# Patient Record
Sex: MALE | Race: BLACK OR AFRICAN AMERICAN | Employment: PART TIME | URBAN - METROPOLITAN AREA
[De-identification: names, ages, dates, MRNs, and addresses within clinical notes are randomized per-mention and may not be internally consistent; named-entity substitution may affect disease eponyms.]

---

## 2017-01-13 RX ORDER — TRAMADOL HYDROCHLORIDE 50 MG/1
50 TABLET ORAL EVERY 6 HOURS PRN
Qty: 40 TABLET | Refills: 0 | Status: SHIPPED | OUTPATIENT
Start: 2017-01-13 | End: 2017-01-25 | Stop reason: SDUPTHER

## 2017-01-25 ENCOUNTER — OFFICE VISIT (OUTPATIENT)
Dept: ORTHOPEDIC SURGERY | Age: 72
End: 2017-01-25

## 2017-01-25 VITALS
DIASTOLIC BLOOD PRESSURE: 86 MMHG | BODY MASS INDEX: 30.16 KG/M2 | SYSTOLIC BLOOD PRESSURE: 126 MMHG | HEART RATE: 74 BPM | HEIGHT: 74 IN | WEIGHT: 235 LBS

## 2017-01-25 DIAGNOSIS — M19.012 ARTHRITIS OF LEFT ACROMIOCLAVICULAR JOINT: ICD-10-CM

## 2017-01-25 DIAGNOSIS — Z98.890 S/P ROTATOR CUFF REPAIR: Primary | ICD-10-CM

## 2017-01-25 PROCEDURE — 99024 POSTOP FOLLOW-UP VISIT: CPT | Performed by: ORTHOPAEDIC SURGERY

## 2017-01-25 RX ORDER — TRAMADOL HYDROCHLORIDE 50 MG/1
50 TABLET ORAL EVERY 6 HOURS PRN
Qty: 40 TABLET | Refills: 0 | Status: SHIPPED | OUTPATIENT
Start: 2017-01-25 | End: 2017-02-04

## 2017-02-13 ENCOUNTER — OFFICE VISIT (OUTPATIENT)
Dept: INTERNAL MEDICINE CLINIC | Age: 72
End: 2017-02-13

## 2017-02-13 VITALS
BODY MASS INDEX: 29.92 KG/M2 | TEMPERATURE: 98.4 F | WEIGHT: 233 LBS | RESPIRATION RATE: 20 BRPM | OXYGEN SATURATION: 98 % | SYSTOLIC BLOOD PRESSURE: 132 MMHG | HEART RATE: 85 BPM | DIASTOLIC BLOOD PRESSURE: 62 MMHG

## 2017-02-13 DIAGNOSIS — J06.9 ACUTE URI: Primary | ICD-10-CM

## 2017-02-13 PROCEDURE — 99214 OFFICE O/P EST MOD 30 MIN: CPT | Performed by: INTERNAL MEDICINE

## 2017-02-13 RX ORDER — GUAIFENESIN 600 MG/1
1200 TABLET, EXTENDED RELEASE ORAL 2 TIMES DAILY
Qty: 28 TABLET | Refills: 0 | Status: SHIPPED | OUTPATIENT
Start: 2017-02-13 | End: 2019-10-22 | Stop reason: SDUPTHER

## 2017-02-13 ASSESSMENT — ENCOUNTER SYMPTOMS
SHORTNESS OF BREATH: 0
RHINORRHEA: 1
HEMOPTYSIS: 0
COUGH: 1
HEARTBURN: 0
SORE THROAT: 1
WHEEZING: 0

## 2017-02-15 DIAGNOSIS — R05.9 COUGH: Primary | ICD-10-CM

## 2017-02-15 RX ORDER — DEXTROMETHORPHAN HYDROBROMIDE AND PROMETHAZINE HYDROCHLORIDE 15; 6.25 MG/5ML; MG/5ML
5 SYRUP ORAL 4 TIMES DAILY PRN
Qty: 240 ML | Refills: 0 | Status: SHIPPED | OUTPATIENT
Start: 2017-02-15 | End: 2017-02-15 | Stop reason: SDUPTHER

## 2017-02-15 RX ORDER — AZITHROMYCIN 250 MG/1
TABLET, FILM COATED ORAL
Qty: 1 PACKET | Refills: 0 | Status: SHIPPED | OUTPATIENT
Start: 2017-02-15 | End: 2017-02-15 | Stop reason: SDUPTHER

## 2017-02-15 RX ORDER — DEXTROMETHORPHAN HYDROBROMIDE AND PROMETHAZINE HYDROCHLORIDE 15; 6.25 MG/5ML; MG/5ML
5 SYRUP ORAL 4 TIMES DAILY PRN
Qty: 240 ML | Refills: 0 | Status: SHIPPED | OUTPATIENT
Start: 2017-02-15 | End: 2018-01-08 | Stop reason: SDUPTHER

## 2017-02-15 RX ORDER — AZITHROMYCIN 250 MG/1
TABLET, FILM COATED ORAL
Qty: 1 PACKET | Refills: 0 | Status: SHIPPED | OUTPATIENT
Start: 2017-02-15 | End: 2017-03-20 | Stop reason: ALTCHOICE

## 2017-02-16 ENCOUNTER — TELEPHONE (OUTPATIENT)
Dept: INTERNAL MEDICINE CLINIC | Age: 72
End: 2017-02-16

## 2017-03-08 ENCOUNTER — OFFICE VISIT (OUTPATIENT)
Dept: RHEUMATOLOGY | Age: 72
End: 2017-03-08

## 2017-03-08 VITALS
BODY MASS INDEX: 30.03 KG/M2 | HEART RATE: 88 BPM | HEIGHT: 74 IN | DIASTOLIC BLOOD PRESSURE: 82 MMHG | WEIGHT: 234 LBS | SYSTOLIC BLOOD PRESSURE: 132 MMHG

## 2017-03-08 DIAGNOSIS — M25.511 CHRONIC PAIN OF BOTH SHOULDERS: Primary | ICD-10-CM

## 2017-03-08 DIAGNOSIS — Z79.1 ENCOUNTER FOR LONG-TERM (CURRENT) USE OF NON-STEROIDAL ANTI-INFLAMMATORIES: ICD-10-CM

## 2017-03-08 DIAGNOSIS — M65.9 FLEXOR TENOSYNOVITIS OF FINGER: ICD-10-CM

## 2017-03-08 DIAGNOSIS — G89.29 CHRONIC PAIN OF BOTH SHOULDERS: Primary | ICD-10-CM

## 2017-03-08 DIAGNOSIS — M25.512 CHRONIC PAIN OF BOTH SHOULDERS: Primary | ICD-10-CM

## 2017-03-08 LAB
ALBUMIN SERPL-MCNC: 4.4 G/DL (ref 3.4–5)
ALP BLD-CCNC: 110 U/L (ref 40–129)
ALT SERPL-CCNC: 21 U/L (ref 10–40)
AST SERPL-CCNC: 22 U/L (ref 15–37)
BASOPHILS ABSOLUTE: 0 K/UL (ref 0–0.2)
BASOPHILS RELATIVE PERCENT: 0.3 %
BILIRUB SERPL-MCNC: 0.4 MG/DL (ref 0–1)
BILIRUBIN DIRECT: <0.2 MG/DL (ref 0–0.3)
BILIRUBIN, INDIRECT: NORMAL MG/DL (ref 0–1)
CREAT SERPL-MCNC: 1.7 MG/DL (ref 0.8–1.3)
EOSINOPHILS ABSOLUTE: 0.1 K/UL (ref 0–0.6)
EOSINOPHILS RELATIVE PERCENT: 3.2 %
GFR AFRICAN AMERICAN: 48
GFR NON-AFRICAN AMERICAN: 40
HCT VFR BLD CALC: 43.2 % (ref 40.5–52.5)
HEMOGLOBIN: 13.5 G/DL (ref 13.5–17.5)
LYMPHOCYTES ABSOLUTE: 1.7 K/UL (ref 1–5.1)
LYMPHOCYTES RELATIVE PERCENT: 38.7 %
MCH RBC QN AUTO: 26.3 PG (ref 26–34)
MCHC RBC AUTO-ENTMCNC: 31.3 G/DL (ref 31–36)
MCV RBC AUTO: 83.8 FL (ref 80–100)
MONOCYTES ABSOLUTE: 0.4 K/UL (ref 0–1.3)
MONOCYTES RELATIVE PERCENT: 9.2 %
NEUTROPHILS ABSOLUTE: 2.1 K/UL (ref 1.7–7.7)
NEUTROPHILS RELATIVE PERCENT: 48.6 %
PDW BLD-RTO: 15.8 % (ref 12.4–15.4)
PLATELET # BLD: 166 K/UL (ref 135–450)
PMV BLD AUTO: 8.9 FL (ref 5–10.5)
RBC # BLD: 5.15 M/UL (ref 4.2–5.9)
TOTAL PROTEIN: 7.2 G/DL (ref 6.4–8.2)
WBC # BLD: 4.3 K/UL (ref 4–11)

## 2017-03-08 PROCEDURE — 99213 OFFICE O/P EST LOW 20 MIN: CPT | Performed by: INTERNAL MEDICINE

## 2017-03-08 RX ORDER — MELOXICAM 7.5 MG/1
7.5 TABLET ORAL DAILY
Qty: 90 TABLET | Refills: 1 | Status: SHIPPED | OUTPATIENT
Start: 2017-03-08 | End: 2017-06-19 | Stop reason: SDUPTHER

## 2017-03-08 RX ORDER — MELOXICAM 7.5 MG/1
7.5 TABLET ORAL DAILY
COMMUNITY
End: 2017-03-08 | Stop reason: SDUPTHER

## 2017-03-09 ENCOUNTER — TELEPHONE (OUTPATIENT)
Dept: INTERNAL MEDICINE CLINIC | Age: 72
End: 2017-03-09

## 2017-03-20 ENCOUNTER — OFFICE VISIT (OUTPATIENT)
Dept: INTERNAL MEDICINE CLINIC | Age: 72
End: 2017-03-20

## 2017-03-20 VITALS
WEIGHT: 234 LBS | HEART RATE: 72 BPM | TEMPERATURE: 98.2 F | SYSTOLIC BLOOD PRESSURE: 118 MMHG | OXYGEN SATURATION: 97 % | DIASTOLIC BLOOD PRESSURE: 80 MMHG | RESPIRATION RATE: 18 BRPM | BODY MASS INDEX: 30.04 KG/M2

## 2017-03-20 DIAGNOSIS — M25.511 CHRONIC PAIN OF BOTH SHOULDERS: Primary | ICD-10-CM

## 2017-03-20 DIAGNOSIS — G89.29 CHRONIC PAIN OF BOTH SHOULDERS: Primary | ICD-10-CM

## 2017-03-20 DIAGNOSIS — N17.9 ACUTE RENAL FAILURE, UNSPECIFIED ACUTE RENAL FAILURE TYPE (HCC): ICD-10-CM

## 2017-03-20 DIAGNOSIS — M25.512 CHRONIC PAIN OF BOTH SHOULDERS: Primary | ICD-10-CM

## 2017-03-20 PROCEDURE — 99214 OFFICE O/P EST MOD 30 MIN: CPT | Performed by: INTERNAL MEDICINE

## 2017-03-20 RX ORDER — TRAMADOL HYDROCHLORIDE 50 MG/1
50 TABLET ORAL EVERY 8 HOURS PRN
Qty: 90 TABLET | Refills: 1 | Status: SHIPPED | OUTPATIENT
Start: 2017-03-20 | End: 2017-03-30

## 2017-03-20 ASSESSMENT — PATIENT HEALTH QUESTIONNAIRE - PHQ9
1. LITTLE INTEREST OR PLEASURE IN DOING THINGS: 0
2. FEELING DOWN, DEPRESSED OR HOPELESS: 0
SUM OF ALL RESPONSES TO PHQ9 QUESTIONS 1 & 2: 0
SUM OF ALL RESPONSES TO PHQ QUESTIONS 1-9: 0

## 2017-03-27 ENCOUNTER — TELEPHONE (OUTPATIENT)
Dept: INTERNAL MEDICINE CLINIC | Age: 72
End: 2017-03-27

## 2017-03-27 RX ORDER — TRAMADOL HYDROCHLORIDE 50 MG/1
50 TABLET ORAL EVERY 8 HOURS PRN
Qty: 90 TABLET | Refills: 1 | OUTPATIENT
Start: 2017-03-27 | End: 2018-06-25 | Stop reason: SDUPTHER

## 2017-03-31 ASSESSMENT — ENCOUNTER SYMPTOMS: BACK PAIN: 0

## 2017-04-24 DIAGNOSIS — I10 ESSENTIAL HYPERTENSION: Primary | ICD-10-CM

## 2017-04-24 DIAGNOSIS — R79.89 ELEVATED SERUM CREATININE: ICD-10-CM

## 2017-04-24 DIAGNOSIS — I10 ESSENTIAL HYPERTENSION: ICD-10-CM

## 2017-04-24 LAB
ALBUMIN SERPL-MCNC: 4.2 G/DL (ref 3.4–5)
ANION GAP SERPL CALCULATED.3IONS-SCNC: 16 MMOL/L (ref 3–16)
BUN BLDV-MCNC: 17 MG/DL (ref 7–20)
CALCIUM SERPL-MCNC: 9 MG/DL (ref 8.3–10.6)
CHLORIDE BLD-SCNC: 102 MMOL/L (ref 99–110)
CO2: 27 MMOL/L (ref 21–32)
CREAT SERPL-MCNC: 1.3 MG/DL (ref 0.8–1.3)
GFR AFRICAN AMERICAN: >60
GFR NON-AFRICAN AMERICAN: 55
GLUCOSE BLD-MCNC: 108 MG/DL (ref 70–99)
PHOSPHORUS: 3.1 MG/DL (ref 2.5–4.9)
POTASSIUM SERPL-SCNC: 3.8 MMOL/L (ref 3.5–5.1)
SODIUM BLD-SCNC: 145 MMOL/L (ref 136–145)

## 2017-04-24 PROCEDURE — 36415 COLL VENOUS BLD VENIPUNCTURE: CPT | Performed by: INTERNAL MEDICINE

## 2017-04-28 ENCOUNTER — OFFICE VISIT (OUTPATIENT)
Dept: INTERNAL MEDICINE CLINIC | Age: 72
End: 2017-04-28

## 2017-04-28 VITALS
TEMPERATURE: 98.8 F | HEART RATE: 87 BPM | WEIGHT: 231 LBS | BODY MASS INDEX: 29.66 KG/M2 | DIASTOLIC BLOOD PRESSURE: 72 MMHG | SYSTOLIC BLOOD PRESSURE: 110 MMHG | OXYGEN SATURATION: 96 %

## 2017-04-28 DIAGNOSIS — E78.5 DYSLIPIDEMIA: ICD-10-CM

## 2017-04-28 DIAGNOSIS — I10 ESSENTIAL HYPERTENSION: Primary | ICD-10-CM

## 2017-04-28 DIAGNOSIS — N17.9 ACUTE RENAL FAILURE, UNSPECIFIED ACUTE RENAL FAILURE TYPE (HCC): ICD-10-CM

## 2017-04-28 PROCEDURE — 99213 OFFICE O/P EST LOW 20 MIN: CPT | Performed by: INTERNAL MEDICINE

## 2017-04-28 RX ORDER — ROSUVASTATIN CALCIUM 40 MG/1
40 TABLET, COATED ORAL EVERY EVENING
Qty: 90 TABLET | Refills: 2 | Status: SHIPPED | OUTPATIENT
Start: 2017-04-28 | End: 2018-09-18 | Stop reason: SDUPTHER

## 2017-04-30 ASSESSMENT — ENCOUNTER SYMPTOMS
RESPIRATORY NEGATIVE: 1
EYES NEGATIVE: 1

## 2017-05-02 ENCOUNTER — TELEPHONE (OUTPATIENT)
Dept: INTERNAL MEDICINE CLINIC | Age: 72
End: 2017-05-02

## 2017-06-01 DIAGNOSIS — I10 ESSENTIAL HYPERTENSION: ICD-10-CM

## 2017-06-01 LAB
ALBUMIN SERPL-MCNC: 4.4 G/DL (ref 3.4–5)
ANION GAP SERPL CALCULATED.3IONS-SCNC: 14 MMOL/L (ref 3–16)
BUN BLDV-MCNC: 19 MG/DL (ref 7–20)
CALCIUM SERPL-MCNC: 9.2 MG/DL (ref 8.3–10.6)
CHLORIDE BLD-SCNC: 98 MMOL/L (ref 99–110)
CO2: 27 MMOL/L (ref 21–32)
CREAT SERPL-MCNC: 1.3 MG/DL (ref 0.8–1.3)
GFR AFRICAN AMERICAN: >60
GFR NON-AFRICAN AMERICAN: 55
GLUCOSE BLD-MCNC: 79 MG/DL (ref 70–99)
PHOSPHORUS: 3.7 MG/DL (ref 2.5–4.9)
POTASSIUM SERPL-SCNC: 4 MMOL/L (ref 3.5–5.1)
SODIUM BLD-SCNC: 139 MMOL/L (ref 136–145)

## 2017-06-19 ENCOUNTER — OFFICE VISIT (OUTPATIENT)
Dept: INTERNAL MEDICINE CLINIC | Age: 72
End: 2017-06-19

## 2017-06-19 VITALS
HEIGHT: 73 IN | OXYGEN SATURATION: 98 % | RESPIRATION RATE: 18 BRPM | HEART RATE: 68 BPM | SYSTOLIC BLOOD PRESSURE: 124 MMHG | BODY MASS INDEX: 30.88 KG/M2 | TEMPERATURE: 98.3 F | WEIGHT: 233 LBS | DIASTOLIC BLOOD PRESSURE: 74 MMHG

## 2017-06-19 DIAGNOSIS — I10 ESSENTIAL HYPERTENSION: ICD-10-CM

## 2017-06-19 DIAGNOSIS — Z00.00 ROUTINE GENERAL MEDICAL EXAMINATION AT A HEALTH CARE FACILITY: ICD-10-CM

## 2017-06-19 DIAGNOSIS — Z72.89 OTHER PROBLEMS RELATED TO LIFESTYLE: ICD-10-CM

## 2017-06-19 DIAGNOSIS — R35.1 NOCTURIA: ICD-10-CM

## 2017-06-19 DIAGNOSIS — Z12.5 SCREENING FOR PROSTATE CANCER: ICD-10-CM

## 2017-06-19 DIAGNOSIS — Z23 NEED FOR PROPHYLACTIC VACCINATION WITH COMBINED DIPHTHERIA-TETANUS-PERTUSSIS (DTP) VACCINE: ICD-10-CM

## 2017-06-19 DIAGNOSIS — Z23 NEED FOR SHINGLES VACCINE: ICD-10-CM

## 2017-06-19 PROCEDURE — G0438 PPPS, INITIAL VISIT: HCPCS | Performed by: INTERNAL MEDICINE

## 2017-06-19 RX ORDER — TRIAMTERENE AND HYDROCHLOROTHIAZIDE 37.5; 25 MG/1; MG/1
1 TABLET ORAL DAILY
Qty: 90 TABLET | Refills: 3 | Status: SHIPPED | OUTPATIENT
Start: 2017-06-19 | End: 2017-10-16 | Stop reason: SDUPTHER

## 2017-06-19 RX ORDER — MELOXICAM 7.5 MG/1
7.5 TABLET ORAL DAILY
Qty: 90 TABLET | Refills: 1 | Status: SHIPPED | OUTPATIENT
Start: 2017-06-19 | End: 2017-10-16 | Stop reason: SDUPTHER

## 2017-06-19 RX ORDER — LOSARTAN POTASSIUM 100 MG/1
100 TABLET ORAL DAILY
Qty: 90 TABLET | Refills: 3 | Status: SHIPPED | OUTPATIENT
Start: 2017-06-19 | End: 2017-10-16 | Stop reason: SDUPTHER

## 2017-06-19 ASSESSMENT — ANXIETY QUESTIONNAIRES: GAD7 TOTAL SCORE: 0

## 2017-06-19 ASSESSMENT — LIFESTYLE VARIABLES
HAS A RELATIVE, FRIEND, DOCTOR, OR ANOTHER HEALTH PROFESSIONAL EXPRESSED CONCERN ABOUT YOUR DRINKING OR SUGGESTED YOU CUT DOWN: 0
HOW OFTEN DURING THE LAST YEAR HAVE YOU FAILED TO DO WHAT WAS NORMALLY EXPECTED FROM YOU BECAUSE OF DRINKING: 0
HOW OFTEN DURING THE LAST YEAR HAVE YOU BEEN UNABLE TO REMEMBER WHAT HAPPENED THE NIGHT BEFORE BECAUSE YOU HAD BEEN DRINKING: 0
HOW OFTEN DO YOU HAVE SIX OR MORE DRINKS ON ONE OCCASION: 0
AUDIT-C TOTAL SCORE: 1
HOW OFTEN DO YOU HAVE A DRINK CONTAINING ALCOHOL: 1
HOW OFTEN DURING THE LAST YEAR HAVE YOU NEEDED AN ALCOHOLIC DRINK FIRST THING IN THE MORNING TO GET YOURSELF GOING AFTER A NIGHT OF HEAVY DRINKING: 0
HOW OFTEN DURING THE LAST YEAR HAVE YOU HAD A FEELING OF GUILT OR REMORSE AFTER DRINKING: 0
HOW MANY STANDARD DRINKS CONTAINING ALCOHOL DO YOU HAVE ON A TYPICAL DAY: 0
HOW OFTEN DURING THE LAST YEAR HAVE YOU FOUND THAT YOU WERE NOT ABLE TO STOP DRINKING ONCE YOU HAD STARTED: 0

## 2017-06-19 ASSESSMENT — PATIENT HEALTH QUESTIONNAIRE - PHQ9: SUM OF ALL RESPONSES TO PHQ QUESTIONS 1-9: 0

## 2017-06-21 ENCOUNTER — TELEPHONE (OUTPATIENT)
Dept: INTERNAL MEDICINE CLINIC | Age: 72
End: 2017-06-21

## 2017-08-08 ENCOUNTER — OFFICE VISIT (OUTPATIENT)
Dept: RHEUMATOLOGY | Age: 72
End: 2017-08-08

## 2017-08-08 VITALS
BODY MASS INDEX: 31 KG/M2 | HEART RATE: 76 BPM | WEIGHT: 235 LBS | DIASTOLIC BLOOD PRESSURE: 82 MMHG | SYSTOLIC BLOOD PRESSURE: 118 MMHG

## 2017-08-08 DIAGNOSIS — G89.29 CHRONIC PAIN OF BOTH SHOULDERS: Primary | ICD-10-CM

## 2017-08-08 DIAGNOSIS — M25.511 CHRONIC PAIN OF BOTH SHOULDERS: Primary | ICD-10-CM

## 2017-08-08 DIAGNOSIS — M25.512 CHRONIC PAIN OF BOTH SHOULDERS: Primary | ICD-10-CM

## 2017-08-08 PROCEDURE — 99213 OFFICE O/P EST LOW 20 MIN: CPT | Performed by: INTERNAL MEDICINE

## 2017-10-06 DIAGNOSIS — Z12.5 SCREENING FOR PROSTATE CANCER: ICD-10-CM

## 2017-10-06 DIAGNOSIS — I10 ESSENTIAL HYPERTENSION: ICD-10-CM

## 2017-10-06 DIAGNOSIS — R35.1 NOCTURIA: ICD-10-CM

## 2017-10-06 DIAGNOSIS — Z72.89 OTHER PROBLEMS RELATED TO LIFESTYLE: ICD-10-CM

## 2017-10-06 LAB
ALBUMIN SERPL-MCNC: 4.2 G/DL (ref 3.4–5)
ANION GAP SERPL CALCULATED.3IONS-SCNC: 12 MMOL/L (ref 3–16)
BUN BLDV-MCNC: 17 MG/DL (ref 7–20)
CALCIUM SERPL-MCNC: 9.1 MG/DL (ref 8.3–10.6)
CHLORIDE BLD-SCNC: 103 MMOL/L (ref 99–110)
CO2: 28 MMOL/L (ref 21–32)
CREAT SERPL-MCNC: 1.4 MG/DL (ref 0.8–1.3)
GFR AFRICAN AMERICAN: >60
GFR NON-AFRICAN AMERICAN: 50
GLUCOSE BLD-MCNC: 97 MG/DL (ref 70–99)
HEPATITIS C ANTIBODY INTERPRETATION: NORMAL
PHOSPHORUS: 3.3 MG/DL (ref 2.5–4.9)
POTASSIUM SERPL-SCNC: 3.9 MMOL/L (ref 3.5–5.1)
PROSTATE SPECIFIC ANTIGEN: 2.22 NG/ML (ref 0–4)
SODIUM BLD-SCNC: 143 MMOL/L (ref 136–145)

## 2017-10-16 ENCOUNTER — OFFICE VISIT (OUTPATIENT)
Dept: INTERNAL MEDICINE CLINIC | Age: 72
End: 2017-10-16

## 2017-10-16 VITALS
OXYGEN SATURATION: 98 % | RESPIRATION RATE: 16 BRPM | BODY MASS INDEX: 31.27 KG/M2 | HEART RATE: 68 BPM | DIASTOLIC BLOOD PRESSURE: 76 MMHG | WEIGHT: 237 LBS | SYSTOLIC BLOOD PRESSURE: 128 MMHG | TEMPERATURE: 98.2 F

## 2017-10-16 DIAGNOSIS — N18.2 CRI (CHRONIC RENAL INSUFFICIENCY), STAGE 2 (MILD): ICD-10-CM

## 2017-10-16 DIAGNOSIS — I10 ESSENTIAL HYPERTENSION: ICD-10-CM

## 2017-10-16 DIAGNOSIS — N52.9 VASCULOGENIC ERECTILE DYSFUNCTION, UNSPECIFIED VASCULOGENIC ERECTILE DYSFUNCTION TYPE: Primary | ICD-10-CM

## 2017-10-16 PROCEDURE — 99214 OFFICE O/P EST MOD 30 MIN: CPT | Performed by: INTERNAL MEDICINE

## 2017-10-16 RX ORDER — SILDENAFIL 100 MG/1
100 TABLET, FILM COATED ORAL PRN
Qty: 4 TABLET | Refills: 0 | COMMUNITY
Start: 2017-10-16 | End: 2020-08-06 | Stop reason: SDUPTHER

## 2017-10-16 RX ORDER — TRIAMTERENE AND HYDROCHLOROTHIAZIDE 37.5; 25 MG/1; MG/1
1 TABLET ORAL DAILY
Qty: 90 TABLET | Refills: 3 | Status: SHIPPED | OUTPATIENT
Start: 2017-10-16 | End: 2018-09-28 | Stop reason: SDUPTHER

## 2017-10-16 RX ORDER — MELOXICAM 7.5 MG/1
7.5 TABLET ORAL DAILY
Qty: 90 TABLET | Refills: 1 | Status: SHIPPED | OUTPATIENT
Start: 2017-10-16 | End: 2018-09-18 | Stop reason: SDUPTHER

## 2017-10-16 RX ORDER — LOSARTAN POTASSIUM 100 MG/1
100 TABLET ORAL DAILY
Qty: 90 TABLET | Refills: 3 | Status: SHIPPED | OUTPATIENT
Start: 2017-10-16 | End: 2018-11-29 | Stop reason: SDUPTHER

## 2017-10-16 RX ORDER — INFLUENZA A VIRUS A/MICHIGAN/45/2015 X-275 (H1N1) ANTIGEN (FORMALDEHYDE INACTIVATED), INFLUENZA A VIRUS A/SINGAPORE/INFIMH-16-0019/2016 IVR-186 (H3N2) ANTIGEN (FORMALDEHYDE INACTIVATED), AND INFLUENZA B VIRUS B/MARYLAND/15/2016 BX-69A (A B/COLORADO/6/2017-LIKE VIRUS) ANTIGEN (FORMALDEHYDE INACTIVATED) 60; 60; 60 UG/.5ML; UG/.5ML; UG/.5ML
INJECTION, SUSPENSION INTRAMUSCULAR
Refills: 0 | COMMUNITY
Start: 2017-09-28 | End: 2018-01-08 | Stop reason: ALTCHOICE

## 2017-10-16 ASSESSMENT — ENCOUNTER SYMPTOMS: RESPIRATORY NEGATIVE: 1

## 2017-10-16 NOTE — PROGRESS NOTES
Subjective:      Patient ID: Ken Hurley is a 81 y.o. male.     HPI    Review of Systems    Objective:   Physical Exam    Assessment:      ***      Plan:      ***

## 2017-10-16 NOTE — PROGRESS NOTES
Subjective:      Patient ID: Ban Malik is a 81 y.o. male. Chief Complaint   Patient presents with    Hypertension     follow up  HTN    Chronic Kidney Disease     stable    Shoulder Pain     improved       HPI   Subjective:       Ban Malik is a 81 y.o. male here for evaluation and treatment of erectile dysfunction. Onset of dysfunction was several weeks ago and onset was gradual. Patient states he has difficulty both attaining and maintaining erection. Full erections occur with intercourse. Partial erections occur with intercourse and spontaneously. Libido is not affected. Risk factors for ED include antihypertensive medications and diabetes mellitus. Patient denies history of hypogonadism, neurologic disease, and pelvic radiation. Patient's expectations of sexual function  To maintain an erection. Patient describes relationship with partner as good. Previous treatment of ED includes cialis. Hypertension:  Home blood pressure monitoring: No.  He is adherent to a low sodium diet. Patient denies headache, lightheadedness, blurred vision, peripheral edema and palpitations. Antihypertensive medication side effects: no medication side effects noted. Use of agents associated with hypertension: none. Sodium (mmol/L)   Date Value   10/06/2017 143    BUN (mg/dL)   Date Value   10/06/2017 17    Glucose (mg/dL)   Date Value   10/06/2017 97      Potassium (mmol/L)   Date Value   10/06/2017 3.9    CREATININE (mg/dL)   Date Value   10/06/2017 1.4 (H)         Shoulder pain is somewhat improved. He feels much better with and has been using meloxicam intermittently. Review of Systems   Constitutional: Negative. HENT: Negative. Respiratory: Negative. Cardiovascular: Negative. Negative for chest pain and leg swelling. Gastrointestinal: Negative. Negative for abdominal distention, abdominal pain and anal bleeding. Musculoskeletal: Negative.   Negative for Head: Normocephalic and atraumatic. Eyes: Conjunctivae and EOM are normal. Pupils are equal, round, and reactive to light. Neck: Normal range of motion. Neck supple. Cardiovascular: Normal rate, regular rhythm and intact distal pulses. Exam reveals gallop. No murmur heard. Pulmonary/Chest: Effort normal and breath sounds normal. No respiratory distress. He has no wheezes. Abdominal: Soft. Bowel sounds are normal. He exhibits no distension. Musculoskeletal: Normal range of motion. Neurological: He is alert and oriented to person, place, and time. Skin: Skin is warm. Psychiatric: He has a normal mood and affect. His behavior is normal. Judgment and thought content normal.   Nursing note and vitals reviewed. Assessment/Plan:  Kita Moreno was seen today for hypertension. Diagnoses and all orders for this visit:    Vasculogenic erectile dysfunction, unspecified vasculogenic erectile dysfunction type  -     sildenafil (VIAGRA) 100 MG tablet; Take 1 tablet by mouth as needed for Erectile Dysfunction    Essential hypertension  -     losartan (COZAAR) 100 MG tablet; Take 1 tablet by mouth daily  -     triamterene-hydrochlorothiazide (MAXZIDE-25) 37.5-25 MG per tablet; Take 1 tablet by mouth daily    Other orders. CRI/shoulder pain  -     meloxicam (MOBIC) 7.5 MG tablet; Take 1 tablet by mouth daily  -     Continue follow renal function, patient will continue to use medicine sparingly      No Follow-up on file.

## 2017-11-02 ASSESSMENT — ENCOUNTER SYMPTOMS
ANAL BLEEDING: 0
BACK PAIN: 0
ABDOMINAL PAIN: 0
GASTROINTESTINAL NEGATIVE: 1
ABDOMINAL DISTENTION: 0

## 2018-01-08 ENCOUNTER — OFFICE VISIT (OUTPATIENT)
Dept: INTERNAL MEDICINE CLINIC | Age: 73
End: 2018-01-08

## 2018-01-08 VITALS
WEIGHT: 239 LBS | DIASTOLIC BLOOD PRESSURE: 68 MMHG | OXYGEN SATURATION: 98 % | BODY MASS INDEX: 30.67 KG/M2 | HEART RATE: 85 BPM | TEMPERATURE: 98.7 F | SYSTOLIC BLOOD PRESSURE: 102 MMHG | HEIGHT: 74 IN

## 2018-01-08 DIAGNOSIS — R68.89 FLU-LIKE SYMPTOMS: ICD-10-CM

## 2018-01-08 DIAGNOSIS — J20.9 ACUTE BRONCHITIS, UNSPECIFIED ORGANISM: Primary | ICD-10-CM

## 2018-01-08 LAB
INFLUENZA A ANTIBODY: NEGATIVE
INFLUENZA B ANTIBODY: NEGATIVE

## 2018-01-08 PROCEDURE — 87804 INFLUENZA ASSAY W/OPTIC: CPT | Performed by: INTERNAL MEDICINE

## 2018-01-08 PROCEDURE — 99213 OFFICE O/P EST LOW 20 MIN: CPT | Performed by: INTERNAL MEDICINE

## 2018-01-08 RX ORDER — AZITHROMYCIN 250 MG/1
TABLET, FILM COATED ORAL
Qty: 6 TABLET | Refills: 0 | Status: SHIPPED | OUTPATIENT
Start: 2018-01-08 | End: 2018-03-12 | Stop reason: ALTCHOICE

## 2018-01-08 RX ORDER — DEXTROMETHORPHAN HYDROBROMIDE AND PROMETHAZINE HYDROCHLORIDE 15; 6.25 MG/5ML; MG/5ML
5 SYRUP ORAL 4 TIMES DAILY PRN
Qty: 240 ML | Refills: 0 | Status: SHIPPED | OUTPATIENT
Start: 2018-01-08 | End: 2018-01-15

## 2018-01-08 NOTE — PROGRESS NOTES
Chief Complaint   Patient presents with    Cough    Headache    Generalized Body Aches          Subjective:  4 days of productive cough  +shortness of breath  No chest pain  No runny nose or sore throat  No ear pain    History:     Past Medical History:   Diagnosis Date    Arthritis     Hyperlipemia     Hypertension     Rheumatoid arthritis ([address]        No past surgical history on file. Social History     Social History    Marital status:      Spouse name: N/A    Number of children: 4    Years of education: N/A     Occupational History    part-time to stay busy      [employer] History Main Topics    Smoking status: Former Smoker     Years: 10.00     Quit date: 2/11/1984    Smokeless tobacco: Never Used      Comment: quit smoking 30 years     Alcohol use Yes      Comment: rare    Drug use: No    Sexual activity: Not on file     Other Topics Concern    Not on file     Social History Narrative    Lives home with wife and has 4 children. Family History   Problem Relation Age of Onset    High Blood Pressure Sister        No Known Allergies     Review of Systems:    Review of Systems   Constitutional: Negative for unexpected weight change. HENT: Negative. Eyes: Negative. Respiratory: Positive for cough and chest tightness. Negative for wheezing. Cardiovascular: Negative for chest pain. Gastrointestinal: Negative. Objective:    Vitals:    01/08/18 1414   BP: 102/68   Pulse: 85   Temp: 98.7 °F (37.1 °C)   TempSrc: Oral   SpO2: 98%   Weight: 239 lb (108.4 kg)   Height: 6' 2\" (1.88 m)     Wt Readings from Last 3 Encounters:   01/08/18 239 lb (108.4 kg)   10/16/17 237 lb (107.5 kg)   08/08/17 235 lb (106.6 kg)       Body mass index is 30.69 kg/m². Physical Exam   Constitutional: He appears well-developed and well-nourished. No distress. HENT:   Head: Normocephalic. Mouth/Throat: Oropharynx is clear and moist. No oropharyngeal exudate.

## 2018-01-15 ASSESSMENT — ENCOUNTER SYMPTOMS
WHEEZING: 0
EYES NEGATIVE: 1
COUGH: 1
CHEST TIGHTNESS: 1
GASTROINTESTINAL NEGATIVE: 1

## 2018-01-24 ENCOUNTER — TELEPHONE (OUTPATIENT)
Dept: INTERNAL MEDICINE CLINIC | Age: 73
End: 2018-01-24

## 2018-03-12 ENCOUNTER — OFFICE VISIT (OUTPATIENT)
Dept: INTERNAL MEDICINE CLINIC | Age: 73
End: 2018-03-12

## 2018-03-12 VITALS
RESPIRATION RATE: 18 BRPM | DIASTOLIC BLOOD PRESSURE: 76 MMHG | BODY MASS INDEX: 30.93 KG/M2 | WEIGHT: 241 LBS | SYSTOLIC BLOOD PRESSURE: 126 MMHG | HEART RATE: 68 BPM | OXYGEN SATURATION: 96 % | TEMPERATURE: 98.4 F | HEIGHT: 74 IN

## 2018-03-12 DIAGNOSIS — E78.2 MIXED HYPERLIPIDEMIA: ICD-10-CM

## 2018-03-12 DIAGNOSIS — I10 ESSENTIAL HYPERTENSION: Primary | ICD-10-CM

## 2018-03-12 PROCEDURE — 99214 OFFICE O/P EST MOD 30 MIN: CPT | Performed by: INTERNAL MEDICINE

## 2018-03-18 ASSESSMENT — ENCOUNTER SYMPTOMS
RESPIRATORY NEGATIVE: 1
GASTROINTESTINAL NEGATIVE: 1

## 2018-03-28 DIAGNOSIS — E78.2 MIXED HYPERLIPIDEMIA: ICD-10-CM

## 2018-03-28 DIAGNOSIS — I10 ESSENTIAL HYPERTENSION: ICD-10-CM

## 2018-03-28 LAB
A/G RATIO: 1.7 (ref 1.1–2.2)
ALBUMIN SERPL-MCNC: 4.5 G/DL (ref 3.4–5)
ALP BLD-CCNC: 89 U/L (ref 40–129)
ALT SERPL-CCNC: 28 U/L (ref 10–40)
ANION GAP SERPL CALCULATED.3IONS-SCNC: 16 MMOL/L (ref 3–16)
AST SERPL-CCNC: 29 U/L (ref 15–37)
BILIRUB SERPL-MCNC: 0.3 MG/DL (ref 0–1)
BUN BLDV-MCNC: 18 MG/DL (ref 7–20)
CALCIUM SERPL-MCNC: 9.3 MG/DL (ref 8.3–10.6)
CHLORIDE BLD-SCNC: 102 MMOL/L (ref 99–110)
CHOLESTEROL, TOTAL: 178 MG/DL (ref 0–199)
CO2: 26 MMOL/L (ref 21–32)
CREAT SERPL-MCNC: 1.3 MG/DL (ref 0.8–1.3)
GFR AFRICAN AMERICAN: >60
GFR NON-AFRICAN AMERICAN: 54
GLOBULIN: 2.6 G/DL
GLUCOSE BLD-MCNC: 98 MG/DL (ref 70–99)
HDLC SERPL-MCNC: 55 MG/DL (ref 40–60)
LDL CHOLESTEROL CALCULATED: 95 MG/DL
POTASSIUM SERPL-SCNC: 3.8 MMOL/L (ref 3.5–5.1)
SODIUM BLD-SCNC: 144 MMOL/L (ref 136–145)
TOTAL PROTEIN: 7.1 G/DL (ref 6.4–8.2)
TRIGL SERPL-MCNC: 139 MG/DL (ref 0–150)
VLDLC SERPL CALC-MCNC: 28 MG/DL

## 2018-06-25 ENCOUNTER — TELEPHONE (OUTPATIENT)
Dept: INTERNAL MEDICINE CLINIC | Age: 73
End: 2018-06-25

## 2018-06-25 DIAGNOSIS — M25.511 CHRONIC PAIN OF BOTH SHOULDERS: Primary | ICD-10-CM

## 2018-06-25 DIAGNOSIS — G89.29 CHRONIC PAIN OF BOTH SHOULDERS: Primary | ICD-10-CM

## 2018-06-25 DIAGNOSIS — M25.512 CHRONIC PAIN OF BOTH SHOULDERS: Primary | ICD-10-CM

## 2018-06-25 RX ORDER — TRAMADOL HYDROCHLORIDE 50 MG/1
50 TABLET ORAL EVERY 8 HOURS PRN
Qty: 90 TABLET | Refills: 1 | Status: SHIPPED | OUTPATIENT
Start: 2018-06-25 | End: 2019-10-22 | Stop reason: SDUPTHER

## 2018-09-18 DIAGNOSIS — I10 ESSENTIAL HYPERTENSION: ICD-10-CM

## 2018-09-18 DIAGNOSIS — E78.5 DYSLIPIDEMIA: ICD-10-CM

## 2018-09-18 RX ORDER — MELOXICAM 7.5 MG/1
TABLET ORAL
Qty: 90 TABLET | Refills: 1 | Status: SHIPPED | OUTPATIENT
Start: 2018-09-18 | End: 2019-03-02 | Stop reason: SDUPTHER

## 2018-09-19 RX ORDER — ROSUVASTATIN CALCIUM 40 MG/1
TABLET, COATED ORAL
Qty: 90 TABLET | Refills: 2 | Status: SHIPPED | OUTPATIENT
Start: 2018-09-19 | End: 2020-02-14

## 2018-09-28 ENCOUNTER — OFFICE VISIT (OUTPATIENT)
Dept: INTERNAL MEDICINE CLINIC | Age: 73
End: 2018-09-28
Payer: COMMERCIAL

## 2018-09-28 VITALS
BODY MASS INDEX: 31.57 KG/M2 | WEIGHT: 246 LBS | TEMPERATURE: 98.4 F | HEART RATE: 68 BPM | RESPIRATION RATE: 16 BRPM | SYSTOLIC BLOOD PRESSURE: 126 MMHG | DIASTOLIC BLOOD PRESSURE: 74 MMHG | OXYGEN SATURATION: 97 % | HEIGHT: 74 IN

## 2018-09-28 DIAGNOSIS — I10 ESSENTIAL HYPERTENSION: Primary | ICD-10-CM

## 2018-09-28 DIAGNOSIS — M25.831 MASS OF JOINT OF RIGHT WRIST: ICD-10-CM

## 2018-09-28 PROCEDURE — 99213 OFFICE O/P EST LOW 20 MIN: CPT | Performed by: INTERNAL MEDICINE

## 2018-09-28 PROCEDURE — 90662 IIV NO PRSV INCREASED AG IM: CPT | Performed by: INTERNAL MEDICINE

## 2018-09-28 PROCEDURE — G0008 ADMIN INFLUENZA VIRUS VAC: HCPCS | Performed by: INTERNAL MEDICINE

## 2018-09-28 RX ORDER — TRIAMTERENE AND HYDROCHLOROTHIAZIDE 37.5; 25 MG/1; MG/1
1 TABLET ORAL DAILY
Qty: 90 TABLET | Refills: 3 | Status: SHIPPED | OUTPATIENT
Start: 2018-09-28 | End: 2019-09-01 | Stop reason: SDUPTHER

## 2018-09-28 ASSESSMENT — PATIENT HEALTH QUESTIONNAIRE - PHQ9
1. LITTLE INTEREST OR PLEASURE IN DOING THINGS: 1
SUM OF ALL RESPONSES TO PHQ9 QUESTIONS 1 & 2: 1
SUM OF ALL RESPONSES TO PHQ QUESTIONS 1-9: 1
2. FEELING DOWN, DEPRESSED OR HOPELESS: 0
SUM OF ALL RESPONSES TO PHQ QUESTIONS 1-9: 1

## 2018-09-29 ASSESSMENT — ENCOUNTER SYMPTOMS
EYES NEGATIVE: 1
ALLERGIC/IMMUNOLOGIC NEGATIVE: 1
RESPIRATORY NEGATIVE: 1

## 2018-10-06 ENCOUNTER — HOSPITAL ENCOUNTER (OUTPATIENT)
Age: 73
Discharge: HOME | End: 2018-10-06
Payer: COMMERCIAL

## 2018-10-06 ENCOUNTER — HOSPITAL ENCOUNTER (OUTPATIENT)
Dept: GENERAL RADIOLOGY | Age: 73
Discharge: HOME | End: 2018-10-06
Payer: COMMERCIAL

## 2018-10-06 DIAGNOSIS — M25.831 MASS OF JOINT OF RIGHT WRIST: ICD-10-CM

## 2018-10-06 PROCEDURE — 73100 X-RAY EXAM OF WRIST: CPT

## 2018-10-09 DIAGNOSIS — M25.831 MASS OF JOINT OF RIGHT WRIST: ICD-10-CM

## 2018-10-09 DIAGNOSIS — I10 ESSENTIAL HYPERTENSION: ICD-10-CM

## 2018-10-09 LAB
ALBUMIN SERPL-MCNC: 4.2 G/DL (ref 3.4–5)
ANION GAP SERPL CALCULATED.3IONS-SCNC: 14 MMOL/L (ref 3–16)
BUN BLDV-MCNC: 20 MG/DL (ref 7–20)
CALCIUM SERPL-MCNC: 9.6 MG/DL (ref 8.3–10.6)
CHLORIDE BLD-SCNC: 104 MMOL/L (ref 99–110)
CO2: 26 MMOL/L (ref 21–32)
CREAT SERPL-MCNC: 1.6 MG/DL (ref 0.8–1.3)
GFR AFRICAN AMERICAN: 52
GFR NON-AFRICAN AMERICAN: 43
GLUCOSE BLD-MCNC: 101 MG/DL (ref 70–99)
PHOSPHORUS: 3 MG/DL (ref 2.5–4.9)
POTASSIUM SERPL-SCNC: 4.1 MMOL/L (ref 3.5–5.1)
SODIUM BLD-SCNC: 144 MMOL/L (ref 136–145)
URIC ACID, SERUM: 7.5 MG/DL (ref 3.5–7.2)

## 2019-03-05 RX ORDER — MELOXICAM 7.5 MG/1
TABLET ORAL
Qty: 90 TABLET | Refills: 1 | Status: SHIPPED | OUTPATIENT
Start: 2019-03-05 | End: 2019-09-01 | Stop reason: SDUPTHER

## 2019-03-12 ENCOUNTER — OFFICE VISIT (OUTPATIENT)
Dept: INTERNAL MEDICINE CLINIC | Age: 74
End: 2019-03-12
Payer: COMMERCIAL

## 2019-03-12 VITALS
OXYGEN SATURATION: 98 % | HEART RATE: 68 BPM | TEMPERATURE: 97.8 F | DIASTOLIC BLOOD PRESSURE: 60 MMHG | WEIGHT: 244.2 LBS | SYSTOLIC BLOOD PRESSURE: 124 MMHG | HEIGHT: 74 IN | RESPIRATION RATE: 16 BRPM | BODY MASS INDEX: 31.34 KG/M2

## 2019-03-12 DIAGNOSIS — L30.9 ECZEMA, UNSPECIFIED TYPE: ICD-10-CM

## 2019-03-12 DIAGNOSIS — N28.9 RENAL DYSFUNCTION: Primary | ICD-10-CM

## 2019-03-12 DIAGNOSIS — Z23 NEED FOR PROPHYLACTIC VACCINATION AND INOCULATION AGAINST VARICELLA: ICD-10-CM

## 2019-03-12 DIAGNOSIS — Z23 NEED FOR PROPHYLACTIC VACCINATION AGAINST DIPHTHERIA-TETANUS-PERTUSSIS (DTP): ICD-10-CM

## 2019-03-12 DIAGNOSIS — Z00.00 ROUTINE GENERAL MEDICAL EXAMINATION AT A HEALTH CARE FACILITY: ICD-10-CM

## 2019-03-12 PROCEDURE — G0439 PPPS, SUBSEQ VISIT: HCPCS | Performed by: INTERNAL MEDICINE

## 2019-03-12 RX ORDER — CLOBETASOL PROPIONATE 0.5 MG/G
OINTMENT TOPICAL
Qty: 1 TUBE | Refills: 5 | Status: SHIPPED | OUTPATIENT
Start: 2019-03-12 | End: 2019-04-17 | Stop reason: SDUPTHER

## 2019-03-12 RX ORDER — SILDENAFIL 100 MG/1
100 TABLET, FILM COATED ORAL DAILY PRN
Qty: 30 TABLET | Refills: 3 | Status: SHIPPED | OUTPATIENT
Start: 2019-03-12 | End: 2021-06-28 | Stop reason: SDUPTHER

## 2019-03-12 ASSESSMENT — LIFESTYLE VARIABLES: HOW OFTEN DO YOU HAVE A DRINK CONTAINING ALCOHOL: 0

## 2019-03-12 ASSESSMENT — ANXIETY QUESTIONNAIRES: GAD7 TOTAL SCORE: 0

## 2019-03-12 ASSESSMENT — PATIENT HEALTH QUESTIONNAIRE - PHQ9
SUM OF ALL RESPONSES TO PHQ QUESTIONS 1-9: 0
SUM OF ALL RESPONSES TO PHQ QUESTIONS 1-9: 0

## 2019-03-13 DIAGNOSIS — N28.9 RENAL DYSFUNCTION: ICD-10-CM

## 2019-03-13 DIAGNOSIS — Z00.00 ROUTINE GENERAL MEDICAL EXAMINATION AT A HEALTH CARE FACILITY: ICD-10-CM

## 2019-03-13 LAB
A/G RATIO: 2 (ref 1.1–2.2)
ALBUMIN SERPL-MCNC: 4.2 G/DL (ref 3.4–5)
ALBUMIN SERPL-MCNC: 4.4 G/DL (ref 3.4–5)
ALP BLD-CCNC: 70 U/L (ref 40–129)
ALT SERPL-CCNC: 22 U/L (ref 10–40)
ANION GAP SERPL CALCULATED.3IONS-SCNC: 13 MMOL/L (ref 3–16)
ANION GAP SERPL CALCULATED.3IONS-SCNC: 14 MMOL/L (ref 3–16)
AST SERPL-CCNC: 29 U/L (ref 15–37)
BILIRUB SERPL-MCNC: 0.3 MG/DL (ref 0–1)
BUN BLDV-MCNC: 19 MG/DL (ref 7–20)
BUN BLDV-MCNC: 19 MG/DL (ref 7–20)
CALCIUM SERPL-MCNC: 9.1 MG/DL (ref 8.3–10.6)
CALCIUM SERPL-MCNC: 9.2 MG/DL (ref 8.3–10.6)
CHLORIDE BLD-SCNC: 102 MMOL/L (ref 99–110)
CHLORIDE BLD-SCNC: 103 MMOL/L (ref 99–110)
CHOLESTEROL, TOTAL: 168 MG/DL (ref 0–199)
CO2: 27 MMOL/L (ref 21–32)
CO2: 27 MMOL/L (ref 21–32)
CREAT SERPL-MCNC: 1.4 MG/DL (ref 0.8–1.3)
CREAT SERPL-MCNC: 1.4 MG/DL (ref 0.8–1.3)
GFR AFRICAN AMERICAN: >60
GFR AFRICAN AMERICAN: >60
GFR NON-AFRICAN AMERICAN: 50
GFR NON-AFRICAN AMERICAN: 50
GLOBULIN: 2.2 G/DL
GLUCOSE BLD-MCNC: 103 MG/DL (ref 70–99)
GLUCOSE BLD-MCNC: 104 MG/DL (ref 70–99)
HDLC SERPL-MCNC: 51 MG/DL (ref 40–60)
LDL CHOLESTEROL CALCULATED: 91 MG/DL
PHOSPHORUS: 3.8 MG/DL (ref 2.5–4.9)
POTASSIUM SERPL-SCNC: 4.1 MMOL/L (ref 3.5–5.1)
POTASSIUM SERPL-SCNC: 4.2 MMOL/L (ref 3.5–5.1)
SODIUM BLD-SCNC: 142 MMOL/L (ref 136–145)
SODIUM BLD-SCNC: 144 MMOL/L (ref 136–145)
TOTAL PROTEIN: 6.6 G/DL (ref 6.4–8.2)
TRIGL SERPL-MCNC: 132 MG/DL (ref 0–150)
VLDLC SERPL CALC-MCNC: 26 MG/DL

## 2019-03-14 LAB
ESTIMATED AVERAGE GLUCOSE: 122.6 MG/DL
HBA1C MFR BLD: 5.9 %

## 2019-03-16 LAB — VITAMIN D 1,25-DIHYDROXY: 51.4 PG/ML (ref 19.9–79.3)

## 2019-03-18 ENCOUNTER — TELEPHONE (OUTPATIENT)
Dept: INTERNAL MEDICINE CLINIC | Age: 74
End: 2019-03-18

## 2019-03-18 DIAGNOSIS — L30.9 ECZEMA, UNSPECIFIED TYPE: ICD-10-CM

## 2019-03-18 NOTE — TELEPHONE ENCOUNTER
Patient states his insurance will not cover the Temovate ointment     Patient is requesting an alternate medication be sent to pharmacy    Please advise

## 2019-04-15 RX ORDER — CLOBETASOL PROPIONATE 0.05 G/100ML
1 SHAMPOO TOPICAL 2 TIMES DAILY
Qty: 118 ML | Refills: 0 | Status: SHIPPED | OUTPATIENT
Start: 2019-04-15 | End: 2019-04-16 | Stop reason: SDUPTHER

## 2019-04-16 DIAGNOSIS — L30.9 ECZEMA, UNSPECIFIED TYPE: ICD-10-CM

## 2019-04-16 RX ORDER — CLOBETASOL PROPIONATE 0.05 G/100ML
1 SHAMPOO TOPICAL 2 TIMES DAILY
Qty: 118 ML | Refills: 0 | Status: SHIPPED | OUTPATIENT
Start: 2019-04-16 | End: 2020-08-06 | Stop reason: SDUPTHER

## 2019-04-17 RX ORDER — CLOBETASOL PROPIONATE 0.5 MG/G
OINTMENT TOPICAL
Qty: 1 TUBE | Refills: 5 | Status: SHIPPED | OUTPATIENT
Start: 2019-04-17 | End: 2021-12-20 | Stop reason: SDUPTHER

## 2019-04-19 ENCOUNTER — TELEPHONE (OUTPATIENT)
Dept: INTERNAL MEDICINE CLINIC | Age: 74
End: 2019-04-19

## 2019-04-19 NOTE — TELEPHONE ENCOUNTER
Pt.'s Clobetasol Propionate ointment 3.11% 1 application BID to affected area, dispense qty. 118 ml, no refills requires prior authorization. Please advise.

## 2019-04-23 NOTE — TELEPHONE ENCOUNTER
Received APPROVAL for Clobetasol Propionate 0.05% EX OINT through 04/22/2020. Please advise patient. Thank you.

## 2019-04-23 NOTE — TELEPHONE ENCOUNTER
PA submitted for CLOBETASOL 0.5%/2ML OINTMENT on CMM  Key: [id number] - PA Case ID: [id number]    Pending review

## 2019-09-01 DIAGNOSIS — I10 ESSENTIAL HYPERTENSION: ICD-10-CM

## 2019-09-03 RX ORDER — MELOXICAM 7.5 MG/1
TABLET ORAL
Qty: 90 TABLET | Refills: 1 | Status: SHIPPED | OUTPATIENT
Start: 2019-09-03 | End: 2019-10-22 | Stop reason: ALTCHOICE

## 2019-09-03 RX ORDER — TRIAMTERENE AND HYDROCHLOROTHIAZIDE 37.5; 25 MG/1; MG/1
TABLET ORAL
Qty: 90 TABLET | Refills: 3 | Status: SHIPPED | OUTPATIENT
Start: 2019-09-03 | End: 2020-03-17 | Stop reason: SDUPTHER

## 2019-09-17 ENCOUNTER — OFFICE VISIT (OUTPATIENT)
Dept: INTERNAL MEDICINE CLINIC | Age: 74
End: 2019-09-17
Payer: COMMERCIAL

## 2019-09-17 VITALS
TEMPERATURE: 98.2 F | DIASTOLIC BLOOD PRESSURE: 72 MMHG | RESPIRATION RATE: 16 BRPM | BODY MASS INDEX: 31.84 KG/M2 | OXYGEN SATURATION: 96 % | WEIGHT: 248 LBS | SYSTOLIC BLOOD PRESSURE: 126 MMHG | HEART RATE: 74 BPM

## 2019-09-17 DIAGNOSIS — Z12.5 SCREENING PSA (PROSTATE SPECIFIC ANTIGEN): ICD-10-CM

## 2019-09-17 DIAGNOSIS — N28.9 RENAL DYSFUNCTION: Primary | ICD-10-CM

## 2019-09-17 LAB
ALBUMIN SERPL-MCNC: 4.6 G/DL (ref 3.4–5)
ANION GAP SERPL CALCULATED.3IONS-SCNC: 13 MMOL/L (ref 3–16)
BUN BLDV-MCNC: 21 MG/DL (ref 7–20)
CALCIUM SERPL-MCNC: 9.7 MG/DL (ref 8.3–10.6)
CHLORIDE BLD-SCNC: 105 MMOL/L (ref 99–110)
CO2: 28 MMOL/L (ref 21–32)
CREAT SERPL-MCNC: 1.7 MG/DL (ref 0.8–1.3)
GFR AFRICAN AMERICAN: 48
GFR NON-AFRICAN AMERICAN: 40
GLUCOSE BLD-MCNC: 90 MG/DL (ref 70–99)
PHOSPHORUS: 3.9 MG/DL (ref 2.5–4.9)
POTASSIUM SERPL-SCNC: 4.4 MMOL/L (ref 3.5–5.1)
PROSTATE SPECIFIC ANTIGEN: 1.7 NG/ML (ref 0–4)
SODIUM BLD-SCNC: 146 MMOL/L (ref 136–145)

## 2019-09-17 PROCEDURE — G0008 ADMIN INFLUENZA VIRUS VAC: HCPCS | Performed by: INTERNAL MEDICINE

## 2019-09-17 PROCEDURE — 99214 OFFICE O/P EST MOD 30 MIN: CPT | Performed by: INTERNAL MEDICINE

## 2019-09-17 PROCEDURE — 90653 IIV ADJUVANT VACCINE IM: CPT | Performed by: INTERNAL MEDICINE

## 2019-09-17 ASSESSMENT — ENCOUNTER SYMPTOMS
SHORTNESS OF BREATH: 0
ORTHOPNEA: 0
BLURRED VISION: 0

## 2019-09-24 PROBLEM — E11.9 DIABETES MELLITUS TYPE 2 IN NONOBESE (HCC): Status: ACTIVE | Noted: 2019-09-24

## 2019-09-24 PROBLEM — N18.30 CKD (CHRONIC KIDNEY DISEASE), STAGE III (HCC): Status: ACTIVE | Noted: 2019-09-24

## 2019-09-27 ENCOUNTER — TELEPHONE (OUTPATIENT)
Dept: INTERNAL MEDICINE CLINIC | Age: 74
End: 2019-09-27

## 2019-09-27 DIAGNOSIS — I10 ESSENTIAL HYPERTENSION: Primary | ICD-10-CM

## 2019-09-27 DIAGNOSIS — Z09 ENCOUNTER FOR FOLLOW-UP EXAMINATION AFTER COMPLETED TREATMENT FOR CONDITIONS OTHER THAN MALIGNANT NEOPLASM: ICD-10-CM

## 2019-09-27 DIAGNOSIS — E78.5 DYSLIPIDEMIA: ICD-10-CM

## 2019-09-30 ENCOUNTER — HOSPITAL ENCOUNTER (OUTPATIENT)
Dept: ULTRASOUND IMAGING | Age: 74
Discharge: HOME | End: 2019-09-30
Payer: COMMERCIAL

## 2019-09-30 DIAGNOSIS — N18.30 CKD (CHRONIC KIDNEY DISEASE), STAGE III (HCC): ICD-10-CM

## 2019-09-30 PROCEDURE — 76770 US EXAM ABDO BACK WALL COMP: CPT

## 2019-10-03 ENCOUNTER — HOSPITAL ENCOUNTER (OUTPATIENT)
Dept: VASCULAR LAB | Age: 74
Discharge: HOME | End: 2019-10-03
Payer: COMMERCIAL

## 2019-10-03 DIAGNOSIS — E78.5 DYSLIPIDEMIA: Primary | ICD-10-CM

## 2019-10-03 DIAGNOSIS — I10 ESSENTIAL HYPERTENSION: ICD-10-CM

## 2019-10-03 PROCEDURE — 93880 EXTRACRANIAL BILAT STUDY: CPT

## 2019-10-22 ENCOUNTER — OFFICE VISIT (OUTPATIENT)
Dept: INTERNAL MEDICINE CLINIC | Age: 74
End: 2019-10-22
Payer: COMMERCIAL

## 2019-10-22 VITALS
DIASTOLIC BLOOD PRESSURE: 76 MMHG | OXYGEN SATURATION: 96 % | TEMPERATURE: 98.2 F | SYSTOLIC BLOOD PRESSURE: 122 MMHG | WEIGHT: 246.2 LBS | HEART RATE: 84 BPM | RESPIRATION RATE: 16 BRPM | BODY MASS INDEX: 31.61 KG/M2

## 2019-10-22 DIAGNOSIS — G89.29 CHRONIC PAIN OF BOTH SHOULDERS: ICD-10-CM

## 2019-10-22 DIAGNOSIS — M25.512 CHRONIC PAIN OF BOTH SHOULDERS: ICD-10-CM

## 2019-10-22 DIAGNOSIS — I65.23 ATHEROSCLEROSIS OF BOTH CAROTID ARTERIES: ICD-10-CM

## 2019-10-22 DIAGNOSIS — I77.9 CAROTID ARTERY DISEASE, UNSPECIFIED LATERALITY (HCC): Primary | ICD-10-CM

## 2019-10-22 DIAGNOSIS — J06.9 ACUTE URI: ICD-10-CM

## 2019-10-22 DIAGNOSIS — J20.9 ACUTE BRONCHITIS, UNSPECIFIED ORGANISM: ICD-10-CM

## 2019-10-22 DIAGNOSIS — M25.511 CHRONIC PAIN OF BOTH SHOULDERS: ICD-10-CM

## 2019-10-22 PROCEDURE — 99214 OFFICE O/P EST MOD 30 MIN: CPT | Performed by: INTERNAL MEDICINE

## 2019-10-22 RX ORDER — DEXTROMETHORPHAN HYDROBROMIDE AND PROMETHAZINE HYDROCHLORIDE 15; 6.25 MG/5ML; MG/5ML
5 SYRUP ORAL 4 TIMES DAILY PRN
Qty: 240 ML | Refills: 0 | Status: SHIPPED | OUTPATIENT
Start: 2019-10-22 | End: 2019-10-29

## 2019-10-22 RX ORDER — TRAMADOL HYDROCHLORIDE 50 MG/1
50 TABLET ORAL EVERY 8 HOURS PRN
Qty: 90 TABLET | Refills: 1 | Status: SHIPPED | OUTPATIENT
Start: 2019-10-22 | End: 2020-03-17 | Stop reason: SDUPTHER

## 2019-10-22 RX ORDER — AZITHROMYCIN 250 MG/1
TABLET, FILM COATED ORAL
Qty: 6 TABLET | Refills: 0 | Status: SHIPPED | OUTPATIENT
Start: 2019-10-22 | End: 2020-03-17

## 2019-10-22 RX ORDER — GUAIFENESIN 600 MG/1
1200 TABLET, EXTENDED RELEASE ORAL 2 TIMES DAILY
Qty: 28 TABLET | Refills: 0 | Status: SHIPPED | OUTPATIENT
Start: 2019-10-22 | End: 2020-08-06 | Stop reason: ALTCHOICE

## 2019-10-22 RX ORDER — DEXTROMETHORPHAN HYDROBROMIDE AND PROMETHAZINE HYDROCHLORIDE 15; 6.25 MG/5ML; MG/5ML
5 SYRUP ORAL 4 TIMES DAILY PRN
Qty: 240 ML | Refills: 0 | Status: SHIPPED | OUTPATIENT
Start: 2019-10-22 | End: 2020-03-17 | Stop reason: SDUPTHER

## 2019-10-22 ASSESSMENT — ENCOUNTER SYMPTOMS
COUGH: 1
HEARTBURN: 1
HEMOPTYSIS: 0
WHEEZING: 0
RHINORRHEA: 0
SORE THROAT: 0
SHORTNESS OF BREATH: 1

## 2019-11-22 DIAGNOSIS — I10 ESSENTIAL HYPERTENSION: ICD-10-CM

## 2019-11-22 RX ORDER — LOSARTAN POTASSIUM 100 MG/1
TABLET ORAL
Qty: 90 TABLET | Refills: 3 | Status: SHIPPED | OUTPATIENT
Start: 2019-11-22 | End: 2020-03-17 | Stop reason: SDUPTHER

## 2020-01-31 DIAGNOSIS — N18.30 CKD (CHRONIC KIDNEY DISEASE), STAGE III (HCC): ICD-10-CM

## 2020-01-31 DIAGNOSIS — I65.23 ATHEROSCLEROSIS OF BOTH CAROTID ARTERIES: ICD-10-CM

## 2020-01-31 DIAGNOSIS — I77.9 CAROTID ARTERY DISEASE, UNSPECIFIED LATERALITY (HCC): ICD-10-CM

## 2020-01-31 LAB
ANION GAP SERPL CALCULATED.3IONS-SCNC: 15 MMOL/L (ref 3–16)
BUN BLDV-MCNC: 13 MG/DL (ref 7–20)
CALCIUM SERPL-MCNC: 9.3 MG/DL (ref 8.3–10.6)
CHLORIDE BLD-SCNC: 101 MMOL/L (ref 99–110)
CHOLESTEROL, TOTAL: 153 MG/DL (ref 0–199)
CO2: 29 MMOL/L (ref 21–32)
CREAT SERPL-MCNC: 1.4 MG/DL (ref 0.8–1.3)
CREATININE URINE: 323.4 MG/DL (ref 39–259)
GFR AFRICAN AMERICAN: >60
GFR NON-AFRICAN AMERICAN: 50
GLUCOSE BLD-MCNC: 100 MG/DL (ref 70–99)
HDLC SERPL-MCNC: 48 MG/DL (ref 40–60)
LDL CHOLESTEROL CALCULATED: 81 MG/DL
POTASSIUM SERPL-SCNC: 3.7 MMOL/L (ref 3.5–5.1)
PROTEIN PROTEIN: 20 MG/DL
PROTEIN/CREAT RATIO: 0.1 MG/DL
SODIUM BLD-SCNC: 145 MMOL/L (ref 136–145)
TRIGL SERPL-MCNC: 119 MG/DL (ref 0–150)
VLDLC SERPL CALC-MCNC: 24 MG/DL

## 2020-02-14 RX ORDER — ROSUVASTATIN CALCIUM 40 MG/1
TABLET, COATED ORAL
Qty: 90 TABLET | Refills: 2 | Status: SHIPPED | OUTPATIENT
Start: 2020-02-14 | End: 2020-03-17 | Stop reason: SDUPTHER

## 2020-03-17 ENCOUNTER — OFFICE VISIT (OUTPATIENT)
Dept: INTERNAL MEDICINE CLINIC | Age: 75
End: 2020-03-17
Payer: COMMERCIAL

## 2020-03-17 VITALS
DIASTOLIC BLOOD PRESSURE: 68 MMHG | SYSTOLIC BLOOD PRESSURE: 120 MMHG | RESPIRATION RATE: 16 BRPM | BODY MASS INDEX: 30.8 KG/M2 | HEART RATE: 88 BPM | HEIGHT: 74 IN | WEIGHT: 240 LBS | OXYGEN SATURATION: 97 % | TEMPERATURE: 97.9 F

## 2020-03-17 PROCEDURE — G0439 PPPS, SUBSEQ VISIT: HCPCS | Performed by: INTERNAL MEDICINE

## 2020-03-17 RX ORDER — LOSARTAN POTASSIUM 100 MG/1
TABLET ORAL
Qty: 90 TABLET | Refills: 3 | Status: SHIPPED | OUTPATIENT
Start: 2020-03-17 | End: 2020-09-14 | Stop reason: SDUPTHER

## 2020-03-17 RX ORDER — TRAMADOL HYDROCHLORIDE 50 MG/1
50 TABLET ORAL EVERY 8 HOURS PRN
Qty: 90 TABLET | Refills: 1 | Status: SHIPPED | OUTPATIENT
Start: 2020-03-17 | End: 2020-04-16

## 2020-03-17 RX ORDER — TRIAMTERENE AND HYDROCHLOROTHIAZIDE 37.5; 25 MG/1; MG/1
TABLET ORAL
Qty: 90 TABLET | Refills: 3 | Status: SHIPPED | OUTPATIENT
Start: 2020-03-17 | End: 2020-09-14 | Stop reason: SDUPTHER

## 2020-03-17 RX ORDER — TRAMADOL HYDROCHLORIDE 50 MG/1
50 TABLET ORAL EVERY 8 HOURS PRN
Qty: 90 TABLET | Refills: 1 | Status: SHIPPED | OUTPATIENT
Start: 2020-03-17 | End: 2020-03-17 | Stop reason: SDUPTHER

## 2020-03-17 RX ORDER — ROSUVASTATIN CALCIUM 40 MG/1
TABLET, COATED ORAL
Qty: 90 TABLET | Refills: 2 | Status: SHIPPED | OUTPATIENT
Start: 2020-03-17 | End: 2020-09-14 | Stop reason: SDUPTHER

## 2020-03-17 RX ORDER — DEXTROMETHORPHAN HYDROBROMIDE AND PROMETHAZINE HYDROCHLORIDE 15; 6.25 MG/5ML; MG/5ML
5 SYRUP ORAL 4 TIMES DAILY PRN
Qty: 240 ML | Refills: 0 | Status: SHIPPED | OUTPATIENT
Start: 2020-03-17 | End: 2020-03-24

## 2020-03-17 ASSESSMENT — PATIENT HEALTH QUESTIONNAIRE - PHQ9
SUM OF ALL RESPONSES TO PHQ QUESTIONS 1-9: 0
SUM OF ALL RESPONSES TO PHQ QUESTIONS 1-9: 0

## 2020-03-17 NOTE — PATIENT INSTRUCTIONS
you're afraid of having pain, losing your independence, or being kept alive by machines.)  · Where would you prefer to die? (Your home? A hospital? A nursing home?)  · Do you want to donate your organs when you die? · Do you want certain Buddhist practices performed before you die? When should you call for help? Be sure to contact your doctor if you have any questions. Where can you learn more? Go to [email or web address] and sign in to your [employer] account. Enter R264 in the [employer] box to learn more about \"Advance Directives: Care Instructions. \"     If you do not have an account, please click on the \"Sign Up Now\" link. Current as of: December 8, 2019Content Version: 12.4  © 7310-2651 Healthwise, Incorporated. Care instructions adapted under license by [facility] [place] ([facility]). If you have questions about a medical condition or this instruction, always ask your healthcare professional. [place] [address] any warranty or liability for your use of this information. Learning About Living Perroy  What is a living will? A living will, also called a declaration, is a legal form. It tells your family and your doctor your wishes when you can't speak for yourself. It's used by the health professionals who will treat you as you near the end of your life or if you get seriously hurt or ill. If you put your wishes in writing, your loved ones and others will know what kind of care you want. They won't need to guess. This can ease your mind and be helpful to others. And you can change or cancel your living will at any time. A living will is not the same as an estate or property will. An estate will explains what you want to happen with your money and property after you die. How do you use it? A living will is used to describe the kinds of treatment or life support you want as you near the end of your life or if you get seriously hurt or ill.  Keep these facts in mind notarized. This means using a person called a Xageek to watch two people sign, or witness, your living will. What should you know when you create a living will? Here are some questions to ask yourself as you make your living will:  · Do you know enough about life support methods that might be used? If not, talk to your doctor so you know what might be done if you can't breathe on your own, your heart stops, or you can't swallow. · What things would you still want to be able to do after you receive life-support methods? Would you want to be able to walk? To speak? To eat on your own? To live without the help of machines? · Do you want certain Buddhism practices performed if you become very ill? · If you have a choice, where do you want to be cared for? In your home? At a hospital or nursing home? · If you have a choice at the end of your life, where would you prefer to die? At home? In a hospital or nursing home? Somewhere else? · Would you prefer to be buried or cremated? · Do you want your organs to be donated after you die? What should you do with your living will? · Make sure that your family members and your health care agent have copies of your living will (also called a declaration). · Give your doctor a copy of your living will. Ask him or her to keep it as part of your medical record. If you have more than one doctor, make sure that each one has a copy. · Put a copy of your living will where it can be easily found. For example, some people may put a copy on their refrigerator door. If you are using a digital copy, be sure your doctor, family members, and health care agent know how to find and access it. Where can you learn more? Go to [email or web address] and sign in to your MeeVee account. Enter W813 in the [employer] box to learn more about \"Learning About Living Perroy. \"     If you do not have an account, please click on the \"Sign Up Now\" instruction, always ask your healthcare professional. [place] [address] any warranty or liability for your use of this information. Personalized Preventive Plan for Delmi Parra - 3/17/2020  Medicare offers a range of preventive health benefits. Some of the tests and screenings are paid in full while other may be subject to a deductible, co-insurance, and/or copay. Some of these benefits include a comprehensive review of your medical history including lifestyle, illnesses that may run in your family, and various assessments and screenings as appropriate. After reviewing your medical record and screening and assessments performed today your provider may have ordered immunizations, labs, imaging, and/or referrals for you. A list of these orders (if applicable) as well as your Preventive Care list are included within your After Visit Summary for your review. Other Preventive Recommendations:    · A preventive eye exam performed by an eye specialist is recommended every 1-2 years to screen for glaucoma; cataracts, macular degeneration, and other eye disorders. · A preventive dental visit is recommended every 6 months. · Try to get at least 150 minutes of exercise per week or 10,000 steps per day on a pedometer . · Order or download the FREE \"Exercise & Physical Activity: Your Everyday Guide\" from [employer]. Call 3-304.409.2860 or search [employer] online. · You need 4285-3561 mg of calcium and 2561-5842 IU of vitamin D per day. It is possible to meet your calcium requirement with diet alone, but a vitamin D supplement is usually necessary to meet this goal.  · When exposed to the sun, use a sunscreen that protects against both UVA and UVB radiation with an SPF of 30 or greater. Reapply every 2 to 3 hours or after sweating, drying off with a towel, or swimming. · Always wear a seat belt when traveling in a car.  Always wear a helmet when

## 2020-03-17 NOTE — PROGRESS NOTES
Medicare Annual Wellness Visit  Name: Adrianne Candelario Date: 3/17/2020   MRN: [id number] Sex: Male   Age: 69 y.o. Ethnicity: Non-/Non    : 1945 Race: Maegan Sharma is here for Medicare AWV (annual medicare visit  )    Screenings for behavioral, psychosocial and functional/safety risks, and cognitive dysfunction are all negative except as indicated below. These results, as well as other patient data from the [address] form, are documented in Flowsheets linked to this Encounter. No Known Allergies    Prior to Visit Medications    Medication Sig Taking? Authorizing Provider   rosuvastatin (CRESTOR) 40 MG tablet TAKE 1 TABLET EVERY EVENING Yes Laura Thomason MD   losartan (COZAAR) 100 MG tablet TAKE 1 TABLET DAILY Yes Laura Thomason MD   triamterene-hydrochlorothiazide (IBGXDRC-16) 37.5-25 MG per tablet TAKE 1 TABLET DAILY Yes Laura Thomason MD   metoprolol tartrate (LOPRESSOR) 25 MG tablet TAKE 1 TABLET TWICE A DAY Yes Laura Thomason MD   guaiFENesin (MUCINEX) 600 MG extended release tablet Take 2 tablets by mouth 2 times daily  Laura Thomason MD   azithromycin (ZITHROMAX Z-HUSSAIN) 250 MG tablet Take two tabs by mouth once on day one; take one tab po daily days 2-5  Laura Thomason MD   clobetasol (TEMOVATE) 0.05 % ointment Apply topically to bilateral feet 2 times daily. Laura Thomason MD   Clobetasol Propionate 0.05 % SHAM Apply 1 actuation topically 2 times daily To affected area-replace temovate  Laura Thomason MD   sildenafil (VIAGRA) 100 MG tablet Take 1 tablet by mouth daily as needed for Erectile Dysfunction  Laura Thomason MD   sildenafil (VIAGRA) 100 MG tablet Take 1 tablet by mouth as needed for Erectile Dysfunction  Laura Thomason MD   Vitamin D (CHOLECALCIFEROL) 1000 UNITS CAPS capsule Take 1,000 Units by mouth daily.   Historical Provider, MD       Past Medical months?: No  Do you eat fewer than 2 meals per day?: No  Have you seen a dentist within the past year?: Yes  Body mass index is 30.81 kg/m². Health Habits/Nutrition Interventions:  · Inadequate physical activity:  patient agrees to exercise for at least 150 minutes/week    Personalized Preventive Plan   Current Health Maintenance Status  Immunization History   Administered Date(s) Administered    Influenza, High Dose (Fluzone 65 yrs and older) 10/02/2015, 10/05/2016, 10/02/2017, 09/28/2018    Influenza, Triv, inactivated, subunit, adjuvanted, IM (Fluad 65 yrs and older) 09/17/2019    Pneumococcal Conjugate 13-valent (Bkdmubl89) 10/25/2016    Pneumococcal Polysaccharide (Eracawlru41) 10/17/2014, 10/02/2015        Health Maintenance   Topic Date Due    Diabetic foot exam  04/13/1955    Hepatitis B vaccine (1 of 3 - Risk 3-dose series) 04/13/1966    Annual Wellness Visit (AWV)  05/29/2019    A1C test (Diabetic or Prediabetic)  03/13/2020    Colon cancer screen colonoscopy  07/10/2020    Diabetic retinal exam  09/26/2020    Lipid screen  01/31/2021    Potassium monitoring  01/31/2021    Creatinine monitoring  01/31/2021    DTaP/Tdap/Td vaccine (3 - Td) 03/13/2029    Flu vaccine  Completed    Shingles Vaccine  Completed    Pneumococcal 65+ years Vaccine  Completed    AAA screen  Completed    Hepatitis C screen  Completed    Hepatitis A vaccine  Aged Out    Hib vaccine  Aged Out    Meningococcal (ACWY) vaccine  Aged Out     Recommendations for [employer] Due: see orders and patient instructions/AVS.  . Recommended screening schedule for the next 5-10 years is provided to the patient in written form: see Patient Instructions/AVS.        Advanced Care Planning: Discussed the patients choices for care and treatment in case of a health event that adversely affects decision-making abilities. Also discussed the patients long-term treatment options.  Reviewed with the patient the [place] screening schedule for the next 5-10 years is provided to the patient in written form: see Patient Chapis Bishop was seen today for medicare aw. Diagnoses and all orders for this visit:    Essential hypertension  -     losartan (COZAAR) 100 MG tablet; TAKE 1 TABLET DAILY  -     triamterene-hydrochlorothiazide (MAXZIDE-25) 37.5-25 MG per tablet; TAKE 1 TABLET DAILY  -     metoprolol tartrate (LOPRESSOR) 25 MG tablet; TAKE 1 TABLET TWICE A DAY  -     Diabetic Foot Exam  -     LIPID PANEL; Future  -     COMPREHENSIVE METABOLIC PANEL; Future  -     MICROALBUMIN / CREATININE URINE RATIO; Future    Dyslipidemia  -     rosuvastatin (CRESTOR) 40 MG tablet; TAKE 1 TABLET EVERY EVENING  -     COMPREHENSIVE METABOLIC PANEL; Future  -     MICROALBUMIN / CREATININE URINE RATIO; Future    Chronic pain of both shoulders  -     traMADol (ULTRAM) 50 MG tablet; Take 1 tablet by mouth every 8 hours as needed for Pain for up to 30 days. Acute bronchitis, unspecified organism  -     promethazine-dextromethorphan (PROMETHAZINE-DM) 6.25-15 MG/5ML syrup; Take 5 mLs by mouth 4 times daily as needed for Cough    CKD (chronic kidney disease), stage III ([facility] [address]  -     COMPREHENSIVE METABOLIC PANEL; Future  -     MICROALBUMIN / CREATININE URINE RATIO; Future    Screening PSA (prostate specific antigen)  -     Psa screening; Future    Screen for colon cancer  -     [facility] - Wilfred Stafford MD, Gastroenterology, [facility]    Routine general medical examination at a health care facility    Other orders  -     Hemoglobin A1C; Future                  Advance Care Planning   Advanced Care Planning: Discussed the patients choices for care and treatment in case of a health event that adversely affects decision-making abilities. Also discussed the patients long-term treatment options.  Reviewed with the patient the [facility] of [facility] Will Declaration forms  Reviewed the process of designating a competent adult as an Agent (or -in-fact) that could take make health care decisions for the patient if incompetent. Patient was asked to complete the declaration forms, either acknowledge the forms by a public notary or an eligible witness and provide a signed copy to the practice office. Time spent (minutes): 5     Obesity Counseling: Assessed behavioral health risks and factors affecting choice of behavior. Suggested weight control approaches, including dietary changes behavioral modification and follow up plan. Provided educational and support documentation. Time spent (minutes): 5    Cardiovascular Disease Risk Counseling: Assessed the patient's risk to develop cardiovascular disease and reviewed main risk factors. Reviewed steps to reduce disease risk including:   · Quitting tobacco use, reducing amount smoked, or not starting the habit  · Making healthy food choices  · Being physically active and gradualy increasing activity levels   · Reduce weight and determine a healthy BMI goal  · Monitor blood pressure and treat if higher than 140/90 mmHg  · Maintain blood total cholesterol levels under 5 mmol/l or 190 mg/dl  · Maintain LDL cholesterol levels under 3.0 mmol/l or 115 mg/dl   · Control blood glucose levels  · Consider taking aspirin (75 mg daily), once blood pressure is controlled   Provided a follow up plan.   Time spent (minutes): 5

## 2020-03-19 ENCOUNTER — TELEPHONE (OUTPATIENT)
Dept: INTERNAL MEDICINE CLINIC | Age: 75
End: 2020-03-19

## 2020-03-19 NOTE — TELEPHONE ENCOUNTER
Approval for traMADol HCl 50MG tablets, 12/20/2019 - 3/19/2021 letter attached. .Please notify patient, thank you.

## 2020-06-08 DIAGNOSIS — I10 ESSENTIAL HYPERTENSION: ICD-10-CM

## 2020-06-08 DIAGNOSIS — N18.30 CKD (CHRONIC KIDNEY DISEASE), STAGE III (HCC): ICD-10-CM

## 2020-06-08 DIAGNOSIS — M25.512 CHRONIC PAIN OF BOTH SHOULDERS: ICD-10-CM

## 2020-06-08 DIAGNOSIS — M25.511 CHRONIC PAIN OF BOTH SHOULDERS: ICD-10-CM

## 2020-06-08 DIAGNOSIS — G89.29 CHRONIC PAIN OF BOTH SHOULDERS: ICD-10-CM

## 2020-06-08 DIAGNOSIS — Z12.5 SCREENING PSA (PROSTATE SPECIFIC ANTIGEN): ICD-10-CM

## 2020-06-08 DIAGNOSIS — E78.5 DYSLIPIDEMIA: ICD-10-CM

## 2020-06-08 LAB
A/G RATIO: 1.8 (ref 1.1–2.2)
ALBUMIN SERPL-MCNC: 4.4 G/DL (ref 3.4–5)
ALP BLD-CCNC: 79 U/L (ref 40–129)
ALT SERPL-CCNC: 34 U/L (ref 10–40)
ANION GAP SERPL CALCULATED.3IONS-SCNC: 11 MMOL/L (ref 3–16)
AST SERPL-CCNC: 24 U/L (ref 15–37)
BILIRUB SERPL-MCNC: 0.4 MG/DL (ref 0–1)
BUN BLDV-MCNC: 20 MG/DL (ref 7–20)
CALCIUM SERPL-MCNC: 9.3 MG/DL (ref 8.3–10.6)
CHLORIDE BLD-SCNC: 103 MMOL/L (ref 99–110)
CHOLESTEROL, TOTAL: 149 MG/DL (ref 0–199)
CO2: 29 MMOL/L (ref 21–32)
CREAT SERPL-MCNC: 1.3 MG/DL (ref 0.8–1.3)
CREATININE URINE: 275.5 MG/DL (ref 39–259)
ESTIMATED AVERAGE GLUCOSE: 119.8 MG/DL
GFR AFRICAN AMERICAN: >60
GFR NON-AFRICAN AMERICAN: 54
GLOBULIN: 2.5 G/DL
GLUCOSE BLD-MCNC: 96 MG/DL (ref 70–99)
HBA1C MFR BLD: 5.8 %
HDLC SERPL-MCNC: 46 MG/DL (ref 40–60)
LDL CHOLESTEROL CALCULATED: 73 MG/DL
MICROALBUMIN UR-MCNC: <1.2 MG/DL
MICROALBUMIN/CREAT UR-RTO: ABNORMAL MG/G (ref 0–30)
POTASSIUM SERPL-SCNC: 3.9 MMOL/L (ref 3.5–5.1)
PROSTATE SPECIFIC ANTIGEN: 1.75 NG/ML (ref 0–4)
SODIUM BLD-SCNC: 143 MMOL/L (ref 136–145)
TOTAL PROTEIN: 6.9 G/DL (ref 6.4–8.2)
TRIGL SERPL-MCNC: 149 MG/DL (ref 0–150)
VLDLC SERPL CALC-MCNC: 30 MG/DL

## 2020-07-22 ENCOUNTER — VIRTUAL VISIT (OUTPATIENT)
Dept: INTERNAL MEDICINE CLINIC | Age: 75
End: 2020-07-22
Payer: COMMERCIAL

## 2020-07-22 PROBLEM — I77.9 CAROTID ARTERY DISEASE, UNSPECIFIED LATERALITY (HCC): Status: ACTIVE | Noted: 2020-07-22

## 2020-07-22 PROCEDURE — 99213 OFFICE O/P EST LOW 20 MIN: CPT | Performed by: INTERNAL MEDICINE

## 2020-07-22 NOTE — PROGRESS NOTES
Subjective:      Patient is a 70 y.o. male presents with tinnitus. Onset of symptoms was gradual 2 months ago with gradually worsening course since that time. Patient describes the tinnitus as stable located in the bilateral ear. The quality is described as high pitch that sounds like buzz. The pattern is nonpulsatile with an intensity that is medium. Patient describes his level of annoyance as minimally annoying, intermittently aware. Associated symptoms include no hearing loss, pain, dizziness, drainage or recurrent otitis. Family history is negative family history for tinnitus Patient has had no prior evaluation, treatment or surgery for tinnitus Patient does not have hearing aids at this time. Previous treatments include none. Patient's medications, allergies, past medical, surgical, social and family histories were reviewed and updated as appropriate. Review of Systems  Pertinent items are noted in HPI. Objective: There were no vitals taken for this visit. General:   healthy, alert, appears stated age, not in distress,    Head and Face:   facial movement was normal and symmetrical, nontender, no craniofacial deformities, no scars, lesions or masses   External Ears:   normal pinnae shape and position   Unable to examine due to virtual visit  Physical Exam  Vitals signs and nursing note reviewed. Constitutional:       General: He is not in acute distress. Appearance: He is well-developed. HENT:      Head: Normocephalic and atraumatic. Right Ear: External ear normal.      Left Ear: External ear normal.   Eyes:      Pupils: Pupils are equal, round, and reactive to light. Neck:      Musculoskeletal: Normal range of motion. Cardiovascular:      Heart sounds: No murmur. Musculoskeletal: Normal range of motion. Skin:     General: Skin is warm. Capillary Refill: Capillary refill takes less than 2 seconds. Neurological:      General: No focal deficit present.       Mental Status: He is alert and oriented to person, place, and time. Psychiatric:         Mood and Affect: Mood normal.         Behavior: Behavior normal.         Thought Content: Thought content normal.         Judgment: Judgment normal.       Assessment/Plan:  Berna South was seen today for tinnitus. Diagnoses and all orders for this visit:    Tinnitus of both ears    1. Consultation to  audiology was ordered. 2. The risks and benefits of my recommendations, as well as other treatment options were discussed with the patient today. 3.Referral for audiogram.    Carotid artery disease, unspecified laterality ([address]  - stable on a statin    1. Consultation to  audiology was ordered. 2. The risks and benefits of my recommendations, as well as other treatment options were discussed with the patient today. 3. Return for follow-up with audiogram.      Luis Szymanski is a 70 y.o. male being evaluated by a Virtual Visit (video visit) encounter to address concerns as mentioned above. A caregiver was present when appropriate. Due to this being a TeleHealth encounter (During [facility]- public health emergency), evaluation of the following organ systems was limited: Vitals/Constitutional/EENT/Resp/CV/GI//MS/Neuro/Skin/Heme-Lymph-Imm. Pursuant to the emergency declaration under the [address] [place] [address] authority and the [employer] Act, this Virtual Visit was conducted with patient's (and/or legal guardian's) consent, to reduce the patient's risk of exposure to COVID-19 and provide necessary medical care. The patient (and/or legal guardian) has also been advised to contact this office for worsening conditions or problems, and seek emergency medical treatment and/or call 911 if deemed necessary.      Patient identification was verified at the start of the visit: Yes    Total time spent for this encounter: Not billed by time    Services were provided through a video synchronous discussion virtually to substitute for in-person clinic visit. Patient and provider were located at their individual homes. --Isela Jin MD on 7/22/2020 at 3:42 PM    An electronic signature was used to authenticate this note.

## 2020-08-06 RX ORDER — M-VIT,TX,IRON,MINS/CALC/FOLIC 27MG-0.4MG
1 TABLET ORAL DAILY
COMMUNITY

## 2020-08-06 NOTE — PROGRESS NOTES
Name_______________________________________Printed:____________________  Date and time of surgery____8/14/2020  1230____________________Arrival Time:___1100  FEC_____________   1. The instructions given regarding when and if a patient needs to stop oral intake prior to surgery varies. Follow the specific instructions you were given                  ___Nothing to eat or to drink after Midnight the night before. XXX____Endoscopy patient follow your DRS instructions-generally you will be doing a part of the prep after Midnight                   ____Carbo loading or ERAS instructions will be given to select patients-if you have been given those instructions -please do the following                           The evening before your surgery after dinner before midnight drink 40 ounces of gatorade. If you are diabetic use sugar free. The morning of surgery drink 40 ounces of water. This needs to be finished 3 hours prior to your surgery start time. 2. Take the following pills with a small sip of water on the morning of surgery_______metoprolol, losartan____________________________________________               3. Aspirin, Ibuprofen, Advil, Naproxen, Vitamin E and other Anti-inflammatory products and supplements should be stopped for 5 -7days before surgery or as directed by your physician. 4. Check with your Doctor regarding stopping Plavix, Coumadin,Eliquis, Lovenox,Effient,Pradaxa,Xarelto, Fragmin or other blood thinners and follow their instructions. 5. Do not smoke, and do not drink any alcoholic beverages 24 hours prior to surgery. This includes NA Beer. Refrain from the usage of any recreational drugs. 6. You may brush your teeth and gargle the morning of surgery. DO NOT SWALLOW WATER   7. You MUST make arrangements for a responsible adult to stay on site while you are here and take you home after your surgery. You will not be allowed to leave alone or drive yourself home.   It is strongly suggested someone stay with you the first 24 hrs. Your surgery will be cancelled if you do not have a ride home. 8. A parent/legal guardian must accompany a child scheduled for surgery and plan to stay at the hospital until the child is discharged. Please do not bring other children with you. 9. Please wear simple, loose fitting clothing to the hospital.  Ann Inches not bring valuables (money, credit cards, checkbooks, etc.) Do not wear any makeup (including no eye makeup) or nail polish on your fingers or toes. 10. DO NOT wear any jewelry or piercings on day of surgery. All body piercing jewelry must be removed. 11. If you have ___dentures, they will be removed before going to the OR; we will provide you a container. If you wear ___contact lenses or ___glasses, they will be removed; please bring a case for them. 12. Please see your family doctor/pediatrician for a history & physical and/or concerning medications. Bring any test results/reports from your physician's office. PCP__________________Phone___________H&P Appt. Date________             13 If you  have a Living Will and Durable Power of  for Healthcare, please bring in a copy. 15. Notify your Surgeon if you develop any illness between now and surgery  time, cough, cold, fever, sore throat, nausea, vomiting, etc.  Please notify your surgeon if you experience dizziness, shortness of breath or blurred vision between now & the time of your surgery             15. DO NOT shave your operative site 96 hours prior to surgery. For face & neck surgery, men may use an electric razor 48 hours prior to surgery. 16. Shower the night before or morning of surgery using an antibacterial soap or as you have been instructed. 17. To provide excellent care visitors will be limited to one in the room at any given time. 18.  Please bring picture ID and insurance card. 19.  Visit our web site for additional information:  [email or web address]              20.During flu season no children under the age of 15 are permitted in the hospital for the safety of all patients. 21. If you take a long acting insulin in the evening only  take half of your usual  dose the night  before your procedure              22. If you use a c-pap please bring DOS if staying overnight,             23.For your convenience [facility] has a pharmacy on site to fill your prescriptions. 24. If you use oxygen and have a portable tank please bring it  with you the DOS             25. Bring a complete list of all your medications with name and dose include any supplements. 26. Other__________________________________________   *Please call pre admission testing if you any further questions   [address] [place]  [phone]   [address] [place] [address]       All above information reviewed with patient in person or by phone. Patient verbalizes understanding. All questions and concerns addressed. Patient/Rep____________________                                                                                                                                  Patient instructed to get their COVID-19 test done as directed by their doctor (5-7 days prior to procedure)  or patient states will get on _8/7/2020_________. Patient was notified that they need to have an appointment,number to call provided. The day the COVID test is done is considered day one. Instructed to self quarantine after test until DOS. There is a one visitor policy at [facility] for the pre-op phase only for surgery and endoscopy cases. The visitor is expected to leave the facility after the patient is taken back for the procedure. Pain management is NO VISITOR policyThe patients ride is expected to remain in the car with a cell phone for communication. If the ride is leaving the hospital grounds please make sure they are back in time for pickup. Have the patient inform the staff on arrival what their rides plans are while the patient is in the facility. At the MAIN there is one visitor allowed. Please note that the visitor policy is subject to change.

## 2020-08-07 ENCOUNTER — OFFICE VISIT (OUTPATIENT)
Dept: PRIMARY CARE CLINIC | Age: 75
End: 2020-08-07
Payer: COMMERCIAL

## 2020-08-07 PROCEDURE — 99211 OFF/OP EST MAY X REQ PHY/QHP: CPT | Performed by: NURSE PRACTITIONER

## 2020-08-07 NOTE — PATIENT INSTRUCTIONS

## 2020-08-07 NOTE — PROGRESS NOTES
Rogelio Ferguson received a viral test for COVID-19. They were educated on isolation and quarantine as appropriate. For any symptoms, they were directed to seek care from their PCP, given contact information to establish with a doctor, directed to an urgent care or the emergency room.

## 2020-08-08 LAB — SARS-COV-2, NAA: NOT DETECTED

## 2020-08-14 ENCOUNTER — ANESTHESIA (OUTPATIENT)
Dept: ENDOSCOPY | Age: 75
End: 2020-08-14
Payer: COMMERCIAL

## 2020-08-14 ENCOUNTER — HOSPITAL ENCOUNTER (OUTPATIENT)
Age: 75
Setting detail: OUTPATIENT SURGERY
Discharge: HOME | End: 2020-08-14
Attending: INTERNAL MEDICINE | Admitting: INTERNAL MEDICINE
Payer: COMMERCIAL

## 2020-08-14 ENCOUNTER — ANESTHESIA EVENT (OUTPATIENT)
Dept: ENDOSCOPY | Age: 75
End: 2020-08-14
Payer: COMMERCIAL

## 2020-08-14 VITALS
OXYGEN SATURATION: 100 % | DIASTOLIC BLOOD PRESSURE: 81 MMHG | RESPIRATION RATE: 13 BRPM | SYSTOLIC BLOOD PRESSURE: 101 MMHG

## 2020-08-14 VITALS
OXYGEN SATURATION: 99 % | WEIGHT: 235 LBS | RESPIRATION RATE: 16 BRPM | HEART RATE: 54 BPM | TEMPERATURE: 98.1 F | SYSTOLIC BLOOD PRESSURE: 139 MMHG | BODY MASS INDEX: 30.16 KG/M2 | DIASTOLIC BLOOD PRESSURE: 81 MMHG | HEIGHT: 74 IN

## 2020-08-14 PROCEDURE — 2580000003 HC RX 258: Performed by: ANESTHESIOLOGY

## 2020-08-14 PROCEDURE — 3700000000 HC ANESTHESIA ATTENDED CARE: Performed by: INTERNAL MEDICINE

## 2020-08-14 PROCEDURE — 7100000010 HC PHASE II RECOVERY - FIRST 15 MIN: Performed by: INTERNAL MEDICINE

## 2020-08-14 PROCEDURE — 3609027000 HC COLONOSCOPY: Performed by: INTERNAL MEDICINE

## 2020-08-14 PROCEDURE — 3700000001 HC ADD 15 MINUTES (ANESTHESIA): Performed by: INTERNAL MEDICINE

## 2020-08-14 PROCEDURE — 2500000003 HC RX 250 WO HCPCS: Performed by: NURSE ANESTHETIST, CERTIFIED REGISTERED

## 2020-08-14 PROCEDURE — 6370000000 HC RX 637 (ALT 250 FOR IP): Performed by: INTERNAL MEDICINE

## 2020-08-14 PROCEDURE — 2709999900 HC NON-CHARGEABLE SUPPLY: Performed by: INTERNAL MEDICINE

## 2020-08-14 PROCEDURE — 2500000003 HC RX 250 WO HCPCS: Performed by: INTERNAL MEDICINE

## 2020-08-14 PROCEDURE — 7100000011 HC PHASE II RECOVERY - ADDTL 15 MIN: Performed by: INTERNAL MEDICINE

## 2020-08-14 PROCEDURE — 6360000002 HC RX W HCPCS: Performed by: NURSE ANESTHETIST, CERTIFIED REGISTERED

## 2020-08-14 RX ORDER — LIDOCAINE HYDROCHLORIDE 20 MG/ML
INJECTION, SOLUTION EPIDURAL; INFILTRATION; INTRACAUDAL; PERINEURAL PRN
Status: DISCONTINUED | OUTPATIENT
Start: 2020-08-14 | End: 2020-08-14 | Stop reason: SDUPTHER

## 2020-08-14 RX ORDER — SODIUM CHLORIDE 9 MG/ML
INJECTION, SOLUTION INTRAVENOUS CONTINUOUS
Status: DISCONTINUED | OUTPATIENT
Start: 2020-08-14 | End: 2020-08-14 | Stop reason: HOSPADM

## 2020-08-14 RX ORDER — PROPOFOL 10 MG/ML
INJECTION, EMULSION INTRAVENOUS PRN
Status: DISCONTINUED | OUTPATIENT
Start: 2020-08-14 | End: 2020-08-14 | Stop reason: SDUPTHER

## 2020-08-14 RX ADMIN — PROPOFOL 50 MG: 10 INJECTION, EMULSION INTRAVENOUS at 14:34

## 2020-08-14 RX ADMIN — SODIUM CHLORIDE: 9 INJECTION, SOLUTION INTRAVENOUS at 13:12

## 2020-08-14 RX ADMIN — PROPOFOL 50 MG: 10 INJECTION, EMULSION INTRAVENOUS at 14:39

## 2020-08-14 RX ADMIN — LIDOCAINE HYDROCHLORIDE 5 ML: 20 INJECTION, SOLUTION EPIDURAL; INFILTRATION; INTRACAUDAL; PERINEURAL at 14:25

## 2020-08-14 RX ADMIN — PROPOFOL 50 MG: 10 INJECTION, EMULSION INTRAVENOUS at 14:27

## 2020-08-14 RX ADMIN — PROPOFOL 50 MG: 10 INJECTION, EMULSION INTRAVENOUS at 14:44

## 2020-08-14 RX ADMIN — PROPOFOL 50 MG: 10 INJECTION, EMULSION INTRAVENOUS at 14:48

## 2020-08-14 RX ADMIN — SODIUM CHLORIDE: 9 INJECTION, SOLUTION INTRAVENOUS at 14:25

## 2020-08-14 RX ADMIN — PROPOFOL 50 MG: 10 INJECTION, EMULSION INTRAVENOUS at 14:29

## 2020-08-14 ASSESSMENT — PAIN - FUNCTIONAL ASSESSMENT: PAIN_FUNCTIONAL_ASSESSMENT: 0-10

## 2020-08-14 ASSESSMENT — PAIN SCALES - GENERAL
PAINLEVEL_OUTOF10: 0

## 2020-08-14 NOTE — H&P
[address] [place] [address]    Pre-operative History and Physical    Patient: Jeferson Mcnally  : 1945  CSN:     History Obtained From:   Patient or guardian. HISTORY OF PRESENT ILLNESS:    The patient is a 70 y.o. male here for Endoscopy. Past Medical History:    Past Medical History:   Diagnosis Date    Arthritis     Colon polyp     Diabetes mellitus ([facility]     denies being diabetic    Hyperlipemia     Hypertension     Kidney disease     Rheumatoid arthritis ([facility]      Past Surgical History:    Past Surgical History:   Procedure Laterality Date    COLONOSCOPY      ROTATOR CUFF REPAIR Left      Medications Prior to Admission:   No current facility-administered medications on file prior to encounter. Current Outpatient Medications on File Prior to Encounter   Medication Sig Dispense Refill    Multiple Vitamins-Minerals (THERAPEUTIC MULTIVITAMIN-MINERALS) tablet Take 1 tablet by mouth daily      losartan (COZAAR) 100 MG tablet TAKE 1 TABLET DAILY 90 tablet 3    triamterene-hydrochlorothiazide (MAXZIDE-25) 37.5-25 MG per tablet TAKE 1 TABLET DAILY 90 tablet 3    rosuvastatin (CRESTOR) 40 MG tablet TAKE 1 TABLET EVERY EVENING 90 tablet 2    metoprolol tartrate (LOPRESSOR) 25 MG tablet TAKE 1 TABLET TWICE A DAY (Patient taking differently: daily ) 180 tablet 3    Vitamin D (CHOLECALCIFEROL) 1000 UNITS CAPS capsule Take 1,000 Units by mouth daily.  clobetasol (TEMOVATE) 0.05 % ointment Apply topically to bilateral feet 2 times daily. 1 Tube 5    sildenafil (VIAGRA) 100 MG tablet Take 1 tablet by mouth daily as needed for Erectile Dysfunction 30 tablet 3        Allergies:  Patient has no known allergies.       Social History:   Social History     Tobacco Use    Smoking status: Former Smoker     Packs/day: 0.50     Years: 10.00     Pack years: 5.00     Start date:      Last attempt to quit: 1984     Years since quittin.5    Smokeless tobacco: Never Used    Tobacco comment: quit smoking 30 years    Substance Use Topics    Alcohol use: Yes     Comment: rare     Family History:   Family History   Problem Relation Age of Onset    High Blood Pressure Sister        PHYSICAL EXAM:      /75   Pulse 68   Temp 97.9 °F (36.6 °C) (Temporal)   Resp 16   Ht 6' 2\" (1.88 m)   Wt 235 lb (106.6 kg)   SpO2 98%   BMI 30.17 kg/m²  I        Heart:  RRR, normal s1s2    Lungs:  CTA and normal effort    Abdomen:   Soft, nt nd. ASSESSMENT AND PLAN:    1. Patient is a 70 y.o. male here for endoscopy with MAC sedation. 2.  Procedure options, risks and benefits reviewed with patient and/or guardian. They express understanding.

## 2020-08-14 NOTE — ANESTHESIA PRE PROCEDURE
Department of Anesthesiology  Preprocedure Note       Name:  Zaira Doran   Age:  70 y.o.  :  1945                                          MRN:  [id number]         Date:  2020      Surgeon: Marguerite Wong):  Darci Fraga MD    Procedure: Procedure(s):  COLONOSCOPY DIAGNOSTIC    Medications prior to admission:   Prior to Admission medications    Medication Sig Start Date End Date Taking? Authorizing Provider   Multiple Vitamins-Minerals (THERAPEUTIC MULTIVITAMIN-MINERALS) tablet Take 1 tablet by mouth daily   Yes Historical Provider, MD   losartan (COZAAR) 100 MG tablet TAKE 1 TABLET DAILY 3/17/20   Joy Gallegos MD   triamterene-hydrochlorothiazide ([id number]) 37.5-25 MG per tablet TAKE 1 TABLET DAILY 3/17/20   Joy Gallegos MD   rosuvastatin (CRESTOR) 40 MG tablet TAKE 1 TABLET EVERY EVENING 3/17/20   Joy Gallegos MD   metoprolol tartrate (LOPRESSOR) 25 MG tablet TAKE 1 TABLET TWICE A DAY  Patient taking differently: daily  3/17/20   Joy Gallegos MD   clobetasol (TEMOVATE) 0.05 % ointment Apply topically to bilateral feet 2 times daily. 19   Joy Gallegos MD   sildenafil (VIAGRA) 100 MG tablet Take 1 tablet by mouth daily as needed for Erectile Dysfunction 3/12/19   Joy Gallegos MD   Vitamin D (CHOLECALCIFEROL) 1000 UNITS CAPS capsule Take 1,000 Units by mouth daily. Historical Provider, MD       Current medications:    No current facility-administered medications for this encounter.         Allergies:  No Known Allergies    Problem List:    Patient Active Problem List   Diagnosis Code    HTN (hypertension) I10    Bilateral shoulder pain M25.511, M25.512    CKD (chronic kidney disease), stage III (HCC) N18.3    Diabetes mellitus type 2 in nonobese ([facility] E11.9    Carotid artery disease, unspecified laterality ([facility] I73.9       Past Medical History:        Diagnosis Date    Arthritis     Hyperlipemia     Hypertension     Rheumatoid arthritis ([facility]        Past Surgical History:        Procedure Laterality Date    COLONOSCOPY      ROTATOR CUFF REPAIR Left        Social History:    Social History     Tobacco Use    Smoking status: Former Smoker     Packs/day: 0.50     Years: 10.00     Pack years: 5.00     Start date:      Last attempt to quit: 1984     Years since quittin.5    Smokeless tobacco: Never Used    Tobacco comment: quit smoking 30 years    Substance Use Topics    Alcohol use: Yes     Comment: rare                                Counseling given: Not Answered  Comment: quit smoking 30 years       Vital Signs (Current):   Vitals:    20 1812   Weight: 235 lb (106.6 kg)   Height: 6' 2\" (1.88 m)                                              BP Readings from Last 3 Encounters:   20 115/61   20 (!) 140/90   20 120/68       NPO Status:                                                                                 BMI:   Wt Readings from Last 3 Encounters:   20 235 lb (106.6 kg)   20 242 lb 12.8 oz (110.1 kg)   20 239 lb 3.2 oz (108.5 kg)     Body mass index is 30.17 kg/m². CBC:   Lab Results   Component Value Date    WBC 5.2 2019    RBC 4.94 2019    HGB 13.5 2019    HCT 41.0 2019    MCV 82.9 2019    RDW 15.1 2019     2019       CMP:   Lab Results   Component Value Date     2020    K 3.9 2020     2020    CO2 29 2020    BUN 20 2020    CREATININE 1.3 2020    GFRAA >60 2020    AGRATIO 1.8 2020    LABGLOM 54 2020    GLUCOSE 96 2020    PROT 6.9 2020    CALCIUM 9.3 2020    BILITOT 0.4 2020    ALKPHOS 79 2020    AST 24 2020    ALT 34 2020       POC Tests: No results for input(s): POCGLU, POCNA, POCK, POCCL, POCBUN, POCHEMO, POCHCT in the last 72 hours.     Coags: No results found for: PROTIME, INR, APTT    HCG (If Applicable): No results found for: PREGTESTUR, PREGSERUM, HCG, HCGQUANT     ABGs: No results found for: PHART, PO2ART, DZM9JWU, JOQ3ANK, BEART, S9ULRZRA     Type & Screen (If Applicable):  No results found for: LABABO, LABRH    Drug/Infectious Status (If Applicable):  No results found for: HIV, HEPCAB    COVID-19 Screening (If Applicable):   Lab Results   Component Value Date    COVID19 NOT DETECTED 08/07/2020         Anesthesia Evaluation  Patient summary reviewed and Nursing notes reviewed  Airway: Mallampati: II        Dental:          Pulmonary:                              Cardiovascular:    (+) hypertension:,                   Neuro/Psych:               GI/Hepatic/Renal:             Endo/Other:    (+) Diabetes, : arthritis:., .                 Abdominal:           Vascular:                                        Anesthesia Plan      MAC     ASA 3       Induction: intravenous. MIPS: Prophylactic antiemetics administered.                       Caridad Garrison MD   8/14/2020

## 2020-09-08 DIAGNOSIS — N18.30 CKD (CHRONIC KIDNEY DISEASE), STAGE III (HCC): ICD-10-CM

## 2020-09-14 ENCOUNTER — OFFICE VISIT (OUTPATIENT)
Dept: INTERNAL MEDICINE CLINIC | Age: 75
End: 2020-09-14
Payer: COMMERCIAL

## 2020-09-14 VITALS
TEMPERATURE: 97.7 F | HEART RATE: 68 BPM | OXYGEN SATURATION: 98 % | BODY MASS INDEX: 30.56 KG/M2 | SYSTOLIC BLOOD PRESSURE: 124 MMHG | DIASTOLIC BLOOD PRESSURE: 84 MMHG | WEIGHT: 238 LBS

## 2020-09-14 PROCEDURE — 90653 IIV ADJUVANT VACCINE IM: CPT | Performed by: INTERNAL MEDICINE

## 2020-09-14 PROCEDURE — G0008 ADMIN INFLUENZA VIRUS VAC: HCPCS | Performed by: INTERNAL MEDICINE

## 2020-09-14 PROCEDURE — 99214 OFFICE O/P EST MOD 30 MIN: CPT | Performed by: INTERNAL MEDICINE

## 2020-09-14 RX ORDER — LOSARTAN POTASSIUM 100 MG/1
TABLET ORAL
Qty: 90 TABLET | Refills: 3 | Status: SHIPPED | OUTPATIENT
Start: 2020-09-14 | End: 2020-11-09

## 2020-09-14 RX ORDER — ROSUVASTATIN CALCIUM 40 MG/1
TABLET, COATED ORAL
Qty: 90 TABLET | Refills: 2 | Status: SHIPPED | OUTPATIENT
Start: 2020-09-14 | End: 2021-06-28 | Stop reason: SDUPTHER

## 2020-09-14 RX ORDER — TRIAMTERENE AND HYDROCHLOROTHIAZIDE 37.5; 25 MG/1; MG/1
TABLET ORAL
Qty: 90 TABLET | Refills: 3 | Status: SHIPPED | OUTPATIENT
Start: 2020-09-14 | End: 2021-08-11 | Stop reason: SDUPTHER

## 2020-09-14 ASSESSMENT — PATIENT HEALTH QUESTIONNAIRE - PHQ9
SUM OF ALL RESPONSES TO PHQ QUESTIONS 1-9: 0
2. FEELING DOWN, DEPRESSED OR HOPELESS: 0
SUM OF ALL RESPONSES TO PHQ QUESTIONS 1-9: 0
1. LITTLE INTEREST OR PLEASURE IN DOING THINGS: 0
SUM OF ALL RESPONSES TO PHQ9 QUESTIONS 1 & 2: 0

## 2020-09-14 ASSESSMENT — ENCOUNTER SYMPTOMS
BLURRED VISION: 0
COUGH: 0
ORTHOPNEA: 0
NAIL CHANGES: 0
SHORTNESS OF BREATH: 0
DIARRHEA: 0
SORE THROAT: 0
EYE PAIN: 0
VOMITING: 0

## 2020-09-14 NOTE — PROGRESS NOTES
Vaccine Information Sheet, \"Influenza - Inactivated\"  given to Melania Alanis, or parent/legal guardian of  Melania Alanis and verbalized understanding. Patient responses:    Have you ever had a reaction to a flu vaccine? No  Do you have any current illness? No  Have you ever had Guillian Ernest Syndrome? No  Do you have a serious allergy to any of the follow: Neomycin, Polymyxin, Thimerosal, eggs or egg products? No    Flu vaccine given per order. Please see immunization tab. Risks and benefits explained. Current VIS given.

## 2020-09-14 NOTE — PROGRESS NOTES
Subjective:      Patient ID: Madhu Hou is a 70 y.o. male. Chief Complaint   Patient presents with    Medication Check     rash on back of left leg. Hypertension   This is a chronic problem. The current episode started more than 1 year ago. The problem is unchanged. The problem is controlled. Pertinent negatives include no anxiety, blurred vision, chest pain, headaches, malaise/fatigue, neck pain, orthopnea, palpitations, peripheral edema, PND, shortness of breath or sweats. There are no associated agents to hypertension. The current treatment provides mild improvement. There are no compliance problems. Hypertensive end-organ damage includes left ventricular hypertrophy. Rash   This is a chronic problem. The current episode started more than 1 month ago. The problem is unchanged. The affected locations include the left lower leg. The rash is characterized by itchiness. He was exposed to nothing. Pertinent negatives include no anorexia, congestion, cough, diarrhea, eye pain, fatigue, fever, joint pain, nail changes, shortness of breath, sore throat or vomiting. Past treatments include topical steroids (OTC cortisone with no help). The treatment provided mild relief. There is no history of allergies, asthma or varicella.          Past Medical History:   Diagnosis Date    Arthritis     Colon polyp     Diabetes mellitus ([address]     denies being diabetic    Hyperlipemia     Hypertension     Kidney disease     Rheumatoid arthritis ([facility]      Past Surgical History:   Procedure Laterality Date    COLONOSCOPY      COLONOSCOPY N/A 8/14/2020    COLONOSCOPY DIAGNOSTIC performed by Amarilys Rodgers [facility] at [facility] Left      Family History   Problem Relation Age of Onset    High Blood Pressure Sister      Social History     Socioeconomic History    Marital status:      Spouse name: Not on file    Number of children: 4    Years of education: Not on file    Highest education level: Not on file   Occupational History    Occupation: part-time to stay busy     Comment: [employer]   Social Needs    Financial resource strain: Not on file    Food insecurity     Worry: Not on file     Inability: Not on file   [employer] needs     Medical: Not on file     Non-medical: Not on file   Tobacco Use    Smoking status: Former Smoker     Packs/day: 0.50     Years: 10.00     Pack years: 5.00     Start date:      Last attempt to quit: 1984     Years since quittin.6    Smokeless tobacco: Never Used    Tobacco comment: quit smoking 30 years    Substance and Sexual Activity    Alcohol use: Yes     Comment: rare    Drug use: No    Sexual activity: Not on file   Lifestyle    Physical activity     Days per week: Not on file     Minutes per session: Not on file    Stress: Not on file   Relationships    Social connections     Talks on phone: Not on file     Gets together: Not on file     Attends Protestant service: Not on file     Active member of club or organization: Not on file     Attends meetings of clubs or organizations: Not on file     Relationship status: Not on file    Intimate partner violence     Fear of current or ex partner: Not on file     Emotionally abused: Not on file     Physically abused: Not on file     Forced sexual activity: Not on file   Other Topics Concern    Not on file   Social History Narrative    Lives home with wife and has 4 children. Review of Systems   Constitutional: Negative for fatigue, fever and malaise/fatigue. HENT: Negative for congestion and sore throat. Eyes: Negative for blurred vision and pain. Respiratory: Negative for cough and shortness of breath. Cardiovascular: Negative for chest pain, palpitations, orthopnea and PND. Gastrointestinal: Negative for anorexia, diarrhea and vomiting. Musculoskeletal: Negative for joint pain and neck pain. Skin: Positive for rash. Negative for nail changes. Neurological: Negative for headaches. No Known Allergies    Current Outpatient Medications   Medication Sig Dispense Refill    Multiple Vitamins-Minerals (THERAPEUTIC MULTIVITAMIN-MINERALS) tablet Take 1 tablet by mouth daily      losartan (COZAAR) 100 MG tablet TAKE 1 TABLET DAILY 90 tablet 3    triamterene-hydrochlorothiazide (MAXZIDE-25) 37.5-25 MG per tablet TAKE 1 TABLET DAILY 90 tablet 3    rosuvastatin (CRESTOR) 40 MG tablet TAKE 1 TABLET EVERY EVENING 90 tablet 2    metoprolol tartrate (LOPRESSOR) 25 MG tablet TAKE 1 TABLET TWICE A DAY (Patient taking differently: daily ) 180 tablet 3    clobetasol (TEMOVATE) 0.05 % ointment Apply topically to bilateral feet 2 times daily. 1 Tube 5    sildenafil (VIAGRA) 100 MG tablet Take 1 tablet by mouth daily as needed for Erectile Dysfunction 30 tablet 3    Vitamin D (CHOLECALCIFEROL) 1000 UNITS CAPS capsule Take 1,000 Units by mouth daily. No current facility-administered medications for this visit. Vitals:    09/14/20 1558   BP: 124/84   Pulse: 68   Temp: 97.7 °F (36.5 °C)   TempSrc: Temporal   SpO2: 98%   Weight: 238 lb (108 kg)     Body mass index is 30.56 kg/m². Wt Readings from Last 3 Encounters:   09/14/20 238 lb (108 kg)   08/14/20 235 lb (106.6 kg)   07/06/20 242 lb 12.8 oz (110.1 kg)     BP Readings from Last 3 Encounters:   09/14/20 124/84   08/14/20 139/81   08/14/20 101/81       Objective:   Physical Exam  Vitals signs and nursing note reviewed. Constitutional:       Appearance: He is well-developed. HENT:      Head: Normocephalic and atraumatic. Nose: Nose normal.   Eyes:      General: No scleral icterus. Conjunctiva/sclera: Conjunctivae normal.      Pupils: Pupils are equal, round, and reactive to light. Neck:      Musculoskeletal: Normal range of motion and neck supple. Thyroid: No thyromegaly. Cardiovascular:      Rate and Rhythm: Normal rate and regular rhythm.       Heart sounds: Normal heart sounds. No murmur. Pulmonary:      Effort: Pulmonary effort is normal. No respiratory distress. Breath sounds: Normal breath sounds. No stridor. Musculoskeletal: Normal range of motion. Legs:       Comments: Small varicose veins of posterior legs     Skin:     General: Skin is warm. Capillary Refill: Capillary refill takes less than 2 seconds. Neurological:      General: No focal deficit present. Mental Status: He is alert and oriented to person, place, and time. Psychiatric:         Behavior: Behavior normal.         Thought Content: Thought content normal.         Judgment: Judgment normal.       Lab Review   Office Visit on 08/07/2020   Component Date Value    SARS-CoV-2, YOSSI 08/07/2020 NOT DETECTED           Assessment/Plan:  Kassie Kaur was seen today for medication check. Diagnoses and all orders for this visit:    Varicose veins of lower extremity, unspecified laterality, unspecified whether complicated  - stable      Essential hypertension  -     triamterene-hydroCHLOROthiazide (MAXZIDE-25) 37.5-25 MG per tablet; TAKE 1 TABLET DAILY  -     losartan (COZAAR) 100 MG tablet; TAKE 1 TABLET DAILY  -     metoprolol tartrate (LOPRESSOR) 25 MG tablet; TAKE 1 TABLET TWICE A DAY  -     Lipid Panel; Future    Dyslipidemia  -     rosuvastatin (CRESTOR) 40 MG tablet; TAKE 1 TABLET EVERY EVENING  -     Lipid Panel; Future    Needs flu shot  -     INFLUENZA, TRIV, INACTIVATED, SUBUNIT, ADJUVANTED, 65 YRS AND OLDER, IM, PREFILL SYR, 0.5ML (FLUAD TRIV)    Carotid artery disease, unspecified laterality (HCC)  -     Lipid Panel; Future    - supportive care    No follow-ups on file.         May Palmer MD

## 2020-10-16 DIAGNOSIS — I10 ESSENTIAL HYPERTENSION: ICD-10-CM

## 2020-10-16 DIAGNOSIS — I77.9 CAROTID ARTERY DISEASE, UNSPECIFIED LATERALITY (HCC): ICD-10-CM

## 2020-10-16 DIAGNOSIS — E78.5 DYSLIPIDEMIA: ICD-10-CM

## 2020-10-16 LAB
CHOLESTEROL, TOTAL: 135 MG/DL (ref 0–199)
HDLC SERPL-MCNC: 44 MG/DL (ref 40–60)
LDL CHOLESTEROL CALCULATED: 70 MG/DL
TRIGL SERPL-MCNC: 103 MG/DL (ref 0–150)
VLDLC SERPL CALC-MCNC: 21 MG/DL

## 2020-12-08 ENCOUNTER — OFFICE VISIT (OUTPATIENT)
Dept: INTERNAL MEDICINE CLINIC | Age: 75
End: 2020-12-08
Payer: COMMERCIAL

## 2020-12-08 VITALS
HEIGHT: 74 IN | WEIGHT: 240 LBS | DIASTOLIC BLOOD PRESSURE: 80 MMHG | BODY MASS INDEX: 30.8 KG/M2 | HEART RATE: 87 BPM | SYSTOLIC BLOOD PRESSURE: 124 MMHG | OXYGEN SATURATION: 98 %

## 2020-12-08 LAB — HBA1C MFR BLD: 5.8 %

## 2020-12-08 PROCEDURE — 99213 OFFICE O/P EST LOW 20 MIN: CPT | Performed by: INTERNAL MEDICINE

## 2020-12-08 PROCEDURE — 83036 HEMOGLOBIN GLYCOSYLATED A1C: CPT | Performed by: INTERNAL MEDICINE

## 2020-12-08 NOTE — PROGRESS NOTES
Subjective:      Patient ID: Denton De La Torre is a 70 y.o. male. Chief Complaint   Patient presents with    Hypertension    Discuss Labs       Hypertension  This is a chronic problem. The current episode started more than 1 year ago. The problem is unchanged. The problem is controlled. Pertinent negatives include no anxiety, blurred vision, chest pain, malaise/fatigue, neck pain, orthopnea, palpitations, peripheral edema, PND, shortness of breath or sweats. Risk factors for coronary artery disease include male gender, obesity and sedentary lifestyle. Past treatments include beta blockers, alpha 1 blockers, calcium channel blockers and diuretics. The current treatment provides no improvement. There are no compliance problems.       Past Medical History:   Diagnosis Date    Arthritis     Colon polyp     Diabetes mellitus ([facility] [address]     denies being diabetic    Hyperlipemia     Hypertension     Kidney disease     Rheumatoid arthritis ([facility] [address]      Past Surgical History:   Procedure Laterality Date    COLONOSCOPY      COLONOSCOPY N/A 2020    COLONOSCOPY DIAGNOSTIC performed by Ag Quiñonez MD at [facility] Left      Family History   Problem Relation Age of Onset    High Blood Pressure Sister      Social History     Socioeconomic History    Marital status:      Spouse name: Not on file    Number of children: 3    Years of education: Not on file    Highest education level: Not on file   Occupational History    Occupation: part-time to stay busy     Comment: [employer]   Social Needs    Financial resource strain: Not on file    Food insecurity     Worry: Not on file     Inability: Not on file   [employer] needs     Medical: Not on file     Non-medical: Not on file   Tobacco Use    Smoking status: Former Smoker     Packs/day: 0.50     Years: 10.00     Pack years: 5.00     Start date:      Quit date: 1984     Years since quittin.9    Smokeless tobacco: Never Used    Tobacco comment: quit smoking 30 years    Substance and Sexual Activity    Alcohol use: Yes     Comment: rare    Drug use: No    Sexual activity: Not on file   Lifestyle    Physical activity     Days per week: Not on file     Minutes per session: Not on file    Stress: Not on file   Relationships    Social connections     Talks on phone: Not on file     Gets together: Not on file     Attends [employer] service: Not on file     Active member of club or organization: Not on file     Attends meetings of clubs or organizations: Not on file     Relationship status: Not on file    Intimate partner violence     Fear of current or ex partner: Not on file     Emotionally abused: Not on file     Physically abused: Not on file     Forced sexual activity: Not on file   Other Topics Concern    Not on file   Social History Narrative    Lives home with wife and has 4 children. Review of Systems   Constitutional: Positive for fatigue. Negative for malaise/fatigue. Eyes: Negative for blurred vision. Respiratory: Negative. Negative for shortness of breath. Cardiovascular: Negative. Negative for chest pain, palpitations, orthopnea and PND. Gastrointestinal: Negative. Endocrine: Negative. Genitourinary: Negative. Musculoskeletal: Negative. Negative for neck pain. Skin: Negative. Allergic/Immunologic: Negative. Neurological: Negative. Hematological: Negative. Psychiatric/Behavioral: Negative. All other systems reviewed and are negative.       No Known Allergies    Current Outpatient Medications   Medication Sig Dispense Refill    losartan (COZAAR) 50 MG tablet Take 1 tablet by mouth daily TAKE 1 TABLET DAILY 90 tablet 0    triamterene-hydroCHLOROthiazide (MAXZIDE-25) 37.5-25 MG per tablet TAKE 1 TABLET DAILY 90 tablet 3    rosuvastatin (CRESTOR) 40 MG tablet TAKE 1 TABLET EVERY EVENING 90 tablet 2    metoprolol tartrate (LOPRESSOR) 25 MG tablet TAKE 1 TABLET TWICE A  tablet 3    Multiple Vitamins-Minerals (THERAPEUTIC MULTIVITAMIN-MINERALS) tablet Take 1 tablet by mouth daily      sildenafil (VIAGRA) 100 MG tablet Take 1 tablet by mouth daily as needed for Erectile Dysfunction 30 tablet 3    Vitamin D (CHOLECALCIFEROL) 1000 UNITS CAPS capsule Take 1,000 Units by mouth daily.  clobetasol (TEMOVATE) 0.05 % ointment Apply topically to bilateral feet 2 times daily. (Patient not taking: Reported on 12/8/2020) 1 Tube 5     No current facility-administered medications for this visit. Vitals:    12/08/20 1537   BP: 124/80   Pulse: 87   SpO2: 98%   Weight: 240 lb (108.9 kg)   Height: 6' 2\" (1.88 m)     Body mass index is 30.81 kg/m². Wt Readings from Last 3 Encounters:   12/08/20 240 lb (108.9 kg)   11/09/20 239 lb 12.8 oz (108.8 kg)   09/14/20 238 lb (108 kg)     BP Readings from Last 3 Encounters:   12/08/20 124/80   11/09/20 (!) 114/55   09/14/20 124/84       Objective:   Physical Exam  Vitals signs and nursing note reviewed. Constitutional:       General: He is not in acute distress. Appearance: He is well-developed. HENT:      Head: Normocephalic and atraumatic. Right Ear: External ear normal.      Left Ear: External ear normal.      Nose: Nose normal.   Eyes:      General: No scleral icterus. Conjunctiva/sclera: Conjunctivae normal.      Pupils: Pupils are equal, round, and reactive to light. Neck:      Musculoskeletal: Normal range of motion and neck supple. Cardiovascular:      Rate and Rhythm: Normal rate and regular rhythm. Heart sounds: Normal heart sounds. No murmur. Pulmonary:      Effort: Pulmonary effort is normal.      Breath sounds: Normal breath sounds. Musculoskeletal: Normal range of motion. Skin:     General: Skin is warm. Capillary Refill: Capillary refill takes less than 2 seconds. Neurological:      Mental Status: He is alert and oriented to person, place, and time.    Psychiatric: Behavior: Behavior normal.         Thought Content: Thought content normal.         Judgment: Judgment normal.     Lab Review   Orders Only on 10/16/2020   Component Date Value    Cholesterol, Total 10/16/2020 135     Triglycerides 10/16/2020 103     HDL 10/16/2020 44     LDL Calculated 10/16/2020 70     VLDL Cholesterol Calcula* 10/16/2020 21          Assessment/Plan:  Nick Meyers was seen today for hypertension and discuss labs. Diagnoses and all orders for this visit:    Diabetes mellitus type 2 in nonobese ([address]  -     POCT glycosylated hemoglobin (Hb A1C)      Return in 6 months (on 6/8/2021) for AWV march -- diabetes.       Tatiana Vences MD

## 2020-12-30 ASSESSMENT — ENCOUNTER SYMPTOMS
SHORTNESS OF BREATH: 0
GASTROINTESTINAL NEGATIVE: 1
ALLERGIC/IMMUNOLOGIC NEGATIVE: 1
BLURRED VISION: 0
RESPIRATORY NEGATIVE: 1
ORTHOPNEA: 0

## 2021-02-11 DIAGNOSIS — I10 ESSENTIAL HYPERTENSION: ICD-10-CM

## 2021-02-11 LAB
ANION GAP SERPL CALCULATED.3IONS-SCNC: 13 MMOL/L (ref 3–16)
BILIRUBIN URINE: NEGATIVE
BLOOD, URINE: NEGATIVE
BUN BLDV-MCNC: 17 MG/DL (ref 7–20)
CALCIUM SERPL-MCNC: 9.6 MG/DL (ref 8.3–10.6)
CHLORIDE BLD-SCNC: 105 MMOL/L (ref 99–110)
CLARITY: CLEAR
CO2: 26 MMOL/L (ref 21–32)
COLOR: YELLOW
CREAT SERPL-MCNC: 1.3 MG/DL (ref 0.8–1.3)
EPITHELIAL CELLS, UA: 2 /HPF (ref 0–5)
GFR AFRICAN AMERICAN: >60
GFR NON-AFRICAN AMERICAN: 54
GLUCOSE BLD-MCNC: 94 MG/DL (ref 70–99)
GLUCOSE URINE: NEGATIVE MG/DL
HYALINE CASTS: 6 /LPF (ref 0–8)
KETONES, URINE: ABNORMAL MG/DL
LEUKOCYTE ESTERASE, URINE: NEGATIVE
MICROSCOPIC EXAMINATION: ABNORMAL
NITRITE, URINE: NEGATIVE
PH UA: 6.5 (ref 5–8)
POTASSIUM SERPL-SCNC: 3.7 MMOL/L (ref 3.5–5.1)
PROTEIN UA: NEGATIVE MG/DL
RBC UA: 2 /HPF (ref 0–4)
SODIUM BLD-SCNC: 144 MMOL/L (ref 136–145)
SPECIFIC GRAVITY UA: 1.02 (ref 1–1.03)
URINE TYPE: ABNORMAL
UROBILINOGEN, URINE: 0.2 E.U./DL
WBC UA: 3 /HPF (ref 0–5)

## 2021-06-15 DIAGNOSIS — I10 ESSENTIAL HYPERTENSION: ICD-10-CM

## 2021-06-15 DIAGNOSIS — N18.31 STAGE 3A CHRONIC KIDNEY DISEASE (HCC): ICD-10-CM

## 2021-06-15 LAB
ANION GAP SERPL CALCULATED.3IONS-SCNC: 13 MMOL/L (ref 3–16)
BUN BLDV-MCNC: 16 MG/DL (ref 7–20)
CALCIUM SERPL-MCNC: 9.4 MG/DL (ref 8.3–10.6)
CHLORIDE BLD-SCNC: 102 MMOL/L (ref 99–110)
CO2: 26 MMOL/L (ref 21–32)
CREAT SERPL-MCNC: 1.4 MG/DL (ref 0.8–1.3)
GFR AFRICAN AMERICAN: 60
GFR NON-AFRICAN AMERICAN: 50
GLUCOSE BLD-MCNC: 104 MG/DL (ref 70–99)
POTASSIUM SERPL-SCNC: 3.9 MMOL/L (ref 3.5–5.1)
SODIUM BLD-SCNC: 141 MMOL/L (ref 136–145)

## 2021-06-28 ENCOUNTER — OFFICE VISIT (OUTPATIENT)
Dept: INTERNAL MEDICINE CLINIC | Age: 76
End: 2021-06-28
Payer: COMMERCIAL

## 2021-06-28 VITALS
DIASTOLIC BLOOD PRESSURE: 70 MMHG | SYSTOLIC BLOOD PRESSURE: 90 MMHG | BODY MASS INDEX: 30.7 KG/M2 | OXYGEN SATURATION: 98 % | WEIGHT: 239.2 LBS | HEIGHT: 74 IN | HEART RATE: 79 BPM

## 2021-06-28 DIAGNOSIS — R73.03 PREDIABETES: ICD-10-CM

## 2021-06-28 DIAGNOSIS — I77.9 CAROTID ARTERY DISEASE, UNSPECIFIED LATERALITY (HCC): ICD-10-CM

## 2021-06-28 DIAGNOSIS — I10 ESSENTIAL HYPERTENSION: ICD-10-CM

## 2021-06-28 DIAGNOSIS — Z00.00 ROUTINE GENERAL MEDICAL EXAMINATION AT A HEALTH CARE FACILITY: Primary | ICD-10-CM

## 2021-06-28 DIAGNOSIS — I79.8 OTHER DISORDERS OF ARTERIES, ARTERIOLES AND CAPILLARIES IN DISEASES CLASSIFIED ELSEWHERE (HCC): ICD-10-CM

## 2021-06-28 DIAGNOSIS — E78.5 DYSLIPIDEMIA: ICD-10-CM

## 2021-06-28 DIAGNOSIS — N18.31 STAGE 3A CHRONIC KIDNEY DISEASE (HCC): ICD-10-CM

## 2021-06-28 DIAGNOSIS — R79.9 ABNORMAL FINDING OF BLOOD CHEMISTRY, UNSPECIFIED: ICD-10-CM

## 2021-06-28 PROCEDURE — 93880 EXTRACRANIAL BILAT STUDY: CPT | Performed by: INTERNAL MEDICINE

## 2021-06-28 PROCEDURE — G0439 PPPS, SUBSEQ VISIT: HCPCS | Performed by: INTERNAL MEDICINE

## 2021-06-28 RX ORDER — SILDENAFIL 100 MG/1
100 TABLET, FILM COATED ORAL DAILY PRN
Qty: 30 TABLET | Refills: 3 | Status: SHIPPED | OUTPATIENT
Start: 2021-06-28 | End: 2021-12-20 | Stop reason: SDUPTHER

## 2021-06-28 RX ORDER — ROSUVASTATIN CALCIUM 40 MG/1
TABLET, COATED ORAL
Qty: 90 TABLET | Refills: 1 | Status: SHIPPED | OUTPATIENT
Start: 2021-06-28 | End: 2021-12-20 | Stop reason: SDUPTHER

## 2021-06-28 RX ORDER — LOSARTAN POTASSIUM 50 MG/1
50 TABLET ORAL DAILY
Qty: 90 TABLET | Refills: 1 | Status: SHIPPED | OUTPATIENT
Start: 2021-06-28 | End: 2021-12-20 | Stop reason: SDUPTHER

## 2021-06-28 SDOH — ECONOMIC STABILITY: FOOD INSECURITY: WITHIN THE PAST 12 MONTHS, THE FOOD YOU BOUGHT JUST DIDN'T LAST AND YOU DIDN'T HAVE MONEY TO GET MORE.: NEVER TRUE

## 2021-06-28 SDOH — ECONOMIC STABILITY: FOOD INSECURITY: WITHIN THE PAST 12 MONTHS, YOU WORRIED THAT YOUR FOOD WOULD RUN OUT BEFORE YOU GOT MONEY TO BUY MORE.: NEVER TRUE

## 2021-06-28 ASSESSMENT — ENCOUNTER SYMPTOMS
ORTHOPNEA: 0
BLURRED VISION: 0
ALLERGIC/IMMUNOLOGIC NEGATIVE: 1
SHORTNESS OF BREATH: 0
RESPIRATORY NEGATIVE: 1
GASTROINTESTINAL NEGATIVE: 1

## 2021-06-28 ASSESSMENT — PATIENT HEALTH QUESTIONNAIRE - PHQ9
SUM OF ALL RESPONSES TO PHQ QUESTIONS 1-9: 0
SUM OF ALL RESPONSES TO PHQ9 QUESTIONS 1 & 2: 0
2. FEELING DOWN, DEPRESSED OR HOPELESS: 0
SUM OF ALL RESPONSES TO PHQ QUESTIONS 1-9: 0
SUM OF ALL RESPONSES TO PHQ QUESTIONS 1-9: 0
1. LITTLE INTEREST OR PLEASURE IN DOING THINGS: 0

## 2021-06-28 ASSESSMENT — LIFESTYLE VARIABLES
AUDIT TOTAL SCORE: 1
HOW OFTEN DURING THE LAST YEAR HAVE YOU FOUND THAT YOU WERE NOT ABLE TO STOP DRINKING ONCE YOU HAD STARTED: 0
HOW MANY STANDARD DRINKS CONTAINING ALCOHOL DO YOU HAVE ON A TYPICAL DAY: 0
HOW OFTEN DURING THE LAST YEAR HAVE YOU BEEN UNABLE TO REMEMBER WHAT HAPPENED THE NIGHT BEFORE BECAUSE YOU HAD BEEN DRINKING: 0
HOW OFTEN DURING THE LAST YEAR HAVE YOU FAILED TO DO WHAT WAS NORMALLY EXPECTED FROM YOU BECAUSE OF DRINKING: 0
HAVE YOU OR SOMEONE ELSE BEEN INJURED AS A RESULT OF YOUR DRINKING: 0
HOW OFTEN DURING THE LAST YEAR HAVE YOU HAD A FEELING OF GUILT OR REMORSE AFTER DRINKING: 0
HOW OFTEN DO YOU HAVE SIX OR MORE DRINKS ON ONE OCCASION: 0
AUDIT-C TOTAL SCORE: 1
HAS A RELATIVE, FRIEND, DOCTOR, OR ANOTHER HEALTH PROFESSIONAL EXPRESSED CONCERN ABOUT YOUR DRINKING OR SUGGESTED YOU CUT DOWN: 0
HOW OFTEN DURING THE LAST YEAR HAVE YOU NEEDED AN ALCOHOLIC DRINK FIRST THING IN THE MORNING TO GET YOURSELF GOING AFTER A NIGHT OF HEAVY DRINKING: 0
HOW OFTEN DO YOU HAVE A DRINK CONTAINING ALCOHOL: 1

## 2021-06-28 ASSESSMENT — SOCIAL DETERMINANTS OF HEALTH (SDOH): HOW HARD IS IT FOR YOU TO PAY FOR THE VERY BASICS LIKE FOOD, HOUSING, MEDICAL CARE, AND HEATING?: NOT HARD AT ALL

## 2021-06-28 NOTE — PATIENT INSTRUCTIONS
Schedule for dietitician     Advance Directives: Care Instructions  Overview  An advance directive is a legal way to state your wishes at the end of your life. It tells your family and your doctor what to do if you can't say what you want. There are two main types of advance directives. You can change them any time your wishes change. Living will. This form tells your family and your doctor your wishes about life support and other treatment. The form is also called a declaration. Medical power of . This form lets you name a person to make treatment decisions for you when you can't speak for yourself. This person is called a health care agent (health care proxy, health care surrogate). The form is also called a durable power of  for health care. If you do not have an advance directive, decisions about your medical care may be made by a family member, or by a doctor or a  who doesn't know you. It may help to think of an advance directive as a gift to the people who care for you. If you have one, they won't have to make tough decisions by themselves. Follow-up care is a key part of your treatment and safety. Be sure to make and go to all appointments, and call your doctor if you are having problems. It's also a good idea to know your test results and keep a list of the medicines you take. What should you include in an advance directive? Many states have a unique advance directive form. (It may ask you to address specific issues.) Or you might use a universal form that's approved by many states. If your form doesn't tell you what to address, it may be hard to know what to include in your advance directive. Use the questions below to help you get started. · Who do you want to make decisions about your medical care if you are not able to? · What life-support measures do you want if you have a serious illness that gets worse over time or can't be cured?   · What are you most afraid of that might happen? (Maybe you're afraid of having pain, losing your independence, or being kept alive by machines.)  · Where would you prefer to die? (Your home? A hospital? A nursing home?)  · Do you want to donate your organs when you die? · Do you want certain Confucianist practices performed before you die? When should you call for help? Be sure to contact your doctor if you have any questions. Where can you learn more? Go to [email or web address].Furious. org and sign in to your Plert[employer] account. Enter R264 in the "[employer] box to learn more about \"Advance Directives: Care Instructions. \"     If you do not have an account, please click on the \"Sign Up Now\" link. Current as of: March 17, 2021               Content Version: 12.9  © 2006-2021 Healthwise, Incorporated. Care instructions adapted under license by [facility] ([facility]). If you have questions about a medical condition or this instruction, always ask your healthcare professional. [place] [address] any warranty or liability for your use of this information. Body Mass Index: Care Instructions  Your Care Instructions     Body mass index (BMI) can help you see if your weight is raising your risk for health problems. It uses a formula to compare how much you weigh with how tall you are. · A BMI lower than 18.5 is considered underweight. · A BMI between 18.5 and 24.9 is considered healthy. · A BMI between 25 and 29.9 is considered overweight. A BMI of 30 or higher is considered obese. If your BMI is in the normal range, it means that you have a lower risk for weight-related health problems. If your BMI is in the overweight or obese range, you may be at increased risk for weight-related health problems, such as high blood pressure, heart disease, stroke, arthritis or joint pain, and diabetes.  If your BMI is in the underweight range, you may be at increased risk for health problems such as fatigue, lower protection (immunity) against illness, muscle loss, bone loss, hair loss, and hormone problems. BMI is just one measure of your risk for weight-related health problems. You may be at higher risk for health problems if you are not active, you eat an unhealthy diet, or you drink too much alcohol or use tobacco products. Follow-up care is a key part of your treatment and safety. Be sure to make and go to all appointments, and call your doctor if you are having problems. It's also a good idea to know your test results and keep a list of the medicines you take. How can you care for yourself at home? · Practice healthy eating habits. This includes eating plenty of fruits, vegetables, whole grains, lean protein, and low-fat dairy. · If your doctor recommends it, get more exercise. Walking is a good choice. Bit by bit, increase the amount you walk every day. Try for at least 30 minutes on most days of the week. · Do not smoke. Smoking can increase your risk for health problems. If you need help quitting, talk to your doctor about stop-smoking programs and medicines. These can increase your chances of quitting for good. · Limit alcohol to 2 drinks a day for men and 1 drink a day for women. Too much alcohol can cause health problems. If you have a BMI higher than 25  · Your doctor may do other tests to check your risk for weight-related health problems. This may include measuring the distance around your waist. A waist measurement of more than 40 inches in men or 35 inches in women can increase the risk of weight-related health problems. · Talk with your doctor about steps you can take to stay healthy or improve your health. You may need to make lifestyle changes to lose weight and stay healthy, such as changing your diet and getting regular exercise. If you have a BMI lower than 18.5  · Your doctor may do other tests to check your risk for health problems.   · Talk with your doctor about steps you can take to stay healthy or on a pedometer . · Order or download the FREE \"Exercise & Physical Activity: Your Everyday Guide\" from [employer]. Call 4-338.611.7642 or search [employer] online. · You need 7186-4174 mg of calcium and 8874-8051 IU of vitamin D per day. It is possible to meet your calcium requirement with diet alone, but a vitamin D supplement is usually necessary to meet this goal.  · When exposed to the sun, use a sunscreen that protects against both UVA and UVB radiation with an SPF of 30 or greater. Reapply every 2 to 3 hours or after sweating, drying off with a towel, or swimming. · Always wear a seat belt when traveling in a car. Always wear a helmet when riding a bicycle or motorcycle. Personalized Preventive Plan for [place] - 6/28/2021  Medicare offers a range of preventive health benefits. Some of the tests and screenings are paid in full while other may be subject to a deductible, co-insurance, and/or copay. Some of these benefits include a comprehensive review of your medical history including lifestyle, illnesses that may run in your family, and various assessments and screenings as appropriate. After reviewing your medical record and screening and assessments performed today your provider may have ordered immunizations, labs, imaging, and/or referrals for you. A list of these orders (if applicable) as well as your Preventive Care list are included within your After Visit Summary for your review. Other Preventive Recommendations:    A preventive eye exam performed by an eye specialist is recommended every 1-2 years to screen for glaucoma; cataracts, macular degeneration, and other eye disorders. A preventive dental visit is recommended every 6 months. Try to get at least 150 minutes of exercise per week or 10,000 steps per day on a pedometer .   Order or download the FREE \"Exercise & Physical Activity: Your Everyday Guide\" from [employer]. Call 3-207.724.1156 or search [employer] online. You need 3055-8726 mg of calcium and 0906-5278 IU of vitamin D per day. It is possible to meet your calcium requirement with diet alone, but a vitamin D supplement is usually necessary to meet this goal.  When exposed to the sun, use a sunscreen that protects against both UVA and UVB radiation with an SPF of 30 or greater. Reapply every 2 to 3 hours or after sweating, drying off with a towel, or swimming. Always wear a seat belt when traveling in a car. Always wear a helmet when riding a bicycle or motorcycle.

## 2021-06-28 NOTE — PROGRESS NOTES
Subjective:      Patient ID: Chrissie Moran is a 78 y.o. male. Chief Complaint   Patient presents with    Medicare AW       Hypertension  This is a chronic problem. The current episode started more than 1 year ago. The problem is unchanged. The problem is controlled. Pertinent negatives include no anxiety, blurred vision, chest pain, malaise/fatigue, neck pain, orthopnea, palpitations, peripheral edema, PND, shortness of breath or sweats. Risk factors for coronary artery disease include male gender, obesity and sedentary lifestyle. Past treatments include beta blockers, alpha 1 blockers, calcium channel blockers and diuretics. The current treatment provides no improvement. There are no compliance problems.       Past Medical History:   Diagnosis Date    Arthritis     Colon polyp     Diabetes mellitus ([facility]     denies being diabetic    Hyperlipemia     Hypertension     Kidney disease     Rheumatoid arthritis ([facility]      Past Surgical History:   Procedure Laterality Date    COLONOSCOPY      COLONOSCOPY N/A 2020    COLONOSCOPY DIAGNOSTIC performed by Royal Ellen MD at [facility] Left      Family History   Problem Relation Age of Onset    High Blood Pressure Sister      Social History     Socioeconomic History    Marital status:      Spouse name: Not on file    Number of children: 3    Years of education: Not on file    Highest education level: Not on file   Occupational History    Occupation: part-time to stay busy     Comment: [employer]   Tobacco Use    Smoking status: Former Smoker     Packs/day: 0.50     Years: 10.00     Pack years: 5.00     Start date:      Quit date: 1984     Years since quittin.4    Smokeless tobacco: Never Used    Tobacco comment: quit smoking 30 years    Substance and Sexual Activity    Alcohol use: Yes     Comment: rare    Drug use: No    Sexual activity: Not on file   Other Topics Concern    Not on file   Social History Narrative    Lives home with wife and has 4 children. Social Determinants of Health     Financial Resource Strain: Low Risk     Difficulty of Paying Living Expenses: Not hard at all   Food Insecurity: No Food Insecurity    Worried About Running Out of Food in the Last Year: Never true    Ksenia of Food in the Last Year: Never true   Transportation Needs:     Lack of Transportation (Medical):  Lack of Transportation (Non-Medical):    Physical Activity:     Days of Exercise per Week:     Minutes of Exercise per Session:    Stress:     Feeling of Stress :    Social Connections:     Frequency of Communication with Friends and Family:     Frequency of Social Gatherings with Friends and Family:     Attends Jew Services:     Active Member of Clubs or Organizations:     Attends Club or Organization Meetings:     Marital Status:    Intimate Partner Violence:     Fear of Current or Ex-Partner:     Emotionally Abused:     Physically Abused:     Sexually Abused:        Review of Systems   Constitutional: Positive for fatigue. Negative for malaise/fatigue. Eyes: Negative for blurred vision. Respiratory: Negative. Negative for shortness of breath. Cardiovascular: Negative. Negative for chest pain, palpitations, orthopnea and PND. Gastrointestinal: Negative. Endocrine: Negative. Genitourinary: Negative. Musculoskeletal: Negative. Negative for neck pain. Skin: Negative. Allergic/Immunologic: Negative. Neurological: Negative. Hematological: Negative. Psychiatric/Behavioral: Negative. All other systems reviewed and are negative.       No Known Allergies    Current Outpatient Medications   Medication Sig Dispense Refill    losartan (COZAAR) 50 MG tablet TAKE 1 TABLET DAILY 90 tablet 2    triamterene-hydroCHLOROthiazide (MAXZIDE-25) 37.5-25 MG per tablet TAKE 1 TABLET DAILY 90 tablet 3    rosuvastatin (CRESTOR) 40 MG tablet TAKE 1 TABLET EVERY EVENING 90 tablet 2    metoprolol tartrate (LOPRESSOR) 25 MG tablet TAKE 1 TABLET TWICE A  tablet 3    Multiple Vitamins-Minerals (THERAPEUTIC MULTIVITAMIN-MINERALS) tablet Take 1 tablet by mouth daily      sildenafil (VIAGRA) 100 MG tablet Take 1 tablet by mouth daily as needed for Erectile Dysfunction 30 tablet 3    Vitamin D (CHOLECALCIFEROL) 1000 UNITS CAPS capsule Take 1,000 Units by mouth daily.  clobetasol (TEMOVATE) 0.05 % ointment Apply topically to bilateral feet 2 times daily. (Patient not taking: Reported on 12/8/2020) 1 Tube 5     No current facility-administered medications for this visit. Vitals:    06/28/21 1311   BP: 90/70   Pulse: 79   SpO2: 98%   Weight: 239 lb 3.2 oz (108.5 kg)   Height: 6' 2\" (1.88 m)     Body mass index is 30.71 kg/m². Wt Readings from Last 3 Encounters:   06/28/21 239 lb 3.2 oz (108.5 kg)   03/01/21 241 lb (109.3 kg)   12/08/20 240 lb (108.9 kg)     BP Readings from Last 3 Encounters:   06/28/21 90/70   03/01/21 135/68   12/08/20 124/80       Objective:   Physical Exam  Vitals and nursing note reviewed. Constitutional:       General: He is not in acute distress. Appearance: He is well-developed. HENT:      Head: Normocephalic and atraumatic. Right Ear: External ear normal.      Left Ear: External ear normal.      Nose: Nose normal.   Eyes:      General: No scleral icterus. Conjunctiva/sclera: Conjunctivae normal.      Pupils: Pupils are equal, round, and reactive to light. Cardiovascular:      Rate and Rhythm: Normal rate and regular rhythm. Heart sounds: Normal heart sounds. No murmur heard. Pulmonary:      Effort: Pulmonary effort is normal.      Breath sounds: Normal breath sounds. Musculoskeletal:         General: Normal range of motion. Cervical back: Normal range of motion and neck supple. Skin:     General: Skin is warm. Capillary Refill: Capillary refill takes less than 2 seconds.    Neurological: minutes 2-3 times per week?: Yes  Have you lost any weight without trying in the past 3 months?: No  Do you eat only one meal per day?: No  Have you seen the dentist within the past year?: Yes  Body mass index: (!) 30.71  Health Habits/Nutrition Interventions:  · Nutritional issues:  educational materials for healthy, well-balanced diet provided, educational materials to promote weight loss provided     Safety:  Safety  Do you have working smoke detectors?: Yes  Have all throw rugs been removed or fastened?: (!) No  Do you have non-slip mats or surfaces in all bathtubs/showers?: (!) No  Do all of your stairways have a railing or banister?: Yes  Are your doorways, halls and stairs free of clutter?: Yes  Do you always fasten your seatbelt when you are in a car?: Yes  Safety Interventions:  · Home safety tips provided     Personalized Preventive Plan   Current Health Maintenance Status  Immunization History   Administered Date(s) Administered    Influenza, High Dose (Fluzone 65 yrs and older) 10/02/2015, 10/05/2016, 10/02/2017, 09/28/2018    Influenza, Triv, inactivated, subunit, adjuvanted, IM (Fluad 65 yrs and older) 09/17/2019, 09/14/2020    Pneumococcal Conjugate 13-valent (Jaylin Shoemaker) 10/25/2016    Pneumococcal Polysaccharide ([id number]) 10/17/2014, 10/02/2015        Health Maintenance   Topic Date Due    Annual Wellness Visit (AWV)  Never done    Diabetic retinal exam  09/26/2020    Diabetic foot exam  03/17/2021    Lipid screen  10/16/2021    A1C test (Diabetic or Prediabetic)  12/08/2021    Potassium monitoring  06/15/2022    Creatinine monitoring  06/15/2022    Colon cancer screen colonoscopy  08/14/2025    DTaP/Tdap/Td vaccine (3 - Td or Tdap) 03/13/2029    Flu vaccine  Completed    Shingles Vaccine  Completed    Pneumococcal 65+ years Vaccine  Completed    COVID-19 Vaccine  Completed    AAA screen  Completed    Hepatitis C screen  Completed    Hepatitis A vaccine  Aged Out    Hib vaccine  Aged Out    Meningococcal (ACWY) vaccine  Aged Out     Recommendations for [facility] Due: see orders and patient instructions/AVS.  . Recommended screening schedule for the next 5-10 years is provided to the patient in written form: see Patient Dior Arora was seen today for medicare awv. Diagnoses and all orders for this visit:    Routine general medical examination at a health care facility  - see above  Carotid artery disease, unspecified laterality ([facility]  -     Lipid Panel; Future  -     Psa screening; Future  -     COMPREHENSIVE METABOLIC PANEL; Future  -     Ultrasound carotid doppler; Future    Prediabetes  -     64967 - AL Duplex Scan Extracranial, [place]  -     External Referral to Dietitian    Other disorders of arteries, arterioles and capillaries in diseases classified elsewhere ([facility]   -     00373 - AL Duplex Scan Extracranial, [place]    Stage 3a chronic kidney disease ([facility]  -     Lipid Panel; Future  -     Psa screening; Future  -     COMPREHENSIVE METABOLIC PANEL; Future    Abnormal finding of blood chemistry, unspecified   -     Lipid Panel; Future  -     URIC ACID; Future    Dyslipidemia  -     rosuvastatin (CRESTOR) 40 MG tablet; TAKE 1 TABLET EVERY EVENING    Essential hypertension  -     losartan (COZAAR) 50 MG tablet; Take 1 tablet by mouth daily  -     metoprolol tartrate (LOPRESSOR) 25 MG tablet; TAKE 1 TABLET TWICE A DAY    Other orders  -     sildenafil (VIAGRA) 100 MG tablet; Take 1 tablet by mouth daily as needed for Erectile Dysfunction      Discussed weight loss. Patient reports being able to tighten his belt        --Nella Benavidez MD on 6/28/2021 at 1:26 PM    An electronic signature was used to authenticate this note.

## 2021-07-01 ENCOUNTER — TELEPHONE (OUTPATIENT)
Dept: INTERNAL MEDICINE CLINIC | Age: 76
End: 2021-07-01

## 2021-07-01 DIAGNOSIS — E11.9 DIABETES MELLITUS TYPE 2 IN NONOBESE (HCC): ICD-10-CM

## 2021-07-01 DIAGNOSIS — E78.5 DYSLIPIDEMIA: ICD-10-CM

## 2021-07-01 DIAGNOSIS — I77.9 CAROTID ARTERY DISEASE, UNSPECIFIED LATERALITY (HCC): Primary | ICD-10-CM

## 2021-07-01 DIAGNOSIS — I79.8 OTHER DISORDERS OF ARTERIES, ARTERIOLES AND CAPILLARIES IN DISEASES CLASSIFIED ELSEWHERE (HCC): ICD-10-CM

## 2021-07-02 ENCOUNTER — TELEPHONE (OUTPATIENT)
Dept: ADMINISTRATIVE | Age: 76
End: 2021-07-02

## 2021-07-02 DIAGNOSIS — I79.8 OTHER DISORDERS OF ARTERIES, ARTERIOLES AND CAPILLARIES IN DISEASES CLASSIFIED ELSEWHERE (HCC): ICD-10-CM

## 2021-07-02 DIAGNOSIS — I77.9 CAROTID ARTERY DISEASE, UNSPECIFIED LATERALITY (HCC): Primary | ICD-10-CM

## 2021-07-02 DIAGNOSIS — R09.89 BRUIT: ICD-10-CM

## 2021-07-02 NOTE — TELEPHONE ENCOUNTER
[address] [place] [address] Drive advised the patient to call his physician's office because they have not received the updated ultra sound order Dr Noble Diamond placed yesterday.

## 2021-07-12 ENCOUNTER — HOSPITAL ENCOUNTER (OUTPATIENT)
Dept: VASCULAR LAB | Age: 76
Discharge: HOME | End: 2021-07-12
Payer: COMMERCIAL

## 2021-07-12 DIAGNOSIS — I79.8 OTHER DISORDERS OF ARTERIES, ARTERIOLES AND CAPILLARIES IN DISEASES CLASSIFIED ELSEWHERE (HCC): ICD-10-CM

## 2021-07-12 DIAGNOSIS — R09.89 BRUIT: ICD-10-CM

## 2021-07-12 DIAGNOSIS — I77.9 CAROTID ARTERY DISEASE, UNSPECIFIED LATERALITY (HCC): ICD-10-CM

## 2021-07-12 PROCEDURE — 93880 EXTRACRANIAL BILAT STUDY: CPT

## 2021-07-26 ENCOUNTER — TELEPHONE (OUTPATIENT)
Dept: INTERNAL MEDICINE CLINIC | Age: 76
End: 2021-07-26

## 2021-07-26 NOTE — TELEPHONE ENCOUNTER
Med renewal for TRIAM/HCTZ TAB 37.5-25 TAKE 1 TABLET DAILY    [facility]

## 2021-07-28 DIAGNOSIS — I10 ESSENTIAL HYPERTENSION: ICD-10-CM

## 2021-07-28 RX ORDER — TRIAMTERENE AND HYDROCHLOROTHIAZIDE 37.5; 25 MG/1; MG/1
TABLET ORAL
Qty: 90 TABLET | Refills: 3 | Status: CANCELLED | OUTPATIENT
Start: 2021-07-28

## 2021-08-11 DIAGNOSIS — I10 ESSENTIAL HYPERTENSION: ICD-10-CM

## 2021-08-11 RX ORDER — TRIAMTERENE AND HYDROCHLOROTHIAZIDE 37.5; 25 MG/1; MG/1
TABLET ORAL
Qty: 90 TABLET | Refills: 1 | Status: SHIPPED | OUTPATIENT
Start: 2021-08-11 | End: 2021-12-20 | Stop reason: SDUPTHER

## 2021-08-11 NOTE — TELEPHONE ENCOUNTER
[facility] requesting a refill on Maxzide 37.5-25 for PT call back [phone] option 2 Reference # [id number]

## 2021-08-11 NOTE — TELEPHONE ENCOUNTER
Recent Visits  Date Type Provider Dept   06/28/21 Office Visit Eileen Knott MD [facility]   12/08/20 Office Visit Eileen Knott MD [facility]   09/14/20 Office Visit Eileen Knott MD [facility]   08/07/20 Office Visit Amy Mccollum APRN - CNP [facility]   03/17/20 Office Visit Eileen Knott MD [facility]   Showing recent visits within past 540 days with a meds authorizing provider and meeting all other requirements  Future Appointments  Date Type Provider Dept   12/20/21 Appointment Eileen Knott MD [facility]   Showing future appointments within next 150 days with a meds authorizing provider and meeting all other requirements     Visit date not found

## 2021-10-19 DIAGNOSIS — I10 ESSENTIAL HYPERTENSION: ICD-10-CM

## 2021-10-19 DIAGNOSIS — E11.9 DIABETES MELLITUS TYPE 2 IN NONOBESE (HCC): ICD-10-CM

## 2021-10-19 DIAGNOSIS — E11.9 DIABETES MELLITUS TYPE 2 IN NONOBESE (HCC): Primary | ICD-10-CM

## 2021-10-19 LAB
CREATININE URINE: 307.7 MG/DL (ref 39–259)
MICROALBUMIN UR-MCNC: <1.2 MG/DL
MICROALBUMIN/CREAT UR-RTO: ABNORMAL MG/G (ref 0–30)

## 2021-11-06 ENCOUNTER — NURSE ONLY (OUTPATIENT)
Dept: INTERNAL MEDICINE CLINIC | Age: 76
End: 2021-11-06
Payer: COMMERCIAL

## 2021-11-06 DIAGNOSIS — Z23 FLU VACCINE NEED: Primary | ICD-10-CM

## 2021-11-06 PROCEDURE — G0008 ADMIN INFLUENZA VIRUS VAC: HCPCS | Performed by: INTERNAL MEDICINE

## 2021-11-06 PROCEDURE — 90694 VACC AIIV4 NO PRSRV 0.5ML IM: CPT | Performed by: INTERNAL MEDICINE

## 2021-12-20 ENCOUNTER — OFFICE VISIT (OUTPATIENT)
Dept: INTERNAL MEDICINE CLINIC | Age: 76
End: 2021-12-20
Payer: COMMERCIAL

## 2021-12-20 VITALS
BODY MASS INDEX: 30.93 KG/M2 | HEIGHT: 74 IN | OXYGEN SATURATION: 96 % | WEIGHT: 241 LBS | DIASTOLIC BLOOD PRESSURE: 76 MMHG | HEART RATE: 69 BPM | SYSTOLIC BLOOD PRESSURE: 120 MMHG | TEMPERATURE: 96.7 F

## 2021-12-20 DIAGNOSIS — L30.9 ECZEMA, UNSPECIFIED TYPE: ICD-10-CM

## 2021-12-20 DIAGNOSIS — I10 ESSENTIAL HYPERTENSION: ICD-10-CM

## 2021-12-20 DIAGNOSIS — E78.5 DYSLIPIDEMIA: ICD-10-CM

## 2021-12-20 PROCEDURE — 99214 OFFICE O/P EST MOD 30 MIN: CPT | Performed by: INTERNAL MEDICINE

## 2021-12-20 RX ORDER — TRIAMTERENE AND HYDROCHLOROTHIAZIDE 37.5; 25 MG/1; MG/1
TABLET ORAL
Qty: 90 TABLET | Refills: 1 | Status: SHIPPED | OUTPATIENT
Start: 2021-12-20 | End: 2022-05-16

## 2021-12-20 RX ORDER — ROSUVASTATIN CALCIUM 40 MG/1
TABLET, COATED ORAL
Qty: 90 TABLET | Refills: 1 | Status: SHIPPED | OUTPATIENT
Start: 2021-12-20 | End: 2022-05-16

## 2021-12-20 RX ORDER — CLOBETASOL PROPIONATE 0.5 MG/G
OINTMENT TOPICAL
Qty: 1 EACH | Refills: 1 | Status: ON HOLD | OUTPATIENT
Start: 2021-12-20 | End: 2022-10-20 | Stop reason: HOSPADM

## 2021-12-20 RX ORDER — LOSARTAN POTASSIUM 50 MG/1
50 TABLET ORAL DAILY
Qty: 90 TABLET | Refills: 1 | Status: SHIPPED | OUTPATIENT
Start: 2021-12-20 | End: 2022-07-27

## 2021-12-20 RX ORDER — SILDENAFIL 100 MG/1
100 TABLET, FILM COATED ORAL DAILY PRN
Qty: 30 TABLET | Refills: 3 | Status: SHIPPED | OUTPATIENT
Start: 2021-12-20

## 2021-12-20 ASSESSMENT — ENCOUNTER SYMPTOMS
BLURRED VISION: 0
RESPIRATORY NEGATIVE: 1

## 2021-12-20 NOTE — PROGRESS NOTES
Tc Monahan (:  1945) is a 78 y.o. male,Established patient, here for evaluation of the following chief complaint(s):  Hypertension (doing well. 6 month follow up)         ASSESSMENT/PLAN:  1. Essential hypertension  -     losartan (COZAAR) 50 MG tablet; Take 1 tablet by mouth daily, Disp-90 tablet, R-1Normal  -     metoprolol tartrate (LOPRESSOR) 25 MG tablet; TAKE 1 TABLET TWICE A DAY, Disp-180 tablet, R-3Normal  -     triamterene-hydroCHLOROthiazide (MAXZIDE-25) 37.5-25 MG per tablet; TAKE 1 TABLET DAILY, Disp-90 tablet, R-1Normal  2. Dyslipidemia  -     rosuvastatin (CRESTOR) 40 MG tablet; TAKE 1 TABLET EVERY EVENING, Disp-90 tablet, R-1Normal  3. Eczema, unspecified type  -     clobetasol (TEMOVATE) 0.05 % ointment; Apply topically to bilateral feet 2 times daily. , Disp-1 each, R-1, Normal        Subjective   SUBJECTIVE/OBJECTIVE:  Hypertension  The current episode started more than 1 month ago. The problem is unchanged. The problem is controlled. Pertinent negatives include no anxiety, blurred vision, chest pain or headaches. There are no associated agents to hypertension. There are no known risk factors for coronary artery disease. Past treatments include ACE inhibitors. The current treatment provides no improvement. There are no compliance problems. There is no history of angina, kidney disease, CAD/MI, CVA, heart failure, left ventricular hypertrophy, PVD or retinopathy. Review of Systems   Constitutional: Negative. HENT: Negative. Eyes: Negative for blurred vision. Respiratory: Negative. Cardiovascular: Negative for chest pain. Neurological: Negative for headaches. All other systems reviewed and are negative.            No Known Allergies    Current Outpatient Medications   Medication Sig Dispense Refill    triamterene-hydroCHLOROthiazide (MAXZIDE-25) 37.5-25 MG per tablet TAKE 1 TABLET DAILY 90 tablet 1    sildenafil (VIAGRA) 100 MG tablet Take 1 tablet by mouth daily as needed for Erectile Dysfunction 30 tablet 3    rosuvastatin (CRESTOR) 40 MG tablet TAKE 1 TABLET EVERY EVENING 90 tablet 1    metoprolol tartrate (LOPRESSOR) 25 MG tablet TAKE 1 TABLET TWICE A  tablet 3    Multiple Vitamins-Minerals (THERAPEUTIC MULTIVITAMIN-MINERALS) tablet Take 1 tablet by mouth daily      Vitamin D (CHOLECALCIFEROL) 1000 UNITS CAPS capsule Take 1,000 Units by mouth daily.  losartan (COZAAR) 50 MG tablet Take 1 tablet by mouth daily 90 tablet 1    clobetasol (TEMOVATE) 0.05 % ointment Apply topically to bilateral feet 2 times daily. (Patient not taking: Reported on 11/1/2021) 1 Tube 5     No current facility-administered medications for this visit. Vitals:    12/20/21 1528   BP: 120/76   Site: Left Upper Arm   Position: Sitting   Pulse: 69   Temp: 96.7 °F (35.9 °C)   TempSrc: Temporal   SpO2: 96%   Weight: 241 lb (109.3 kg)   Height: 6' 2\" (1.88 m)     Body mass index is 30.94 kg/m². Wt Readings from Last 3 Encounters:   12/20/21 241 lb (109.3 kg)   11/01/21 240 lb (108.9 kg)   06/28/21 240 lb (108.9 kg)     BP Readings from Last 3 Encounters:   12/20/21 120/76   11/01/21 127/67   06/28/21 117/73       Objective   Physical Exam  Vitals and nursing note reviewed. Constitutional:       General: He is not in acute distress. Appearance: He is well-developed. HENT:      Head: Normocephalic and atraumatic. Nose: Nose normal.   Eyes:      General: No scleral icterus. Left eye: No discharge. Conjunctiva/sclera: Conjunctivae normal.      Pupils: Pupils are equal, round, and reactive to light. Cardiovascular:      Rate and Rhythm: Normal rate and regular rhythm. Heart sounds: Normal heart sounds. No murmur heard. Pulmonary:      Effort: Pulmonary effort is normal. No respiratory distress. Breath sounds: Normal breath sounds. No wheezing or rales. Musculoskeletal:         General: Normal range of motion.       Cervical back: Normal range of motion and neck supple. Skin:     General: Skin is warm. Comments: 6x6 cm mass soft likelylipoma   Neurological:      Mental Status: He is alert and oriented to person, place, and time. Psychiatric:         Behavior: Behavior normal.         Thought Content: Thought content normal.         Judgment: Judgment normal.              An electronic signature was used to authenticate this note.     --Jeffrey Conrad MD

## 2022-04-05 DIAGNOSIS — I10 ESSENTIAL HYPERTENSION: ICD-10-CM

## 2022-04-05 DIAGNOSIS — N18.31 STAGE 3A CHRONIC KIDNEY DISEASE (HCC): ICD-10-CM

## 2022-04-05 LAB
ANION GAP SERPL CALCULATED.3IONS-SCNC: 13 MMOL/L (ref 3–16)
BILIRUBIN URINE: NEGATIVE
BLOOD, URINE: NEGATIVE
BUN BLDV-MCNC: 16 MG/DL (ref 7–20)
CALCIUM SERPL-MCNC: 9.8 MG/DL (ref 8.3–10.6)
CHLORIDE BLD-SCNC: 106 MMOL/L (ref 99–110)
CLARITY: CLEAR
CO2: 26 MMOL/L (ref 21–32)
COLOR: NORMAL
CREAT SERPL-MCNC: 1.3 MG/DL (ref 0.8–1.3)
EPITHELIAL CELLS, UA: 1 /HPF (ref 0–5)
GFR AFRICAN AMERICAN: >60
GFR NON-AFRICAN AMERICAN: 54
GLUCOSE BLD-MCNC: 92 MG/DL (ref 70–99)
GLUCOSE URINE: NEGATIVE MG/DL
HYALINE CASTS: 2 /LPF (ref 0–8)
KETONES, URINE: NEGATIVE MG/DL
LEUKOCYTE ESTERASE, URINE: NEGATIVE
MICROSCOPIC EXAMINATION: NORMAL
NITRITE, URINE: NEGATIVE
PH UA: 6 (ref 5–8)
POTASSIUM SERPL-SCNC: 3.9 MMOL/L (ref 3.5–5.1)
PROTEIN UA: NEGATIVE MG/DL
RBC UA: 4 /HPF (ref 0–4)
SODIUM BLD-SCNC: 145 MMOL/L (ref 136–145)
SPECIFIC GRAVITY UA: 1.02 (ref 1–1.03)
URINE TYPE: NORMAL
UROBILINOGEN, URINE: 0.2 E.U./DL
WBC UA: 1 /HPF (ref 0–5)

## 2022-05-14 DIAGNOSIS — E78.5 DYSLIPIDEMIA: ICD-10-CM

## 2022-05-14 DIAGNOSIS — I10 ESSENTIAL HYPERTENSION: ICD-10-CM

## 2022-05-16 RX ORDER — ROSUVASTATIN CALCIUM 40 MG/1
TABLET, COATED ORAL
Qty: 90 TABLET | Refills: 0 | Status: SHIPPED | OUTPATIENT
Start: 2022-05-16 | End: 2022-07-27

## 2022-05-16 RX ORDER — TRIAMTERENE AND HYDROCHLOROTHIAZIDE 37.5; 25 MG/1; MG/1
TABLET ORAL
Qty: 90 TABLET | Refills: 0 | Status: SHIPPED | OUTPATIENT
Start: 2022-05-16 | End: 2022-07-27

## 2022-07-11 SDOH — HEALTH STABILITY: PHYSICAL HEALTH: ON AVERAGE, HOW MANY DAYS PER WEEK DO YOU ENGAGE IN MODERATE TO STRENUOUS EXERCISE (LIKE A BRISK WALK)?: 3 DAYS

## 2022-07-11 SDOH — HEALTH STABILITY: PHYSICAL HEALTH: ON AVERAGE, HOW MANY MINUTES DO YOU ENGAGE IN EXERCISE AT THIS LEVEL?: 30 MIN

## 2022-07-11 ASSESSMENT — PATIENT HEALTH QUESTIONNAIRE - PHQ9
SUM OF ALL RESPONSES TO PHQ QUESTIONS 1-9: 0
2. FEELING DOWN, DEPRESSED OR HOPELESS: 0
1. LITTLE INTEREST OR PLEASURE IN DOING THINGS: 0
SUM OF ALL RESPONSES TO PHQ QUESTIONS 1-9: 0
SUM OF ALL RESPONSES TO PHQ QUESTIONS 1-9: 0
SUM OF ALL RESPONSES TO PHQ9 QUESTIONS 1 & 2: 0
SUM OF ALL RESPONSES TO PHQ QUESTIONS 1-9: 0

## 2022-07-11 ASSESSMENT — LIFESTYLE VARIABLES
HOW OFTEN DO YOU HAVE A DRINK CONTAINING ALCOHOL: MONTHLY OR LESS
HOW OFTEN DO YOU HAVE SIX OR MORE DRINKS ON ONE OCCASION: 1
HOW MANY STANDARD DRINKS CONTAINING ALCOHOL DO YOU HAVE ON A TYPICAL DAY: 1
HOW MANY STANDARD DRINKS CONTAINING ALCOHOL DO YOU HAVE ON A TYPICAL DAY: 1 OR 2
HOW OFTEN DO YOU HAVE A DRINK CONTAINING ALCOHOL: 2

## 2022-07-14 ENCOUNTER — OFFICE VISIT (OUTPATIENT)
Dept: INTERNAL MEDICINE CLINIC | Age: 77
End: 2022-07-14
Payer: COMMERCIAL

## 2022-07-14 VITALS
OXYGEN SATURATION: 94 % | TEMPERATURE: 97.9 F | HEIGHT: 74 IN | DIASTOLIC BLOOD PRESSURE: 66 MMHG | HEART RATE: 65 BPM | WEIGHT: 240.8 LBS | BODY MASS INDEX: 30.9 KG/M2 | SYSTOLIC BLOOD PRESSURE: 124 MMHG

## 2022-07-14 DIAGNOSIS — Z12.5 SCREENING PSA (PROSTATE SPECIFIC ANTIGEN): ICD-10-CM

## 2022-07-14 DIAGNOSIS — Z13.1 SCREENING FOR DIABETES MELLITUS (DM): ICD-10-CM

## 2022-07-14 DIAGNOSIS — R79.9 ABNORMAL FINDING OF BLOOD CHEMISTRY, UNSPECIFIED: ICD-10-CM

## 2022-07-14 DIAGNOSIS — Z00.00 MEDICARE ANNUAL WELLNESS VISIT, SUBSEQUENT: Primary | ICD-10-CM

## 2022-07-14 DIAGNOSIS — I77.9 CAROTID ARTERY DISEASE, UNSPECIFIED LATERALITY (HCC): ICD-10-CM

## 2022-07-14 DIAGNOSIS — Z72.89 OTHER PROBLEMS RELATED TO LIFESTYLE: ICD-10-CM

## 2022-07-14 PROCEDURE — G0439 PPPS, SUBSEQ VISIT: HCPCS | Performed by: INTERNAL MEDICINE

## 2022-07-14 PROCEDURE — 1123F ACP DISCUSS/DSCN MKR DOCD: CPT | Performed by: INTERNAL MEDICINE

## 2022-07-14 SDOH — ECONOMIC STABILITY: FOOD INSECURITY: WITHIN THE PAST 12 MONTHS, YOU WORRIED THAT YOUR FOOD WOULD RUN OUT BEFORE YOU GOT MONEY TO BUY MORE.: NEVER TRUE

## 2022-07-14 SDOH — ECONOMIC STABILITY: FOOD INSECURITY: WITHIN THE PAST 12 MONTHS, THE FOOD YOU BOUGHT JUST DIDN'T LAST AND YOU DIDN'T HAVE MONEY TO GET MORE.: NEVER TRUE

## 2022-07-14 ASSESSMENT — SOCIAL DETERMINANTS OF HEALTH (SDOH): HOW HARD IS IT FOR YOU TO PAY FOR THE VERY BASICS LIKE FOOD, HOUSING, MEDICAL CARE, AND HEATING?: NOT HARD AT ALL

## 2022-07-14 NOTE — PROGRESS NOTES
Medicare Annual Wellness Visit    Shelley Prasad is here for Medicare AWV    Assessment & Plan   Medicare annual wellness visit, subsequent  BMI 31.0-31.9,adult  Other problems related to lifestyle  -     Comprehensive Metabolic Panel; Future  -     Lipid Panel; Future  -     Hemoglobin A1C; Future  Screening for diabetes mellitus (DM)  -     Comprehensive Metabolic Panel; Future  -     Lipid Panel; Future  -     Hemoglobin A1C; Future  Abnormal finding of blood chemistry, unspecified   -     Lipid Panel; Future  -     Hemoglobin A1C; Future  Screening PSA (prostate specific antigen)  -     PSA Screening; Future  Carotid artery disease, unspecified laterality ([facility]  Assessment & Plan:   Continue statin      Recommendations for Preventive Services Due: see orders and patient instructions/AVS.  Recommended screening schedule for the next 5-10 years is provided to the patient in written form: see Patient Instructions/AVS.     Return for Medicare Annual Wellness Visit in 1 year. Subjective   The following acute and/or chronic problems were also addressed today:      Rash: Shelley Prasad is a 78 y.o. male who presents with a 4 week history of a localized rash. Patient's complete Health Risk Assessment and screening values have been reviewed and are found in Flowsheets. The following problems were reviewed today and where indicated follow up appointments were made and/or referrals ordered.     Positive Risk Factor Screenings with Interventions:             General Health and ACP:  General  In general, how would you say your health is?: Very Good  In the past 7 days, have you experienced any of the following: New or Increased Pain, New or Increased Fatigue, Loneliness, Social Isolation, Stress or Anger?: No  Do you get the social and emotional support that you need?: Yes  Do you have a Living Will?: Yes    Advance Directives       Power of  Living Will ACP-Advance Directive ACP-Power of     Not on edema  Musculoskeletal: normal range of motion, no joint swelling, deformity or tenderness  Neurologic: reflexes normal and symmetric, no cranial nerve deficit, gait, coordination and speech normal       No Known Allergies  Prior to Visit Medications    Medication Sig Taking? Authorizing Provider   rosuvastatin (CRESTOR) 40 MG tablet TAKE 1 TABLET EVERY EVENING Yes James Shafer MD   triamterene-hydroCHLOROthiazide (VKOKBXR-71) 37.5-25 MG per tablet TAKE 1 TABLET DAILY Yes James Shafer MD   metoprolol tartrate (LOPRESSOR) 25 MG tablet TAKE 1 TABLET TWICE A DAY Yes James Shafer MD   sildenafil (VIAGRA) 100 MG tablet Take 1 tablet by mouth daily as needed for Erectile Dysfunction Yes James Shafer MD   clobetasol (TEMOVATE) 0.05 % ointment Apply topically to bilateral feet 2 times daily. Yes James Shafer MD   Multiple Vitamins-Minerals (THERAPEUTIC MULTIVITAMIN-MINERALS) tablet Take 1 tablet by mouth daily Yes Historical Provider, MD   Vitamin D (CHOLECALCIFEROL) 1000 UNITS CAPS capsule Take 1,000 Units by mouth daily. Yes Historical Provider, MD   losartan (COZAAR) 50 MG tablet Take 1 tablet by mouth daily  James Shafer MD       [facility] (Including outside providers/suppliers regularly involved in providing care):   Patient Care Team:  James Shafer MD as PCP - General (Pediatrics)  James Shafer MD as PCP - [facility] Empaneled Provider  Sofi Estrada MD as Consulting Physician (Rheumatology)     Reviewed and updated this visit:  Tobacco  Allergies  Meds  Problems  Med Hx  Surg Hx  Soc Hx  Fam Hx                   Obesity Counseling: Assessed behavioral health risks and factors affecting choice of behavior. Suggested weight control approaches, including dietary changes behavioral modification and follow up plan. Provided educational and support documentation.   Time spent (minutes): 5 min

## 2022-07-14 NOTE — PATIENT INSTRUCTIONS
Tinactin  Zeasorb           Patient Education        Jock Itch: Care Instructions  Overview  Jock itch is a fungal infection of the groin. The fungus that causes jock itch lives on your skin. It often affects male athletes, but anyone can get jock itch. You may get an itchy rash on your inner thighs and rear end (buttocks). It spreads and starts to itch when you sweat or are in steamy showers or lockerrooms. Jock itch should end soon if you keep your skin dry after you clean it. You can treat jock itch at home with antifungal creams that you can buy without aprescription. Follow-up care is a key part of your treatment and safety. Be sure to make and go to all appointments, and call your doctor if you are having problems. It's also a good idea to know your test results and keep alist of the medicines you take. How can you care for yourself at home?  Wash the rash with soap and water. Pat the skin dry.  Put a cool compress on the skin to relieve itching.  Spread antifungal cream over and around the entire edge of the rash. Follow the directions on the package.  To avoid spreading it, wash your hands well after treating or touching the rash.  If your doctor prescribed medicine, take it exactly as directed. Call your doctor if you have any problems with your medicine.  Try not to scratch the rash.  Shower or bathe daily and after you exercise.  Keep your skin dry as much as possible to allow it to heal.   Until your jock itch is cured, wear loose-fitting cotton clothing. Avoid tight underwear, pants, and tights. [employer] your supporters and shorts after every wearing.  Do not share clothing, sports equipment, towels, or sheets to avoid spreading the fungi to other people. To prevent jock itch   Put on socks before you put on underwear if you have athlete's foot. This action helps prevent the fungus on your feet from spreading to your groin.    Wash your workout clothes, underwear, socks, and increased risk for weight-related health problems, such as high blood pressure, heart disease, stroke, arthritis or joint pain, and diabetes. If your BMI is in the underweight range, you may be at increased risk for health problems such as fatigue, lower protection (immunity) againstillness, muscle loss, bone loss, hair loss, and hormone problems. BMI is just one measure of your risk for weight-related health problems. You may be at higher risk for health problems if you are not active, you eat anunhealthy diet, or you drink too much alcohol or use tobacco products. Follow-up care is a key part of your treatment and safety. Be sure to make and go to all appointments, and call your doctor if you are having problems. It's also a good idea to know your test results and keep alist of the medicines you take. How can you care for yourself at home?  Practice healthy eating habits. This includes eating plenty of fruits, vegetables, whole grains, lean protein, and low-fat dairy.  If your doctor recommends it, get more exercise. Walking is a good choice. Bit by bit, increase the amount you walk every day. Try for at least 30 minutes on most days of the week.  Do not smoke. Smoking can increase your risk for health problems. If you need help quitting, talk to your doctor about stop-smoking programs and medicines. These can increase your chances of quitting for good.  Limit alcohol to 2 drinks a day for men and 1 drink a day for women. Too much alcohol can cause health problems. If you have a BMI higher than 25   Your doctor may do other tests to check your risk for weight-related health problems. This may include measuring the distance around your waist. A waist measurement of more than 40 inches in men or 35 inches in women can increase the risk of weight-related health problems.  Talk with your doctor about steps you can take to stay healthy or improve your health.  You may need to make lifestyle changes to lose weight and stay healthy, such as changing your diet and getting regular exercise. If you have a BMI lower than 18.5   Your doctor may do other tests to check your risk for health problems.  Talk with your doctor about steps you can take to stay healthy or improve your health. You may need to make lifestyle changes to gain or maintain weight and stay healthy, such as getting more healthy foods in your diet and doing exercises to build muscle. Where can you learn more? Go to https://chpepicewsierra.[employer]. org and sign in to your Traxer account. Enter S176 in the [employer] box to learn more about \"Body Mass Index: Care Instructions. \"     If you do not have an account, please click on the \"Sign Up Now\" link. Current as of: December 27, 2021               Content Version: 13.3  © 2006-2022 Healthwise, Sviral. Care instructions adapted under license by [facility] ([facility]). If you have questions about a medical condition or this instruction, always ask your healthcare professional. [address] any warranty or liability for your use of this information. Learning About Low-Carbohydrate Diets  What is a low-carbohydrate diet? A low-carbohydrate (or \"low-carb\") diet limits foods and drinks that have carbohydrates. This includes grains, fruits, milk and yogurt, and starchy vegetables like potatoes, beans, and corn. It also avoids foods and drinks that have added sugar. Instead, low-carb diets include foods that are high inprotein and fat. Why might you follow a low-carb diet? Low-carb diets may be used for a variety of reasons, such as for weight loss. People who have diabetes may use a low-carb diet to help manage their bloodsugar levels. What should you do before you start the diet? Talk to your doctor before you try any diet. This is even more important if you have health problems like kidney disease, heart disease, or diabetes.  Your doctor may suggest that you meet with a registered dietitian. A dietitian canhelp you make an eating plan that works for you. What foods do you eat on a low-carb diet? On a low-carb diet, you choose foods that are high in protein and fat. Examplesof these are:  [employer], poultry, and fish.  Eggs.  Nuts, such as walnuts, pecans, almonds, and peanuts.  Peanut butter and other nut butters.  Tofu.  Avocado.  Olives.  Non-starchy vegetables like broccoli, cauliflower, green beans, mushrooms, peppers, lettuce, and spinach.  Unsweetened non-dairy milks like almond milk and coconut milk.  Cheese, cottage cheese, and cream cheese. Where can you learn more? Go to https://LEYIOpeemmanueleweb.Easel Learn. org and sign in to your Chicisimo account. Enter C335 in the [employer] box to learn more about \"Learning About Low-Carbohydrate Diets. \"     If you do not have an account, please click on the \"Sign Up Now\" link. Current as of: September 8, 2021               Content Version: 13.3  © 6720-9127 Healthwise, GroupZoom. Care instructions adapted under license by [facility] ([facility]). If you have questions about a medical condition or this instruction, always ask your healthcare professional. [place] [address] any warranty or liability for your use of this information. Eating Healthy Foods: Care Instructions  Your Care Instructions     Eating healthy foods can help lower your risk for disease. Healthy food gives you energy and keeps your heart strong, your brain active, your musclesworking, and your bones strong. A healthy diet includes a variety of foods from the basic food groups: grains, vegetables, fruits, milk and milk products, and meat and beans. Some people may eat more of their favorite foods from only one food group and, as a result, miss getting the nutrients they need. So, it is important to pay attention not only to what you eat but also to what you are missing from your diet. diet all at once. You might feel that you are missing out on your favorite foods and then be more likely to fail.  Start slowly, and gradually change your habits. Try some of the following:  ? Use whole wheat bread instead of white bread. ? Use nonfat or low-fat milk instead of whole milk. ? Eat brown rice instead of white rice, and eat whole wheat pasta instead of white-flour pasta. ? Try low-fat cheeses and low-fat yogurt. ? Add more fruits and vegetables to meals and have them for snacks. ? Add lettuce, tomato, cucumber, and onion to sandwiches. ? Add fruit to yogurt and cereal.  Enjoy food   You can still eat your favorite foods. You just may need to eat less of them. If your favorite foods are high in fat, salt, and sugar, limit how often you eat them, but do not cut them out entirely.  Eat a wide variety of foods. Make healthy choices when eating out   The type of restaurant you choose can help you make healthy choices. Even fast-food chains are now offering more low-fat or healthier choices on the menu.  Choose smaller portions, or take half of your meal home.  When eating out, try:  ? A veggie pizza with a whole wheat crust or grilled chicken (instead of sausage or pepperoni). ? Pasta with roasted vegetables, grilled chicken, or marinara sauce instead of cream sauce. ? A vegetable wrap or grilled chicken wrap. ? Broiled or poached food instead of fried or breaded items. Make healthy choices easy   Buy packaged, prewashed, ready-to-eat fresh vegetables and fruits, such as baby carrots, salad mixes, and chopped or shredded broccoli and cauliflower.  Buy packaged, presliced fruits, such as melon or pineapple.  Choose 100% fruit or vegetable juice instead of soda. Limit juice intake to 4 to 6 oz (½ to ¾ cup) a day.  Blend low-fat yogurt, fruit juice, and canned or frozen fruit to make a smoothie for breakfast or a snack. Where can you learn more?   Go to need 2500-1942 mg of calcium and 0772-1190 IU of vitamin D per day. It is possible to meet your calcium requirement with diet alone, but a vitamin D supplement is usually necessary to meet this goal.  · When exposed to the sun, use a sunscreen that protects against both UVA and UVB radiation with an SPF of 30 or greater. Reapply every 2 to 3 hours or after sweating, drying off with a towel, or swimming. · Always wear a seat belt when traveling in a car. Always wear a helmet when riding a bicycle or motorcycle. DASH Diet: Care Instructions  Your Care Instructions     The DASH diet is an eating plan that can help lower your blood pressure. DASH stands for Dietary Approaches to Stop Hypertension. Hypertension is high bloodpressure. The DASH diet focuses on eating foods that are high in calcium, potassium, and magnesium. These nutrients can lower blood pressure. The foods that are highest in these nutrients are fruits, vegetables, low-fat dairy products, nuts, seeds, and legumes. But taking calcium, potassium, and magnesium supplements instead of eating foods that are high in those nutrients does not have the same effect. The DASH diet also includes whole grains, fish, and poultry. The DASH diet is one of several lifestyle changes your doctor may recommend to lower your high blood pressure. Your doctor may also want you to decrease the amount of sodium in your diet. Lowering sodium while following the DASH dietcan lower blood pressure even further than just the DASH diet alone. Follow-up care is a key part of your treatment and safety. Be sure to make and go to all appointments, and call your doctor if you are having problems. It's also a good idea to know your test results and keep alist of the medicines you take. How can you care for yourself at home? Following the DASH diet  Eat 4 to 5 servings of fruit each day.  A serving is 1 medium-sized piece of fruit, ½ cup chopped or canned fruit, 1/4 cup dried fruit, or 4 ounces (½ cup) of fruit juice. Choose fruit more often than fruit juice. Eat 4 to 5 servings of vegetables each day. A serving is 1 cup of lettuce or raw leafy vegetables, ½ cup of chopped or cooked vegetables, or 4 ounces (½ cup) of vegetable juice. Choose vegetables more often than vegetable juice. Get 2 to 3 servings of low-fat and fat-free dairy each day. A serving is 8 ounces of milk, 1 cup of yogurt, or 1 ½ ounces of cheese. Eat 6 to 8 servings of grains each day. A serving is 1 slice of bread, 1 ounce of dry cereal, or ½ cup of cooked rice, pasta, or cooked cereal. Try to choose whole-grain products as much as possible. Limit lean meat, poultry, and fish to 2 servings each day. A serving is 3 ounces, about the size of a deck of cards. Eat 4 to 5 servings of nuts, seeds, and legumes (cooked dried beans, lentils, and split peas) each week. A serving is 1/3 cup of nuts, 2 tablespoons of seeds, or ½ cup of cooked beans or peas. Limit fats and oils to 2 to 3 servings each day. A serving is 1 teaspoon of vegetable oil or 2 tablespoons of salad dressing. Limit sweets and added sugars to 5 servings or less a week. A serving is 1 tablespoon jelly or jam, ½ cup sorbet, or 1 cup of lemonade. Eat less than 2,300 milligrams (mg) of sodium a day. If you limit your sodium to 1,500 mg a day, you can lower your blood pressure even more. Be aware that all of these are the suggested number of servings for people who eat 1,800 to 2,000 calories a day. Your recommended number of servings may be different if you need more or fewer calories. Tips for success  Start small. Do not try to make dramatic changes to your diet all at once. You might feel that you are missing out on your favorite foods and then be more likely to not follow the plan. Make small changes, and stick with them. Once those changes become habit, add a few more changes.   Try some of the following:  Make it a goal to eat a fruit or vegetable at every meal and at snacks. This will make it easy to get the recommended amount of fruits and vegetables each day. Try yogurt topped with fruit and nuts for a snack or healthy dessert. Add lettuce, tomato, cucumber, and onion to sandwiches. Combine a ready-made pizza crust with low-fat mozzarella cheese and lots of vegetable toppings. Try using tomatoes, squash, spinach, broccoli, carrots, cauliflower, and onions. Have a variety of cut-up vegetables with a low-fat dip as an appetizer instead of chips and dip. Sprinkle sunflower seeds or chopped almonds over salads. Or try adding chopped walnuts or almonds to cooked vegetables. Try some vegetarian meals using beans and peas. Add garbanzo or kidney beans to salads. Make burritos and tacos with mashed brand beans or black beans. Where can you learn more? Go to https://Externautics.[employer]. org and sign in to your U*tique account. Enter I216 in the [employer] box to learn more about \"DASH Diet: Care Instructions. \"     If you do not have an account, please click on the \"Sign Up Now\" link. Current as of: January 10, 2022               Content Version: 13.3  © 2688-4233 Healthwise, Incorporated. Care instructions adapted under license by [facility] ([facility]). If you have questions about a medical condition or this instruction, always ask your healthcare professional. [place] [address] any warranty or liability for your use of this information. ·        Learning About Low-Carbohydrate Diets  What is a low-carbohydrate diet? A low-carbohydrate (or \"low-carb\") diet limits foods and drinks that have carbohydrates. This includes grains, fruits, milk and yogurt, and starchy vegetables like potatoes, beans, and corn. It also avoids foods and drinks that have added sugar. Instead, low-carb diets include foods that are high inprotein and fat. Why might you follow a low-carb diet?   Low-carb diets may be used for a variety of reasons, such as for weight loss. People who have diabetes may use a low-carb diet to help manage their bloodsugar levels. What should you do before you start the diet? Talk to your doctor before you try any diet. This is even more important if you have health problems like kidney disease, heart disease, or diabetes. Your doctor may suggest that you meet with a registered dietitian. A dietitian canhelp you make an eating plan that works for you. What foods do you eat on a low-carb diet? On a low-carb diet, you choose foods that are high in protein and fat. Examplesof these are:  Meat, poultry, and fish. Eggs. Nuts, such as walnuts, pecans, almonds, and peanuts. Peanut butter and other nut butters. Tofu. Avocado. [place] Jordi. Non-starchy vegetables like broccoli, cauliflower, green beans, mushrooms, peppers, lettuce, and spinach. Unsweetened non-dairy milks like almond milk and coconut milk. Cheese, cottage cheese, and cream cheese. Where can you learn more? Go to https://TapZenpepiceweb.Sound2Light Productions. org and sign in to your [employer] account. Enter C335 in the [employer] box to learn more about \"Learning About Low-Carbohydrate Diets. \"     If you do not have an account, please click on the \"Sign Up Now\" link. Current as of: September 8, 2021               Content Version: 13.3  © 2006-2022 Healthwise, Incorporated. Care instructions adapted under license by [facility] ([facility]). If you have questions about a medical condition or this instruction, always ask your healthcare professional. [place] [address] any warranty or liability for your use of this information. ·   Personalized Preventive Plan for Salvador Mccallum - 7/14/2022  Medicare offers a range of preventive health benefits. Some of the tests and screenings are paid in full while other may be subject to a deductible, co-insurance, and/or copay.     Some of these benefits include a comprehensive review of your medical history including lifestyle, illnesses that may run in your family, and various assessments and screenings as appropriate. After reviewing your medical record and screening and assessments performed today your provider may have ordered immunizations, labs, imaging, and/or referrals for you. A list of these orders (if applicable) as well as your Preventive Care list are included within your After Visit Summary for your review. Other Preventive Recommendations:    A preventive eye exam performed by an eye specialist is recommended every 1-2 years to screen for glaucoma; cataracts, macular degeneration, and other eye disorders. A preventive dental visit is recommended every 6 months. Try to get at least 150 minutes of exercise per week or 10,000 steps per day on a pedometer . Order or download the FREE \"Exercise & Physical Activity: Your Everyday Guide\" from [employer]. Call 8-240.326.8386 or search [employer] online. You need 9213-0722 mg of calcium and 8832-7577 IU of vitamin D per day. It is possible to meet your calcium requirement with diet alone, but a vitamin D supplement is usually necessary to meet this goal.  When exposed to the sun, use a sunscreen that protects against both UVA and UVB radiation with an SPF of 30 or greater. Reapply every 2 to 3 hours or after sweating, drying off with a towel, or swimming. Always wear a seat belt when traveling in a car. Always wear a helmet when riding a bicycle or motorcycle.

## 2022-07-15 DIAGNOSIS — R79.9 ABNORMAL FINDING OF BLOOD CHEMISTRY, UNSPECIFIED: ICD-10-CM

## 2022-07-15 DIAGNOSIS — Z72.89 OTHER PROBLEMS RELATED TO LIFESTYLE: ICD-10-CM

## 2022-07-15 DIAGNOSIS — Z12.5 SCREENING PSA (PROSTATE SPECIFIC ANTIGEN): ICD-10-CM

## 2022-07-15 DIAGNOSIS — Z13.1 SCREENING FOR DIABETES MELLITUS (DM): ICD-10-CM

## 2022-07-15 LAB
A/G RATIO: 1.5 (ref 1.1–2.2)
ALBUMIN SERPL-MCNC: 4 G/DL (ref 3.4–5)
ALP BLD-CCNC: 73 U/L (ref 40–129)
ALT SERPL-CCNC: 45 U/L (ref 10–40)
ANION GAP SERPL CALCULATED.3IONS-SCNC: 11 MMOL/L (ref 3–16)
AST SERPL-CCNC: 61 U/L (ref 15–37)
BILIRUB SERPL-MCNC: 0.3 MG/DL (ref 0–1)
BUN BLDV-MCNC: 17 MG/DL (ref 7–20)
CALCIUM SERPL-MCNC: 8.9 MG/DL (ref 8.3–10.6)
CHLORIDE BLD-SCNC: 102 MMOL/L (ref 99–110)
CHOLESTEROL, TOTAL: 183 MG/DL (ref 0–199)
CO2: 27 MMOL/L (ref 21–32)
CREAT SERPL-MCNC: 1.2 MG/DL (ref 0.8–1.3)
GFR AFRICAN AMERICAN: >60
GFR NON-AFRICAN AMERICAN: 59
GLUCOSE BLD-MCNC: 87 MG/DL (ref 70–99)
HDLC SERPL-MCNC: 49 MG/DL (ref 40–60)
LDL CHOLESTEROL CALCULATED: 105 MG/DL
POTASSIUM SERPL-SCNC: 3.9 MMOL/L (ref 3.5–5.1)
PROSTATE SPECIFIC ANTIGEN: 1.84 NG/ML (ref 0–4)
SODIUM BLD-SCNC: 140 MMOL/L (ref 136–145)
TOTAL PROTEIN: 6.7 G/DL (ref 6.4–8.2)
TRIGL SERPL-MCNC: 147 MG/DL (ref 0–150)
VLDLC SERPL CALC-MCNC: 29 MG/DL

## 2022-07-16 LAB
ESTIMATED AVERAGE GLUCOSE: 114 MG/DL
HBA1C MFR BLD: 5.6 %

## 2022-07-25 DIAGNOSIS — I10 ESSENTIAL HYPERTENSION: ICD-10-CM

## 2022-07-25 DIAGNOSIS — E78.5 DYSLIPIDEMIA: ICD-10-CM

## 2022-07-26 NOTE — TELEPHONE ENCOUNTER
Requested Prescriptions     Pending Prescriptions Disp Refills    losartan (COZAAR) 50 MG tablet [Pharmacy Med Name: LOSARTAN TAB 50MG] 90 tablet 1     Sig: TAKE 1 TABLET DAILY    triamterene-hydroCHLOROthiazide (MAXZIDE-25) 37.5-25 MG per tablet [Pharmacy Med Name: TRIAMT/HCTZ  TAB 37.5-25] 90 tablet 0     Sig: TAKE 1 TABLET DAILY    rosuvastatin (CRESTOR) 40 MG tablet [Pharmacy Med Name: ROSUVASTATIN TAB 40MG] 90 tablet 0     Sig: TAKE 1 TABLET EVERY EVENING     Recent Visits  Date Type Provider Dept   07/14/22 Office Visit Ruben Alexis MD [facility]   12/20/21 Office Visit Ruben Alexis MD [facility]   06/28/21 Office Visit Ruben Alexis MD [facility]   Showing recent visits within past 540 days with a meds authorizing provider and meeting all other requirements  Future Appointments  No visits were found meeting these conditions.   Showing future appointments within next 150 days with a meds authorizing provider and meeting all other requirements       LAST APPOINTMENT  7/14/2022

## 2022-07-27 RX ORDER — TRIAMTERENE AND HYDROCHLOROTHIAZIDE 37.5; 25 MG/1; MG/1
TABLET ORAL
Qty: 90 TABLET | Refills: 0 | Status: SHIPPED | OUTPATIENT
Start: 2022-07-27 | End: 2022-10-06

## 2022-07-27 RX ORDER — LOSARTAN POTASSIUM 50 MG/1
TABLET ORAL
Qty: 90 TABLET | Refills: 0 | Status: SHIPPED | OUTPATIENT
Start: 2022-07-27 | End: 2022-10-06

## 2022-07-27 RX ORDER — ROSUVASTATIN CALCIUM 40 MG/1
TABLET, COATED ORAL
Qty: 90 TABLET | Refills: 0 | Status: SHIPPED | OUTPATIENT
Start: 2022-07-27 | End: 2022-10-06

## 2022-07-30 NOTE — ASSESSMENT & PLAN NOTE
Continue statin, labs ordered and discussed.   Lab Results   Component Value Date    CHOL 183 07/15/2022    CHOL 163 10/04/2021    CHOL 135 10/16/2020     Lab Results   Component Value Date    TRIG 147 07/15/2022    TRIG 111 10/04/2021    TRIG 103 10/16/2020     Lab Results   Component Value Date    HDL 49 07/15/2022    HDL 49 10/04/2021    HDL 44 10/16/2020     Lab Results   Component Value Date    LDLCALC 105 (H) 07/15/2022    LDLCALC 92 10/04/2021    LDLCALC 70 10/16/2020     Lab Results   Component Value Date    LABVLDL 29 07/15/2022    LABVLDL 22 10/04/2021    LABVLDL 21 10/16/2020     No results found for: CHOLHDLRATIO

## 2022-10-05 DIAGNOSIS — E78.5 DYSLIPIDEMIA: ICD-10-CM

## 2022-10-05 DIAGNOSIS — I10 ESSENTIAL HYPERTENSION: ICD-10-CM

## 2022-10-06 RX ORDER — TRIAMTERENE AND HYDROCHLOROTHIAZIDE 37.5; 25 MG/1; MG/1
TABLET ORAL
Qty: 90 TABLET | Refills: 0 | Status: ON HOLD | OUTPATIENT
Start: 2022-10-06 | End: 2022-10-20 | Stop reason: HOSPADM

## 2022-10-06 RX ORDER — ROSUVASTATIN CALCIUM 40 MG/1
TABLET, COATED ORAL
Qty: 90 TABLET | Refills: 0 | Status: ON HOLD | OUTPATIENT
Start: 2022-10-06 | End: 2022-10-20 | Stop reason: HOSPADM

## 2022-10-06 RX ORDER — LOSARTAN POTASSIUM 50 MG/1
TABLET ORAL
Qty: 90 TABLET | Refills: 0 | Status: SHIPPED | OUTPATIENT
Start: 2022-10-06

## 2022-10-10 ENCOUNTER — HOSPITAL ENCOUNTER (INPATIENT)
Age: 77
LOS: 10 days | Discharge: HOME | DRG: 501 | End: 2022-10-21
Attending: EMERGENCY MEDICINE | Admitting: INTERNAL MEDICINE
Payer: COMMERCIAL

## 2022-10-10 ENCOUNTER — APPOINTMENT (OUTPATIENT)
Dept: CT IMAGING | Age: 77
DRG: 501 | End: 2022-10-10
Payer: COMMERCIAL

## 2022-10-10 DIAGNOSIS — T79.6XXA TRAUMATIC RHABDOMYOLYSIS, INITIAL ENCOUNTER (HCC): ICD-10-CM

## 2022-10-10 DIAGNOSIS — R74.8 ELEVATED CPK: ICD-10-CM

## 2022-10-10 DIAGNOSIS — I21.4 NSTEMI (NON-ST ELEVATED MYOCARDIAL INFARCTION) (HCC): Primary | ICD-10-CM

## 2022-10-10 LAB
A/G RATIO: 1.1 (ref 1.1–2.2)
ALBUMIN SERPL-MCNC: 3.5 G/DL (ref 3.4–5)
ALP BLD-CCNC: 65 U/L (ref 40–129)
ALT SERPL-CCNC: 232 U/L (ref 10–40)
ANION GAP SERPL CALCULATED.3IONS-SCNC: 11 MMOL/L (ref 3–16)
APTT: 29.1 SEC (ref 23–34.3)
AST SERPL-CCNC: 426 U/L (ref 15–37)
BASOPHILS ABSOLUTE: 0 K/UL (ref 0–0.2)
BASOPHILS RELATIVE PERCENT: 0.3 %
BILIRUB SERPL-MCNC: 0.3 MG/DL (ref 0–1)
BUN BLDV-MCNC: 16 MG/DL (ref 7–20)
CALCIUM SERPL-MCNC: 9.2 MG/DL (ref 8.3–10.6)
CHLORIDE BLD-SCNC: 103 MMOL/L (ref 99–110)
CO2: 25 MMOL/L (ref 21–32)
CREAT SERPL-MCNC: 1 MG/DL (ref 0.8–1.3)
EOSINOPHILS ABSOLUTE: 0.1 K/UL (ref 0–0.6)
EOSINOPHILS RELATIVE PERCENT: 2.5 %
GFR AFRICAN AMERICAN: >60
GFR NON-AFRICAN AMERICAN: >60
GLUCOSE BLD-MCNC: 94 MG/DL (ref 70–99)
HCT VFR BLD CALC: 37.5 % (ref 40.5–52.5)
HEMOGLOBIN: 11.8 G/DL (ref 13.5–17.5)
LYMPHOCYTES ABSOLUTE: 1.2 K/UL (ref 1–5.1)
LYMPHOCYTES RELATIVE PERCENT: 28.7 %
MCH RBC QN AUTO: 25.4 PG (ref 26–34)
MCHC RBC AUTO-ENTMCNC: 31.6 G/DL (ref 31–36)
MCV RBC AUTO: 80.6 FL (ref 80–100)
MONOCYTES ABSOLUTE: 0.4 K/UL (ref 0–1.3)
MONOCYTES RELATIVE PERCENT: 10.5 %
NEUTROPHILS ABSOLUTE: 2.3 K/UL (ref 1.7–7.7)
NEUTROPHILS RELATIVE PERCENT: 58 %
PDW BLD-RTO: 16.3 % (ref 12.4–15.4)
PLATELET # BLD: 168 K/UL (ref 135–450)
PMV BLD AUTO: 7.9 FL (ref 5–10.5)
POTASSIUM SERPL-SCNC: 4 MMOL/L (ref 3.5–5.1)
PRO-BNP: 114 PG/ML (ref 0–449)
RBC # BLD: 4.66 M/UL (ref 4.2–5.9)
SODIUM BLD-SCNC: 139 MMOL/L (ref 136–145)
TOTAL PROTEIN: 6.6 G/DL (ref 6.4–8.2)
TROPONIN: 0.9 NG/ML
WBC # BLD: 4 K/UL (ref 4–11)

## 2022-10-10 PROCEDURE — 85730 THROMBOPLASTIN TIME PARTIAL: CPT

## 2022-10-10 PROCEDURE — 71260 CT THORAX DX C+: CPT | Performed by: NURSE PRACTITIONER

## 2022-10-10 PROCEDURE — 96365 THER/PROPH/DIAG IV INF INIT: CPT

## 2022-10-10 PROCEDURE — 93005 ELECTROCARDIOGRAM TRACING: CPT | Performed by: NURSE PRACTITIONER

## 2022-10-10 PROCEDURE — 93005 ELECTROCARDIOGRAM TRACING: CPT | Performed by: INTERNAL MEDICINE

## 2022-10-10 PROCEDURE — 83880 ASSAY OF NATRIURETIC PEPTIDE: CPT

## 2022-10-10 PROCEDURE — 84484 ASSAY OF TROPONIN QUANT: CPT

## 2022-10-10 PROCEDURE — 6360000002 HC RX W HCPCS: Performed by: NURSE PRACTITIONER

## 2022-10-10 PROCEDURE — 6370000000 HC RX 637 (ALT 250 FOR IP): Performed by: NURSE PRACTITIONER

## 2022-10-10 PROCEDURE — 80053 COMPREHEN METABOLIC PANEL: CPT

## 2022-10-10 PROCEDURE — 99285 EMERGENCY DEPT VISIT HI MDM: CPT

## 2022-10-10 PROCEDURE — 85025 COMPLETE CBC W/AUTO DIFF WBC: CPT

## 2022-10-10 PROCEDURE — 36415 COLL VENOUS BLD VENIPUNCTURE: CPT

## 2022-10-10 PROCEDURE — 6360000004 HC RX CONTRAST MEDICATION: Performed by: NURSE PRACTITIONER

## 2022-10-10 RX ORDER — HEPARIN SODIUM 1000 [USP'U]/ML
2000 INJECTION, SOLUTION INTRAVENOUS; SUBCUTANEOUS PRN
Status: DISCONTINUED | OUTPATIENT
Start: 2022-10-10 | End: 2022-10-10 | Stop reason: SDUPTHER

## 2022-10-10 RX ORDER — HEPARIN SODIUM 10000 [USP'U]/100ML
5-30 INJECTION, SOLUTION INTRAVENOUS CONTINUOUS
Status: DISCONTINUED | OUTPATIENT
Start: 2022-10-10 | End: 2022-10-10 | Stop reason: SDUPTHER

## 2022-10-10 RX ORDER — ASPIRIN 81 MG/1
324 TABLET, CHEWABLE ORAL ONCE
Status: COMPLETED | OUTPATIENT
Start: 2022-10-10 | End: 2022-10-10

## 2022-10-10 RX ORDER — HEPARIN SODIUM 10000 [USP'U]/100ML
5-30 INJECTION, SOLUTION INTRAVENOUS CONTINUOUS
Status: DISCONTINUED | OUTPATIENT
Start: 2022-10-10 | End: 2022-10-11

## 2022-10-10 RX ORDER — HEPARIN SODIUM 1000 [USP'U]/ML
4000 INJECTION, SOLUTION INTRAVENOUS; SUBCUTANEOUS PRN
Status: DISCONTINUED | OUTPATIENT
Start: 2022-10-10 | End: 2022-10-10 | Stop reason: SDUPTHER

## 2022-10-10 RX ORDER — HEPARIN SODIUM 1000 [USP'U]/ML
4000 INJECTION, SOLUTION INTRAVENOUS; SUBCUTANEOUS ONCE
Status: DISCONTINUED | OUTPATIENT
Start: 2022-10-10 | End: 2022-10-10 | Stop reason: SDUPTHER

## 2022-10-10 RX ORDER — HEPARIN SODIUM 1000 [USP'U]/ML
2000 INJECTION, SOLUTION INTRAVENOUS; SUBCUTANEOUS PRN
Status: DISCONTINUED | OUTPATIENT
Start: 2022-10-10 | End: 2022-10-11

## 2022-10-10 RX ORDER — HEPARIN SODIUM 1000 [USP'U]/ML
4000 INJECTION, SOLUTION INTRAVENOUS; SUBCUTANEOUS PRN
Status: DISCONTINUED | OUTPATIENT
Start: 2022-10-10 | End: 2022-10-11

## 2022-10-10 RX ORDER — HEPARIN SODIUM 1000 [USP'U]/ML
4000 INJECTION, SOLUTION INTRAVENOUS; SUBCUTANEOUS ONCE
Status: COMPLETED | OUTPATIENT
Start: 2022-10-10 | End: 2022-10-10

## 2022-10-10 RX ADMIN — HEPARIN SODIUM 12 UNITS/KG/HR: 10000 INJECTION, SOLUTION INTRAVENOUS at 23:45

## 2022-10-10 RX ADMIN — HEPARIN SODIUM 4000 UNITS: 1000 INJECTION INTRAVENOUS; SUBCUTANEOUS at 23:41

## 2022-10-10 RX ADMIN — ASPIRIN 81 MG 324 MG: 81 TABLET ORAL at 22:21

## 2022-10-10 RX ADMIN — IOPAMIDOL 75 ML: 755 INJECTION, SOLUTION INTRAVENOUS at 22:06

## 2022-10-10 ASSESSMENT — ENCOUNTER SYMPTOMS
CHEST TIGHTNESS: 1
NAUSEA: 0
ABDOMINAL PAIN: 0
DIARRHEA: 0
VOMITING: 0
SHORTNESS OF BREATH: 1

## 2022-10-11 ENCOUNTER — APPOINTMENT (OUTPATIENT)
Dept: ULTRASOUND IMAGING | Age: 77
DRG: 501 | End: 2022-10-11
Payer: COMMERCIAL

## 2022-10-11 PROBLEM — I21.4 NSTEMI (NON-ST ELEVATED MYOCARDIAL INFARCTION) (HCC): Status: ACTIVE | Noted: 2022-10-11

## 2022-10-11 PROBLEM — R79.89 ELEVATED LFTS: Status: ACTIVE | Noted: 2022-10-11

## 2022-10-11 LAB
APTT: 59.8 SEC (ref 23–34.3)
C-REACTIVE PROTEIN: 4.7 MG/L (ref 0–5.1)
EKG ATRIAL RATE: 76 BPM
EKG ATRIAL RATE: 81 BPM
EKG DIAGNOSIS: NORMAL
EKG DIAGNOSIS: NORMAL
EKG P AXIS: 61 DEGREES
EKG P AXIS: 67 DEGREES
EKG P-R INTERVAL: 186 MS
EKG P-R INTERVAL: 194 MS
EKG Q-T INTERVAL: 384 MS
EKG Q-T INTERVAL: 392 MS
EKG QRS DURATION: 100 MS
EKG QRS DURATION: 98 MS
EKG QTC CALCULATION (BAZETT): 432 MS
EKG QTC CALCULATION (BAZETT): 455 MS
EKG R AXIS: -15 DEGREES
EKG R AXIS: -18 DEGREES
EKG T AXIS: 52 DEGREES
EKG T AXIS: 59 DEGREES
EKG VENTRICULAR RATE: 76 BPM
EKG VENTRICULAR RATE: 81 BPM
LEFT VENTRICULAR EJECTION FRACTION MODE: NORMAL
LV EF: 55 %
LV EF: 58 %
LVEF MODALITY: NORMAL
PRO-BNP: 99 PG/ML (ref 0–449)
SEDIMENTATION RATE, ERYTHROCYTE: 62 MM/HR (ref 0–20)
TROPONIN: 0.64 NG/ML
TROPONIN: 0.71 NG/ML

## 2022-10-11 PROCEDURE — 93458 L HRT ARTERY/VENTRICLE ANGIO: CPT | Performed by: INTERNAL MEDICINE

## 2022-10-11 PROCEDURE — 2580000003 HC RX 258: Performed by: INTERNAL MEDICINE

## 2022-10-11 PROCEDURE — 2709999900 HC NON-CHARGEABLE SUPPLY

## 2022-10-11 PROCEDURE — 76705 ECHO EXAM OF ABDOMEN: CPT

## 2022-10-11 PROCEDURE — 6360000004 HC RX CONTRAST MEDICATION: Performed by: INTERNAL MEDICINE

## 2022-10-11 PROCEDURE — 6370000000 HC RX 637 (ALT 250 FOR IP): Performed by: INTERNAL MEDICINE

## 2022-10-11 PROCEDURE — 99152 MOD SED SAME PHYS/QHP 5/>YRS: CPT | Performed by: INTERNAL MEDICINE

## 2022-10-11 PROCEDURE — 99223 1ST HOSP IP/OBS HIGH 75: CPT | Performed by: INTERNAL MEDICINE

## 2022-10-11 PROCEDURE — C1894 INTRO/SHEATH, NON-LASER: HCPCS

## 2022-10-11 PROCEDURE — 84484 ASSAY OF TROPONIN QUANT: CPT

## 2022-10-11 PROCEDURE — 99152 MOD SED SAME PHYS/QHP 5/>YRS: CPT

## 2022-10-11 PROCEDURE — 86140 C-REACTIVE PROTEIN: CPT

## 2022-10-11 PROCEDURE — 2500000003 HC RX 250 WO HCPCS

## 2022-10-11 PROCEDURE — 6360000002 HC RX W HCPCS: Performed by: NURSE PRACTITIONER

## 2022-10-11 PROCEDURE — 93010 ELECTROCARDIOGRAM REPORT: CPT | Performed by: INTERNAL MEDICINE

## 2022-10-11 PROCEDURE — 93458 L HRT ARTERY/VENTRICLE ANGIO: CPT

## 2022-10-11 PROCEDURE — 85652 RBC SED RATE AUTOMATED: CPT

## 2022-10-11 PROCEDURE — B2111ZZ FLUOROSCOPY OF MULTIPLE CORONARY ARTERIES USING LOW OSMOLAR CONTRAST: ICD-10-PCS | Performed by: INTERNAL MEDICINE

## 2022-10-11 PROCEDURE — 6360000002 HC RX W HCPCS

## 2022-10-11 PROCEDURE — 93306 TTE W/DOPPLER COMPLETE: CPT

## 2022-10-11 PROCEDURE — 85730 THROMBOPLASTIN TIME PARTIAL: CPT

## 2022-10-11 PROCEDURE — 83880 ASSAY OF NATRIURETIC PEPTIDE: CPT

## 2022-10-11 PROCEDURE — 2060000000 HC ICU INTERMEDIATE R&B

## 2022-10-11 PROCEDURE — 36415 COLL VENOUS BLD VENIPUNCTURE: CPT

## 2022-10-11 PROCEDURE — C1769 GUIDE WIRE: HCPCS

## 2022-10-11 PROCEDURE — 99153 MOD SED SAME PHYS/QHP EA: CPT

## 2022-10-11 PROCEDURE — B2151ZZ FLUOROSCOPY OF LEFT HEART USING LOW OSMOLAR CONTRAST: ICD-10-PCS | Performed by: INTERNAL MEDICINE

## 2022-10-11 PROCEDURE — 4A023N7 MEASUREMENT OF CARDIAC SAMPLING AND PRESSURE, LEFT HEART, PERCUTANEOUS APPROACH: ICD-10-PCS | Performed by: INTERNAL MEDICINE

## 2022-10-11 RX ORDER — ENOXAPARIN SODIUM 100 MG/ML
30 INJECTION SUBCUTANEOUS 2 TIMES DAILY
Status: DISCONTINUED | OUTPATIENT
Start: 2022-10-12 | End: 2022-10-12

## 2022-10-11 RX ORDER — ONDANSETRON 2 MG/ML
4 INJECTION INTRAMUSCULAR; INTRAVENOUS EVERY 6 HOURS PRN
Status: DISCONTINUED | OUTPATIENT
Start: 2022-10-11 | End: 2022-10-18

## 2022-10-11 RX ORDER — POLYETHYLENE GLYCOL 3350 17 G/17G
17 POWDER, FOR SOLUTION ORAL DAILY PRN
Status: DISCONTINUED | OUTPATIENT
Start: 2022-10-11 | End: 2022-10-21 | Stop reason: HOSPADM

## 2022-10-11 RX ORDER — ASPIRIN 81 MG/1
81 TABLET, CHEWABLE ORAL DAILY
Status: DISCONTINUED | OUTPATIENT
Start: 2022-10-12 | End: 2022-10-21 | Stop reason: HOSPADM

## 2022-10-11 RX ORDER — SODIUM CHLORIDE 0.9 % (FLUSH) 0.9 %
5-40 SYRINGE (ML) INJECTION EVERY 12 HOURS SCHEDULED
Status: DISCONTINUED | OUTPATIENT
Start: 2022-10-11 | End: 2022-10-21 | Stop reason: HOSPADM

## 2022-10-11 RX ORDER — ROSUVASTATIN CALCIUM 10 MG/1
40 TABLET, COATED ORAL NIGHTLY
Status: DISCONTINUED | OUTPATIENT
Start: 2022-10-11 | End: 2022-10-12

## 2022-10-11 RX ORDER — SODIUM CHLORIDE 9 MG/ML
INJECTION, SOLUTION INTRAVENOUS PRN
Status: DISCONTINUED | OUTPATIENT
Start: 2022-10-11 | End: 2022-10-21 | Stop reason: HOSPADM

## 2022-10-11 RX ORDER — ACETAMINOPHEN 325 MG/1
650 TABLET ORAL EVERY 6 HOURS PRN
Status: DISCONTINUED | OUTPATIENT
Start: 2022-10-11 | End: 2022-10-18

## 2022-10-11 RX ORDER — ACETAMINOPHEN 325 MG/1
650 TABLET ORAL EVERY 4 HOURS PRN
Status: DISCONTINUED | OUTPATIENT
Start: 2022-10-11 | End: 2022-10-11 | Stop reason: SDUPTHER

## 2022-10-11 RX ORDER — LOSARTAN POTASSIUM 25 MG/1
50 TABLET ORAL DAILY
Status: DISCONTINUED | OUTPATIENT
Start: 2022-10-11 | End: 2022-10-21 | Stop reason: HOSPADM

## 2022-10-11 RX ORDER — TRIAMTERENE AND HYDROCHLOROTHIAZIDE 37.5; 25 MG/1; MG/1
1 TABLET ORAL DAILY
Status: DISCONTINUED | OUTPATIENT
Start: 2022-10-11 | End: 2022-10-21 | Stop reason: HOSPADM

## 2022-10-11 RX ORDER — ONDANSETRON 4 MG/1
4 TABLET, ORALLY DISINTEGRATING ORAL EVERY 8 HOURS PRN
Status: DISCONTINUED | OUTPATIENT
Start: 2022-10-11 | End: 2022-10-18

## 2022-10-11 RX ORDER — SODIUM CHLORIDE 9 MG/ML
INJECTION, SOLUTION INTRAVENOUS CONTINUOUS
Status: ACTIVE | OUTPATIENT
Start: 2022-10-11 | End: 2022-10-11

## 2022-10-11 RX ORDER — ENOXAPARIN SODIUM 100 MG/ML
40 INJECTION SUBCUTANEOUS DAILY
Status: DISCONTINUED | OUTPATIENT
Start: 2022-10-12 | End: 2022-10-11 | Stop reason: DRUGHIGH

## 2022-10-11 RX ORDER — ACETAMINOPHEN 650 MG/1
650 SUPPOSITORY RECTAL EVERY 6 HOURS PRN
Status: DISCONTINUED | OUTPATIENT
Start: 2022-10-11 | End: 2022-10-18

## 2022-10-11 RX ORDER — SODIUM CHLORIDE 0.9 % (FLUSH) 0.9 %
5-40 SYRINGE (ML) INJECTION PRN
Status: DISCONTINUED | OUTPATIENT
Start: 2022-10-11 | End: 2022-10-21 | Stop reason: HOSPADM

## 2022-10-11 RX ORDER — NITROGLYCERIN 0.4 MG/1
0.4 TABLET SUBLINGUAL EVERY 5 MIN PRN
Status: DISCONTINUED | OUTPATIENT
Start: 2022-10-11 | End: 2022-10-21 | Stop reason: HOSPADM

## 2022-10-11 RX ORDER — M-VIT,TX,IRON,MINS/CALC/FOLIC 27MG-0.4MG
1 TABLET ORAL DAILY
Status: DISCONTINUED | OUTPATIENT
Start: 2022-10-11 | End: 2022-10-21 | Stop reason: HOSPADM

## 2022-10-11 RX ORDER — ATORVASTATIN CALCIUM 80 MG/1
40 TABLET, FILM COATED ORAL NIGHTLY
Status: DISCONTINUED | OUTPATIENT
Start: 2022-10-11 | End: 2022-10-11

## 2022-10-11 RX ORDER — VITAMIN B COMPLEX
1000 TABLET ORAL DAILY
Status: DISCONTINUED | OUTPATIENT
Start: 2022-10-11 | End: 2022-10-21 | Stop reason: HOSPADM

## 2022-10-11 RX ADMIN — MULTIPLE VITAMINS W/ MINERALS TAB 1 TABLET: TAB at 08:32

## 2022-10-11 RX ADMIN — SODIUM CHLORIDE, PRESERVATIVE FREE 10 ML: 5 INJECTION INTRAVENOUS at 06:23

## 2022-10-11 RX ADMIN — Medication 10 ML: at 20:38

## 2022-10-11 RX ADMIN — LOSARTAN POTASSIUM 50 MG: 25 TABLET, FILM COATED ORAL at 08:32

## 2022-10-11 RX ADMIN — METOPROLOL TARTRATE 25 MG: 25 TABLET, FILM COATED ORAL at 20:38

## 2022-10-11 RX ADMIN — HEPARIN SODIUM 2000 UNITS: 1000 INJECTION INTRAVENOUS; SUBCUTANEOUS at 06:23

## 2022-10-11 RX ADMIN — Medication 1000 UNITS: at 08:32

## 2022-10-11 RX ADMIN — ROSUVASTATIN CALCIUM 40 MG: 10 TABLET, COATED ORAL at 20:38

## 2022-10-11 RX ADMIN — Medication 10 ML: at 09:18

## 2022-10-11 RX ADMIN — IOPAMIDOL 55 ML: 755 INJECTION, SOLUTION INTRAVENOUS at 14:37

## 2022-10-11 RX ADMIN — METOPROLOL TARTRATE 25 MG: 25 TABLET, FILM COATED ORAL at 08:32

## 2022-10-11 RX ADMIN — SODIUM CHLORIDE: 9 INJECTION, SOLUTION INTRAVENOUS at 18:08

## 2022-10-11 NOTE — PROGRESS NOTES
Pt admitted to room 3321 from ED. VSS. Pt A&Ox4. POC updated with pt, all questions answered. Oriented pt to room and call light. Call light and bedside table within reach. Instructed to call out with any needs, v/u. Will monitor.

## 2022-10-11 NOTE — ED PROVIDER NOTES
I have personally performed a face to face diagnostic evaluation on this patient. I have fully participated in the care of this patient I personally saw the patient and performed a substantive portion of the visit including all aspects of the medical decision making. I have reviewed and agree with all pertinent clinical information including history, physical exam, diagnostic tests, and the plan. HPI: Calista Davis presented with bilateral lower extremity edema which is new. No chest pain. No shortness of breath. Patient did have recent travel as well as recent COVID infection. No other associated symptoms. Extensive past medical history. See MARIANNE note for further details. Chief Complaint   Patient presents with    Leg Swelling     Patient in with complaints of bilateral edema in his legs that is new. PCP told him to come into the er. Review of Systems: See MARIANNE note  Vital Signs: /75   Pulse 80   Temp 98.5 °F (36.9 °C) (Oral)   Resp 23   SpO2 100%     Alert 78 y.o. male who does not appear toxic or acutely ill  HENT: Atraumatic, oral mucosa moist  Neck: Grossly normal ROM  Chest/Lungs: respiratory effort normal  Extremities: 2+ swelling lower extremity   Musculoskeletal: Grossly normal ROM  Skin: No palor or diaphoresis    Medical Decision Making and Plan:  Pertinent Labs & Imaging studies reviewed. (See MARIANNE chart for details)  I agree with MARIANNE assessment and plan. EKG with signs of new lateral T wave inversions with no active chest pain at this time. Of note initial laboratory work-up shows significantly elevated troponin 0.9. Concern for NSTEMI. Cannot rule out CT PE. Will obtain CT PE and patient to be admitted for either PE treatment and/or NSTEMI. EKG unchanged. Repeated at 2225. No active chest pain. Rest of exam unremarkable. See MARIANNE note for further details. Update  CT PE negative. Will admit patient for NSTEMI.   Cardiology to be consulted we will start patient on heparin. I personally saw the patient and independently provided 15 minutes of nonconcurrent critical care out of the total shared critical care time provided    EKG: All EKG's are interpreted by the Emergency Department Physician who either signs or Co-signs this chart in the absence of a cardiologist.    EKG Interpretation    Interpreted by emergency department physician    Rhythm: normal sinus   Rate: normal  Axis: normal  Ectopy: Infrequent PVC  Conduction: normal  ST Segments: nonspecific changes  T Waves: Inversion in V4, V5  Q Waves: none    Clinical Impression: Sinus rhythm with occasional PVCs with nonspecific anterior changes similar to previous EKG dated October 25, 2016. New lateral T wave changes in lateral leads. Normal NM interval normal QRS duration normal QT QTC. Interpreted by myself.     MD Jakob Chow MD  10/10/22 2152       Jakob Tubbs MD  10/10/22 2212       Jakob Tubbs MD  10/10/22 [address], [place]  10/10/22 2589

## 2022-10-11 NOTE — PROGRESS NOTES
Pharmacist Review and Automatic Dose Adjustment of Prophylactic Enoxaparin    *Review reason for admission/hospital problem list*    The reviewing pharmacist has made an adjustment to the ordered enoxaparin dose or converted to UFH per the approved [facility] protocol and table as identified below. Keyshawn Barnhart is a 78 y.o. male. Recent Labs     10/10/22  2128   CREATININE 1.0       Estimated Creatinine Clearance: 84 mL/min (based on SCr of 1 mg/dL). Recent Labs     10/10/22  2128   HGB 11.8*   HCT 37.5*        No results for input(s): INR in the last 72 hours. Height:   Ht Readings from Last 1 Encounters:   10/11/22 6' 2\" (1.88 m)     Weight:  Wt Readings from Last 1 Encounters:   10/11/22 241 lb 10 oz (109.6 kg)               Plan: Based upon the patient's weight and renal function, the ordered enoxaparin dose of 40 mg daily has been changed/converted to 30 mg BID.       Thank you,  Nolvia Dyer [id number], [facility]  10/11/2022, 4:08 PM

## 2022-10-11 NOTE — PRE SEDATION
Brief Pre-Op Note/Sedation Assessment      Rachel Hardy  1945  [id number]  1:58 PM    Planned Procedure: Cardiac Catheterization Procedure  Post Procedure Plan: Return to same level of care  Consent: I have discussed with the patient and/or the patient representative the indication, alternatives, and the possible risks and/or complications of the planned procedure and the anesthesia methods. The patient and/or patient representative appear to understand and agree to proceed. Chief Complaint:   Anginal Equivalent  NSTEMI      Indications for Cath Procedure:  Presentation:  ACS > 24 hrs and Suspected CAD  2. Anginal Classification within 2 weeks:  CCS III - Symptoms with everyday living activities, i.e., moderate limitation CCS IV - Inability to perform any activity without angina or angina at rest, i.e., severe limitation  3. Angina Symptoms Assessment:  Asymptomatic  4. Heart Failure Class within last 2 weeks:  No symptoms  5. Cardiovascular Instability:  No    Prior Ischemic Workup/Eval:  Pre-Procedural Medications: Yes: ACE/ARB/ARNI, Aspirin, Beta Blockers, and STATIN  2. Stress Test Completed? No    Does Patient need surgery?   Cath Valve Surgery:  No    Pre-Procedure Medical History:  Vital Signs:  /78   Pulse 67   Temp 97.7 °F (36.5 °C) (Oral)   Resp 16   Ht 6' 2\" (1.88 m)   Wt 241 lb 10 oz (109.6 kg)   SpO2 93%   BMI 31.02 kg/m²     Allergies:  No Known Allergies  Medications:    Current Facility-Administered Medications   Medication Dose Route Frequency Provider Last Rate Last Admin    sodium chloride flush 0.9 % injection 5-40 mL  5-40 mL IntraVENous 2 times per day Evan Self MD   10 mL at 10/11/22 0918    sodium chloride flush 0.9 % injection 5-40 mL  5-40 mL IntraVENous PRN Evan Self MD   10 mL at 10/11/22 0623    0.9 % sodium chloride infusion   IntraVENous PRN Evan Self MD        ondansetron (ZOFRAN-ODT) disintegrating tablet 4 mg  4 mg Oral Q8H PRN Cordelia Nora Topete MD        Or    ondansetron (ZOFRAN) injection 4 mg  4 mg IntraVENous Q6H PRN Alfredo Grant MD        acetaminophen (TYLENOL) tablet 650 mg  650 mg Oral Q6H PRN Alfredo Grant MD        Or    acetaminophen (TYLENOL) suppository 650 mg  650 mg Rectal Q6H PRN Alfredo Grant MD        polyethylene glycol (GLYCOLAX) packet 17 g  17 g Oral Daily PRN Alfredo Grant MD        Darryn Backbone ON 10/12/2022] aspirin chewable tablet 81 mg  81 mg Oral Daily Alfredo Grant MD        nitroGLYCERIN (NITROSTAT) SL tablet 0.4 mg  0.4 mg SubLINGual Q5 Min PRN Alfredo Grant MD        rosuvastatin (CRESTOR) tablet 40 mg  40 mg Oral Nightly Alfredo Grant MD        losartan (COZAAR) tablet 50 mg  50 mg Oral Daily Alfredo Grant MD   50 mg at 10/11/22 8316    metoprolol tartrate (LOPRESSOR) tablet 25 mg  25 mg Oral BID Alfredo Grant MD   25 mg at 10/11/22 0239    [Held by provider] triamterene-hydroCHLOROthiazide (MAXZIDE-25) 37.5-25 MG per tablet 1 tablet  1 tablet Oral Daily Alfredo Grant MD        therapeutic multivitamin-minerals 1 tablet  1 tablet Oral Daily Alfredo Grant MD   1 tablet at 10/11/22 8045    Vitamin D (CHOLECALCIFEROL) tablet 1,000 Units  1,000 Units Oral Daily Alfredo Grant MD   1,000 Units at 10/11/22 9374    perflutren lipid microspheres (DEFINITY) injection 1.65 mg  1.5 mL IntraVENous ONCE PRN Sailaja Oropeza MD        heparin (porcine) injection 4,000 Units  4,000 Units IntraVENous PRN Tiny Farm, APRN - CNP        heparin (porcine) injection 2,000 Units  2,000 Units IntraVENous PRN Tiny Hesham, APRN - CNP   2,000 Units at 10/11/22 1017    heparin 25,000 units in dextrose 5% 250 mL (premix) infusion  5-30 Units/kg/hr IntraVENous Continuous Tiny Hesham APRN - CNP 15.4 mL/hr at 10/11/22 2257 14 Units/kg/hr at 10/11/22 2261       Past Medical History:    Past Medical History:   Diagnosis Date    Arthritis     Colon polyp     Diabetes mellitus ([facility] [address]     denies being diabetic    Hyperlipemia Hypertension     Kidney disease     Rheumatoid arthritis [facility])        Surgical History:    Past Surgical History:   Procedure Laterality Date    COLONOSCOPY      COLONOSCOPY N/A 8/14/2020    COLONOSCOPY DIAGNOSTIC performed by Kyle Wilder MD at [address]              Pre-Sedation:  Pre-Sedation Documentation and Exam:  I have personally completed a history, physical exam & review of systems for this patient (see notes). Prior History of Anesthesia Complications:   none    Modified Mallampati:  III (soft palate, base of uvula visible)    ASA Classification:  Class 2 - A normal healthy patient with mild systemic disease    Shukri Scale: Activity:  2 - Able to move 4 extremities voluntarily on command  Respiration:  2 - Able to breathe deeply and cough freely  Circulation:  2 - BP+/- 20mmHg of normal  Consciousness:  2 - Fully awake  Oxygen Saturation (color):  2 - Able to maintain oxygen saturation >92% on room air    Sedation/Anesthesia Plan:  Guard the patient's safety and welfare. Minimize physical discomfort and pain. Minimize negative psychological responses to treatment by providing sedation and analgesia and maximize the potential amnesia. Patient to meet pre-procedure discharge plan.     Medication Planned:  midazolam intravenously and fentanyl intravenously    Patient is an appropriate candidate for plan of sedation:   yes      Electronically signed by Robe Benton MD on 10/11/2022 at 1:58 PM

## 2022-10-11 NOTE — PLAN OF CARE
Problem: Discharge Planning  Goal: Discharge to home or other facility with appropriate resources  Outcome: Progressing  Flowsheets (Taken 10/11/2022 0124)  Discharge to home or other facility with appropriate resources: Identify barriers to discharge with patient and caregiver     Problem: Pain  Goal: Verbalizes/displays adequate comfort level or baseline comfort level  Outcome: Progressing

## 2022-10-11 NOTE — H&P
Hospital Medicine History & Physical      PCP: Marylee Pollock, MD    Date of Admission: 10/10/2022    Date of Service: Pt seen/examined on 10/11/22 and Admitted to Inpatient with expected LOS greater than two midnights due to medical therapy. Chief Complaint: Shortness of breath      History Of Present Illness:  Jc Reyes is 78 y.o. male who presented with complaint of shortness of breath. Symptom onset was acute for a time period of 10 days. The severity is described as moderate. The course of his symptoms over time is worsening. The symptoms improved with rest and worsened with exertion. The patient's symptom is associated with bilateral leg swelling. Jc Reyes is 78 y.o. male with history of HTN, HLP and CKD   He follows with Dr. César Fernandez for history of CKD  He now presents to the ER with complaint of shortness of breath and leg swelling  His shortness of breath started about 10 days ago  It is worse when he ambulates or exerts himself such as going up stairs in his house and better when he rests  Today he went to urgent care where trace bilateral leg swelling was noted  Therefore he was sent to the ED for further evaluation  In the ED troponin was elevated to 0.9  However patient denies any chest pain or chest discomfort  Per cardiology recommendation, he was started on low-dose heparin drip and admitted for further evaluation          Past Medical History:          Diagnosis Date    Arthritis     Colon polyp     Diabetes mellitus ([facility] [address]     denies being diabetic    Hyperlipemia     Hypertension     Kidney disease     Rheumatoid arthritis [facility])        Past Surgical History:          Procedure Laterality Date    COLONOSCOPY      COLONOSCOPY N/A 8/14/2020    COLONOSCOPY DIAGNOSTIC performed by Kimani Hilton MD at [id number] [place] [address],[address]        Medications Prior to Admission:      Prior to Admission medications    Medication Sig Start Date End Date Taking? Authorizing Provider   metoprolol tartrate (LOPRESSOR) 25 MG tablet TAKE 1 TABLET TWICE A DAY 10/6/22   Cherre Barthel, MD   rosuvastatin (CRESTOR) 40 MG tablet TAKE 1 TABLET EVERY EVENING 10/6/22   Cherre Barthel, MD   losartan (COZAAR) 50 MG tablet TAKE 1 TABLET DAILY 10/6/22   Cherre Barthel, MD   triamterene-hydroCHLOROthiazide [facility]) 37.5-25 MG per tablet TAKE 1 TABLET DAILY 10/6/22   Cherre Barthel, MD   sildenafil (VIAGRA) 100 MG tablet Take 1 tablet by mouth daily as needed for Erectile Dysfunction 12/20/21   Cherre Barthel, MD   clobetasol (TEMOVATE) 0.05 % ointment Apply topically to bilateral feet 2 times daily. 12/20/21   Cherre Barthel, MD   Multiple Vitamins-Minerals (THERAPEUTIC MULTIVITAMIN-MINERALS) tablet Take 1 tablet by mouth daily    Historical Provider, MD   Vitamin D (CHOLECALCIFEROL) 1000 UNITS CAPS capsule Take 1,000 Units by mouth daily. Historical Provider, MD       Allergies:  Patient has no known allergies. Social History:      The patient currently lives at home    TOBACCO:   reports that he quit smoking about 38 years ago. He started smoking about 48 years ago. He has a 5.00 pack-year smoking history. He has never used smokeless tobacco.  ETOH:   reports current alcohol use. E-cigarette/Vaping       Questions Responses    E-cigarette/Vaping Use     Start Date     Passive Exposure     Quit Date     Counseling Given     Comments               Family History:      Reviewed and negative in regards to presenting illness/complaint. Problem Relation Age of Onset    High Blood Pressure Sister        REVIEW OF SYSTEMS COMPLETED:   Pertinent positives as noted in the HPI. All other systems reviewed and negative.     PHYSICAL EXAM PERFORMED:    /78   Pulse 77   Temp 98.5 °F (36.9 °C) (Oral)   Resp 18   Wt 242 lb (109.8 kg)   SpO2 100%   BMI 31.07 kg/m²     General appearance:  No apparent distress, appears stated age and cooperative. HEENT:  Normal cephalic, atraumatic without obvious deformity. Pupils equal, round, and reactive to light. Extra ocular muscles intact. Conjunctivae/corneas clear. Neck: Supple, with full range of motion. No jugular venous distention. Trachea midline. Respiratory:  Normal respiratory effort. Clear to auscultation, bilaterally without Rales/Wheezes/Rhonchi. Cardiovascular:  Regular rate and rhythm with normal S1/S2 without murmurs, rubs or gallops. Abdomen: Soft, non-tender, non-distended with normal bowel sounds. Musculoskeletal:  No clubbing, cyanosis or edema bilaterally. Full range of motion without deformity. Skin: Skin color, texture, turgor normal.  No rashes or lesions. Neurologic:  Neurovascularly intact without any focal sensory/motor deficits. Cranial nerves: II-XII intact, grossly non-focal.  Psychiatric:  Alert and oriented, thought content appropriate, normal insight  Capillary Refill: Brisk,3 seconds, normal  Peripheral Pulses: +2 palpable, equal bilaterally       Labs:     Recent Labs     10/10/22  2128   WBC 4.0   HGB 11.8*   HCT 37.5*        Recent Labs     10/10/22  2128      K 4.0      CO2 25   BUN 16   CREATININE 1.0   CALCIUM 9.2     Recent Labs     10/10/22  2128   *   *   BILITOT 0.3   ALKPHOS 65     No results for input(s): INR in the last 72 hours. Recent Labs     10/10/22  2128   TROPONINI 0.90*       Urinalysis:      Lab Results   Component Value Date/Time    NITRU Negative 04/05/2022 09:46 AM    WBCUA 1 04/05/2022 09:46 AM    RBCUA 4 04/05/2022 09:46 AM    BLOODU Negative 04/05/2022 09:46 AM    SPECGRAV 1.020 04/05/2022 09:46 AM    GLUCOSEU Negative 04/05/2022 09:46 AM       Radiology:     EKG:  I have reviewed the EKG with the following interpretation: New lateral T wave inversions    CT CHEST PULMONARY EMBOLISM W CONTRAST   Final Result   No evidence of a pulmonary embolus.       Mildly dilated and atherosclerotic thoracic aorta with no aneurysm or   dissection. Minimal bibasilar atelectasis or scarring posteriorly. Suggest follow-up   with chest x-rays. 8 cm benign cyst upper pole left kidney             Consults:    IP CONSULT TO CARDIOLOGY    ASSESSMENT:    NSTEMI   Hypertension  CKD, stage III   Hyperlipidemia    PLAN:    Admit to telemetry unit  Per cardiology recommendation started on heparin drip  Continue heparin drip through the night  Consult cardiology  Repeat troponin  Continue his usual home BP meds  Aspirin and statin as ordered  Check hemoglobin A1c    DVT Prophylaxis: Already on heparin drip  Diet: No diet orders on file  Code Status: No Order    PT/OT Eval Status: PT/OT consult is not ordered    Dispo - admit as inpatient       Santhosh Polanco MD    Thank you Bina Ortez MD for the opportunity to be involved in this patient's care. If you have any questions or concerns please feel free to contact me at [phone].

## 2022-10-11 NOTE — BRIEF OP NOTE
Cardiac Cath 10/11/2022:  Access: Right RA  Ultrasound: Ultrasound guidance used to determine after mentioned artery patency, size (> 2 mm), anatomic variations and ideal puncture location. Real-time ultrasound utilized concurrent with vascular needle entry into the artery. Images permanently recorded and reported in the patient chart. Hemostasis: TR band    Moderate Sedation:  Start time: 1338  Stop time: 1421  3 mg versed   175 mcg fentanyl   An independent trained observer pushed medications at my direction. We monitored the patient's level of consciousness and vital signs/physiologic status throughout the procedure duration (see start and start times above). Bleeding risk: Low  LVEDP: 7 mmHg  AO: 72/40 mmHg  Estimated blood loss: Less than 20 mL  Contrast: 55 mL  Fluoroscopy time: 1.7 min. Anatomy:   LM-normal  LAD-10%  to 20% mid  Cx-normal, dominant  OM1-normal  RCA-normal, nondominant  LVEF-55%    Impression:  1. Minimal 1V CAD involving the LAD. 2.  Normal LV systolic function. 3.  Troponin elevation possibly related to myocarditis. Plan:  1. Medical management. 2.  Outpatient cardiac MRI. 3.  Sed rate, CRP in approximately 2 weeks. Note: 2D echo did demonstrate a large gallstone for which formal ultrasound of the gallbladder is warranted and has been ordered.

## 2022-10-11 NOTE — FLOWSHEET NOTE
10/11/22 4332   Family/Physician Notification   Do you want a physician to be notified of this admission (Other than treatment team) Yes   Physician Name & Phone Number Dr. Dalia Lees [phone]     Attempted to call office of Dr. Dalia Lees. Phone ringing for approximately 5 minutes. Will try again. Attempted to call office of Dr. Dalia Lees again at [phone]. No answer.

## 2022-10-11 NOTE — PROGRESS NOTES
Patient seen and examined by one of her partners earlier today.   I agree with their assessment and plan and will continue to follow while in the hospital.       Edmar Joe MD

## 2022-10-11 NOTE — ED PROVIDER NOTES
[address]        Pt Name: Benedict Shine  MRN: [id number]  [place] 1945  Date of evaluation: 10/10/2022  Provider: NHI Suarez - TAMIKO  PCP: Michelle Varela MD  Note Started: 10:01 PM EDT        I have seen and evaluated this patient with my supervising physician Emory Smith MD.    [address] [place] [address]       Chief Complaint   Patient presents with    Leg Swelling     Patient in with complaints of bilateral edema in his legs that is new. PCP told him to come into the er. HISTORY OF PRESENT ILLNESS   (Location, Timing/Onset, Context/Setting, Quality, Duration, Modifying Factors, Severity, Associated Signs and Symptoms)  Note limiting factors. Chief Complaint: sob, leg swelling     Benedict Shine is a 78 y.o. male who presents to the emergency department with bilateral lower leg edema that he noticed today prompting a visit to urgent care. Urgent care did encourage patient to be evaluated in the emergency department as he has been short winded/short of breath over the past 1 week with history of COVID 1 month ago. He did take a recent bus trip to [place] 3 weeks ago, returning 1 week ago. States that he is feeling sob, winded even with easy exertion. Denies any headache, fever, lightheadedness, dizziness, visual disturbances. No neck or back pain. No abdominal pain, nausea, vomiting, diarrhea, constipation, or dysuria. No rash. Nursing Notes were all reviewed and agreed with or any disagreements were addressed in the HPI. REVIEW OF SYSTEMS    (2-9 systems for level 4, 10 or more for level 5)     Review of Systems   Constitutional:  Negative for activity change, chills and fever. Respiratory:  Positive for chest tightness and shortness of breath. Cardiovascular:  Positive for leg swelling. Negative for chest pain.    Gastrointestinal:  Negative for abdominal pain, diarrhea, nausea and vomiting. Genitourinary:  Negative for dysuria. All other systems reviewed and are negative. Positives and Pertinent negatives as per HPI. Except as noted above in the ROS, all other systems were reviewed and negative. PAST MEDICAL HISTORY     Past Medical History:   Diagnosis Date    Arthritis     Colon polyp     Diabetes mellitus ([facility] [address]     denies being diabetic    Hyperlipemia     Hypertension     Kidney disease     Rheumatoid arthritis ([facility] [address]          SURGICAL HISTORY     Past Surgical History:   Procedure Laterality Date    COLONOSCOPY      COLONOSCOPY N/A 2020    COLONOSCOPY DIAGNOSTIC performed by Floresita Griffin MD at [id number] [place] [address] Drive,[address] Left          CURRENTMEDICATIONS       Previous Medications    CLOBETASOL (TEMOVATE) 0.05 % OINTMENT    Apply topically to bilateral feet 2 times daily. LOSARTAN (COZAAR) 50 MG TABLET    TAKE 1 TABLET DAILY    METOPROLOL TARTRATE (LOPRESSOR) 25 MG TABLET    TAKE 1 TABLET TWICE A DAY    MULTIPLE VITAMINS-MINERALS (THERAPEUTIC MULTIVITAMIN-MINERALS) TABLET    Take 1 tablet by mouth daily    ROSUVASTATIN (CRESTOR) 40 MG TABLET    TAKE 1 TABLET EVERY EVENING    SILDENAFIL (VIAGRA) 100 MG TABLET    Take 1 tablet by mouth daily as needed for Erectile Dysfunction    TRIAMTERENE-HYDROCHLOROTHIAZIDE (MAXZIDE-25) 37.5-25 MG PER TABLET    TAKE 1 TABLET DAILY    VITAMIN D (CHOLECALCIFEROL) 1000 UNITS CAPS CAPSULE    Take 1,000 Units by mouth daily. ALLERGIES     Patient has no known allergies.     FAMILYHISTORY       Family History   Problem Relation Age of Onset    High Blood Pressure Sister           SOCIAL HISTORY       Social History     Tobacco Use    Smoking status: Former     Packs/day: 0.50     Years: 10.00     Pack years: 5.00     Types: Cigarettes     Start date: 67     Quit date: 1984     Years since quittin.6    Smokeless tobacco: Never    Tobacco comments:     quit smoking 30 years    Substance Use Topics Alcohol use: Yes     Comment: rare    Drug use: No       SCREENINGS    [place] Coma Scale  Eye Opening: Spontaneous  Best Verbal Response: Oriented  Best Motor Response: Obeys commands  [place] Coma Scale Score: 15        PHYSICAL EXAM    (up to 7 for level 4, 8 or more for level 5)     ED Triage Vitals [10/10/22 2050]   BP Temp Temp Source Heart Rate Resp SpO2 Height Weight   (!) 145/85 98.5 °F (36.9 °C) Oral 98 18 99 % -- --       Physical Exam  Vitals and nursing note reviewed. Constitutional:       Appearance: He is well-developed. He is not diaphoretic. HENT:      Head: Normocephalic and atraumatic. Right Ear: External ear normal.      Left Ear: External ear normal.   Eyes:      General:         Right eye: No discharge. Left eye: No discharge. Neck:      Vascular: No JVD. Cardiovascular:      Rate and Rhythm: Normal rate. Pulses: Normal pulses. Pulmonary:      Effort: Pulmonary effort is normal. No respiratory distress. Breath sounds: Normal breath sounds. Abdominal:      Palpations: Abdomen is soft. Musculoskeletal:         General: Normal range of motion. Skin:     General: Skin is warm and dry. Coloration: Skin is not pale. Neurological:      Mental Status: He is alert.    Psychiatric:         Behavior: Behavior normal.       DIAGNOSTIC RESULTS   LABS:    Labs Reviewed   CBC WITH AUTO DIFFERENTIAL - Abnormal; Notable for the following components:       Result Value    Hemoglobin 11.8 (*)     Hematocrit 37.5 (*)     MCH 25.4 (*)     RDW 16.3 (*)     All other components within normal limits   COMPREHENSIVE METABOLIC PANEL - Abnormal; Notable for the following components:     (*)      (*)     All other components within normal limits    Narrative:     CALL  [place]  [facility] tel. [phone],  Chemistry results called to and read back by JACK Canales, 10/10/2022  21:57, by [facility]   TROPONIN - Abnormal; Notable for the following components:    Troponin 0.90 (*)     All other components within normal limits    Narrative:     Pedro Prasad  [facility] tel. [phone],  Chemistry results called to and read back by JACK Ramos, 10/10/2022  21:57, by [address]    Narrative:     Pedro Prasad  [facility] tel. [phone],  Chemistry results called to and read back by JACK Ramos, 10/10/2022  21:57, by [facility]   APTT   CBC   APTT   APTT       When ordered only abnormal lab results are displayed. All other labs were within normal range or not returned as of this dictation. EKG: When ordered, EKG's are interpreted by the Emergency Department Physician in the absence of a cardiologist.  Please see their note for interpretation of EKG. RADIOLOGY:   Non-plain film images such as CT, Ultrasound and MRI are read by the radiologist. Plain radiographic images are visualized and preliminarily interpreted by the ED Provider with the below findings:        Interpretation per the Radiologist below, if available at the time of this note:    CT CHEST PULMONARY EMBOLISM W CONTRAST   Final Result   No evidence of a pulmonary embolus. Mildly dilated and atherosclerotic thoracic aorta with no aneurysm or   dissection. Minimal bibasilar atelectasis or scarring posteriorly. Suggest follow-up   with chest x-rays. 8 cm benign cyst upper pole left kidney           No results found. PROCEDURES   Unless otherwise noted below, none     Procedures    CRITICAL CARE TIME   There was a high probability of life-threatening clinical deterioration in the patient's condition requiring my urgent intervention. I personally saw the patient and independently provided 34 minutes of non-concurrent critical care out of the total shared critical care time provided, excluding separately reportable procedures.          CONSULTS:  None      EMERGENCY DEPARTMENT COURSE and DIFFERENTIAL DIAGNOSIS/MDM:   Vitals:    Vitals:    10/10/22 2050 10/10/22 2145 10/10/22 2319 10/10/22 2321 BP: (!) 145/85 124/75 134/78    Pulse: 98 80 77    Resp: 18 23 18    Temp: 98.5 °F (36.9 °C)      TempSrc: Oral      SpO2: 99% 100% 100%    Weight:    242 lb (109.8 kg)       Patient was given the following medications:  Medications   heparin (porcine) injection 4,000 Units (has no administration in time range)   heparin (porcine) injection 2,000 Units (has no administration in time range)   heparin 25,000 units in dextrose 5% 250 mL (premix) infusion (has no administration in time range)   aspirin chewable tablet 324 mg (324 mg Oral Given 10/10/22 2221)   iopamidol (ISOVUE-370) 76 % injection 75 mL (75 mLs IntraVENous Given 10/10/22 2206)   heparin (porcine) injection 4,000 Units (4,000 Units IntraVENous Given 10/10/22 2341)         Is this patient to be included in the SEP-1 Core Measure due to severe sepsis or septic shock? No   Exclusion criteria - the patient is NOT to be included for SEP-1 Core Measure due to: Infection is not suspected    Briefly, this is a 65-year-old male with history of hypertension, diabetes that presents to the emergency department with bilateral lower extremity edema today and feeling short of breath over the past 1 week. EKG reviewed by attending physician. Labs notable for troponin that is significantly elevated at 0.9. BNP is unremarkable at 114. Normal kidney function. Patient given 324 mg of aspirin. CT to rule out PE ordered. CT is negative for PE  CT CHEST PULMONARY EMBOLISM W CONTRAST (Final result)  Result time 10/10/22 23:17:47  Final result by Juan Jacome MD (10/10/22 23:17:47)                Impression:    No evidence of a pulmonary embolus. Mildly dilated and atherosclerotic thoracic aorta with no aneurysm or   dissection. Minimal bibasilar atelectasis or scarring posteriorly. Suggest follow-up   with chest x-rays. 8 cm benign cyst upper pole left kidney               Plan to admit.   Cardiology consulted, spoke with Jeni Monge, he does agree with plan for heparin and admit with cardiology consult. Heparin ordered in the ER     Patient admitted to hospitalist services. FINAL IMPRESSION      1. NSTEMI (non-ST elevated myocardial infarction) [facility])          DISPOSITION/PLAN   DISPOSITION Decision To Admit 10/10/2022 11:43:04 PM      PATIENT REFERRED TO:  No follow-up provider specified.     DISCHARGE MEDICATIONS:  New Prescriptions    No medications on file       DISCONTINUED MEDICATIONS:  Discontinued Medications    No medications on file              (Please note that portions of this note were completed with a voice recognition program.  Efforts were made to edit the dictations but occasionally words are mis-transcribed.)    NHI Faust CNP (electronically signed)            NHI Faust CNP  10/10/22 1540

## 2022-10-11 NOTE — CONSULTS
[address]  [phone]        Reason for Consultation/Chief Complaint: \" Elevated troponin. \"    History of Present Illness:  Will Loyd is a 78 y.o. patient who presented to the hospital with complaints of progressive shortness of breath and lower extremity swelling. His daughter, April, is a nurse that [employer], [employer] [facility] [employer]. cardiac unit and his other daughter Arya Orozco is also present. He had a URI preceding the development of his current complaints by 2 or 3 weeks. He tested negative for COVID by home testing. A PCR was not done. He seemed to improve and then went on a bus trip, with his wife, to [place]. Upon returning he noted progressive shortness of breath which is worse with exertion. He denies chest discomfort. .     Past Medical History:   has a past medical history of Arthritis, Colon polyp, Diabetes mellitus ([facility] [address] Hyperlipemia, Hypertension, Kidney disease, and Rheumatoid arthritis ([facility] [address] Surgical History:   has a past surgical history that includes Rotator cuff repair (Left); Colonoscopy; and Colonoscopy (N/A, 8/14/2020). Social History:   reports that he quit smoking about 38 years ago. He started smoking about 48 years ago. He has a 5.00 pack-year smoking history. He has never used smokeless tobacco. He reports current alcohol use. He reports that he does not use drugs. Family History:  family history includes High Blood Pressure in his sister. Home Medications:  Were reviewed and are listed in nursing record. and/or listed below  Prior to Admission medications    Medication Sig Start Date End Date Taking?  Authorizing Provider   metoprolol tartrate (LOPRESSOR) 25 MG tablet TAKE 1 TABLET TWICE A DAY  Patient taking differently: 25 mg daily TAKE 1 TABLET TWICE A DAY 10/6/22   Chapis Damon MD   rosuvastatin (CRESTOR) 40 MG tablet TAKE 1 TABLET EVERY EVENING 10/6/22   Chapis Damon MD   losartan (COZAAR) 50 MG tablet TAKE 1 TABLET DAILY 10/6/22   Brendan Barboza MD   triamterene-hydroCHLOROthiazide [facility]) 37.5-25 MG per tablet TAKE 1 TABLET DAILY  Patient not taking: Reported on 10/11/2022 10/6/22   Brendan Barboza MD   sildenafil (VIAGRA) 100 MG tablet Take 1 tablet by mouth daily as needed for Erectile Dysfunction 12/20/21   Brendan Barboza MD   clobetasol (TEMOVATE) 0.05 % ointment Apply topically to bilateral feet 2 times daily. Patient not taking: Reported on 10/11/2022 12/20/21   Brendan Barboza MD   Multiple Vitamins-Minerals (THERAPEUTIC MULTIVITAMIN-MINERALS) tablet Take 1 tablet by mouth daily    Historical Provider, MD   Vitamin D (CHOLECALCIFEROL) 1000 UNITS CAPS capsule Take 1,000 Units by mouth daily. Historical Provider, MD        Allergies:  Patient has no known allergies. Review of Systems:   A complete review of systems has been reviewed and updated today and is negative except as noted in the history of present illness.       Physical Examination:    Vitals:    10/11/22 1056   BP:    Pulse: 76   Resp:    Temp:    SpO2:     Weight: 241 lb 10 oz (109.6 kg)         General Appearance:  Alert, cooperative, no distress, appears stated age   Head:  Normocephalic, without obvious abnormality, atraumatic   Eyes:  EOMI, conjunctiva/corneas clear       Nose: Nares normal   Throat: Lips normal   Neck: Supple, symmetrical, trachea midline,  no carotid bruit or JVD       Lungs:   Clear to auscultation bilaterally, respirations unlabored   Chest Wall:  No tenderness or deformity   Heart:  Regular rate and rhythm, S1, S2 normal, no significant murmur, rub or gallop   Abdomen:   Soft, non-tender, bowel sounds active all four quadrants,  no masses, no organomegaly           Extremities: Extremities normal, atraumatic, no cyanosis or edema   Pulses: 2+ and symmetric   Skin: Skin color, texture, turgor normal, no rashes or lesions   Pysch: Normal mood and affect   Neurologic: Normal gross motor and sensory exam.         EKG:  I have reviewed EKG with the following interpretation:  Impression:    10-OCT-2022 21:14:31 [facility] ROUTINE RECORD  Sinus rhythm with occasional Premature ventricular complexes  Low voltage QRS  Cannot rule out Anterior infarct , age undetermined  T wave abnormality consider anterior ischemia    TELEMETRY (Personally reviewed by me): Sinus     Lab Data:  CBC:   Recent Labs     10/10/22  2128   WBC 4.0   HGB 11.8*   HCT 37.5*   MCV 80.6        BMP:   Recent Labs     10/10/22  2128      K 4.0      CO2 25   BUN 16   CREATININE 1.0     LIVER PROFILE:   Recent Labs     10/10/22  2128   *   *   BILITOT 0.3   ALKPHOS 65     PT/INR: No results for input(s): PROTIME, INR in the last 72 hours. APTT:   Recent Labs     10/10/22  2128 10/11/22  0530   APTT 29.1 59.8*     BNP:  No results for input(s): BNP in the last 72 hours. Imaging/Procedures:   CTPA 10/10/2022:    No evidence of a pulmonary embolus. Mildly dilated and atherosclerotic thoracic aorta with no aneurysm or   dissection. Minimal bibasilar atelectasis or scarring posteriorly. Suggest follow-up   with chest x-rays. 8 cm benign cyst upper pole left kidney       Assessment/Plan:  Principal Problem:    NSTEMI (non-ST elevated myocardial infarction) ([address]  Plan: Troponin elevated but trending down. No chest discomfort however shortness of breath may be anginal equivalent. Of note, LFTs are elevated but somewhat higher than expected with level of troponin in the setting of MI. Recommend 2D echo with Doppler and ultimately recommend cardiac catheterization for definitive assessment of coronary arteries. At CRP and ESR levels on for consideration of myocarditis should cardiac catheterization be unremarkable. Active Problems:    Elevated LFTs  Plan: Etiology unclear. Considerations include cardiac however cannot exclude gallbladder disease.       HTN (hypertension)  Plan: Stable. Continue current medical regimen. CKD (chronic kidney disease), stage III ([facility] [address]  Plan: Currently resolved. Performance of the procedure as well as the associated risk, benefits and alternatives have been discussed. Good pertinent questions have been asked and answered. Thank you for allowing us to participate in the care of Jazz Mckeon. Further evaluation will be based upon the patient's clinical course and testing results. All questions and concerns were addressed to the patient/family. Alternatives to my treatment were discussed. The note was completed using EMR. Every effort was made to ensure accuracy; however, inadvertent computerized transcription errors may be present.     Evette Thompson M.D.

## 2022-10-12 ENCOUNTER — APPOINTMENT (OUTPATIENT)
Dept: MRI IMAGING | Age: 77
DRG: 501 | End: 2022-10-12
Payer: COMMERCIAL

## 2022-10-12 PROBLEM — R79.89 ELEVATED TROPONIN LEVEL NOT DUE MYOCARDIAL INFARCTION: Status: ACTIVE | Noted: 2022-10-12

## 2022-10-12 PROBLEM — R74.8 ELEVATED LIVER ENZYMES: Status: ACTIVE | Noted: 2022-10-12

## 2022-10-12 PROBLEM — R77.8 ELEVATED TROPONIN LEVEL NOT DUE MYOCARDIAL INFARCTION: Status: ACTIVE | Noted: 2022-10-12

## 2022-10-12 LAB
ALBUMIN SERPL-MCNC: 2.8 G/DL (ref 3.4–5)
ALP BLD-CCNC: 56 U/L (ref 40–129)
ALT SERPL-CCNC: 171 U/L (ref 10–40)
ANION GAP SERPL CALCULATED.3IONS-SCNC: 13 MMOL/L (ref 3–16)
AST SERPL-CCNC: 282 U/L (ref 15–37)
BACTERIA: NORMAL /HPF
BILIRUB SERPL-MCNC: 0.3 MG/DL (ref 0–1)
BILIRUBIN DIRECT: <0.2 MG/DL (ref 0–0.3)
BILIRUBIN URINE: NEGATIVE
BILIRUBIN, INDIRECT: ABNORMAL MG/DL (ref 0–1)
BLOOD, URINE: ABNORMAL
BUN BLDV-MCNC: 7 MG/DL (ref 7–20)
CALCIUM SERPL-MCNC: 7.9 MG/DL (ref 8.3–10.6)
CHLORIDE BLD-SCNC: 106 MMOL/L (ref 99–110)
CHOLESTEROL, TOTAL: 137 MG/DL (ref 0–199)
CLARITY: ABNORMAL
CO2: 20 MMOL/L (ref 21–32)
COLOR: ABNORMAL
CREAT SERPL-MCNC: 1 MG/DL (ref 0.8–1.3)
EKG ATRIAL RATE: 75 BPM
EKG DIAGNOSIS: NORMAL
EKG P AXIS: 62 DEGREES
EKG P-R INTERVAL: 198 MS
EKG Q-T INTERVAL: 474 MS
EKG QRS DURATION: 100 MS
EKG QTC CALCULATION (BAZETT): 529 MS
EKG R AXIS: 30 DEGREES
EKG T AXIS: 50 DEGREES
EKG VENTRICULAR RATE: 75 BPM
EPITHELIAL CELLS, UA: 2 /HPF (ref 0–5)
ESTIMATED AVERAGE GLUCOSE: 122.6 MG/DL
FERRITIN: 333.2 NG/ML (ref 30–400)
FOLATE: 9.82 NG/ML (ref 4.78–24.2)
GFR AFRICAN AMERICAN: >60
GFR NON-AFRICAN AMERICAN: >60
GLUCOSE BLD-MCNC: 83 MG/DL (ref 70–99)
GLUCOSE URINE: NEGATIVE MG/DL
HBA1C MFR BLD: 5.9 %
HCT VFR BLD CALC: 34.1 % (ref 40.5–52.5)
HDLC SERPL-MCNC: 34 MG/DL (ref 40–60)
HEMOGLOBIN: 10.8 G/DL (ref 13.5–17.5)
HYALINE CASTS: 0 /LPF (ref 0–8)
IRON SATURATION: 30 % (ref 20–50)
IRON: 53 UG/DL (ref 59–158)
KETONES, URINE: ABNORMAL MG/DL
LDL CHOLESTEROL CALCULATED: 65 MG/DL
LEUKOCYTE ESTERASE, URINE: NEGATIVE
MAGNESIUM: 2 MG/DL (ref 1.8–2.4)
MCH RBC QN AUTO: 25.3 PG (ref 26–34)
MCHC RBC AUTO-ENTMCNC: 31.6 G/DL (ref 31–36)
MCV RBC AUTO: 80.2 FL (ref 80–100)
MICROSCOPIC EXAMINATION: YES
NITRITE, URINE: NEGATIVE
PDW BLD-RTO: 16.5 % (ref 12.4–15.4)
PH UA: 5.5 (ref 5–8)
PLATELET # BLD: 144 K/UL (ref 135–450)
PMV BLD AUTO: 8 FL (ref 5–10.5)
POTASSIUM REFLEX MAGNESIUM: 3.8 MMOL/L (ref 3.5–5.1)
PROTEIN UA: 100 MG/DL
RBC # BLD: 4.25 M/UL (ref 4.2–5.9)
RBC UA: 2 /HPF (ref 0–4)
SODIUM BLD-SCNC: 139 MMOL/L (ref 136–145)
SPECIFIC GRAVITY UA: >=1.03 (ref 1–1.03)
TOTAL CK: ABNORMAL U/L (ref 39–308)
TOTAL IRON BINDING CAPACITY: 175 UG/DL (ref 260–445)
TOTAL PROTEIN: 5.5 G/DL (ref 6.4–8.2)
TRIGL SERPL-MCNC: 190 MG/DL (ref 0–150)
TSH REFLEX FT4: 2.37 UIU/ML (ref 0.27–4.2)
URINE TYPE: ABNORMAL
UROBILINOGEN, URINE: 1 E.U./DL
VITAMIN B-12: <150 PG/ML (ref 211–911)
VLDLC SERPL CALC-MCNC: 38 MG/DL
WBC # BLD: 3.6 K/UL (ref 4–11)
WBC UA: 1 /HPF (ref 0–5)

## 2022-10-12 PROCEDURE — 82746 ASSAY OF FOLIC ACID SERUM: CPT

## 2022-10-12 PROCEDURE — 82728 ASSAY OF FERRITIN: CPT

## 2022-10-12 PROCEDURE — A9577 INJ MULTIHANCE: HCPCS | Performed by: INTERNAL MEDICINE

## 2022-10-12 PROCEDURE — 93010 ELECTROCARDIOGRAM REPORT: CPT | Performed by: INTERNAL MEDICINE

## 2022-10-12 PROCEDURE — 74183 MRI ABD W/O CNTR FLWD CNTR: CPT

## 2022-10-12 PROCEDURE — 2580000003 HC RX 258: Performed by: INTERNAL MEDICINE

## 2022-10-12 PROCEDURE — 80048 BASIC METABOLIC PNL TOTAL CA: CPT

## 2022-10-12 PROCEDURE — 97165 OT EVAL LOW COMPLEX 30 MIN: CPT

## 2022-10-12 PROCEDURE — 82550 ASSAY OF CK (CPK): CPT

## 2022-10-12 PROCEDURE — 99233 SBSQ HOSP IP/OBS HIGH 50: CPT | Performed by: INTERNAL MEDICINE

## 2022-10-12 PROCEDURE — 6360000002 HC RX W HCPCS: Performed by: INTERNAL MEDICINE

## 2022-10-12 PROCEDURE — 94760 N-INVAS EAR/PLS OXIMETRY 1: CPT

## 2022-10-12 PROCEDURE — 6370000000 HC RX 637 (ALT 250 FOR IP): Performed by: INTERNAL MEDICINE

## 2022-10-12 PROCEDURE — 83735 ASSAY OF MAGNESIUM: CPT

## 2022-10-12 PROCEDURE — 6360000004 HC RX CONTRAST MEDICATION: Performed by: INTERNAL MEDICINE

## 2022-10-12 PROCEDURE — 97161 PT EVAL LOW COMPLEX 20 MIN: CPT

## 2022-10-12 PROCEDURE — 80076 HEPATIC FUNCTION PANEL: CPT

## 2022-10-12 PROCEDURE — 36415 COLL VENOUS BLD VENIPUNCTURE: CPT

## 2022-10-12 PROCEDURE — 85027 COMPLETE CBC AUTOMATED: CPT

## 2022-10-12 PROCEDURE — 83540 ASSAY OF IRON: CPT

## 2022-10-12 PROCEDURE — 83550 IRON BINDING TEST: CPT

## 2022-10-12 PROCEDURE — 2060000000 HC ICU INTERMEDIATE R&B

## 2022-10-12 PROCEDURE — 84443 ASSAY THYROID STIM HORMONE: CPT

## 2022-10-12 PROCEDURE — 83036 HEMOGLOBIN GLYCOSYLATED A1C: CPT

## 2022-10-12 PROCEDURE — 80061 LIPID PANEL: CPT

## 2022-10-12 PROCEDURE — 93005 ELECTROCARDIOGRAM TRACING: CPT | Performed by: INTERNAL MEDICINE

## 2022-10-12 PROCEDURE — 82607 VITAMIN B-12: CPT

## 2022-10-12 PROCEDURE — 81001 URINALYSIS AUTO W/SCOPE: CPT

## 2022-10-12 RX ORDER — SODIUM CHLORIDE 9 MG/ML
INJECTION, SOLUTION INTRAVENOUS CONTINUOUS
Status: DISCONTINUED | OUTPATIENT
Start: 2022-10-12 | End: 2022-10-19

## 2022-10-12 RX ORDER — ENOXAPARIN SODIUM 100 MG/ML
40 INJECTION SUBCUTANEOUS NIGHTLY
Status: DISCONTINUED | OUTPATIENT
Start: 2022-10-12 | End: 2022-10-18

## 2022-10-12 RX ORDER — ENOXAPARIN SODIUM 100 MG/ML
30 INJECTION SUBCUTANEOUS DAILY
Status: DISCONTINUED | OUTPATIENT
Start: 2022-10-12 | End: 2022-10-12

## 2022-10-12 RX ADMIN — SODIUM CHLORIDE: 9 INJECTION, SOLUTION INTRAVENOUS at 21:00

## 2022-10-12 RX ADMIN — GADOBENATE DIMEGLUMINE 20 ML: 529 INJECTION, SOLUTION INTRAVENOUS at 17:25

## 2022-10-12 RX ADMIN — ASPIRIN 81 MG 81 MG: 81 TABLET ORAL at 08:50

## 2022-10-12 RX ADMIN — Medication 10 ML: at 09:19

## 2022-10-12 RX ADMIN — METOPROLOL TARTRATE 25 MG: 25 TABLET, FILM COATED ORAL at 21:20

## 2022-10-12 RX ADMIN — LOSARTAN POTASSIUM 50 MG: 25 TABLET, FILM COATED ORAL at 08:50

## 2022-10-12 RX ADMIN — ENOXAPARIN SODIUM 40 MG: 100 INJECTION SUBCUTANEOUS at 21:20

## 2022-10-12 RX ADMIN — MULTIPLE VITAMINS W/ MINERALS TAB 1 TABLET: TAB at 08:50

## 2022-10-12 RX ADMIN — Medication 10 ML: at 21:37

## 2022-10-12 RX ADMIN — SODIUM CHLORIDE: 9 INJECTION, SOLUTION INTRAVENOUS at 15:13

## 2022-10-12 RX ADMIN — METOPROLOL TARTRATE 25 MG: 25 TABLET, FILM COATED ORAL at 08:50

## 2022-10-12 RX ADMIN — Medication 1000 UNITS: at 08:50

## 2022-10-12 NOTE — PROGRESS NOTES
Called the office of Dr. Mahamed Keller. Notified staff that pt was admitted to the hospital. Dr. Mahamed Keller now aware.

## 2022-10-12 NOTE — PROGRESS NOTES
[address]   Progress Note  Cardiology      Gretchen Shirley   Admission date:  10/10/2022  CC-f/up cardiac cath and elevated troponin  Subjective:  Cardiac cath reviewed with no significant CAD, echo reviewed and is normal. Has not had any chest pain or indigestion    Objective:  Medications/Labs all Reviewed     enoxaparin  30 mg SubCUTAneous Daily    sodium chloride flush  5-40 mL IntraVENous 2 times per day    aspirin  81 mg Oral Daily    losartan  50 mg Oral Daily    metoprolol tartrate  25 mg Oral BID    [Held by provider] triamterene-hydroCHLOROthiazide  1 tablet Oral Daily    therapeutic multivitamin-minerals  1 tablet Oral Daily    Vitamin D  1,000 Units Oral Daily    sodium chloride flush  5-40 mL IntraVENous 2 times per day       BMP:   Lab Results   Component Value Date/Time     10/12/2022 05:16 AM    K 3.8 10/12/2022 05:16 AM     10/12/2022 05:16 AM    CO2 20 10/12/2022 05:16 AM    BUN 7 10/12/2022 05:16 AM    CREATININE 1.0 10/12/2022 05:16 AM    MG 2.00 10/12/2022 05:16 AM     CBC:    Lab Results   Component Value Date/Time    WBC 3.6 10/12/2022 05:16 AM    RBC 4.25 10/12/2022 05:16 AM    HGB 10.8 10/12/2022 05:16 AM    HCT 34.1 10/12/2022 05:16 AM    MCV 80.2 10/12/2022 05:16 AM    RDW 16.5 10/12/2022 05:16 AM     10/12/2022 05:16 AM      PT/INR:  No results found for: INR, PROTIME  Cardiac Enzymes:  No results found for: CKTOTAL, CKMB, CKMBINDEX  Lab Results   Component Value Date    TROPONINI 0.64 () 10/11/2022    TROPONINI 0.71 ([facility]) 10/11/2022    TROPONINI 0.90 ([facility]) 10/10/2022     BNP:  No results found for: BNP  FASTING LIPID PANEL:    Lab Results   Component Value Date/Time    CHOL 183 07/15/2022 08:20 AM    HDL 49 07/15/2022 08:20 AM    TRIG 147 07/15/2022 08:20 AM       Physical Examination:    /63   Pulse 75   Temp 97.8 °F (36.6 °C) (Oral)   Resp 16   Ht 6' 2\" (1.88 m)   Wt 243 lb 2.7 oz (110.3 kg)   SpO2 93%   BMI 31.22 kg/m² Respiratory:  Resp Assessment: Normal respiratory effort  Resp Auscultation: Clear to auscultation bilaterally   Cardiovascular:   Auscultation: regular rate and rhythm, normal S1S2, no murmur, rub or gallop  Palpation:  Nl PMI  JVP:  normal  Extremities: No Edema  Abdomen:  Soft, non-tender  Normal bowel sounds  Extremities:   No Cyanosis or Clubbing  Neurological/Psychiatric:  Oriented to time, place, and person  Non-anxious  Skin Warm and dry    Assessment:    Principal Problem:    Elevated troponin with noncardiac source of elevated troponin, no sign of MI,could be side effect of statin  Active Problems:    Elevated LFTs  Plan: he does have cholelithiasis but no symptoms to support acute cholecystitis    HTN (hypertension)  Plan: well controlled    CKD (chronic kidney disease), stage III (HCC)  Plan: stable      Plan:  Would check CPK and if elevated would blame findings on statin induced changes  No further cardiac w/up in hospital  Would give lovenox daily,saw note from pharmacy staff  I have spent  35 minutes of face to face time with the patient with more than 50%  spent  counseling and coordinating care for [employer]

## 2022-10-12 NOTE — PROGRESS NOTES
[facility] [address]   Phone: [phone]    Physical Therapy    [x] Initial Evaluation            [] Daily Treatment Note         [x] Discharge Summary      Patient: Jessica Alexandra   : 1945   MRN: [id number]   Date of Service:  10/12/2022  Admitting Diagnosis: Elevated troponin level not due to acute coronary syndrome  Current Admission Summary: Jessica Alexandra is 78 y.o. male who presented with complaint of shortness of breath. Symptom onset was acute for a time period of 10 days. The severity is described as moderate. The course of his symptoms over time is worsening. The symptoms improved with rest and worsened with exertion. The patient's symptom is associated with bilateral leg swelling. Past Medical History:  has a past medical history of Arthritis, Colon polyp, Diabetes mellitus ([address] Hyperlipemia, Hypertension, Kidney disease, and Rheumatoid arthritis ([facility] [address] Past Surgical History:  has a past surgical history that includes Rotator cuff repair (Left); Colonoscopy; and Colonoscopy (N/A, 2020). Discharge Recommendations: Jessica Alexandra scored a 24/24 on the -PAC short mobility form. At this time, no further PT is recommended upon discharge due to patient at independent level. Recommend patient returns to prior setting with prior services.     DME Required For Discharge: No new DME required  Precautions/Restrictions: low fall risk  Positional Restrictions:no positional restrictions    Pre-Admission Information   Lives With: spouse                  Type of Home: house  Home Layout: two level, bedroom/bathroom upstairs  Home Access:  1 step to enter without rails   Bathroom Layout: walk in shower  Bathroom Equipment:  no equipment   Toilet Height: standard height  Home Equipment: no prior equipment  Transfer Assistance: Independent without use of device  Ambulation Assistance:Independent without use of device  ADL Assistance: independent with all ADL's  IADL Assistance: independent with homemaking tasks  Active :        [x] Yes                 [] No  Hand Dominance: [] Left                 [x] Right  Current Employment: part time employment. Occupation: [employer]   Hobbies: golf  Recent Falls: no falls within last 6 months     Examination   Vision:   Vision Gross Assessment: Impaired and Vision Corrective Device: wears glasses for reading  Hearing:   WFL  Sensation:   WFL  ROM:   (B) LE ROM WFL  Strength:   (B) LE gross strength WFL  Decision Making: low complexity  Clinical Presentation: stable      Subjective  General: Patient was supine in bed with head of bed slightly elevated when PT arrived. Agreeable to OT/PT evaluation.    Pain: 0/10  Pain Interventions: not applicable       Functional Mobility  Bed Mobility  Supine to Sit: Independent  Comments:  Transfers  Sit to stand transfer: Independent  Stand to sit transfer: Independent  Comments:  Ambulation  Assistive Device: no device  Assistance: Independent  Distance: 400 feet  Gait Mechanics: WFL  Comments:    Stair Mobility  Number of Steps: 10  Hand Rails: (R) ascending handrail  Comments: reciprocal gait ascending and descending  Balance  Static Sitting Balance: good: independent with functional balance in unsupported position  Dynamic Sitting Balance: good: independent with functional balance in unsupported position  Static Standing Balance: good: independent with functional balance in unsupported position  Dynamic Standing Balance: good: independent with functional balance in unsupported position  Comments:    Other Therapeutic Interventions    Functional Outcomes  AM-PAC Inpatient Mobility Raw Score : 24              Cognition  WFL  Orientation:    A&O x 4    Education  Barriers To Learning: none  Patient Education: patient educated on PT role and benefits, plan of care  Learning Assessment:  Pt verbalized and demonstrates understanding    Assessment  Activity tolerance: SpO2=96% on RA after ambulation around unit/stair ambulation  Impairments Requiring Therapeutic Intervention: none - eval with same day discharge  Prognosis: good without need for therapy intervention  Clinical Assessment: Patient presenting at independent level for completion of required mobility tasks for return to home. Eval with d/c at this time. No therapy services indicated. Safety Interventions: patient left in chair, call light within reach, and gait belt    Plan  Frequency: Eval with same day discharge. No follow up required. Current Treatment Recommendations: Not applicable, evaluation completed with same day discharge. Goals  Patient eval with same day discharge. No goals set as patient is at baseline mobility status.       Therapy Session Time      Individual Group Co-treatment   Time In     1140   Time Out     1155   Minutes     15     Timed Code Treatment Minutes:  0 Minutes  Total Treatment Minutes:  15     KALA Orr  Electronically Signed By: Vanna Jimenez PT  This evaluation/treatment was observed and supervised by Vanna Jimenez, [facility]

## 2022-10-12 NOTE — PROGRESS NOTES
Hospitalist Progress Note      PCP: Adam Willingham MD    Date of Admission: 10/10/2022    Chief Complaint: Shortness of breath    Hospital Course: Ino Gomez is 78 y.o. male with history of HTN, HLD and CKD. He presented to the ER with complaint of shortness of breath and leg swelling which started about 10 days ago, worse on exertion relieved by rest.  He was evaluated at an urgent care facility where bilateral leg swelling was noted and was sent to the ED for further evaluation. In the ED troponin was elevated to 0.9 but without any chest pain or chest discomfort. Per cardiology recommendation, he was started on low-dose heparin drip and admitted for further evaluation. Subjective:Patient seen and examined. Denies any chest pain or shortness of breath. No muscle pain. CK and LFTs elevated.         Medications:  Reviewed    Infusion Medications    sodium chloride      sodium chloride      sodium chloride       Scheduled Medications    enoxaparin  40 mg SubCUTAneous Nightly    sodium chloride flush  5-40 mL IntraVENous 2 times per day    aspirin  81 mg Oral Daily    losartan  50 mg Oral Daily    metoprolol tartrate  25 mg Oral BID    [Held by provider] triamterene-hydroCHLOROthiazide  1 tablet Oral Daily    therapeutic multivitamin-minerals  1 tablet Oral Daily    Vitamin D  1,000 Units Oral Daily    sodium chloride flush  5-40 mL IntraVENous 2 times per day     PRN Meds: sodium chloride flush, sodium chloride, ondansetron **OR** ondansetron, acetaminophen **OR** acetaminophen, polyethylene glycol, nitroGLYCERIN, perflutren lipid microspheres, sodium chloride flush, sodium chloride      Intake/Output Summary (Last 24 hours) at 10/12/2022 1441  Last data filed at 10/11/2022 2038  Gross per 24 hour   Intake 490 ml   Output --   Net 490 ml       Physical Exam Performed:    /73   Pulse 73   Temp 97.8 °F (36.6 °C) (Oral)   Resp 16   Ht 6' 2\" (1.88 m)   Wt 243 lb 2.7 oz (110.3 kg)   SpO2 98%   BMI 31.22 kg/m²     General appearance: No apparent distress, appears stated age and cooperative. HEENT: Pupils equal, round, and reactive to light. Conjunctivae/corneas clear. Neck: Supple, with full range of motion. No jugular venous distention. Trachea midline. Respiratory:  Normal respiratory effort. Clear to auscultation, bilaterally without Rales/Wheezes/Rhonchi. Cardiovascular: Regular rate and rhythm with normal S1/S2 without murmurs, rubs or gallops. Abdomen: Soft, non-tender, non-distended with normal bowel sounds. Musculoskeletal: No clubbing, cyanosis or edema bilaterally. Full range of motion without deformity. Skin: Skin color, texture, turgor normal.  No rashes or lesions. Neurologic:  Neurovascularly intact without any focal sensory/motor deficits. Cranial nerves: II-XII intact, grossly non-focal.  Psychiatric: Alert and oriented, thought content appropriate, normal insight  Capillary Refill: Brisk, 3 seconds, normal   Peripheral Pulses: +2 palpable, equal bilaterally       Labs:   Recent Labs     10/10/22  2128 10/12/22  0516   WBC 4.0 3.6*   HGB 11.8* 10.8*   HCT 37.5* 34.1*    144     Recent Labs     10/10/22  2128 10/12/22  0516    139   K 4.0 3.8    106   CO2 25 20*   BUN 16 7   CREATININE 1.0 1.0   CALCIUM 9.2 7.9*     Recent Labs     10/10/22  2128 10/12/22  0516   * 282*   * 171*   BILIDIR  --  <0.2   BILITOT 0.3 0.3   ALKPHOS 65 56     No results for input(s): INR in the last 72 hours.   Recent Labs     10/10/22  2128 10/11/22  0155 10/11/22  0530 10/12/22  1100   CKTOTAL  --   --   --  11,648*   TROPONINI 0.90* 0.71* 0.64*  --        Urinalysis:      Lab Results   Component Value Date/Time    NITRU Negative 04/05/2022 09:46 AM    WBCUA 1 04/05/2022 09:46 AM    RBCUA 4 04/05/2022 09:46 AM    BLOODU Negative 04/05/2022 09:46 AM    SPECGRAV 1.020 04/05/2022 09:46 AM    GLUCOSEU Negative 04/05/2022 09:46 AM       Radiology:  [address] GALLBLADDER RUQ   Final Result   Mild hydrops of the gallbladder and associated 1.6 cm gallstone with no   secondary signs of cholecystitis. 2 cm rounded echogenic focus in the right lobe of the liver which is   suspicious for a liver hemangioma. Recommend a follow-up CT scan with   contrast further characterization. Borderline enlargement of the common bile duct and partial obscuration of the   pancreas due to overlying bowel gas. 7 cm cyst in the right kidney inferiorly with a thin septation in the cyst in   keeping with a Bosniak 2 cyst.         CT CHEST PULMONARY EMBOLISM W CONTRAST   Final Result   No evidence of a pulmonary embolus. Mildly dilated and atherosclerotic thoracic aorta with no aneurysm or   dissection. Minimal bibasilar atelectasis or scarring posteriorly. Suggest follow-up   with chest x-rays. 8 cm benign cyst upper pole left kidney         MRI ABDOMEN W CONTRAST    (Results Pending)           Assessment/Plan:    Active Hospital Problems    Diagnosis     Elevated troponin level not due to acute coronary syndrome [R77.8]      Priority: Medium    Elevated liver enzymes [R74.8]      Priority: Medium    Elevated LFTs [R79.89]      Priority: Medium    CKD (chronic kidney disease), stage III ([address] [N18.30]     HTN (hypertension) [I10]      Elevated troponin:  Peak 0.9 trended down to 0.64. Cardiology consulted: Suspected to be related to rhabdomyolysis. status post [address] [place] [address] 10/11/2022 with no findings of obstructive CAD. ECHO unremarkable. Heparin drip discontinued. Cardiology recommended medical management with outpatient cardiac MRI and repeat sed rate and CRP in 2 weeks. Rhabdomyolysis: ?  Statin induced. No preceding trauma or known muscle injury. Crestor 40 mg daily discontinued. Monitor CPK levels with IV hydration. Follow-up CRP, aldolase, and ESR. Elevated liver enzymes: Downtrending  Alk phos within normal limits. ?  Due to rhabdomyolysis.   RUQ US with large stone but no evidence of Cholecystitis. CBD mildly elevated. Follow up MRI/MRCP. GI or Surgical evaluation as indicated. Off Crestor. Monitor LFT's. HLD: BP borderline low. Monitor on Cozaar and Lopressor. Maxide on hold.         DVT Prophylaxis: Lovenox  Diet: Diet NPO Exceptions are: Sips of Water with Meds  Code Status: Full Code  PT/OT Eval Status: Independent    Dispo -Home once medically stable      Maria Ines Horner MD

## 2022-10-12 NOTE — PROGRESS NOTES
Physical Therapy/Occupational Therapy  Natalie Ross    PT/OT evaluation attempted this morning. Pt was off the floor for testing. Will follow up as schedule allows.     Thanks, Lazaro Cordova, [address], [address] [place] [address] OTR/L [id number]

## 2022-10-12 NOTE — PROGRESS NOTES
[facility] [address] Department   Phone: [phone]    Occupational Therapy    [x] Initial Evaluation            [] Daily Treatment Note         [x] Discharge Summary      Patient: Will Buckner   : 1945   MRN: [id number]   Date of Service:  10/12/2022    Admitting Diagnosis:  Elevated troponin level not due to acute coronary syndrome  Current Admission Summary:   78 y.o. patient who presented to the hospital with complaints of progressive shortness of breath and lower extremity swelling. His daughter, April, is a nurse that [address] and his other daughter Mallika Yan is also present. He had a URI preceding the development of his current complaints by 2 or 3 weeks. He tested negative for COVID by home testing. A PCR was not done. He seemed to improve and then went on a bus trip, with his wife, to [place]. Upon returning he noted progressive shortness of breath which is worse with exertion. He denies chest discomfort. .   Past Medical History:  has a past medical history of Arthritis, Colon polyp, Diabetes mellitus ([facility] [address] Hyperlipemia, Hypertension, Kidney disease, and Rheumatoid arthritis ([facility] [address] Past Surgical History:  has a past surgical history that includes Rotator cuff repair (Left); Colonoscopy; and Colonoscopy (N/A, 2020). Discharge Recommendations: Will Buckner scored a 24/24 on the -[facility] ADL Inpatient form. At this time, no further OT is recommended upon discharge due to patient at independent level. Recommend patient returns to prior setting with prior services.       DME Required For Discharge: No DME required    Precautions/Restrictions: low fall risk  Positional Restrictions:no positional restrictions    Pre-Admission Information   Lives With: spouse    Type of Home: house  Home Layout: two level, bedroom/bathroom upstairs  Home Access: 1 step to enter without rails   Bathroom Layout: walk in shower  Bathroom Equipment: no equipment   Toilet Height: standard height  Home Equipment: no prior equipment  Transfer Assistance: Independent without use of device  Ambulation Assistance:Independent without use of device  ADL Assistance: independent with all ADL's  IADL Assistance: independent with homemaking tasks  Active :        [x] Yes  [] No  Hand Dominance: [] Left  [x] Right  Current Employment: part time employment. Occupation: [employer]   Hobbies: golf  Recent Falls: no falls within last 6 months     Examination   Vision:   Vision Gross Assessment: WFL and Vision Corrective Device: wears glasses for reading  Hearing:   WFL  Posture:   Good   Sensation:   denies numbness and tingling  ROM:   (B) UE ROM WFL  Strength:   (B) UE 5/5     Decision Making: low complexity  Clinical Presentation: stable      Subjective  General: Patient in bed upon arrival, agreeable to OT/PT evalaution. Reported he is feeling better and has no concerns with going home. No SOB reported. Pain: 0/10  Pain Interventions: not applicable        Activities of Daily Living  Basic Activities of Daily Living  General Comments: Patient reported he has been taking himself to/from bathroom with no issues. Declining completing ADLs   Instrumental Activities of Daily Living  No IADL completed on this date. Functional Mobility  Bed Mobility  Supine to Sit: Independent  Scooting: Independent  Comments:  Transfers  Sit to stand transfer:Independent  Stand to sit transfer: Independent  Comments:  Functional Mobility:  Sitting Balance: Independent. Standing Balance: Independent. Functional Mobility: . Independent  Functional Mobility Activity: ~400 ft in hallway   Functional Mobility Device Use: no device  Functional Mobility Comment: no LOB or SOB noted. Patient able to complete mobility in hallway and then flight of stairs up/down with no seated rest break.  Oxygen 96% once back in room     Other Therapeutic Interventions    Functional Outcomes  AM-PAC Inpatient Daily Activity Raw Score: 24    Cognition  WFL  Orientation:    A&O x 4  Command Following:   [facility]     Education  Barriers To Learning: none  Patient Education: Patient educated on OT role and benefits, plan of care, discharge recommendations  Learning Assessment:  Patient verbalized and demonstrates understanding    Assessment  Impairments Requiring Therapeutic Intervention: none - eval with same day discharge  Prognosis: good without need for therapy intervention  Clinical Assessment: Patient presenting at independent level for completion of required self care tasks and functional mobility for return to home. Eval with d/c at this time. No therapy services indicated. Safety Interventions: patient left in chair, call light within reach, nurse notified, and patient up ad oksana     Plan  Frequency: Eval with same day discharge. No follow up required. Current Treatment Recommendations: Not applicable, evaluation completed with same day discharge. Goals  Patient eval with same day discharge. No goals set as patient is at baseline self care status.       Therapy Session Time     Individual Group Co-treatment   Time In    1140   Time Out    1155   Minutes    15        Timed Code Treatment Minutes:    0 minutes   Total Treatment Minutes:  15 minutes        Electronically Signed By: ALCON Segundo MOT OTR/L [id number]

## 2022-10-13 LAB
A/G RATIO: 0.9 (ref 1.1–2.2)
ALBUMIN SERPL-MCNC: 2.8 G/DL (ref 3.4–5)
ALP BLD-CCNC: 60 U/L (ref 40–129)
ALT SERPL-CCNC: 173 U/L (ref 10–40)
ANION GAP SERPL CALCULATED.3IONS-SCNC: 9 MMOL/L (ref 3–16)
AST SERPL-CCNC: 287 U/L (ref 15–37)
BILIRUB SERPL-MCNC: 0.3 MG/DL (ref 0–1)
BUN BLDV-MCNC: 17 MG/DL (ref 7–20)
CALCIUM SERPL-MCNC: 8.7 MG/DL (ref 8.3–10.6)
CHLORIDE BLD-SCNC: 108 MMOL/L (ref 99–110)
CO2: 22 MMOL/L (ref 21–32)
CREAT SERPL-MCNC: 0.9 MG/DL (ref 0.8–1.3)
GFR AFRICAN AMERICAN: >60
GFR NON-AFRICAN AMERICAN: >60
GLUCOSE BLD-MCNC: 86 MG/DL (ref 70–99)
GLUCOSE BLD-MCNC: 87 MG/DL (ref 70–99)
GLUCOSE BLD-MCNC: 89 MG/DL (ref 70–99)
HCT VFR BLD CALC: 34.4 % (ref 40.5–52.5)
HEMOGLOBIN: 11 G/DL (ref 13.5–17.5)
MAGNESIUM: 2.1 MG/DL (ref 1.8–2.4)
MCH RBC QN AUTO: 25.8 PG (ref 26–34)
MCHC RBC AUTO-ENTMCNC: 31.9 G/DL (ref 31–36)
MCV RBC AUTO: 80.7 FL (ref 80–100)
PDW BLD-RTO: 16.6 % (ref 12.4–15.4)
PERFORMED ON: NORMAL
PERFORMED ON: NORMAL
PHOSPHORUS: 4.1 MG/DL (ref 2.5–4.9)
PLATELET # BLD: 146 K/UL (ref 135–450)
PMV BLD AUTO: 7.9 FL (ref 5–10.5)
POTASSIUM SERPL-SCNC: 3.7 MMOL/L (ref 3.5–5.1)
RBC # BLD: 4.26 M/UL (ref 4.2–5.9)
SODIUM BLD-SCNC: 139 MMOL/L (ref 136–145)
TOTAL CK: ABNORMAL U/L (ref 39–308)
TOTAL PROTEIN: 5.9 G/DL (ref 6.4–8.2)
WBC # BLD: 3.6 K/UL (ref 4–11)

## 2022-10-13 PROCEDURE — 2580000003 HC RX 258: Performed by: INTERNAL MEDICINE

## 2022-10-13 PROCEDURE — 2060000000 HC ICU INTERMEDIATE R&B

## 2022-10-13 PROCEDURE — 94760 N-INVAS EAR/PLS OXIMETRY 1: CPT

## 2022-10-13 PROCEDURE — 85027 COMPLETE CBC AUTOMATED: CPT

## 2022-10-13 PROCEDURE — 82550 ASSAY OF CK (CPK): CPT

## 2022-10-13 PROCEDURE — 84100 ASSAY OF PHOSPHORUS: CPT

## 2022-10-13 PROCEDURE — 80053 COMPREHEN METABOLIC PANEL: CPT

## 2022-10-13 PROCEDURE — 6360000002 HC RX W HCPCS: Performed by: INTERNAL MEDICINE

## 2022-10-13 PROCEDURE — 83735 ASSAY OF MAGNESIUM: CPT

## 2022-10-13 PROCEDURE — 6370000000 HC RX 637 (ALT 250 FOR IP): Performed by: INTERNAL MEDICINE

## 2022-10-13 PROCEDURE — 82085 ASSAY OF ALDOLASE: CPT

## 2022-10-13 PROCEDURE — 99223 1ST HOSP IP/OBS HIGH 75: CPT | Performed by: INTERNAL MEDICINE

## 2022-10-13 PROCEDURE — 36415 COLL VENOUS BLD VENIPUNCTURE: CPT

## 2022-10-13 RX ADMIN — Medication 10 ML: at 20:55

## 2022-10-13 RX ADMIN — Medication 1000 UNITS: at 09:05

## 2022-10-13 RX ADMIN — SODIUM CHLORIDE: 9 INJECTION, SOLUTION INTRAVENOUS at 05:21

## 2022-10-13 RX ADMIN — ENOXAPARIN SODIUM 40 MG: 100 INJECTION SUBCUTANEOUS at 21:20

## 2022-10-13 RX ADMIN — LOSARTAN POTASSIUM 50 MG: 25 TABLET, FILM COATED ORAL at 09:05

## 2022-10-13 RX ADMIN — SODIUM CHLORIDE: 9 INJECTION, SOLUTION INTRAVENOUS at 13:02

## 2022-10-13 RX ADMIN — ASPIRIN 81 MG 81 MG: 81 TABLET ORAL at 09:05

## 2022-10-13 RX ADMIN — METOPROLOL TARTRATE 25 MG: 25 TABLET, FILM COATED ORAL at 21:20

## 2022-10-13 RX ADMIN — METOPROLOL TARTRATE 25 MG: 25 TABLET, FILM COATED ORAL at 09:05

## 2022-10-13 RX ADMIN — SODIUM CHLORIDE: 9 INJECTION, SOLUTION INTRAVENOUS at 20:52

## 2022-10-13 RX ADMIN — MULTIPLE VITAMINS W/ MINERALS TAB 1 TABLET: TAB at 09:05

## 2022-10-13 NOTE — PROGRESS NOTES
Pt awake in bed. VSS, assessment complete and medications given. Pt denies any needs. Call light in reach.

## 2022-10-13 NOTE — PLAN OF CARE
Problem: Pain  Goal: Verbalizes/displays adequate comfort level or baseline comfort level  Outcome: Progressing   Pt can rate pain on a 0-10 scale. Pt pain will remain at a level that is tolerable to pt. PRN pain medication as needed. Problem: Discharge Planning  Goal: Discharge to home or other facility with appropriate resources  Outcome: Progressing   Pt will d/c to home. No needs at this time.

## 2022-10-13 NOTE — CARE COORDINATION
Discharge Planning Assessment  Readmission score 11%  Patient's sharma reviewed for discharge planning needs ; admitted from home , A&O, independent and it appears that patient has minimal needs for discharge at this time. Discussed with patients nurse and requested that case management be notified if discharge needs are identified. [place] [facility]    Dr Jennifer Araujo is listed as PCP    Case management will continue to follow progress and update discharge plan as needed.

## 2022-10-13 NOTE — CONSULTS
Office : [phone]     Fax :[phone]       Nephrology Consult Note      Patient's Name: Yang Parker  8:17 AM  10/13/2022    Reason for Consult:  Rhabdomyolysis       Requesting Physician:  Ac Falcon MD      Chief Complaint:    Chief Complaint   Patient presents with    Leg Swelling     Patient in with complaints of bilateral edema in his legs that is new. PCP told him to come into the er. History of Present iIlness:    Yang Parker is a 78 y.o. male with prior history of CKD, HTN, RA , simple renal cyst who came to ER with c/o pain in thighs. He was found to have elevated CK levels. Denies any trauma. Denies any heavy exercise. [place] at [address] . Has h/o CKD 2/2 NSaids which improved. Likely has CKD 2         I/O last 3 completed shifts: In: 657.6 [P.O.:480; I.V.:177.6]  Out: -     Past Medical History:   Diagnosis Date    Arthritis     Colon polyp     Diabetes mellitus ([facility] [address]     denies being diabetic    Hyperlipemia     Hypertension     Kidney disease     Rheumatoid arthritis [facility])        Past Surgical History:   Procedure Laterality Date    COLONOSCOPY      COLONOSCOPY N/A 8/14/2020    COLONOSCOPY DIAGNOSTIC performed by Angely Montero MD at [address] [place] [address],[address]        Family History   Problem Relation Age of Onset    High Blood Pressure Sister         reports that he quit smoking about 38 years ago. He started smoking about 48 years ago. He has a 5.00 pack-year smoking history. He has never used smokeless tobacco. He reports current alcohol use. He reports that he does not use drugs. Allergies:  Patient has no known allergies.     Current Medications:    enoxaparin (LOVENOX) injection 40 mg, Nightly  0.9 % sodium chloride infusion, Continuous  sodium chloride flush 0.9 % injection 5-40 mL, 2 times per day  sodium chloride flush 0.9 % injection 5-40 mL, PRN  0.9 % sodium chloride infusion, PRN  ondansetron (ZOFRAN-ODT) disintegrating tablet 4 mg, Q8H PRN   Or  ondansetron (ZOFRAN) injection 4 mg, Q6H PRN  acetaminophen (TYLENOL) tablet 650 mg, Q6H PRN   Or  acetaminophen (TYLENOL) suppository 650 mg, Q6H PRN  polyethylene glycol (GLYCOLAX) packet 17 g, Daily PRN  aspirin chewable tablet 81 mg, Daily  nitroGLYCERIN (NITROSTAT) SL tablet 0.4 mg, Q5 Min PRN  losartan (COZAAR) tablet 50 mg, Daily  metoprolol tartrate (LOPRESSOR) tablet 25 mg, BID  [Held by provider] triamterene-hydroCHLOROthiazide (MAXZIDE-25) 37.5-25 MG per tablet 1 tablet, Daily  therapeutic multivitamin-minerals 1 tablet, Daily  Vitamin D (CHOLECALCIFEROL) tablet 1,000 Units, Daily  perflutren lipid microspheres (DEFINITY) injection 1.65 mg, ONCE PRN  sodium chloride flush 0.9 % injection 5-40 mL, 2 times per day  sodium chloride flush 0.9 % injection 5-40 mL, PRN  0.9 % sodium chloride infusion, PRN        Review of Systems:   14 point ROS obtained but were negative except mentioned in HPI      Physical exam:     Vitals:  /73   Pulse 77   Temp 98.3 °F (36.8 °C) (Oral)   Resp 16   Ht 6' 2\" (1.88 m)   Wt 244 lb 11.4 oz (111 kg)   SpO2 92%   BMI 31.42 kg/m²   Constitutional:  OAA X3 NAD  Skin: no rash, turgor wnl  Heent:  eomi, mmm  Neck: no bruits or jvd noted  Cardiovascular:  S1, S2 without m/r/g  Respiratory: CTA B without w/r/r  Abdomen:  +bs, soft, nt, nd  Ext: no  lower extremity edema  Psychiatric: mood and affect appropriate  Musculoskeletal:  Rom, muscular strength intact    Labs:  CBC:   Recent Labs     10/10/22  2128 10/12/22  0516 10/13/22  0555   WBC 4.0 3.6* 3.6*   HGB 11.8* 10.8* 11.0*    144 146     BMP:    Recent Labs     10/10/22  2128 10/12/22  0516 10/13/22  0555    139 139   K 4.0 3.8 3.7    106 108 CO2 25 20* 22   BUN 16 7 17   CREATININE 1.0 1.0 0.9   GLUCOSE 94 83 89     Ca/Mg/Phos:   Recent Labs     10/10/22  2128 10/12/22  0516 10/13/22  0555   CALCIUM 9.2 7.9* 8.7   MG  --  2.00 2.10   PHOS  --   --  4.1     Hepatic:   Recent Labs     10/10/22  2128 10/12/22  0516 10/13/22  0555   * 282* 287*   * 171* 173*   BILITOT 0.3 0.3 0.3   ALKPHOS 65 56 60     Troponin:   Recent Labs     10/10/22  2128 10/11/22  0155 10/11/22  0530   TROPONINI 0.90* 0.71* 0.64*     BNP: No results for input(s): BNP in the last 72 hours. Lipids:   Recent Labs     10/12/22  0516   CHOL 137   TRIG 190*   HDL 34*   LDLCALC 65   LABVLDL 38     ABGs: No results for input(s): PHART, PO2ART, SQU7PEG in the last 72 hours. INR: No results for input(s): INR in the last 72 hours. UA:  Recent Labs     10/12/22  1814   COLORU DARK YELLOW*   CLARITYU CLOUDY*   GLUCOSEU Negative   BILIRUBINUR Negative   KETUA TRACE*   SPECGRAV >=1.030   BLOODU LARGE*   PHUR 5.5   PROTEINU 100*   UROBILINOGEN 1.0   NITRU Negative   LEUKOCYTESUR Negative   LABMICR YES   URINETYPE NotGiven      Urine Microscopic:   Recent Labs     10/12/22  1814   BACTERIA None Seen   HYALCAST 0   WBCUA 1   RBCUA 2   EPIU 2     Urine Culture: No results for input(s): LABURIN in the last 72 hours. Urine Chemistry: No results for input(s): Juarez Yin, PROTEINUR, NAUR in the last 72 hours. IMAGING:  MRI ABDOMEN W WO CONTRAST MRCP   Final Result   1. Cholelithiasis without evidence of acute cholecystitis. 2. No biliary ductal dilatation or choledocholithiasis. 3. Within the liver, there is a 1.8 cm hemangioma near the gallbladder. 4. Simple and mildly complicated (Bosniak 1 and 2) bilateral renal cysts   measuring up to 8.4 cm. No dedicated follow-up recommended. 5. In the pancreatic uncinate process, there is a 1.5 x 1.4 cm cystic lesion   which could represent a branch duct IPMN. Recommend 6 month follow-up.       RECOMMENDATIONS: Incidental Pancreatic Cysts      65-79 years at presentation      1.5-2.5 cm      Main pancreatic duct communication absent or cannot be determined: Pancreas   protocol MRI (or CT) in 6 months, consider EUS/FNA      Reference: Tyra et al. Management of incidental pancreatic cysts: A white   paper of the ACR Incidental Findings Committee. [address] [address];14:400-131. US GALLBLADDER RUQ   Final Result   Mild hydrops of the gallbladder and associated 1.6 cm gallstone with no   secondary signs of cholecystitis. 2 cm rounded echogenic focus in the right lobe of the liver which is   suspicious for a liver hemangioma. Recommend a follow-up CT scan with   contrast further characterization. Borderline enlargement of the common bile duct and partial obscuration of the   pancreas due to overlying bowel gas. 7 cm cyst in the right kidney inferiorly with a thin septation in the cyst in   keeping with a Bosniak 2 cyst.         CT CHEST PULMONARY EMBOLISM W CONTRAST   Final Result   No evidence of a pulmonary embolus. Mildly dilated and atherosclerotic thoracic aorta with no aneurysm or   dissection. Minimal bibasilar atelectasis or scarring posteriorly. Suggest follow-up   with chest x-rays. 8 cm benign cyst upper pole left kidney             Assessment/Plan :      1. Rhabdomyolysis  likely 2/2 Statins   Stop rosuvastatin. Continue iv fluids   Monitor renal function     2. HTN  Controlled off meds. 3. Anemia. Hb 11. 0 monitor     4. Acid- base disorder.   Monitor     Continue iv fluids   Monitor UOP             D/w primary team      Thank you for allowing us to participate in care of Kathia Cid         Electronically signed by: Harmeet Parks MD, 10/13/2022, 8:17 AM      [facility] [address]  Office : [phone]  Fax :[phone]

## 2022-10-13 NOTE — PROGRESS NOTES
Hospitalist Progress Note      PCP: Jo Ann Browning MD    Date of Admission: 10/10/2022    Chief Complaint: Shortness of breath    Hospital Course: Nian Paez is 78 y.o. male with history of HTN, HLD and CKD. He presented to the ER with complaint of shortness of breath and leg swelling which started about 10 days ago, worse on exertion relieved by rest.  He was evaluated at an urgent care facility where bilateral leg swelling was noted and was sent to the ED for further evaluation. In the ED troponin was elevated to 0.9 but without any chest pain or chest discomfort. Per cardiology recommendation, he was started on low-dose heparin drip and admitted for further evaluation. Subjective:Patient seen and examined. Denies any chest pain or shortness of breath. No muscle pain. CK and LFTs remain elevated.         Medications:  Reviewed    Infusion Medications    sodium chloride 125 mL/hr at 10/13/22 0521    sodium chloride      sodium chloride       Scheduled Medications    enoxaparin  40 mg SubCUTAneous Nightly    sodium chloride flush  5-40 mL IntraVENous 2 times per day    aspirin  81 mg Oral Daily    losartan  50 mg Oral Daily    metoprolol tartrate  25 mg Oral BID    [Held by provider] triamterene-hydroCHLOROthiazide  1 tablet Oral Daily    therapeutic multivitamin-minerals  1 tablet Oral Daily    Vitamin D  1,000 Units Oral Daily    sodium chloride flush  5-40 mL IntraVENous 2 times per day     PRN Meds: sodium chloride flush, sodium chloride, ondansetron **OR** ondansetron, acetaminophen **OR** acetaminophen, polyethylene glycol, nitroGLYCERIN, perflutren lipid microspheres, sodium chloride flush, sodium chloride      Intake/Output Summary (Last 24 hours) at 10/13/2022 0939  Last data filed at 10/13/2022 0901  Gross per 24 hour   Intake 647.6 ml   Output --   Net 647.6 ml       Physical Exam Performed:    /66   Pulse 75   Temp 98.2 °F (36.8 °C) (Oral)   Resp 16   Ht 6' 2\" (1.88 m)   Wt 244 lb 11.4 oz (111 kg)   SpO2 93%   BMI 31.42 kg/m²     General appearance: No apparent distress, appears stated age and cooperative. HEENT: Pupils equal, round, and reactive to light. Conjunctivae/corneas clear. Neck: Supple, with full range of motion. No jugular venous distention. Trachea midline. Respiratory:  Normal respiratory effort. Clear to auscultation, bilaterally without Rales/Wheezes/Rhonchi. Cardiovascular: Regular rate and rhythm with normal S1/S2 without murmurs, rubs or gallops. Abdomen: Soft, non-tender, non-distended with normal bowel sounds. Musculoskeletal: No clubbing, cyanosis or edema bilaterally. Full range of motion without deformity. Skin: Skin color, texture, turgor normal.  No rashes or lesions. Neurologic:  Neurovascularly intact without any focal sensory/motor deficits. Cranial nerves: II-XII intact, grossly non-focal.  Psychiatric: Alert and oriented, thought content appropriate, normal insight  Capillary Refill: Brisk, 3 seconds, normal   Peripheral Pulses: +2 palpable, equal bilaterally       Labs:   Recent Labs     10/10/22  2128 10/12/22  0516 10/13/22  0555   WBC 4.0 3.6* 3.6*   HGB 11.8* 10.8* 11.0*   HCT 37.5* 34.1* 34.4*    144 146     Recent Labs     10/10/22  2128 10/12/22  0516 10/13/22  0555    139 139   K 4.0 3.8 3.7    106 108   CO2 25 20* 22   BUN 16 7 17   CREATININE 1.0 1.0 0.9   CALCIUM 9.2 7.9* 8.7   PHOS  --   --  4.1     Recent Labs     10/10/22  2128 10/12/22  0516 10/13/22  0555   * 282* 287*   * 171* 173*   BILIDIR  --  <0.2  --    BILITOT 0.3 0.3 0.3   ALKPHOS 65 56 60     No results for input(s): INR in the last 72 hours.   Recent Labs     10/10/22  2128 10/11/22  0155 10/11/22  0530 10/12/22  1100 10/13/22  0555   CKTOTAL  --   --   --  11,648* 10,942*   TROPONINI 0.90* 0.71* 0.64*  --   --        Urinalysis:      Lab Results   Component Value Date/Time    NITRU Negative 10/12/2022 06:14 PM    WBCUA 1 10/12/2022 06:14 PM    BACTERIA None Seen 10/12/2022 06:14 PM    RBCUA 2 10/12/2022 06:14 PM    BLOODU LARGE 10/12/2022 06:14 PM    SPECGRAV >=1.030 10/12/2022 06:14 PM    GLUCOSEU Negative 10/12/2022 06:14 PM       Radiology:  MRI ABDOMEN W WO CONTRAST MRCP   Final Result   1. Cholelithiasis without evidence of acute cholecystitis. 2. No biliary ductal dilatation or choledocholithiasis. 3. Within the liver, there is a 1.8 cm hemangioma near the gallbladder. 4. Simple and mildly complicated (Bosniak 1 and 2) bilateral renal cysts   measuring up to 8.4 cm. No dedicated follow-up recommended. 5. In the pancreatic uncinate process, there is a 1.5 x 1.4 cm cystic lesion   which could represent a branch duct IPMN. Recommend 6 month follow-up. RECOMMENDATIONS:   Incidental Pancreatic Cysts      65-79 years at presentation      1.5-2.5 cm      Main pancreatic duct communication absent or cannot be determined: Pancreas   protocol MRI (or CT) in 6 months, consider EUS/FNA      Reference: Tyra et al. Management of incidental pancreatic cysts: A white   paper of the ACR Incidental Findings Committee. [address] [address];11:977-435. US GALLBLADDER RUQ   Final Result   Mild hydrops of the gallbladder and associated 1.6 cm gallstone with no   secondary signs of cholecystitis. 2 cm rounded echogenic focus in the right lobe of the liver which is   suspicious for a liver hemangioma. Recommend a follow-up CT scan with   contrast further characterization. Borderline enlargement of the common bile duct and partial obscuration of the   pancreas due to overlying bowel gas. 7 cm cyst in the right kidney inferiorly with a thin septation in the cyst in   keeping with a Bosniak 2 cyst.         CT CHEST PULMONARY EMBOLISM W CONTRAST   Final Result   No evidence of a pulmonary embolus. Mildly dilated and atherosclerotic thoracic aorta with no aneurysm or   dissection.       Minimal bibasilar atelectasis or scarring posteriorly. Suggest follow-up   with chest x-rays. 8 cm benign cyst upper pole left kidney                 Assessment/Plan:    Active Hospital Problems    Diagnosis     Elevated troponin level not due to acute coronary syndrome [R77.8]      Priority: Medium    Elevated liver enzymes [R74.8]      Priority: Medium    Elevated LFTs [R79.89]      Priority: Medium    CKD (chronic kidney disease), stage III ([address] [N18.30]     HTN (hypertension) [I10]      Rhabdomyolysis: ?  Statin induced. No preceding trauma or known muscle injury. Crestor 40 mg daily discontinued. Monitor CPK levels with IV hydration. CRP wnl. ESR 61. Aldolase pending. Elevated troponin:  Peak 0.9 trended down to 0.64. Cardiology consulted: Suspected to be related to rhabdomyolysis. status post [facility] 10/11/2022 with no findings of obstructive CAD. ECHO unremarkable. Heparin drip discontinued. Cardiology recommended medical management with outpatient cardiac MRI and repeat sed rate and CRP in 2 weeks. Elevated liver enzymes: Alk phos within normal limits. ?  Due to rhabdomyolysis. RUQ US with large stone but no evidence of Cholecystitis. CBD mildly elevated. MRCP: Cholelithiasis without choledocholithiasis or CBD dilatation. Off Crestor. Monitor LFT's. HLD: BP borderline low. Monitor on Cozaar and Lopressor. Maxide on hold. Liver lesion:  Incidental finding on ultrasound. MRI consistent with hemangioma. Pancreatic cyst:  MRI: Pancreatic uncinate process with a 1.5 x 1.4 cm cystic lesion? IPMN. 6-month follow-up recommended. DVT Prophylaxis: Lovenox  Diet: ADULT DIET; Regular; Low Fat/Low Chol/High Fiber/2 gm Na  Code Status: Full Code  PT/OT Eval Status: Independent    Dispo -Home once medically stable.       Alcide Goldmann, MD

## 2022-10-14 LAB
A/G RATIO: 1 (ref 1.1–2.2)
ALBUMIN SERPL-MCNC: 2.9 G/DL (ref 3.4–5)
ALDOLASE: 50.9 U/L (ref 1.2–7.6)
ALP BLD-CCNC: 59 U/L (ref 40–129)
ALT SERPL-CCNC: 180 U/L (ref 10–40)
ANION GAP SERPL CALCULATED.3IONS-SCNC: 8 MMOL/L (ref 3–16)
AST SERPL-CCNC: 307 U/L (ref 15–37)
BILIRUB SERPL-MCNC: 0.3 MG/DL (ref 0–1)
BUN BLDV-MCNC: 12 MG/DL (ref 7–20)
CALCIUM SERPL-MCNC: 8.6 MG/DL (ref 8.3–10.6)
CHLORIDE BLD-SCNC: 107 MMOL/L (ref 99–110)
CO2: 23 MMOL/L (ref 21–32)
CREAT SERPL-MCNC: 0.9 MG/DL (ref 0.8–1.3)
GFR AFRICAN AMERICAN: >60
GFR NON-AFRICAN AMERICAN: >60
GLUCOSE BLD-MCNC: 83 MG/DL (ref 70–99)
GLUCOSE BLD-MCNC: 87 MG/DL (ref 70–99)
HAV IGM SER IA-ACNC: NORMAL
HCT VFR BLD CALC: 36.1 % (ref 40.5–52.5)
HEMOGLOBIN: 11.4 G/DL (ref 13.5–17.5)
HEPATITIS B CORE IGM ANTIBODY: NORMAL
HEPATITIS B SURFACE ANTIGEN INTERPRETATION: NORMAL
HEPATITIS C ANTIBODY INTERPRETATION: NORMAL
MAGNESIUM: 2 MG/DL (ref 1.8–2.4)
MCH RBC QN AUTO: 25.5 PG (ref 26–34)
MCHC RBC AUTO-ENTMCNC: 31.5 G/DL (ref 31–36)
MCV RBC AUTO: 80.8 FL (ref 80–100)
PDW BLD-RTO: 16.9 % (ref 12.4–15.4)
PERFORMED ON: NORMAL
PHOSPHORUS: 3.6 MG/DL (ref 2.5–4.9)
PLATELET # BLD: 161 K/UL (ref 135–450)
PMV BLD AUTO: 8 FL (ref 5–10.5)
POTASSIUM SERPL-SCNC: 4 MMOL/L (ref 3.5–5.1)
RBC # BLD: 4.46 M/UL (ref 4.2–5.9)
SODIUM BLD-SCNC: 138 MMOL/L (ref 136–145)
TOTAL CK: ABNORMAL U/L (ref 39–308)
TOTAL PROTEIN: 5.9 G/DL (ref 6.4–8.2)
WBC # BLD: 3.3 K/UL (ref 4–11)

## 2022-10-14 PROCEDURE — 80074 ACUTE HEPATITIS PANEL: CPT

## 2022-10-14 PROCEDURE — 2580000003 HC RX 258: Performed by: INTERNAL MEDICINE

## 2022-10-14 PROCEDURE — 83735 ASSAY OF MAGNESIUM: CPT

## 2022-10-14 PROCEDURE — 6360000002 HC RX W HCPCS: Performed by: INTERNAL MEDICINE

## 2022-10-14 PROCEDURE — 6370000000 HC RX 637 (ALT 250 FOR IP): Performed by: INTERNAL MEDICINE

## 2022-10-14 PROCEDURE — 36415 COLL VENOUS BLD VENIPUNCTURE: CPT

## 2022-10-14 PROCEDURE — 80053 COMPREHEN METABOLIC PANEL: CPT

## 2022-10-14 PROCEDURE — 84100 ASSAY OF PHOSPHORUS: CPT

## 2022-10-14 PROCEDURE — 82550 ASSAY OF CK (CPK): CPT

## 2022-10-14 PROCEDURE — 2060000000 HC ICU INTERMEDIATE R&B

## 2022-10-14 PROCEDURE — 86038 ANTINUCLEAR ANTIBODIES: CPT

## 2022-10-14 PROCEDURE — 99232 SBSQ HOSP IP/OBS MODERATE 35: CPT | Performed by: INTERNAL MEDICINE

## 2022-10-14 PROCEDURE — 85027 COMPLETE CBC AUTOMATED: CPT

## 2022-10-14 PROCEDURE — 86039 ANTINUCLEAR ANTIBODIES (ANA): CPT

## 2022-10-14 RX ORDER — SODIUM CHLORIDE 9 MG/ML
INJECTION, SOLUTION INTRAVENOUS CONTINUOUS
Status: ACTIVE | OUTPATIENT
Start: 2022-10-14 | End: 2022-10-14

## 2022-10-14 RX ORDER — LANOLIN ALCOHOL/MO/W.PET/CERES
1000 CREAM (GRAM) TOPICAL DAILY
Status: DISCONTINUED | OUTPATIENT
Start: 2022-10-14 | End: 2022-10-21 | Stop reason: HOSPADM

## 2022-10-14 RX ADMIN — CYANOCOBALAMIN TAB 1000 MCG 1000 MCG: 1000 TAB at 08:45

## 2022-10-14 RX ADMIN — LOSARTAN POTASSIUM 50 MG: 25 TABLET, FILM COATED ORAL at 08:45

## 2022-10-14 RX ADMIN — SODIUM CHLORIDE: 9 INJECTION, SOLUTION INTRAVENOUS at 10:33

## 2022-10-14 RX ADMIN — ASPIRIN 81 MG 81 MG: 81 TABLET ORAL at 08:45

## 2022-10-14 RX ADMIN — METOPROLOL TARTRATE 25 MG: 25 TABLET, FILM COATED ORAL at 21:08

## 2022-10-14 RX ADMIN — ENOXAPARIN SODIUM 40 MG: 100 INJECTION SUBCUTANEOUS at 21:08

## 2022-10-14 RX ADMIN — SODIUM CHLORIDE, PRESERVATIVE FREE 10 ML: 5 INJECTION INTRAVENOUS at 08:46

## 2022-10-14 RX ADMIN — SODIUM CHLORIDE: 9 INJECTION, SOLUTION INTRAVENOUS at 16:04

## 2022-10-14 RX ADMIN — SODIUM CHLORIDE: 9 INJECTION, SOLUTION INTRAVENOUS at 21:01

## 2022-10-14 RX ADMIN — MULTIPLE VITAMINS W/ MINERALS TAB 1 TABLET: TAB at 08:45

## 2022-10-14 RX ADMIN — METOPROLOL TARTRATE 25 MG: 25 TABLET, FILM COATED ORAL at 08:45

## 2022-10-14 RX ADMIN — SODIUM CHLORIDE: 9 INJECTION, SOLUTION INTRAVENOUS at 03:45

## 2022-10-14 RX ADMIN — Medication 10 ML: at 21:17

## 2022-10-14 RX ADMIN — Medication 1000 UNITS: at 08:45

## 2022-10-14 ASSESSMENT — PAIN SCALES - GENERAL
PAINLEVEL_OUTOF10: 0
PAINLEVEL_OUTOF10: 0

## 2022-10-14 NOTE — PLAN OF CARE
Problem: Discharge Planning  Goal: Discharge to home or other facility with appropriate resources  10/14/2022 1634 by Echo Champagne RN  Outcome: Progressing     Problem: Pain  Goal: Verbalizes/displays adequate comfort level or baseline comfort level  10/14/2022 1634 by Echo Champagne RN  Outcome: Progressing     Problem: Chronic Conditions and Co-morbidities  Goal: Patient's chronic conditions and co-morbidity symptoms are monitored and maintained or improved  10/14/2022 1634 by Echo Champagne RN  Outcome: Progressing

## 2022-10-14 NOTE — PROGRESS NOTES
Office : [phone]     Fax :[phone]       Nephrology progress  Note      Patient's Name: Darren South  8:00 AM  10/14/2022    Reason for Consult:  Rhabdomyolysis       Requesting Physician:  Ian Mas MD      Chief Complaint:    Chief Complaint   Patient presents with    Leg Swelling     Patient in with complaints of bilateral edema in his legs that is new. PCP told him to come into the er. History of Present iIlness:      Darren South is a 78 y.o. male with prior history of CKD, HTN, RA , simple renal cyst who came to ER with c/o pain in thighs. He was found to have elevated CK levels. Denies any trauma. Denies any heavy exercise. CK level was elevated at [address] . Has h/o CKD 2/2 NSaids which improved. 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 Denies any muscle pain . Making urine. Renal function stable . Denies any recent infection. I/O last 3 completed shifts:   In: 5 [P.O.:720]  Out: -     Past Medical History:   Diagnosis Date    Arthritis     Colon polyp     Diabetes mellitus ([facility] [address]     denies being diabetic    Hyperlipemia     Hypertension     Kidney disease     Rheumatoid arthritis ([facility] [address]        Past Surgical History:   Procedure Laterality Date    COLONOSCOPY      COLONOSCOPY N/A 8/14/2020    COLONOSCOPY DIAGNOSTIC performed by Farzana Godfrey MD at [address]        Family History   Problem Relation Age of Onset    High Blood Pressure Sister          Current Medications:    vitamin B-12 (CYANOCOBALAMIN) tablet 1,000 mcg, Daily  enoxaparin (LOVENOX) injection 40 mg, Nightly  0.9 % sodium chloride infusion, Continuous  sodium chloride flush 0.9 % injection 5-40 mL, 2 times per day  sodium chloride flush 0.9 % injection 5-40 mL, PRN  0.9 % sodium chloride infusion, PRN  ondansetron (ZOFRAN-ODT) disintegrating tablet 4 mg, Q8H PRN   Or  ondansetron (ZOFRAN) injection 4 mg, Q6H PRN  acetaminophen (TYLENOL) tablet 650 mg, Q6H PRN   Or  acetaminophen (TYLENOL) suppository 650 mg, Q6H PRN  polyethylene glycol (GLYCOLAX) packet 17 g, Daily PRN  aspirin chewable tablet 81 mg, Daily  nitroGLYCERIN (NITROSTAT) SL tablet 0.4 mg, Q5 Min PRN  losartan (COZAAR) tablet 50 mg, Daily  metoprolol tartrate (LOPRESSOR) tablet 25 mg, BID  [Held by provider] triamterene-hydroCHLOROthiazide (MAXZIDE-25) 37.5-25 MG per tablet 1 tablet, Daily  therapeutic multivitamin-minerals 1 tablet, Daily  Vitamin D (CHOLECALCIFEROL) tablet 1,000 Units, Daily  perflutren lipid microspheres (DEFINITY) injection 1.65 mg, ONCE PRN  sodium chloride flush 0.9 % injection 5-40 mL, 2 times per day  sodium chloride flush 0.9 % injection 5-40 mL, PRN  0.9 % sodium chloride infusion, PRN        Physical exam:     Vitals:  /76   Pulse 73   Temp 97.9 °F (36.6 °C) (Oral)   Resp 18   Ht 6' 2\" (1.88 m)   Wt 245 lb 2.4 oz (111.2 kg)   SpO2 95%   BMI 31.48 kg/m²   Constitutional:  OAA X3 NAD  Skin: no rash, turgor wnl  Heent:  eomi, mmm  Neck: no bruits or jvd noted  Cardiovascular:  S1, S2 without m/r/g  Respiratory: CTA B without w/r/r  Abdomen:  +bs, soft, nt, nd  Ext: no  lower extremity edema      Labs:  CBC:   Recent Labs     10/12/22  0516 10/13/22  0555 10/14/22  0603   WBC 3.6* 3.6* 3.3*   HGB 10.8* 11.0* 11.4*    146 161     BMP:    Recent Labs     10/12/22  0516 10/13/22  0555 10/14/22  0603    139 138   K 3.8 3.7 4.0    108 107   CO2 20* 22 23   BUN 7 17 12   CREATININE 1.0 0.9 0.9   GLUCOSE 83 89 87     Ca/Mg/Phos:   Recent Labs     10/12/22  0516 10/13/22  0555 10/14/22  0603   CALCIUM 7.9* 8.7 8.6   MG 2.00 2.10 2.00   PHOS  --  4.1 3.6     Hepatic:   Recent Labs     10/12/22  0516 10/13/22  0555 10/14/22  0603   * 287* 307*   * 173* 180*   BILITOT 0.3 0.3 0.3   ALKPHOS 56 60 59     Troponin:   No results for input(s): TROPONINI in the last 72 hours. BNP: No results for input(s): BNP in the last 72 hours. Lipids:   Recent Labs     10/12/22  0516   CHOL 137   TRIG 190*   HDL 34*   LDLCALC 65   LABVLDL 38     ABGs: No results for input(s): PHART, PO2ART, QBE3JVA in the last 72 hours. INR: No results for input(s): INR in the last 72 hours. UA:  Recent Labs     10/12/22  1814   COLORU DARK YELLOW*   CLARITYU CLOUDY*   GLUCOSEU Negative   BILIRUBINUR Negative   KETUA TRACE*   SPECGRAV >=1.030   BLOODU LARGE*   PHUR 5.5   PROTEINU 100*   UROBILINOGEN 1.0   NITRU Negative   LEUKOCYTESUR Negative   LABMICR YES   URINETYPE NotGiven      Urine Microscopic:   Recent Labs     10/12/22  1814   BACTERIA None Seen   HYALCAST 0   WBCUA 1   RBCUA 2   EPIU 2     Urine Culture: No results for input(s): LABURIN in the last 72 hours. Urine Chemistry: No results for input(s): Iker Gale, PROTEINUR, NAUR in the last 72 hours. IMAGING:  MRI ABDOMEN W WO CONTRAST MRCP   Final Result   1. Cholelithiasis without evidence of acute cholecystitis. 2. No biliary ductal dilatation or choledocholithiasis. 3. Within the liver, there is a 1.8 cm hemangioma near the gallbladder. 4. Simple and mildly complicated (Bosniak 1 and 2) bilateral renal cysts   measuring up to 8.4 cm. No dedicated follow-up recommended. 5. In the pancreatic uncinate process, there is a 1.5 x 1.4 cm cystic lesion   which could represent a branch duct IPMN. Recommend 6 month follow-up.       RECOMMENDATIONS:   Incidental Pancreatic Cysts      65-79 years at presentation      1.5-2.5 cm      Main pancreatic duct communication absent or cannot be determined: Pancreas   protocol MRI (or CT) in 6 months, consider EUS/FNA      Reference: Tyra et al. Management of incidental pancreatic cysts: A white   paper of the ACR Incidental Findings Committee. [address] [place] [address];34:377-145. US GALLBLADDER RUQ   Final Result   Mild hydrops of the gallbladder and associated 1.6 cm gallstone with no   secondary signs of cholecystitis. 2 cm rounded echogenic focus in the right lobe of the liver which is   suspicious for a liver hemangioma. Recommend a follow-up CT scan with   contrast further characterization. Borderline enlargement of the common bile duct and partial obscuration of the   pancreas due to overlying bowel gas. 7 cm cyst in the right kidney inferiorly with a thin septation in the cyst in   keeping with a Bosniak 2 cyst.         CT CHEST PULMONARY EMBOLISM W CONTRAST   Final Result   No evidence of a pulmonary embolus. Mildly dilated and atherosclerotic thoracic aorta with no aneurysm or   dissection. Minimal bibasilar atelectasis or scarring posteriorly. Suggest follow-up   with chest x-rays. 8 cm benign cyst upper pole left kidney             Assessment/Plan :      1. Rhabdomyolysis  likely 2/2 Statins   Stopped  rosuvastatin. Continue iv fluids   Monitor renal function     Check IDALIA   Check hepatitis panel       2. HTN  Controlled off meds. 3. Anemia. Hb 11.4  monitor     4. Acid- base disorder.   Monitor     Continue iv fluids   Monitor UOP             D/w primary team      Thank you for allowing us to participate in care of Margarita Barnhart         Electronically signed by: Breanna Velásquez MD, 10/14/2022, 8:00 AM      [facility]  Office : [phone]  Fax :[phone]

## 2022-10-14 NOTE — PROGRESS NOTES
Hospitalist Progress Note      PCP: Binu Pandey MD    Date of Admission: 10/10/2022    Chief Complaint: Shortness of breath    Hospital Course: Danna Grijalva is 78 y.o. male with history of HTN, HLD and CKD. He presented to the ER with complaint of shortness of breath and leg swelling which started about 10 days ago, worse on exertion relieved by rest.  He was evaluated at an urgent care facility where bilateral leg swelling was noted and was sent to the ED for further evaluation. In the ED troponin was elevated to 0.9 but without any chest pain or chest discomfort. Per cardiology recommendation, he was started on low-dose heparin drip and admitted for further evaluation. Subjective:Patient seen and examined. Denies any chest pain or shortness of breath. No muscle pain. CK and LFTs remain elevated.         Medications:  Reviewed    Infusion Medications    sodium chloride 150 mL/hr at 10/14/22 1033    sodium chloride      sodium chloride       Scheduled Medications    vitamin B-12  1,000 mcg Oral Daily    enoxaparin  40 mg SubCUTAneous Nightly    sodium chloride flush  5-40 mL IntraVENous 2 times per day    aspirin  81 mg Oral Daily    losartan  50 mg Oral Daily    metoprolol tartrate  25 mg Oral BID    [Held by provider] triamterene-hydroCHLOROthiazide  1 tablet Oral Daily    therapeutic multivitamin-minerals  1 tablet Oral Daily    Vitamin D  1,000 Units Oral Daily    sodium chloride flush  5-40 mL IntraVENous 2 times per day     PRN Meds: sodium chloride flush, sodium chloride, ondansetron **OR** ondansetron, acetaminophen **OR** acetaminophen, polyethylene glycol, nitroGLYCERIN, perflutren lipid microspheres, sodium chloride flush, sodium chloride      Intake/Output Summary (Last 24 hours) at 10/14/2022 1105  Last data filed at 10/14/2022 0854  Gross per 24 hour   Intake 720 ml   Output --   Net 720 ml       Physical Exam Performed:    /76   Pulse 73   Temp 97.9 °F (36.6 °C) (Oral)   Resp 18   Ht 6' 2\" (1.88 m)   Wt 245 lb 2.4 oz (111.2 kg)   SpO2 95%   BMI 31.48 kg/m²     General appearance: No apparent distress, appears stated age and cooperative. HEENT: Pupils equal, round, and reactive to light. Conjunctivae/corneas clear. Neck: Supple, with full range of motion. No jugular venous distention. Trachea midline. Respiratory:  Normal respiratory effort. Clear to auscultation, bilaterally without Rales/Wheezes/Rhonchi. Cardiovascular: Regular rate and rhythm with normal S1/S2 without murmurs, rubs or gallops. Abdomen: Soft, non-tender, non-distended with normal bowel sounds. Musculoskeletal: No clubbing, cyanosis or edema bilaterally. Full range of motion without deformity. Skin: Skin color, texture, turgor normal.  No rashes or lesions. Neurologic:  Neurovascularly intact without any focal sensory/motor deficits. Cranial nerves: II-XII intact, grossly non-focal.  Psychiatric: Alert and oriented, thought content appropriate, normal insight  Capillary Refill: Brisk, 3 seconds, normal   Peripheral Pulses: +2 palpable, equal bilaterally       Labs:   Recent Labs     10/12/22  0516 10/13/22  0555 10/14/22  0603   WBC 3.6* 3.6* 3.3*   HGB 10.8* 11.0* 11.4*   HCT 34.1* 34.4* 36.1*    146 161     Recent Labs     10/12/22  0516 10/13/22  0555 10/14/22  0603    139 138   K 3.8 3.7 4.0    108 107   CO2 20* 22 23   BUN 7 17 12   CREATININE 1.0 0.9 0.9   CALCIUM 7.9* 8.7 8.6   PHOS  --  4.1 3.6     Recent Labs     10/12/22  0516 10/13/22  0555 10/14/22  0603   * 287* 307*   * 173* 180*   BILIDIR <0.2  --   --    BILITOT 0.3 0.3 0.3   ALKPHOS 56 60 59     No results for input(s): INR in the last 72 hours.   Recent Labs     10/12/22  1100 10/13/22  0555 10/14/22  0603   CKTOTAL 11,648* 10,942* 11,190*       Urinalysis:      Lab Results   Component Value Date/Time    NITRU Negative 10/12/2022 06:14 PM    WBCUA 1 10/12/2022 06:14 PM    BACTERIA None Seen 10/12/2022 06:14 PM    RBCUA 2 10/12/2022 06:14 PM    BLOODU LARGE 10/12/2022 06:14 PM    SPECGRAV >=1.030 10/12/2022 06:14 PM    GLUCOSEU Negative 10/12/2022 06:14 PM       Radiology:  MRI ABDOMEN W WO CONTRAST MRCP   Final Result   1. Cholelithiasis without evidence of acute cholecystitis. 2. No biliary ductal dilatation or choledocholithiasis. 3. Within the liver, there is a 1.8 cm hemangioma near the gallbladder. 4. Simple and mildly complicated (Bosniak 1 and 2) bilateral renal cysts   measuring up to 8.4 cm. No dedicated follow-up recommended. 5. In the pancreatic uncinate process, there is a 1.5 x 1.4 cm cystic lesion   which could represent a branch duct IPMN. Recommend 6 month follow-up. RECOMMENDATIONS:   Incidental Pancreatic Cysts      65-79 years at presentation      1.5-2.5 cm      Main pancreatic duct communication absent or cannot be determined: Pancreas   protocol MRI (or CT) in 6 months, consider EUS/FNA      Reference: Tyra et al. Management of incidental pancreatic cysts: A white   paper of the ACR Incidental Findings Committee. [address] [address];76:[id number]. US GALLBLADDER RUQ   Final Result   Mild hydrops of the gallbladder and associated 1.6 cm gallstone with no   secondary signs of cholecystitis. 2 cm rounded echogenic focus in the right lobe of the liver which is   suspicious for a liver hemangioma. Recommend a follow-up CT scan with   contrast further characterization. Borderline enlargement of the common bile duct and partial obscuration of the   pancreas due to overlying bowel gas. 7 cm cyst in the right kidney inferiorly with a thin septation in the cyst in   keeping with a Bosniak 2 cyst.         CT CHEST PULMONARY EMBOLISM W CONTRAST   Final Result   No evidence of a pulmonary embolus. Mildly dilated and atherosclerotic thoracic aorta with no aneurysm or   dissection. Minimal bibasilar atelectasis or scarring posteriorly. Suggest follow-up   with chest x-rays. 8 cm benign cyst upper pole left kidney                 Assessment/Plan:    Active Hospital Problems    Diagnosis     Elevated troponin level not due to acute coronary syndrome [R77.8]      Priority: Medium    Elevated liver enzymes [R74.8]      Priority: Medium    Elevated LFTs [R79.89]      Priority: Medium    CKD (chronic kidney disease), stage III ([address] [N18.30]     HTN (hypertension) [I10]      Rhabdomyolysis: ?  Statin induced. No preceding trauma or known muscle injury. CPK levels remain elevated. Crestor 40 mg daily discontinued. Monitor CPK levels with IV hydration. CRP wnl. ESR 61. Aldolase pending. Elevated troponin:  Peak 0.9 trended down to 0.64. Cardiology consulted: Suspected to be related to rhabdomyolysis. status post [facility] 10/11/2022 with no findings of obstructive CAD. ECHO unremarkable. Heparin drip discontinued. Cardiology recommended medical management with outpatient cardiac MRI and repeat sed rate and CRP in 2 weeks. Elevated liver enzymes: Alk phos within normal limits. AST and ALT elevated. ?  Due to rhabdomyolysis. Hepatitis panel pending. RUQ US with large stone but no evidence of Cholecystitis. CBD mildly elevated. MRCP: Cholelithiasis without choledocholithiasis or CBD dilatation. Off Crestor. Monitor LFT's. Vitamin B12 deficiency: Started on supplementation. HLD: Monitor on Cozaar and Lopressor. Maxzide on hold. Liver lesion:  Incidental finding on ultrasound. MRI consistent with hemangioma. Pancreatic cyst:  MRI: Pancreatic uncinate process with a 1.5 x 1.4 cm cystic lesion? IPMN. 6-month follow-up recommended. DVT Prophylaxis: Lovenox  Diet: ADULT DIET; Regular; Low Fat/Low Chol/High Fiber/2 gm Na  Code Status: Full Code  PT/OT Eval Status: Independent    Dispo -Home once medically stable.       Faith Rosen MD

## 2022-10-14 NOTE — PROGRESS NOTES
Physician Progress Note      Gavin Wen  CSN #:                  [id number]  :                       1945  ADMIT DATE:       10/10/2022 8:45 PM  [address] [place] DATE:  RESPONDING  PROVIDER #:        Ernst Duncan MD          QUERY TEXT:    Patient admitted with BLE edema and SOB. Documentation reflects NSTEMI in   10/10 ED note, 10/11 H&P and 10/11 Cardio consult. If possible, please   document in the progress notes and discharge summary if NSTEMI was: The medical record reflects the following:  Risk Factors: Elevated troponin  Clinical Indicators: Per ED note- Will admit patient for NSTEMI. Per H&P-   NSTEMI. Cardio consult- NSTEMI. Status post [facility] 10/11/2022 with no findings of   obstructive CAD. Treatment: Labs, Heparin gtt, Cardio consult, LHC  Options provided:  -- NSTEMI confirmed after study  -- NSTEMI ruled out after study, elevated troponin reflects non- ischemic   myocardial injury  -- NSTEMI ruled out after study, elevated troponin due to ##, . -- Other - I will add my own diagnosis  -- Disagree - Not applicable / Not valid  -- Disagree - Clinically unable to determine / Unknown  -- Refer to Clinical Documentation Reviewer    PROVIDER RESPONSE TEXT:    NSTEMI ruled out after study, elevated troponin reflects non- ischemic   myocardial injury.     Query created by: Madeleine Naylor on 10/13/2022 1:37 PM      Electronically signed by:  Ernst Duncan MD 10/14/2022 10:48 AM

## 2022-10-14 NOTE — ADT AUTH CERT
Subscriber Details  Hospital Account [de-identified]  [facility] Subscriber Name/Sex/Relation Subscriber  Subscriber Address/Phone Subscriber Emp/Emp Phone   Ana Moulton   [phone] Kelton Urbina - Male   (Self) 1945 [address], [address]   [phone](L)   [phone](O) [facility]      Utilization Reviews       Myocardial Infarction - Care Day 4 (10/14/2022) by Norma Valle RN       Review Status Review Entered   Completed 10/14/2022 1309       Created By   Norma Valle RN      Criteria Review      Care Day: 4 Care Date: 10/14/2022 Level of Care: Inpatient Floor    Guideline Day 3    Clinical Status    (X) * Hemodynamic stability    10/14/2022 1:08 PM EDT by Daniel Montero:  T: 98.1  RR: 18  MD: 75  BP: 134/76  93% on ra    (X) * Chest pain, dyspnea, or anginal equivalent absent    10/14/2022 1:08 PM EDT by Inge Number      none noted    (X) * No evidence of bleeding    10/14/2022 1:08 PM EDT by Aryoselin Number      none noted    (X) * No evidence of recurrent myocardial ischemia    10/14/2022 1:08 PM EDT by Aryoselin Number      none noted    ( ) * Dangerous arrhythmia absent    (X) * Vascular access site without evidence of infection, aneurysm, or growing hematoma    10/14/2022 1:08 PM EDT by Ardeth Number      none noted    ( ) * Renal function at baseline or acceptable for next level of care    10/14/2022 1:08 PM EDT by Inge Number      baseline, but CK is elevated    ( ) * Discharge plans and education understood    Activity    ( ) * Ambulatory or acceptable for next level of care    Routes    (X) * Oral hydration    10/14/2022 1:00 PM EDT by Inge Tillman      PO hydration, 0.9 Nacl 150 ml/hr CONT. IV    (X) * Oral medications or regimen acceptable for next level of care    10/14/2022 1:00 PM EDT by Kiel Marroquin  B12 1000mcg Daily PO    (X) * Oral diet or acceptable for next level of care    10/14/2022 1:09 PM EDT by Jeff Del Castillo      ADULT DIET; Regular; Low Fat/Low Chol/High Fiber/2 gm Na    Interventions    (X) Smoking cessation counseling if needed    10/14/2022 1:08 PM EDT by Jeff Del Castillo      Prior to DC as ordered    Medications    ( ) * Anticoagulants absent    10/14/2022 1:00 PM EDT by Jeff Del Castillo      Lovenox 40mg Nightly SC    (X) Antiplatelet agents (eg, aspirin, clopidogrel, ticagrelor)    10/14/2022 1:00 PM EDT by Jeff Del Castillo      ASA 81mg daily PO    (X) Beta-blocker    10/14/2022 1:00 PM EDT by Jeff Del Castillo      Lopressor 25mg BID PO    (X) Possible ACE inhibitor or ARB    10/14/2022 1:00 PM EDT by Jeff Del Castillo      Cozaar 50mg Daily PO       Definitions for Care Day 4    Hemodynamic stability    (X) Hemodynamic stability, as indicated by  1 or more  of the following :       (X) Patient hemodynamically stable, as indicated by  ALL  of the following  (1) (2) (3) (4) (5):          (X) Tachycardia absent          (X) Hypotension absent          (X) No evidence of inadequate perfusion (eg, no myocardial ischemia)          (X) No other hemodynamic abnormalities (eg, no Orthostatic hypotension)       Tachycardia absent    (X) Tachycardia absent, as indicated by  1 or more  of the following  (1) (2):       (X) Heart rate less than or equal to 100 beats per minute in adult or child 6 years or older       Hypotension absent    (X) Hypotension absent, as indicated by  1 or more  of the following  (1) (2) (3) (4):       (X) SBP greater than or equal to 90 mm Hg and without recent decrease greater than 40 mm Hg from       baseline in adult or child 10 years or older       * Milestone   Additional Notes   DATE: 10/14/22  M/S tele         PERTINENT UPDATES:   CK level & LFTs remains elevated. Still on IV hydration, on Lovenox SC. H&H are still low. ABNL/PERTINENT LABS/RADIOLOGY/DIAGNOSTIC STUDIES:   Total CK    11,190 H      Protein    5.9 L   Albumin    2.9 L   Alb/Glob.   1.0 L   ALT & AST  180/307 H      WBC       3.3 L   H&H      11.4/36.1 L         PHYSICAL EXAM:   Respiratory:  Normal respiratory effort. Clear to auscultation, bilaterally without Rales/Wheezes/Rhonchi. Cardiovascular: Regular rate and rhythm with normal S1/S2 without murmurs, rubs or gallops. Abdomen: Soft, non-tender, non-distended with normal bowel sounds. Musculoskeletal: No clubbing, cyanosis or edema bilaterally. Full range of motion without deformity         MD CONSULTS/ASSESSMENT AND PLAN:   NEPHRO:   Assessment/Plan : 1. Rhabdomyolysis likely 2/2 Statins    Continue iv fluids    Monitor renal function        Check IDALIA    Check hepatitis panel        2. Anemia. Hb 11.4  monitor          IM:   Assessment/Plan:     ·Elevated troponin level not due to acute coronary syndrome    ·Elevated liver enzymes   ·Elevated LFTs    ·CKD (chronic kidney disease), stage III    ·HTN (hypertension)         Rhabdomyolysis: Statin induced. CPK levels remain elevated. Elevated liver enzymes: Alk phos within normal limits. AST and ALT elevated. Due to rhabdomyolysis. Hepatitis panel pending. Vitamin B12 deficiency: Started on supplementation. HLD: Monitor on Cozaar and Lopressor. Maxzide on hold. DVT Prophylaxis: Lovenox   Diet: ADULT DIET; Regular; Low Fat/Low Chol/High Fiber/2 gm Na         ORDERS:   Tele monitoring, Call for urine ouput less than 0.5mL/kg/hr.         Myocardial Infarction - Care Day 3 (10/13/2022) by Nadeen Wells RN       Review Status Review Entered   Completed 10/14/2022 1251       Created By   Nadeen Wells RN      Criteria Review      Care Day: 3 Care Date: 10/13/2022 Level of Care: Inpatient Floor    Guideline Day 3    Level Of Care    ( ) Intermediate care or telemetry to discharge    10/14/2022 12:39 PM EDT by Jeff CONTRERAS/S tele    Clinical Status    (X) * Hemodynamic stability    10/14/2022 12:50 PM EDT by Katty Kerr:  T: 98.3  RR: 16  WV: 87  BP: 154/87  94% on ra    (X) * Chest pain, dyspnea, or anginal equivalent absent    10/14/2022 12:50 PM EDT by Rachel Liriano      none noted    (X) * No evidence of bleeding    10/14/2022 12:50 PM EDT by Rachel Liriano      none noted    (X) * No evidence of recurrent myocardial ischemia    10/14/2022 12:50 PM EDT by Rachel Liriano      none noted    (X) * Dangerous arrhythmia absent    10/14/2022 12:51 PM EDT by Rachel Liriano      NSR EKG from 10/12    (X) * Vascular access site without evidence of infection, aneurysm, or growing hematoma    10/14/2022 12:50 PM EDT by Rachel Liriano      none noted    (X) * Renal function at baseline or acceptable for next level of care    10/14/2022 12:50 PM EDT by Rachel Liriano      baseline    ( ) * Discharge plans and education understood    Activity    ( ) * Ambulatory or acceptable for next level of care    10/14/2022 12:46 PM EDT by Rimma Fraire as negro., Strict bedrest as ordered. Routes    (X) * Oral hydration    10/14/2022 12:40 PM EDT by Rachel Liriano      PO hydration, 0.9 Nacl 150ml/hr CONT. IV    (X) * Oral medications or regimen acceptable for next level of care    (X) * Oral diet or acceptable for next level of care    10/14/2022 12:39 PM EDT by Jose D Oliver DIET;  Regular; Low Fat/Low Chol/High Fiber/2 gm Na    Interventions    (X) Smoking cessation counseling if needed    10/14/2022 12:46 PM EDT by Rachel Liriano      Prior to DC as ordered    Medications    ( ) * Anticoagulants absent    10/14/2022 12:39 PM EDT by Rachel Liriano      Lovenox 40mg Nightly SC    (X) Antiplatelet agents (eg, aspirin, clopidogrel, ticagrelor)    10/14/2022 12:39 PM EDT by Rachel Liriano      ASA 81mg Daily PO    (X) Beta-blocker    10/14/2022 12:39 PM EDT by Rachel Liriano      lopressor 25mg BID PO    (X) Possible ACE inhibitor or ARB    10/14/2022 12:39 PM EDT by Rachel Corrales 50mg DAily PO       Definitions for Care Day 3    Hemodynamic stability    (X) Hemodynamic stability, as indicated by  1 or more  of the following :       (X) Patient hemodynamically stable, as indicated by  ALL  of the following  (1) (2) (3) (4) (5):          (X) Tachycardia absent          (X) Hypotension absent          (X) No evidence of inadequate perfusion (eg, no myocardial ischemia)          (X) No other hemodynamic abnormalities (eg, no Orthostatic hypotension)       Tachycardia absent    (X) Tachycardia absent, as indicated by  1 or more  of the following  (1) (2):       (X) Heart rate less than or equal to 100 beats per minute in adult or child 6 years or older       Hypotension absent    (X) Hypotension absent, as indicated by  1 or more  of the following  (1) (2) (3) (4):       (X) SBP greater than or equal to 90 mm Hg and without recent decrease greater than 40 mm Hg from       baseline in adult or child 10 years or older       * Milestone   Additional Notes   DATE: 10/13/22 M/S tele         PERTINENT UPDATES:   CK and LFTs remain elevated. Still on IV Hydration. Lovenox SC.   H&H are low. ABNL/PERTINENT LABS/RADIOLOGY/DIAGNOSTIC STUDIES:   Total CK       10,942 H      Protein         5.9 L   Albumin         2.8 L   Alb/Glob ratio   0.9 L   ALT & AST       173/287 H      WBC      3.6 L   H&H      11/34.4 L         PHYSICAL EXAM:   Respiratory:  Normal respiratory effort. Clear to auscultation, bilaterally without Rales/Wheezes/Rhonchi. Cardiovascular: Regular rate and rhythm with normal S1/S2 without murmurs, rubs or gallops. Musculoskeletal: No clubbing, cyanosis or edema bilaterally. Full range of motion without deformity         MD CONSULTS/ASSESSMENT AND PLAN:   NEPHRO:   Assessment/Plan :   1. Rhabdomyolysis  likely 2/2 Statins      Stop rosuvastatin. Continue iv fluids      Monitor renal function    2. HTN     Controlled off meds. 3. Anemia. Hb 11. 0 monitor    4. Acid- base disorder. Monitor      Continue iv fluids    Monitor UOP           IM:   Assessment/Plan:     ·Elevated troponin level not due to acute coronary syndrome    ·Elevated liver enzymes    ·Elevated LFTs    ·CKD (chronic kidney disease), stage III    ·HTN (hypertension)         Rhabdomyolysis:  Statin induced. CRP wnl. ESR 61. Aldolase pending. Elevated liver enzymes:   MRCP: Cholelithiasis without choledocholithiasis or CBD dilatation. Liver lesion:   Incidental finding on ultrasound. MRI consistent with hemangioma. Pancreatic cyst:   MRI: Pancreatic uncinate process with a 1.5 x 1.4 cm cystic lesion. IPMN. 6-month follow-up recommended. DVT Prophylaxis: Lovenox   Diet: ADULT DIET; Regular; Low Fat/Low Chol/High Fiber/2 gm Na         ORDERS:   Tele monitoring, Call for urine ouput less than 0.5mL/kg/hr.

## 2022-10-15 LAB
A/G RATIO: 1.2 (ref 1.1–2.2)
ALBUMIN SERPL-MCNC: 2.8 G/DL (ref 3.4–5)
ALP BLD-CCNC: 57 U/L (ref 40–129)
ALT SERPL-CCNC: 176 U/L (ref 10–40)
ANION GAP SERPL CALCULATED.3IONS-SCNC: 7 MMOL/L (ref 3–16)
ANTI-NUCLEAR ANTIBODY (ANA): POSITIVE
AST SERPL-CCNC: 322 U/L (ref 15–37)
BILIRUB SERPL-MCNC: <0.2 MG/DL (ref 0–1)
BUN BLDV-MCNC: 10 MG/DL (ref 7–20)
CALCIUM SERPL-MCNC: 8.2 MG/DL (ref 8.3–10.6)
CHLORIDE BLD-SCNC: 112 MMOL/L (ref 99–110)
CO2: 23 MMOL/L (ref 21–32)
CREAT SERPL-MCNC: 0.7 MG/DL (ref 0.8–1.3)
GFR AFRICAN AMERICAN: >60
GFR NON-AFRICAN AMERICAN: >60
GLUCOSE BLD-MCNC: 86 MG/DL (ref 70–99)
POTASSIUM SERPL-SCNC: 3.8 MMOL/L (ref 3.5–5.1)
SODIUM BLD-SCNC: 142 MMOL/L (ref 136–145)
TOTAL CK: ABNORMAL U/L (ref 39–308)
TOTAL PROTEIN: 5.1 G/DL (ref 6.4–8.2)

## 2022-10-15 PROCEDURE — 86235 NUCLEAR ANTIGEN ANTIBODY: CPT

## 2022-10-15 PROCEDURE — 6370000000 HC RX 637 (ALT 250 FOR IP): Performed by: INTERNAL MEDICINE

## 2022-10-15 PROCEDURE — 6360000002 HC RX W HCPCS: Performed by: INTERNAL MEDICINE

## 2022-10-15 PROCEDURE — 86663 EPSTEIN-BARR ANTIBODY: CPT

## 2022-10-15 PROCEDURE — 84182 PROTEIN WESTERN BLOT TEST: CPT

## 2022-10-15 PROCEDURE — 2580000003 HC RX 258: Performed by: INTERNAL MEDICINE

## 2022-10-15 PROCEDURE — 86664 EPSTEIN-BARR NUCLEAR ANTIGEN: CPT

## 2022-10-15 PROCEDURE — 99233 SBSQ HOSP IP/OBS HIGH 50: CPT | Performed by: INTERNAL MEDICINE

## 2022-10-15 PROCEDURE — 82550 ASSAY OF CK (CPK): CPT

## 2022-10-15 PROCEDURE — 86694 HERPES SIMPLEX NES ANTBDY: CPT

## 2022-10-15 PROCEDURE — 83516 IMMUNOASSAY NONANTIBODY: CPT

## 2022-10-15 PROCEDURE — 2060000000 HC ICU INTERMEDIATE R&B

## 2022-10-15 PROCEDURE — 36415 COLL VENOUS BLD VENIPUNCTURE: CPT

## 2022-10-15 PROCEDURE — 80053 COMPREHEN METABOLIC PANEL: CPT

## 2022-10-15 PROCEDURE — 86015 ACTIN ANTIBODY EACH: CPT

## 2022-10-15 PROCEDURE — 86665 EPSTEIN-BARR CAPSID VCA: CPT

## 2022-10-15 RX ORDER — PREDNISONE 10 MG/1
10 TABLET ORAL DAILY
Status: DISCONTINUED | OUTPATIENT
Start: 2022-10-15 | End: 2022-10-19

## 2022-10-15 RX ADMIN — ASPIRIN 81 MG 81 MG: 81 TABLET ORAL at 10:00

## 2022-10-15 RX ADMIN — SODIUM CHLORIDE: 9 INJECTION, SOLUTION INTRAVENOUS at 03:51

## 2022-10-15 RX ADMIN — Medication 10 ML: at 11:33

## 2022-10-15 RX ADMIN — Medication 10 ML: at 10:00

## 2022-10-15 RX ADMIN — PREDNISONE 10 MG: 10 TABLET ORAL at 13:32

## 2022-10-15 RX ADMIN — MULTIPLE VITAMINS W/ MINERALS TAB 1 TABLET: TAB at 10:00

## 2022-10-15 RX ADMIN — ENOXAPARIN SODIUM 40 MG: 100 INJECTION SUBCUTANEOUS at 22:07

## 2022-10-15 RX ADMIN — CYANOCOBALAMIN TAB 1000 MCG 1000 MCG: 1000 TAB at 10:00

## 2022-10-15 RX ADMIN — Medication 1000 UNITS: at 10:00

## 2022-10-15 RX ADMIN — Medication 10 ML: at 22:08

## 2022-10-15 RX ADMIN — METOPROLOL TARTRATE 25 MG: 25 TABLET, FILM COATED ORAL at 22:07

## 2022-10-15 RX ADMIN — METOPROLOL TARTRATE 25 MG: 25 TABLET, FILM COATED ORAL at 10:00

## 2022-10-15 NOTE — PROGRESS NOTES
Nursing assessment completed. VSS. Generalized swelling has decreased. Pt needs met during shift. Will cont to monitor.

## 2022-10-15 NOTE — PROGRESS NOTES
Hospitalist Progress Note      PCP: Yefri Souza MD    Date of Admission: 10/10/2022    Chief Complaint: Shortness of breath    Hospital Course: Rema Yancey is 78 y.o. male with history of HTN, HLD and CKD. He presented to the ER with complaint of shortness of breath and leg swelling which started about 10 days ago, worse on exertion relieved by rest.  He was evaluated at an urgent care facility where bilateral leg swelling was noted and was sent to the ED for further evaluation. In the ED troponin was elevated to 0.9 but without any chest pain or chest discomfort. Per cardiology recommendation, he was started on low-dose heparin drip and admitted for further evaluation. Subjective:Patient seen and examined. Denies any chest pain or shortness of breath. Mild bilateral thigh pain but no weakness. CK and LFTs remain elevated.         Medications:  Reviewed    Infusion Medications    sodium chloride 50 mL/hr at 10/15/22 1123    sodium chloride      sodium chloride       Scheduled Medications    predniSONE  10 mg Oral Daily    vitamin B-12  1,000 mcg Oral Daily    enoxaparin  40 mg SubCUTAneous Nightly    sodium chloride flush  5-40 mL IntraVENous 2 times per day    aspirin  81 mg Oral Daily    losartan  50 mg Oral Daily    metoprolol tartrate  25 mg Oral BID    [Held by provider] triamterene-hydroCHLOROthiazide  1 tablet Oral Daily    therapeutic multivitamin-minerals  1 tablet Oral Daily    Vitamin D  1,000 Units Oral Daily    sodium chloride flush  5-40 mL IntraVENous 2 times per day     PRN Meds: sodium chloride flush, sodium chloride, ondansetron **OR** ondansetron, acetaminophen **OR** acetaminophen, polyethylene glycol, nitroGLYCERIN, perflutren lipid microspheres, sodium chloride flush, sodium chloride    No intake or output data in the 24 hours ending 10/15/22 1330      Physical Exam Performed:    BP (!) 160/71   Pulse 81   Temp 97.9 °F (36.6 °C) (Axillary)   Resp 16   Ht 6' 2\" (1.88 m)   Wt 246 lb 0.5 oz (111.6 kg)   SpO2 95%   BMI 31.59 kg/m²     General appearance: No apparent distress, appears stated age and cooperative. HEENT: Pupils equal, round, and reactive to light. Conjunctivae/corneas clear. Neck: Supple, with full range of motion. No jugular venous distention. Trachea midline. Respiratory:  Normal respiratory effort. Clear to auscultation, bilaterally without Rales/Wheezes/Rhonchi. Cardiovascular: Regular rate and rhythm with normal S1/S2 without murmurs, rubs or gallops. Abdomen: Soft, non-tender, non-distended with normal bowel sounds. Musculoskeletal: No clubbing, cyanosis or edema bilaterally. Full range of motion without deformity. Skin: Skin color, texture, turgor normal.  No rashes or lesions. Neurologic:  Neurovascularly intact without any focal sensory/motor deficits. Cranial nerves: II-XII intact, grossly non-focal.  Psychiatric: Alert and oriented, thought content appropriate, normal insight  Capillary Refill: Brisk, 3 seconds, normal   Peripheral Pulses: +2 palpable, equal bilaterally       Labs:   Recent Labs     10/13/22  0555 10/14/22  0603   WBC 3.6* 3.3*   HGB 11.0* 11.4*   HCT 34.4* 36.1*    161     Recent Labs     10/13/22  0555 10/14/22  0603 10/15/22  0618    138 142   K 3.7 4.0 3.8    107 112*   CO2 22 23 23   BUN 17 12 10   CREATININE 0.9 0.9 0.7*   CALCIUM 8.7 8.6 8.2*   PHOS 4.1 3.6  --      Recent Labs     10/13/22  0555 10/14/22  0603 10/15/22  0618   * 307* 322*   * 180* 176*   BILITOT 0.3 0.3 <0.2   ALKPHOS 60 59 57     No results for input(s): INR in the last 72 hours.   Recent Labs     10/13/22  0555 10/14/22  0603 10/15/22  0618   CKTOTAL 10,942* 11,190* 10,841*       Urinalysis:      Lab Results   Component Value Date/Time    NITRU Negative 10/12/2022 06:14 PM    WBCUA 1 10/12/2022 06:14 PM    BACTERIA None Seen 10/12/2022 06:14 PM    RBCUA 2 10/12/2022 06:14 PM    BLOODU LARGE 10/12/2022 06:14 PM SPECGRAV >=1.030 10/12/2022 06:14 PM    GLUCOSEU Negative 10/12/2022 06:14 PM       Radiology:  MRI ABDOMEN W WO CONTRAST MRCP   Final Result   1. Cholelithiasis without evidence of acute cholecystitis. 2. No biliary ductal dilatation or choledocholithiasis. 3. Within the liver, there is a 1.8 cm hemangioma near the gallbladder. 4. Simple and mildly complicated (Bosniak 1 and 2) bilateral renal cysts   measuring up to 8.4 cm. No dedicated follow-up recommended. 5. In the pancreatic uncinate process, there is a 1.5 x 1.4 cm cystic lesion   which could represent a branch duct IPMN. Recommend 6 month follow-up. RECOMMENDATIONS:   Incidental Pancreatic Cysts      65-79 years at presentation      1.5-2.5 cm      Main pancreatic duct communication absent or cannot be determined: Pancreas   protocol MRI (or CT) in 6 months, consider EUS/FNA      Reference: Tyra et al. Management of incidental pancreatic cysts: A white   paper of the ACR Incidental Findings Committee. [address] [place] [address]   1898;04:788-580. US GALLBLADDER RUQ   Final Result   Mild hydrops of the gallbladder and associated 1.6 cm gallstone with no   secondary signs of cholecystitis. 2 cm rounded echogenic focus in the right lobe of the liver which is   suspicious for a liver hemangioma. Recommend a follow-up CT scan with   contrast further characterization. Borderline enlargement of the common bile duct and partial obscuration of the   pancreas due to overlying bowel gas. 7 cm cyst in the right kidney inferiorly with a thin septation in the cyst in   keeping with a Bosniak 2 cyst.         CT CHEST PULMONARY EMBOLISM W CONTRAST   Final Result   No evidence of a pulmonary embolus. Mildly dilated and atherosclerotic thoracic aorta with no aneurysm or   dissection. Minimal bibasilar atelectasis or scarring posteriorly. Suggest follow-up   with chest x-rays.       8 cm benign cyst upper pole left kidney Assessment/Plan:    Active Hospital Problems    Diagnosis     Elevated troponin level not due to acute coronary syndrome [R77.8]      Priority: Medium    Elevated liver enzymes [R74.8]      Priority: Medium    Elevated LFTs [R79.89]      Priority: Medium    CKD (chronic kidney disease), stage III (HCC) [N18.30]     HTN (hypertension) [I10]      Rhabdomyolysis: ?  Statin induced autoimmune myositis. No preceding trauma or known muscle injury. No muscle weakness noted. Mild bilateral thigh pain. CPK levels remain elevated despite IV hydration. Crestor 40 mg daily discontinued. Monitor CPK levels with IV hydration. CRP wnl. ESR 61. Aldolase level elevated. IDALIA positive. Started empirically on prednisone 10/15/2022. Possible muscle biopsy on Monday. Surgery consult requested. Elevated liver enzymes: Alk phos within normal limits. AST and ALT elevated. ?  Due to rhabdomyolysis vs. autoimmune hepatitis. Hepatitis panel negative. RUQ US with large stone but no evidence of Cholecystitis. CBD mildly elevated. MRCP: Cholelithiasis without choledocholithiasis or CBD dilatation. Off Crestor. Monitor LFT's.  GI consulted: Appreciate input. Autoimmune work-up ordered. Elevated troponin:  Peak 0.9 trended down to 0.64. Cardiology consulted: Suspected to be related to rhabdomyolysis. status post [facility] 10/11/2022 with no findings of obstructive CAD. ECHO unremarkable. Heparin drip discontinued. Cardiology recommended medical management with outpatient cardiac MRI and repeat sed rate and CRP in 2 weeks. Vitamin B12 deficiency: Started on supplementation. HLD: Monitor on Cozaar and Lopressor. Maxzide on hold. Liver lesion:  Incidental finding on ultrasound. MRI consistent with hemangioma. Pancreatic cyst:  MRI: Pancreatic uncinate process with a 1.5 x 1.4 cm cystic lesion? IPMN. 6-month follow-up recommended.     Mild leukopenia:  Continue monitoring CBC.        DVT Prophylaxis: Lovenox  Diet: ADULT DIET; Regular; Low Fat/Low Chol/High Fiber/2 gm Na  Code Status: Full Code  PT/OT Eval Status: Independent    Dispo -Home once medically stable.       Gage Patino MD

## 2022-10-15 NOTE — CONSULTS
Consultation Note    Patient Name: Alejandro Gatica  : 1945  Age: 78 y.o. Admitting Physician: Mary Ann MD   Date of Admission: 10/10/2022  8:45 PM   Primary Care Physician: Avelino Oviedo MD        Alejandro Gatica is being seen at the request of Mary Ann MD for abnormal liver enzyme. History of Present Illness:  Patient is a 44-year-old gentleman who has past medical history significant for CKD/HTN/HLD. He presented to the hospital on 10/11/2022 with complaints of shortness of breath going on for past few days before that and leg swelling. On admission he was noted to have elevated troponins. Cardiology was consulted with the diagnosis for NSTEMI. He underwent cardiac cath which showed minimal CAD and was advised medical management. Subsequently was noted to have elevated CK levels of more than 11,000, nephrology was consulted. Over the course of time he was given IV fluids for rhabdomyolysis probably secondary to statins which were held. CK levels did not improve despite fluid resuscitation. Also noted to have elevated transaminases of AST//232 (10/11/22). Today /. Patient denies any regular alcohol intake other than on social occasions once in 3 to 4 months  He denies any change in his medications  Takes over-the-counter multivitamin and denies taking any supplements    Acute hepatitis panel negative  IDALIA was positive. Reflex is pending  Aldolase elevated  ESR elevated  CRP normal      Last colonoscopy by Dr. Cash Souza in .   Noted to have diverticulosis and hemorrhoid        Past Medical History:  Past Medical History:   Diagnosis Date    Arthritis     Colon polyp     Diabetes mellitus ([facility] [address]     denies being diabetic    Hyperlipemia     Hypertension     Kidney disease     Rheumatoid arthritis [facility])         Past Surgical History:  Past Surgical History:   Procedure Laterality Date    COLONOSCOPY      COLONOSCOPY N/A 2020 COLONOSCOPY DIAGNOSTIC performed by Angely Montero MD at [id number] [place] [address] [address] Left         Historical Medications:  Prior to Visit Medications    Medication Sig Taking? Authorizing Provider   metoprolol tartrate (LOPRESSOR) 25 MG tablet TAKE 1 TABLET TWICE A DAY  Patient taking differently: 25 mg daily TAKE 1 TABLET TWICE A DAY  Eva Doss MD   rosuvastatin (CRESTOR) 40 MG tablet TAKE 1 TABLET EVERY EVENING  Eva Doss MD   losartan (COZAAR) 50 MG tablet TAKE 1 TABLET DAILY  Jennifer Araujo MD   triamterene-hydroCHLOROthiazide (YASFZBF-08) 37.5-25 MG per tablet TAKE 1 TABLET DAILY  Patient not taking: Reported on 10/11/2022  Jennifer Araujo MD   sildenafil (VIAGRA) 100 MG tablet Take 1 tablet by mouth daily as needed for Erectile Dysfunction  Jennifer Araujo MD   clobetasol (TEMOVATE) 0.05 % ointment Apply topically to bilateral feet 2 times daily. Patient not taking: Reported on 10/11/2022  Jennifer Araujo MD   Multiple Vitamins-Minerals (THERAPEUTIC MULTIVITAMIN-MINERALS) tablet Take 1 tablet by mouth daily  Historical Provider, MD   Vitamin D (CHOLECALCIFEROL) 1000 UNITS CAPS capsule Take 1,000 Units by mouth daily.   Historical Provider, MD        Hospital Medications:  Current Facility-Administered Medications: predniSONE (DELTASONE) tablet 10 mg, 10 mg, Oral, Daily  vitamin B-12 (CYANOCOBALAMIN) tablet 1,000 mcg, 1,000 mcg, Oral, Daily  enoxaparin (LOVENOX) injection 40 mg, 40 mg, SubCUTAneous, Nightly  0.9 % sodium chloride infusion, , IntraVENous, Continuous  sodium chloride flush 0.9 % injection 5-40 mL, 5-40 mL, IntraVENous, 2 times per day  sodium chloride flush 0.9 % injection 5-40 mL, 5-40 mL, IntraVENous, PRN  0.9 % sodium chloride infusion, , IntraVENous, PRN  ondansetron (ZOFRAN-ODT) disintegrating tablet 4 mg, 4 mg, Oral, Q8H PRN **OR** ondansetron (ZOFRAN) injection 4 mg, 4 mg, IntraVENous, Q6H PRN  acetaminophen (TYLENOL) tablet 650 mg, 650 mg, Oral, Q6H PRN **OR** acetaminophen (TYLENOL) suppository 650 mg, 650 mg, Rectal, Q6H PRN  polyethylene glycol (GLYCOLAX) packet 17 g, 17 g, Oral, Daily PRN  aspirin chewable tablet 81 mg, 81 mg, Oral, Daily  nitroGLYCERIN (NITROSTAT) SL tablet 0.4 mg, 0.4 mg, SubLINGual, Q5 Min PRN  losartan (COZAAR) tablet 50 mg, 50 mg, Oral, Daily  metoprolol tartrate (LOPRESSOR) tablet 25 mg, 25 mg, Oral, BID  [Held by provider] triamterene-hydroCHLOROthiazide (MAXZIDE-25) 37.5-25 MG per tablet 1 tablet, 1 tablet, Oral, Daily  therapeutic multivitamin-minerals 1 tablet, 1 tablet, Oral, Daily  Vitamin D (CHOLECALCIFEROL) tablet 1,000 Units, 1,000 Units, Oral, Daily  perflutren lipid microspheres (DEFINITY) injection 1.65 mg, 1.5 mL, IntraVENous, ONCE PRN  sodium chloride flush 0.9 % injection 5-40 mL, 5-40 mL, IntraVENous, 2 times per day  sodium chloride flush 0.9 % injection 5-40 mL, 5-40 mL, IntraVENous, PRN  0.9 % sodium chloride infusion, , IntraVENous, PRN     Social History:   Social History       Tobacco History       Smoking Status  Former Smoking Start Date  1974 Quit Date  2/11/1984 Smoking Frequency  0.50 packs/day for 10.00 years (5.00 pk-yrs)    Smoking Tobacco Type  Cigarettes from 1974 to 2/11/1984      Smokeless Tobacco Use  Never      Tobacco Comments  quit smoking 30 years               Alcohol History       Alcohol Use Status  Yes Comment  rare              Drug Use       Drug Use Status  No              Sexual Activity       Sexually Active  Not Asked                     Family History:  Family History   Problem Relation Age of Onset    High Blood Pressure Sister         Allergies:  No Known Allergies     ROS:   General: No fever or weight change  Hematologic: No unexpected submucosal bleeding or bruising  HEENT: No sore throat or facial pain  Respiratory: No cough or dyspnea  Cardiovascular: No angina or dependent edema  Gastrointestinal: See HPI  Musculoskeletal: No usual joint pain or stiffness  Skin: No skin eruptions or changing lesions  Neurologic: No focal weakness or numbness  Psychiatric: No anxiety or sleep disturbance    Physical Exam:  Vital Signs:   Vitals:    10/15/22 1000   BP: (!) 160/71   Pulse: 81   Resp: 16   Temp: 97.9 °F (36.6 °C)   SpO2: 95%       General: Well-nourished, well-developed  HEENT: Sclera anicteric, mucosal membranes moist  Cardiovascular: Regular rate and rhythm. No murmurs. Respiratory: Respirations nonlabored, no crepitus  GI: Abdomen nondistended, soft, and nontender. Normal active bowel sounds. No masses palpable. Rectal: Deferred  Musculoskeletal: No pitting edema of the lower legs. Neurological: Gross memory appears intact. Patient is alert and oriented      Recent Imaging:   MRI ABDOMEN W WO CONTRAST MRCP  Narrative: EXAMINATION:  MRI OF THE ABDOMEN WITH AND WITHOUT CONTRAST AND MRCP 10/12/2022 4:56 pm    TECHNIQUE:  Multiplanar multisequence MRI of the abdomen was performed without and with  the administration of 20 mL MultiHance intravenous contrast.  After initial  T2 axial and coronal images, thick slab, thin slab and 3D coronal MRCP  sequences were obtained without the administration of intravenous contrast.  MIP images are provided for review. COMPARISON:  Ultrasound from yesterday    HISTORY:  Elevated LFTs. Cholelithiasis. FINDINGS:  Liver demonstrates normal morphology without steatosis. Adjacent to the  gallbladder, there is a 1.8 cm T2 hyperintense lesion with enhancement  characteristics of a cavernous hemangioma. Hepatic vasculature is patent. Gallbladder contains a 1.9 cm stone but there is no gallbladder wall  thickening or pericholecystic edema. No biliary ductal dilatation or  intraductal filling defect. Best seen on the MRCP sequences, there is a cystic lesion in the pancreatic  uncinate process measuring 1.5 x 1.4 cm. No abnormal enhancement or  restricted diffusion seen.     Spleen and adrenals are unremarkable. Mildly complicated bilateral renal  cysts measure up to 8.4 cm and there are smaller simple cysts. Symmetric  enhancement of the kidneys without hydronephrosis. No ascites or significant  lymphadenopathy. Visualized osseous structures and gastrointestinal tract  are unremarkable. Abdominal aorta is normal caliber. Impression: 1. Cholelithiasis without evidence of acute cholecystitis. 2. No biliary ductal dilatation or choledocholithiasis. 3. Within the liver, there is a 1.8 cm hemangioma near the gallbladder. 4. Simple and mildly complicated (Bosniak 1 and 2) bilateral renal cysts  measuring up to 8.4 cm. No dedicated follow-up recommended. 5. In the pancreatic uncinate process, there is a 1.5 x 1.4 cm cystic lesion  which could represent a branch duct IPMN. Recommend 6 month follow-up. RECOMMENDATIONS:  Incidental Pancreatic Cysts    65-79 years at presentation    1.5-2.5 cm    Main pancreatic duct communication absent or cannot be determined: Pancreas  protocol MRI (or CT) in 6 months, consider EUS/FNA    Reference: Tyra et al. Management of incidental pancreatic cysts: A white  paper of the ACR Incidental Findings Committee. [address] [place] [address];04:965-654. Labs:   Recent Labs     10/13/22  0555 10/14/22  0603 10/15/22  0618   HGB 11.0* 11.4*  --    WBC 3.6* 3.3*  --    PROT 5.9* 5.9* 5.1*   LABALBU 2.8* 2.9* 2.8*   ALKPHOS 60 59 57   * 180* 176*   * 307* 322*   BILITOT 0.3 0.3 <0.2        Assessment:    79-year-old male who presented to the hospital with complaints of shortness of breath and bilateral leg swelling with elevated troponins and CK, underwent cardiac cath showing only minimal disease, recommended medical management. He was was also noted to have persistent elevation of CK levels of 10,000 and elevated transaminases which are improving. IDALIA was positive with reflex being pending, hepatitis panel negative.     At this point elevated transaminases could be related to rhabdo myolysis. Given that IDALIA was positive there was some concern about autoimmune myositis causing rhabdomyolysis. Nephrology is thinking about possible muscle biopsy. Plan:  Trend LFTs  We will do autoimmune work-up  We will continue to follow      Hannah Dodson MD    [id number]  Also available via [employer]

## 2022-10-15 NOTE — PROGRESS NOTES
[facility]   Patient:   Venu Galarza    YOB: 1945  MRN:   [id number]  Location: [facility]    [id number]   10/15/22 2:33 PM  [address] or Facility: [facility] From: Corey Frias RE: Shelley Ivory 1945 RM: [id number] Hello Dr. Burgess Found, Dr. Angela Pryor called and requested that we get a cardiologist to okay this patient before doing surgery. Need Callback: NO CALLBACK REQ 3T  Read 2:51 PM   10/15/22 2:52 PM   Cardiology already saw patient and had cath. No cardiac disease. 10/15/22 5:47 PM  The patient has increase swelling in his abdominal area and a +2 swelling BLE. FLuids are running at 50mL/hr. Read 5:47 PM   10/15/22 5:52 PM   It was cut down from 150. Pls continue for now. Will reassess tomorrow.  Is he SOB?   10/15/22 6:36 PM  No, he doesnt have SOB  Read 6:36 PM

## 2022-10-15 NOTE — PROGRESS NOTES
Office : [phone]     Fax :[phone]       Nephrology progress  Note      Patient's Name: Vivian Velasquez  9:37 AM  10/15/2022    Reason for Consult:  Rhabdomyolysis       Requesting Physician:  Yeimi Ludwig MD      Chief Complaint:    Chief Complaint   Patient presents with    Leg Swelling     Patient in with complaints of bilateral edema in his legs that is new. PCP told him to come into the er. History of Present iIlness:      Vivian Velasquez is a 78 y.o. male with prior history of CKD, HTN, RA , simple renal cyst who came to ER with c/o pain in thighs. He was found to have elevated CK levels. Denies any trauma. Denies any heavy exercise. [place] at [address] . Has h/o CKD 2/2 NSaids which improved. 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 Denies any muscle pain . Making urine. Renal function stable . Denies any recent infection. I/O last 3 completed shifts:   In: 240 [P.O.:240]  Out: -     Past Medical History:   Diagnosis Date    Arthritis     Colon polyp     Diabetes mellitus ([facility] [address]     denies being diabetic    Hyperlipemia     Hypertension     Kidney disease     Rheumatoid arthritis ([facility] [address]        Past Surgical History:   Procedure Laterality Date    COLONOSCOPY      COLONOSCOPY N/A 8/14/2020    COLONOSCOPY DIAGNOSTIC performed by Katarzyna Ruelas MD at [address]        Family History   Problem Relation Age of Onset    High Blood Pressure Sister          Current Medications:    vitamin B-12 (CYANOCOBALAMIN) tablet 1,000 mcg, Daily  enoxaparin (LOVENOX) injection 40 mg, Nightly  0.9 % sodium chloride infusion, Continuous  sodium chloride flush 0.9 % injection 5-40 mL, 2 times per day  sodium chloride flush 0.9 % injection 5-40 mL, PRN  0.9 % sodium chloride infusion, PRN  ondansetron (ZOFRAN-ODT) disintegrating tablet 4 mg, Q8H PRN   Or  ondansetron (ZOFRAN) injection 4 mg, Q6H PRN  acetaminophen (TYLENOL) tablet 650 mg, Q6H PRN   Or  acetaminophen (TYLENOL) suppository 650 mg, Q6H PRN  polyethylene glycol (GLYCOLAX) packet 17 g, Daily PRN  aspirin chewable tablet 81 mg, Daily  nitroGLYCERIN (NITROSTAT) SL tablet 0.4 mg, Q5 Min PRN  losartan (COZAAR) tablet 50 mg, Daily  metoprolol tartrate (LOPRESSOR) tablet 25 mg, BID  [Held by provider] triamterene-hydroCHLOROthiazide (MAXZIDE-25) 37.5-25 MG per tablet 1 tablet, Daily  therapeutic multivitamin-minerals 1 tablet, Daily  Vitamin D (CHOLECALCIFEROL) tablet 1,000 Units, Daily  perflutren lipid microspheres (DEFINITY) injection 1.65 mg, ONCE PRN  sodium chloride flush 0.9 % injection 5-40 mL, 2 times per day  sodium chloride flush 0.9 % injection 5-40 mL, PRN  0.9 % sodium chloride infusion, PRN        Physical exam:     Vitals:  /76   Pulse 76   Temp 98.3 °F (36.8 °C) (Oral)   Resp 16   Ht 6' 2\" (1.88 m)   Wt 246 lb 0.5 oz (111.6 kg)   SpO2 93%   BMI 31.59 kg/m²   Constitutional:  OAA X3 NAD  Skin: no rash, turgor wnl  Heent:  eomi, mmm  Neck: no bruits or jvd noted  Cardiovascular:  S1, S2 without m/r/g  Respiratory: CTA B without w/r/r  Abdomen:  +bs, soft, nt, nd  Ext: no  lower extremity edema      Labs:  CBC:   Recent Labs     10/13/22  0555 10/14/22  0603   WBC 3.6* 3.3*   HGB 11.0* 11.4*    161     BMP:    Recent Labs     10/13/22  0555 10/14/22  0603 10/15/22  0618    138 142   K 3.7 4.0 3.8    107 112*   CO2 22 23 23   BUN 17 12 10   CREATININE 0.9 0.9 0.7*   GLUCOSE 89 87 86     Ca/Mg/Phos:   Recent Labs     10/13/22  0555 10/14/22  0603 10/15/22  0618   CALCIUM 8.7 8.6 8.2*   MG 2.10 2.00  --    PHOS 4.1 3.6  --      Hepatic:   Recent Labs     10/13/22  0555 10/14/22  0603 10/15/22  0618   * 307* 322*   * 180* 176*   BILITOT 0.3 0.3 <0.2   ALKPHOS 60 59 57     Troponin:   No results for input(s): TROPONINI in the last 72 hours. BNP: No results for input(s): BNP in the last 72 hours. Lipids:   No results for input(s): CHOL, TRIG, HDL, LDLCALC, LABVLDL in the last 72 hours. ABGs: No results for input(s): PHART, PO2ART, [id number] in the last 72 hours. INR: No results for input(s): INR in the last 72 hours. UA:  Recent Labs     10/12/22  1814   COLORU DARK YELLOW*   CLARITYU CLOUDY*   GLUCOSEU Negative   BILIRUBINUR Negative   KETUA TRACE*   SPECGRAV >=1.030   BLOODU LARGE*   PHUR 5.5   PROTEINU 100*   UROBILINOGEN 1.0   NITRU Negative   LEUKOCYTESUR Negative   LABMICR YES   URINETYPE NotGiven      Urine Microscopic:   Recent Labs     10/12/22  1814   BACTERIA None Seen   HYALCAST 0   WBCUA 1   RBCUA 2   EPIU 2     Urine Culture: No results for input(s): LABURIN in the last 72 hours. Urine Chemistry: No results for input(s): Nick Gails, PROTEINUR, NAUR in the last 72 hours. IMAGING:  MRI ABDOMEN W WO CONTRAST MRCP   Final Result   1. Cholelithiasis without evidence of acute cholecystitis. 2. No biliary ductal dilatation or choledocholithiasis. 3. Within the liver, there is a 1.8 cm hemangioma near the gallbladder. 4. Simple and mildly complicated (Bosniak 1 and 2) bilateral renal cysts   measuring up to 8.4 cm. No dedicated follow-up recommended. 5. In the pancreatic uncinate process, there is a 1.5 x 1.4 cm cystic lesion   which could represent a branch duct IPMN. Recommend 6 month follow-up. RECOMMENDATIONS:   Incidental Pancreatic Cysts      65-79 years at presentation      1.5-2.5 cm      Main pancreatic duct communication absent or cannot be determined: Pancreas   protocol MRI (or CT) in 6 months, consider EUS/FNA      Reference: Tyra et al. Management of incidental pancreatic cysts: A white   paper of the ACR Incidental Findings Committee.  [facility]   3534;58:474-239. US GALLBLADDER RUQ   Final Result   Mild hydrops of the gallbladder and associated 1.6 cm gallstone with no   secondary signs of cholecystitis. 2 cm rounded echogenic focus in the right lobe of the liver which is   suspicious for a liver hemangioma. Recommend a follow-up CT scan with   contrast further characterization. Borderline enlargement of the common bile duct and partial obscuration of the   pancreas due to overlying bowel gas. 7 cm cyst in the right kidney inferiorly with a thin septation in the cyst in   keeping with a Bosniak 2 cyst.         CT CHEST PULMONARY EMBOLISM W CONTRAST   Final Result   No evidence of a pulmonary embolus. Mildly dilated and atherosclerotic thoracic aorta with no aneurysm or   dissection. Minimal bibasilar atelectasis or scarring posteriorly. Suggest follow-up   with chest x-rays. 8 cm benign cyst upper pole left kidney             Assessment/Plan :      1. Rhabdomyolysis  likely 2/2 Statins   Stopped  rosuvastatin. Continue iv fluids   Monitor renal function     IDALIA - positive   Autoimmune process   Specific antibodies ordered    hepatitis panel - negative     Elevated LFTS  Start prednisone 10 mg po daily     Will need muscle biopsy   D/w Dr. Gus Urrutia    2. HTN  Controlled off meds. 3. Anemia. Hb 11.4  monitor     4. Acid- base disorder.   Monitor       Decrease rate of iv fluids '  Monitor UOP             D/w primary team      Thank you for allowing us to participate in care of Jazz Mckeon         Electronically signed by: Ashley Patiño MD, 10/15/2022, 9:37 AM      [facility] [address]  Office : [phone]  Fax :[phone]

## 2022-10-15 NOTE — PROGRESS NOTES
Physician Progress Note      Chen Hernandez  CSN #:                  [id number]  :                       1945  ADMIT DATE:       10/10/2022 8:45 PM  [address] [place] DATE:  RESPONDING  PROVIDER #:        Martín Bowling MD          QUERY TEXT:    Patient admitted with elevated troponin/non-ischemic myocardial injury and   rhabdo, possibly statin induced. Noted documentation of \"troponin elevation   possibly related to myocarditis\" in Cath report. If possible, please document in progress notes and discharge summary if you   are evaluating and /or treating any of the following: The medical record reflects the following:  Risk Factors: elevated troponin  Clinical Indicators: Per PN- \"Rhabdomyolysis: ?  Statin induced. No preceding   trauma or known muscle injury. Crestor 40 mg daily discontinued. \" Per 10/12   PN- \"Would check CPK and if elevated would blame findings on statin induced   changes. \"  Treatment: Labs, statin dc'd. Options provided:  -- Non-ischemic myocardial injury due to Rhabdomyolysis  -- Non-ischemic myocardial injury due to adverse effect of statin  -- Non-ischemic myocardial injury due to other specified  -- Other - I will add my own diagnosis  -- Disagree - Not applicable / Not valid  -- Disagree - Clinically unable to determine / Unknown  -- Refer to Clinical Documentation Reviewer    PROVIDER RESPONSE TEXT:    This patient has Non-ischemic myocardial injury due to Rhabdomyolysis.     Query created by: Jessica Parrish on 10/14/2022 3:09 PM      Electronically signed by:  Martín Bowling MD 10/15/2022 1:23 PM

## 2022-10-15 NOTE — PLAN OF CARE
Consulted for possible muscle biopsy to explain elevated CK. Would require anesthesia. Patient admitted for NSTEMI and need cardiology risk assessment prior to proceeding to OR  Muscle biopsy require special testing, transport that needs arrangements ahead of time. As this is not emergent, will not be able to be done on the weekend regardless  Full consult to follow    Beto Leung MD, FACS  10/15/2022  2:28 PM

## 2022-10-16 PROBLEM — I21.4 NSTEMI (NON-ST ELEVATED MYOCARDIAL INFARCTION) (HCC): Status: ACTIVE | Noted: 2022-10-16

## 2022-10-16 PROBLEM — Z01.810 PREOP CARDIOVASCULAR EXAM: Status: ACTIVE | Noted: 2022-10-16

## 2022-10-16 LAB
A/G RATIO: 1 (ref 1.1–2.2)
ALBUMIN SERPL-MCNC: 2.7 G/DL (ref 3.4–5)
ALP BLD-CCNC: 58 U/L (ref 40–129)
ALT SERPL-CCNC: 182 U/L (ref 10–40)
ANION GAP SERPL CALCULATED.3IONS-SCNC: 7 MMOL/L (ref 3–16)
AST SERPL-CCNC: 300 U/L (ref 15–37)
BASOPHILS ABSOLUTE: 0 K/UL (ref 0–0.2)
BASOPHILS RELATIVE PERCENT: 0.2 %
BILIRUB SERPL-MCNC: 0.3 MG/DL (ref 0–1)
BUN BLDV-MCNC: 11 MG/DL (ref 7–20)
CALCIUM SERPL-MCNC: 8.5 MG/DL (ref 8.3–10.6)
CHLORIDE BLD-SCNC: 112 MMOL/L (ref 99–110)
CO2: 24 MMOL/L (ref 21–32)
CREAT SERPL-MCNC: 0.8 MG/DL (ref 0.8–1.3)
EOSINOPHILS ABSOLUTE: 0.1 K/UL (ref 0–0.6)
EOSINOPHILS RELATIVE PERCENT: 2.1 %
GFR AFRICAN AMERICAN: >60
GFR NON-AFRICAN AMERICAN: >60
GLUCOSE BLD-MCNC: 82 MG/DL (ref 70–99)
HCT VFR BLD CALC: 34.2 % (ref 40.5–52.5)
HEMOGLOBIN: 11 G/DL (ref 13.5–17.5)
INR BLD: 1.01 (ref 0.87–1.14)
LYMPHOCYTES ABSOLUTE: 1.1 K/UL (ref 1–5.1)
LYMPHOCYTES RELATIVE PERCENT: 27.6 %
MCH RBC QN AUTO: 26.1 PG (ref 26–34)
MCHC RBC AUTO-ENTMCNC: 32.1 G/DL (ref 31–36)
MCV RBC AUTO: 81.4 FL (ref 80–100)
MONOCYTES ABSOLUTE: 0.4 K/UL (ref 0–1.3)
MONOCYTES RELATIVE PERCENT: 10.8 %
NEUTROPHILS ABSOLUTE: 2.3 K/UL (ref 1.7–7.7)
NEUTROPHILS RELATIVE PERCENT: 59.3 %
PDW BLD-RTO: 16.9 % (ref 12.4–15.4)
PLATELET # BLD: 160 K/UL (ref 135–450)
PMV BLD AUTO: 8.2 FL (ref 5–10.5)
POTASSIUM SERPL-SCNC: 4 MMOL/L (ref 3.5–5.1)
PROTHROMBIN TIME: 13.2 SEC (ref 11.7–14.5)
RBC # BLD: 4.2 M/UL (ref 4.2–5.9)
SODIUM BLD-SCNC: 143 MMOL/L (ref 136–145)
TOTAL CK: ABNORMAL U/L (ref 39–308)
TOTAL PROTEIN: 5.4 G/DL (ref 6.4–8.2)
WBC # BLD: 3.9 K/UL (ref 4–11)

## 2022-10-16 PROCEDURE — 99222 1ST HOSP IP/OBS MODERATE 55: CPT | Performed by: SURGERY

## 2022-10-16 PROCEDURE — 99233 SBSQ HOSP IP/OBS HIGH 50: CPT | Performed by: INTERNAL MEDICINE

## 2022-10-16 PROCEDURE — 6360000002 HC RX W HCPCS: Performed by: INTERNAL MEDICINE

## 2022-10-16 PROCEDURE — 85610 PROTHROMBIN TIME: CPT

## 2022-10-16 PROCEDURE — 2580000003 HC RX 258: Performed by: INTERNAL MEDICINE

## 2022-10-16 PROCEDURE — 82550 ASSAY OF CK (CPK): CPT

## 2022-10-16 PROCEDURE — 6370000000 HC RX 637 (ALT 250 FOR IP): Performed by: INTERNAL MEDICINE

## 2022-10-16 PROCEDURE — 2060000000 HC ICU INTERMEDIATE R&B

## 2022-10-16 PROCEDURE — 80053 COMPREHEN METABOLIC PANEL: CPT

## 2022-10-16 PROCEDURE — 85025 COMPLETE CBC W/AUTO DIFF WBC: CPT

## 2022-10-16 PROCEDURE — 36415 COLL VENOUS BLD VENIPUNCTURE: CPT

## 2022-10-16 RX ADMIN — Medication 1000 UNITS: at 10:03

## 2022-10-16 RX ADMIN — PREDNISONE 10 MG: 10 TABLET ORAL at 10:04

## 2022-10-16 RX ADMIN — ENOXAPARIN SODIUM 40 MG: 100 INJECTION SUBCUTANEOUS at 20:44

## 2022-10-16 RX ADMIN — ASPIRIN 81 MG 81 MG: 81 TABLET ORAL at 10:03

## 2022-10-16 RX ADMIN — LOSARTAN POTASSIUM 50 MG: 25 TABLET, FILM COATED ORAL at 10:04

## 2022-10-16 RX ADMIN — METOPROLOL TARTRATE 25 MG: 25 TABLET, FILM COATED ORAL at 10:03

## 2022-10-16 RX ADMIN — SODIUM CHLORIDE: 9 INJECTION, SOLUTION INTRAVENOUS at 07:42

## 2022-10-16 RX ADMIN — METOPROLOL TARTRATE 25 MG: 25 TABLET, FILM COATED ORAL at 20:44

## 2022-10-16 RX ADMIN — CYANOCOBALAMIN TAB 1000 MCG 1000 MCG: 1000 TAB at 10:03

## 2022-10-16 RX ADMIN — MULTIPLE VITAMINS W/ MINERALS TAB 1 TABLET: TAB at 10:03

## 2022-10-16 NOTE — PROGRESS NOTES
Progress Note    Patient Susi Carrasco  MRN: [id number]  YOB: 1945 Age: 78 y.o. Sex: male  Room: [address] [place] [address]       Admitting Physician: Nathaniel Prakash MD   Date of Admission: 10/10/2022  8:45 PM   Primary Care Physician: Coco Thompson MD     Subjective:  Susi Carrasco was seen and examined. We are following for for elevated liver enzymes. -- Patient denies any new symptom        ROS:  Constitutional: Denies fever, no change in appetite  Respiratory: Denies cough or shortness of breath  Cardiovascular: Denies chest pain or edema    Objective:  Vital Signs:   Vitals:    10/16/22 0917   BP: 133/77   Pulse: 92   Resp: 18   Temp: 98.3 °F (36.8 °C)   SpO2: 95%         Physical Exam:  Constitutional: Alert and oriented x 4. No acute distress. Respiratory: Respirations nonlabored, no crepitus  GI: Abdomen nondistended, soft, and nontender. Neurological: No focal deficits noted. No asterixis.     Intake/Output:  No intake or output data in the 24 hours ending 10/16/22 1221     Current Medications:  Current Facility-Administered Medications   Medication Dose Route Frequency Provider Last Rate Last Admin    predniSONE (DELTASONE) tablet 10 mg  10 mg Oral Daily Rupret Pereira MD   10 mg at 10/16/22 1004    vitamin B-12 (CYANOCOBALAMIN) tablet 1,000 mcg  1,000 mcg Oral Daily Martín Bowling MD   1,000 mcg at 10/16/22 1003    enoxaparin (LOVENOX) injection 40 mg  40 mg SubCUTAneous Nightly Martín Bowling MD   40 mg at 10/15/22 2207    0.9 % sodium chloride infusion   IntraVENous Continuous Rupert Pereira MD   Stopped at 10/16/22 1011    sodium chloride flush 0.9 % injection 5-40 mL  5-40 mL IntraVENous 2 times per day Lyudmila Yousif MD   10 mL at 10/15/22 2208    sodium chloride flush 0.9 % injection 5-40 mL  5-40 mL IntraVENous PRN Lyudmila Yousif MD   10 mL at 10/14/22 0846    0.9 % sodium chloride infusion   IntraVENous PRN Lyudmila Yousif MD        ondansetron (ZOFRAN-ODT) disintegrating tablet 4 mg  4 mg Oral Q8H PRN Daniel Tovar MD        Or    ondansetron [facility]) injection 4 mg  4 mg IntraVENous Q6H PRN Daniel Tovar MD        acetaminophen (TYLENOL) tablet 650 mg  650 mg Oral Q6H PRN Daniel Tovar MD        Or    acetaminophen (TYLENOL) suppository 650 mg  650 mg Rectal Q6H PRN Daniel Tovar MD        polyethylene glycol (GLYCOLAX) packet 17 g  17 g Oral Daily PRN Daniel Tovar MD        aspirin chewable tablet 81 mg  81 mg Oral Daily Daniel Tovar MD   81 mg at 10/16/22 1003    nitroGLYCERIN (NITROSTAT) SL tablet 0.4 mg  0.4 mg SubLINGual Q5 Min PRN Daniel Tovar MD        losartan (COZAAR) tablet 50 mg  50 mg Oral Daily Daniel Tovar MD   50 mg at 10/16/22 1004    metoprolol tartrate (LOPRESSOR) tablet 25 mg  25 mg Oral BID Daniel Tovar MD   25 mg at 10/16/22 1003    [Held by provider] triamterene-hydroCHLOROthiazide (MAXZIDE-25) 37.5-25 MG per tablet 1 tablet  1 tablet Oral Daily Barb Coon MD        therapeutic multivitamin-minerals 1 tablet  1 tablet Oral Daily Daniel Tovar MD   1 tablet at 10/16/22 1003    Vitamin D (CHOLECALCIFEROL) tablet 1,000 Units  1,000 Units Oral Daily Daniel Tovar MD   1,000 Units at 10/16/22 1003    perflutren lipid microspheres (DEFINITY) injection 1.65 mg  1.5 mL IntraVENous ONCE PRN Daniel Tovar MD        sodium chloride flush 0.9 % injection 5-40 mL  5-40 mL IntraVENous 2 times per day Daniel Tovar MD   10 mL at 10/15/22 1000    sodium chloride flush 0.9 % injection 5-40 mL  5-40 mL IntraVENous PRN Daniel Tovar MD        0.9 % sodium chloride infusion   IntraVENous PRN Daniel Tovar MD             Recent Imaging:   MRI ABDOMEN W WO CONTRAST MRCP  Narrative: EXAMINATION:  MRI OF THE ABDOMEN WITH AND WITHOUT CONTRAST AND MRCP 10/12/2022 4:56 pm    TECHNIQUE:  Multiplanar multisequence MRI of the abdomen was performed without and with  the administration of 20 mL MultiHance intravenous contrast.  After initial  T2 axial and coronal images, thick slab, thin slab and 3D coronal MRCP  sequences were obtained without the administration of intravenous contrast.  MIP images are provided for review. COMPARISON:  Ultrasound from yesterday    HISTORY:  Elevated LFTs. Cholelithiasis. FINDINGS:  Liver demonstrates normal morphology without steatosis. Adjacent to the  gallbladder, there is a 1.8 cm T2 hyperintense lesion with enhancement  characteristics of a cavernous hemangioma. Hepatic vasculature is patent. Gallbladder contains a 1.9 cm stone but there is no gallbladder wall  thickening or pericholecystic edema. No biliary ductal dilatation or  intraductal filling defect. Best seen on the MRCP sequences, there is a cystic lesion in the pancreatic  uncinate process measuring 1.5 x 1.4 cm. No abnormal enhancement or  restricted diffusion seen. Spleen and adrenals are unremarkable. Mildly complicated bilateral renal  cysts measure up to 8.4 cm and there are smaller simple cysts. Symmetric  enhancement of the kidneys without hydronephrosis. No ascites or significant  lymphadenopathy. Visualized osseous structures and gastrointestinal tract  are unremarkable. Abdominal aorta is normal caliber. Impression: 1. Cholelithiasis without evidence of acute cholecystitis. 2. No biliary ductal dilatation or choledocholithiasis. 3. Within the liver, there is a 1.8 cm hemangioma near the gallbladder. 4. Simple and mildly complicated (Bosniak 1 and 2) bilateral renal cysts  measuring up to 8.4 cm. No dedicated follow-up recommended. 5. In the pancreatic uncinate process, there is a 1.5 x 1.4 cm cystic lesion  which could represent a branch duct IPMN. Recommend 6 month follow-up.     RECOMMENDATIONS:  Incidental Pancreatic Cysts    65-79 years at presentation    1.5-2.5 cm    Main pancreatic duct communication absent or cannot be determined: Pancreas  protocol MRI (or CT) in 6 months, consider EUS/FNA    Reference: Tyra et al. Management of incidental pancreatic cysts: A white  paper of the ACR Incidental Findings Committee. [address] [address];25:961-212. Labs:   Recent Labs     10/14/22  0603 10/15/22  0618 10/16/22  0721   HGB 11.4*  --  11.0*   WBC 3.3*  --  3.9*   PROT 5.9* 5.1* 5.4*   LABALBU 2.9* 2.8* 2.7*   ALKPHOS 59 57 58   * 176* 182*   * 322* 300*   BILITOT 0.3 <0.2 0.3      Assessment:     79-year-old male who presented to the hospital with complaints of shortness of breath and bilateral leg swelling with elevated troponins and CK, underwent cardiac cath showing only minimal disease, recommended medical management. He was was also noted to have persistent elevation of CK levels of 10,000 and elevated transaminases which are improving. IDALIA was positive with reflex being pending, hepatitis panel negative. At this point elevated transaminases could be related to rhabdomyolysis. Given that IDALIA was positive there was some concern about autoimmune myositis causing rhabdomyolysis. Nephrology planning for muscle biopsy rule out autoimmune myositis. Plan:  Trend LFTs  Follow-up with autoimmune labs   We will continue to follow      Britni Wooten MD    [facility]    [phone].  Also available via [employer]

## 2022-10-16 NOTE — PROGRESS NOTES
Pt ambulating in room. Denies pain. Pt needs met.  Bed low, call bell in reach, instructed to call for assist.

## 2022-10-16 NOTE — PROGRESS NOTES
Office : [phone]     Fax :[phone]       Nephrology progress  Note      Patient's Name: Ryley Ritchie  9:11 AM  10/16/2022    Reason for Consult:  Rhabdomyolysis       Requesting Physician:  Carlita Pérez MD      Chief Complaint:    Chief Complaint   Patient presents with    Leg Swelling     Patient in with complaints of bilateral edema in his legs that is new. PCP told him to come into the er. History of Present iIlness:      Ryley Ritchie is a 78 y.o. male with prior history of CKD, HTN, RA , simple renal cyst who came to ER with c/o pain in thighs. He was found to have elevated CK levels. Denies any trauma. Denies any heavy exercise. CK [place] elevated at [address] . Has h/o CKD 2/2 NSaids which improved. 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 Denies any muscle pain . Making urine. Renal function stable . Denies any recent infection. No intake/output data recorded.     Past Medical History:   Diagnosis Date    Arthritis     Colon polyp     Diabetes mellitus ([facility] [address]     denies being diabetic    Hyperlipemia     Hypertension     Kidney disease     Rheumatoid arthritis ([facility] [address]        Past Surgical History:   Procedure Laterality Date    COLONOSCOPY      COLONOSCOPY N/A 8/14/2020    COLONOSCOPY DIAGNOSTIC performed by Reji Cazares MD at [address] [facility]        Family History   Problem Relation Age of Onset    High Blood Pressure Sister          Current Medications:    predniSONE (DELTASONE) tablet 10 mg, Daily  vitamin B-12 (CYANOCOBALAMIN) tablet 1,000 mcg, Daily  enoxaparin (LOVENOX) injection 40 mg, Nightly  0.9 % sodium chloride infusion, Continuous  sodium chloride flush 0.9 % injection 5-40 mL, 2 times per day  sodium chloride flush 0.9 % injection 5-40 mL, PRN  0.9 % sodium chloride infusion, PRN  ondansetron (ZOFRAN-ODT) disintegrating tablet 4 mg, Q8H PRN   Or  ondansetron (ZOFRAN) injection 4 mg, Q6H PRN  acetaminophen (TYLENOL) tablet 650 mg, Q6H PRN   Or  acetaminophen (TYLENOL) suppository 650 mg, Q6H PRN  polyethylene glycol (GLYCOLAX) packet 17 g, Daily PRN  aspirin chewable tablet 81 mg, Daily  nitroGLYCERIN (NITROSTAT) SL tablet 0.4 mg, Q5 Min PRN  losartan (COZAAR) tablet 50 mg, Daily  metoprolol tartrate (LOPRESSOR) tablet 25 mg, BID  [Held by provider] triamterene-hydroCHLOROthiazide (MAXZIDE-25) 37.5-25 MG per tablet 1 tablet, Daily  therapeutic multivitamin-minerals 1 tablet, Daily  Vitamin D (CHOLECALCIFEROL) tablet 1,000 Units, Daily  perflutren lipid microspheres (DEFINITY) injection 1.65 mg, ONCE PRN  sodium chloride flush 0.9 % injection 5-40 mL, 2 times per day  sodium chloride flush 0.9 % injection 5-40 mL, PRN  0.9 % sodium chloride infusion, PRN        Physical exam:     Vitals:  BP (!) 144/74   Pulse 77   Temp 98.1 °F (36.7 °C) (Oral)   Resp 16   Ht 6' 2\" (1.88 m)   Wt 254 lb 4.8 oz (115.3 kg)   SpO2 94%   BMI 32.65 kg/m²   Constitutional:  OAA X3 NAD  Skin: no rash, turgor wnl  Heent:  eomi, mmm  Neck: no bruits or jvd noted  Cardiovascular:  S1, S2 without m/r/g  Respiratory: CTA B without w/r/r  Abdomen:  +bs, soft, nt, nd  Ext: no  lower extremity edema      Labs:  CBC:   Recent Labs     10/14/22  0603 10/16/22  0721   WBC 3.3* 3.9*   HGB 11.4* 11.0*    160     BMP:    Recent Labs     10/14/22  0603 10/15/22  0618 10/16/22  0721    142 143   K 4.0 3.8 4.0    112* 112*   CO2 23 23 24   BUN 12 10 11   CREATININE 0.9 0.7* 0.8   GLUCOSE 87 86 82     Ca/Mg/Phos:   Recent Labs     10/14/22  0603 10/15/22  0618 10/16/22  0721   CALCIUM 8.6 8.2* 8.5   MG 2.00  --   --    PHOS 3.6  --   --      Hepatic:   Recent Labs     10/14/22  0603 10/15/22  8472 10/16/22  0721   * 322* 300*   * 176* 182*   BILITOT 0.3 <0.2 0.3   ALKPHOS 59 57 58     Troponin:   No results for input(s): TROPONINI in the last 72 hours. BNP: No results for input(s): BNP in the last 72 hours. Lipids:   No results for input(s): CHOL, TRIG, HDL, LDLCALC, LABVLDL in the last 72 hours. ABGs: No results for input(s): PHART, PO2ART, [id number] in the last 72 hours. INR:   Recent Labs     10/16/22  0721   INR 1.01     UA:  No results for input(s): COLORU, CLARITYU, GLUCOSEU, BILIRUBINUR, KETUA, SPECGRAV, BLOODU, PHUR, PROTEINU, UROBILINOGEN, NITRU, LEUKOCYTESUR, Ruth Ann Saver in the last 72 hours. Urine Microscopic:   No results for input(s): LABCAST, BACTERIA, COMU, HYALCAST, WBCUA, RBCUA, EPIU in the last 72 hours. Urine Culture: No results for input(s): LABURIN in the last 72 hours. Urine Chemistry: No results for input(s): Kenny Kida, PROTEINUR, NAUR in the last 72 hours. IMAGING:  MRI ABDOMEN W WO CONTRAST MRCP   Final Result   1. Cholelithiasis without evidence of acute cholecystitis. 2. No biliary ductal dilatation or choledocholithiasis. 3. Within the liver, there is a 1.8 cm hemangioma near the gallbladder. 4. Simple and mildly complicated (Bosniak 1 and 2) bilateral renal cysts   measuring up to 8.4 cm. No dedicated follow-up recommended. 5. In the pancreatic uncinate process, there is a 1.5 x 1.4 cm cystic lesion   which could represent a branch duct IPMN. Recommend 6 month follow-up. RECOMMENDATIONS:   Incidental Pancreatic Cysts      65-79 years at presentation      1.5-2.5 cm      Main pancreatic duct communication absent or cannot be determined: Pancreas   protocol MRI (or CT) in 6 months, consider EUS/FNA      Reference: Tyra et al. Management of incidental pancreatic cysts: A white   paper of the ACR Incidental Findings Committee. [address] [address];90:195-063.          US GALLBLADDER RUQ   Final Result   Mild hydrops of the gallbladder and associated 1.6 cm gallstone with no   secondary signs of cholecystitis. 2 cm rounded echogenic focus in the right lobe of the liver which is   suspicious for a liver hemangioma. Recommend a follow-up CT scan with   contrast further characterization. Borderline enlargement of the common bile duct and partial obscuration of the   pancreas due to overlying bowel gas. 7 cm cyst in the right kidney inferiorly with a thin septation in the cyst in   keeping with a Bosniak 2 cyst.         CT CHEST PULMONARY EMBOLISM W CONTRAST   Final Result   No evidence of a pulmonary embolus. Mildly dilated and atherosclerotic thoracic aorta with no aneurysm or   dissection. Minimal bibasilar atelectasis or scarring posteriorly. Suggest follow-up   with chest x-rays. 8 cm benign cyst upper pole left kidney             Assessment/Plan :      1. Rhabdomyolysis  likely 2/2 Statins   Stopped  rosuvastatin. Dc iv fluids. Monitor renal function     IDALIA - positive   Autoimmune process   Specific antibodies ordered    hepatitis panel - negative     Elevated LFTS      Will need muscle biopsy   D/w Dr. Cuauhtemoc Hoffmann. Started prednisone 10 mg. Hospitalist discussed with rheumatology. Will defer dose to hospitalist.    2. HTN  Controlled off meds. 3. Anemia. Hb 11.4  monitor     4. Acid- base disorder.   Monitor       Monitor UOP             D/w primary team      Thank you for allowing us to participate in care of Will Loyd         Electronically signed by: Jose R Victor MD, 10/16/2022, 9:11 AM      [facility] [address]  Office : [phone]  Fax :[phone]

## 2022-10-16 NOTE — CONSULTS
[address] [facility]     Consult                                                     Patient Name: Jessica Alexandra  MRN: [id number]  YOB: 1945  Admission date: 10/10/2022  8:45 PM   Date of evaluation: 10/16/2022  Primary Care Physician: Kathryn Neff MD  Reason for consult:  myalgias  History of Present Illness:    Mr. Isaura Klein is a 78 y.o. male who presents with multiple complaints but primarily dyspnea. Medical history significant for diabetes, chronic renal failure. Elevated troponin on admission and diagnosed with NSTEMI, cardiac cath with minimal CAD. Elevated CK over 10,000 and concern for rhabdomyolysis. Unclear etiology and surgery consulted to discuss and plan muscle biopsy. Only complains of mild bilateral thigh soreness. Does not recall any significant stressful activity lately. Currently feels well and denies fevers, chills, chest pain, dyspnea, dysuria, nausea, vomiting, change in bowels or other complaints.  Also found to have transaminitis and GI following    Past Medical History:        Diagnosis Date    Arthritis     Colon polyp     Diabetes mellitus ([facility] [address]     denies being diabetic    Hyperlipemia     Hypertension     Kidney disease     Rheumatoid arthritis ([facility] [address]        Past Surgical History:        Procedure Laterality Date    COLONOSCOPY      COLONOSCOPY N/A 8/14/2020    COLONOSCOPY DIAGNOSTIC performed by Demario Diaz MD at [id number] [place] [address],[address]        Scheduled Meds:   predniSONE  10 mg Oral Daily    vitamin B-12  1,000 mcg Oral Daily    enoxaparin  40 mg SubCUTAneous Nightly    sodium chloride flush  5-40 mL IntraVENous 2 times per day    aspirin  81 mg Oral Daily    losartan  50 mg Oral Daily    metoprolol tartrate  25 mg Oral BID    [Held by provider] triamterene-hydroCHLOROthiazide  1 tablet Oral Daily    therapeutic multivitamin-minerals  1 tablet Oral Daily    Vitamin D  1,000 Units Oral Daily    sodium chloride flush  5-40 mL IntraVENous 2 times per day     Continuous Infusions:   sodium chloride Stopped (10/16/22 1011)    sodium chloride      sodium chloride       PRN Meds:.sodium chloride flush, sodium chloride, ondansetron **OR** ondansetron, acetaminophen **OR** acetaminophen, polyethylene glycol, nitroGLYCERIN, perflutren lipid microspheres, sodium chloride flush, sodium chloride    Prior to Admission medications    Medication Sig Start Date End Date Taking? Authorizing Provider   metoprolol tartrate (LOPRESSOR) 25 MG tablet TAKE 1 TABLET TWICE A DAY  Patient taking differently: 25 mg daily TAKE 1 TABLET TWICE A DAY 10/6/22   Mackenzie Vazquez MD   rosuvastatin (CRESTOR) 40 MG tablet TAKE 1 TABLET EVERY EVENING 10/6/22   Mackenzie Vazquez MD   losartan (COZAAR) 50 MG tablet TAKE 1 TABLET DAILY 10/6/22   Mackenzie Vazquez MD   triamterene-hydroCHLOROthiazide [facility]) 37.5-25 MG per tablet TAKE 1 TABLET DAILY  Patient not taking: Reported on 10/11/2022 10/6/22   Mackenzie Vazquez MD   sildenafil (VIAGRA) 100 MG tablet Take 1 tablet by mouth daily as needed for Erectile Dysfunction 12/20/21   Mackenzie Vazquez MD   clobetasol (TEMOVATE) 0.05 % ointment Apply topically to bilateral feet 2 times daily. Patient not taking: Reported on 10/11/2022 12/20/21   Mackenzie Vazquez MD   Multiple Vitamins-Minerals (THERAPEUTIC MULTIVITAMIN-MINERALS) tablet Take 1 tablet by mouth daily    Historical Provider, MD   Vitamin D (CHOLECALCIFEROL) 1000 UNITS CAPS capsule Take 1,000 Units by mouth daily. Historical Provider, MD        Allergies:  Patient has no known allergies.     Social History:   Social History     Socioeconomic History    Marital status:      Spouse name: None    Number of children: 4    Years of education: None    Highest education level: None   Occupational History    Occupation: part-time to stay busy     Comment: [employer]   Tobacco Use    Smoking status: Former     Packs/day: 0.50     Years: 10.00     Pack years: 5.00     Types: Cigarettes     Start date: 67     Quit date: 1984     Years since quittin.7    Smokeless tobacco: Never    Tobacco comments:     quit smoking 30 years    Substance and Sexual Activity    Alcohol use: Yes     Comment: rare    Drug use: No   Social History Narrative    Lives home with wife and has 4 children.       Social Determinants of Health     Financial Resource Strain: Low Risk     Difficulty of Paying Living Expenses: Not hard at all   Food Insecurity: No Food Insecurity    Worried About Running Out of Food in the Last Year: Never true    Ran Out of Food in the Last Year: Never true   Physical Activity: Insufficiently Active    Days of Exercise per Week: 3 days    Minutes of Exercise per Session: 30 min       Family History:    Family History   Problem Relation Age of Onset    High Blood Pressure Sister        Review of Systems:  CONSTITUTIONAL:  Negative except as above  HEENT:  negative  RESPIRATORY:  negative  CARDIOVASCULAR:  negative  GASTROINTESTINAL:  negative except as above   GENITOURINARY:  negative  HEMATOLOGIC/LYMPHATIC:  negative  NEUROLOGICAL:  Negative      Vital Signs:  Patient Vitals for the past 24 hrs:   BP Temp Temp src Pulse Resp SpO2 Weight   10/16/22 0917 133/77 98.3 °F (36.8 °C) Oral 92 18 95 % --   10/16/22 0430 (!) 144/74 98.1 °F (36.7 °C) Oral 77 16 94 % 254 lb 4.8 oz (115.3 kg)   10/15/22 2200 (!) 154/77 98.2 °F (36.8 °C) Oral 80 16 96 % --   10/15/22 1330 (!) 152/75 98.2 °F (36.8 °C) Axillary 71 16 95 % --      TEMPERATURE HISTORY 24H: Temp (24hrs), Av.2 °F (36.8 °C), Min:98.1 °F (36.7 °C), Max:98.3 °F (36.8 °C)    BLOOD PRESSURE HISTORY: Systolic (22XIX), PJT:098 , Min:119 , WFZ:600    Diastolic (00UVU), EHH:17, Min:71, Max:77    Admission Weight: 242 lb (109.8 kg)     No intake or output data in the 24 hours ending 10/16/22 1217  Drain/tube Output:         Physical Exam:  Constitutional:  well-developed, well-nourished,   Neurologic: awake, Orientation:  Oriented to person, place, time, follows commands, clear speech, cranial nerves grossly intact  Psychiatric: normal affect, no hallucinations  Eyes:  sclera clear, no visible discharge  Head, ears, nose, mouth, throat: normocephalic, without obvious abnormality, supple, symmetrical, trachea midline, no JVD, mucous membranes moist  Cardiovascular: regular rate and rhythm   Respiratory: clear to auscultation  GI: soft, non-distended, non-tender  Musculoskeletal: minimal bilateral thigh tenderness  Lymph: no palpable lymphadenopathy  Skin: no bruising or bleeding, normal skin color, texture, turgor, and no redness, warmth, or swelling    Labs:    CBC:    Recent Labs     10/14/22  0603 10/16/22  0721   WBC 3.3* 3.9*   HGB 11.4* 11.0*   HCT 36.1* 34.2*    160     BMP:    Recent Labs     10/14/22  0603 10/15/22  0618 10/16/22  0721    142 143   K 4.0 3.8 4.0    112* 112*   CO2 23 23 24   BUN 12 10 11   CREATININE 0.9 0.7* 0.8   GLUCOSE 87 86 82     Hepatic:   Recent Labs     10/14/22  0603 10/15/22  0618 10/16/22  0721   * 322* 300*   * 176* 182*   BILITOT 0.3 <0.2 0.3   ALKPHOS 59 57 58     Amylase: No results for input(s): AMYLASE in the last 72 hours. Lipase: No results for input(s): LIPASE in the last 72 hours. Mag:      Recent Labs     10/14/22  0603   MG 2.00      Phos:     Recent Labs     10/14/22  0603   PHOS 3.6      Coags:   Recent Labs     10/16/22  0721   INR 1.01       Imaging:  I have personally reviewed the following films:  US GALLBLADDER RUQ    Result Date: 10/11/2022  EXAMINATION: RIGHT UPPER QUADRANT ULTRASOUND 10/11/2022 4:19 pm COMPARISON: None.  HISTORY: ORDERING SYSTEM PROVIDED HISTORY: Mila Martin noted on echo TECHNOLOGIST PROVIDED HISTORY: Reason for exam:->Galllstone noted on echo Reason for exam:->Elevated LFTs Reason for Exam: gallstone noted on echo/ Elevated LFTs Additional signs and symptoms: na Relevant Medical/Surgical History: na FINDINGS: LIVER:  The liver is borderline enlarged there is a 2 6 x 2 cm rounded echogenic lesion in the right lobe near the capsule which is well-circumscribed and homogeneous. There is a patent pedal flow in the portal vein. BILIARY SYSTEM:  The gallbladder is mildly dilated there is a 1.6 cm echogenic focus in the gallbladder which is shadowing and mobile consistent with gallstones. There is no wall thickening or edema and there is no pain upon compression. The common duct measures 7 mm. Common bile duct is within normal limits measuring . RIGHT KIDNEY: The right kidney measures 13 x 6 x 6 cm and there is a 7 x 5 x 4.6 cm cyst along the mid-polar region there is a mild land thin linear septation along the cyst inferiorly with no significant wall thickening and no hydronephrosis or renal stones. PANCREAS:  The pancreas is partially obscured due to overlying bowel gas. OTHER: No evidence of right upper quadrant ascites. Mild hydrops of the gallbladder and associated 1.6 cm gallstone with no secondary signs of cholecystitis. 2 cm rounded echogenic focus in the right lobe of the liver which is suspicious for a liver hemangioma. Recommend a follow-up CT scan with contrast further characterization. Borderline enlargement of the common bile duct and partial obscuration of the pancreas due to overlying bowel gas. 7 cm cyst in the right kidney inferiorly with a thin septation in the cyst in keeping with a Bosniak 2 cyst.     CT CHEST PULMONARY EMBOLISM W CONTRAST    Result Date: 10/10/2022  EXAMINATION: CTA OF THE CHEST 10/10/2022 10:06 pm TECHNIQUE: CTA of the chest was performed after the administration of intravenous contrast.  Multiplanar reformatted images are provided for review. MIP images are provided for review.  Automated exposure control, iterative reconstruction, and/or weight based adjustment of the mA/kV was utilized to reduce the radiation dose to as low as reasonably achievable. COMPARISON: None. HISTORY: ORDERING SYSTEM PROVIDED HISTORY: pe TECHNOLOGIST PROVIDED HISTORY: Reason for exam:->pe Decision Support Exception - unselect if not a suspected or confirmed emergency medical condition->Emergency Medical Condition (MA) Reason for Exam: PE.Leg Swelling (Patient in with complaints of bilateral edema in his legs that is new. PCP told him to come into the er. ). FINDINGS: Pulmonary Arteries: Pulmonary arteries are adequately opacified for evaluation. No evidence of intraluminal filling defect to suggest pulmonary embolism. Main pulmonary artery is normal in caliber. Mediastinum: No evidence of mediastinal lymphadenopathy. The heart and pericardium demonstrate no acute abnormality. There is mild calcified plaque throughout the thoracic aorta which is mildly dilated throughout with no aneurysm or dissection. Lungs/pleura: There mild subsegmental linear densities scattered along the lung bases posterolaterally. No effusion is seen. There is no pulmonary nodule or mass. Upper Abdomen: The adrenals are normal.  There is an 8 cm cyst upper pole left kidney Soft Tissues/Bones: No acute bone or soft tissue abnormality. No evidence of a pulmonary embolus. Mildly dilated and atherosclerotic thoracic aorta with no aneurysm or dissection. Minimal bibasilar atelectasis or scarring posteriorly. Suggest follow-up with chest x-rays.  8 cm benign cyst upper pole left kidney     MRI ABDOMEN W WO CONTRAST MRCP    Result Date: 10/12/2022  EXAMINATION: MRI OF THE ABDOMEN WITH AND WITHOUT CONTRAST AND MRCP 10/12/2022 4:56 pm TECHNIQUE: Multiplanar multisequence MRI of the abdomen was performed without and with the administration of 20 mL MultiHance intravenous contrast.  After initial T2 axial and coronal images, thick slab, thin slab and 3D coronal MRCP sequences were obtained without the administration of intravenous contrast. MIP images are provided for review. COMPARISON: Ultrasound from yesterday HISTORY: Elevated LFTs. Cholelithiasis. FINDINGS: Liver demonstrates normal morphology without steatosis. Adjacent to the gallbladder, there is a 1.8 cm T2 hyperintense lesion with enhancement characteristics of a cavernous hemangioma. Hepatic vasculature is patent. Gallbladder contains a 1.9 cm stone but there is no gallbladder wall thickening or pericholecystic edema. No biliary ductal dilatation or intraductal filling defect. Best seen on the MRCP sequences, there is a cystic lesion in the pancreatic uncinate process measuring 1.5 x 1.4 cm. No abnormal enhancement or restricted diffusion seen. Spleen and adrenals are unremarkable. Mildly complicated bilateral renal cysts measure up to 8.4 cm and there are smaller simple cysts. Symmetric enhancement of the kidneys without hydronephrosis. No ascites or significant lymphadenopathy. Visualized osseous structures and gastrointestinal tract are unremarkable. Abdominal aorta is normal caliber. 1. Cholelithiasis without evidence of acute cholecystitis. 2. No biliary ductal dilatation or choledocholithiasis. 3. Within the liver, there is a 1.8 cm hemangioma near the gallbladder. 4. Simple and mildly complicated (Bosniak 1 and 2) bilateral renal cysts measuring up to 8.4 cm. No dedicated follow-up recommended. 5. In the pancreatic uncinate process, there is a 1.5 x 1.4 cm cystic lesion which could represent a branch duct IPMN. Recommend 6 month follow-up. RECOMMENDATIONS: Incidental Pancreatic Cysts 65-79 years at presentation 1.5-2.5 cm Main pancreatic duct communication absent or cannot be determined: Pancreas protocol MRI (or CT) in 6 months, consider EUS/FNA Reference: Tyra et al. Management of incidental pancreatic cysts: A white paper of the ACR Incidental Findings Committee. [address] [place] [address];41:198-569.        Cultures:  Relevant cultures documented under results section Assessment:  Patient Active Problem List   Diagnosis    HTN (hypertension)    Bilateral shoulder pain    CKD (chronic kidney disease), stage III (HCC)    Carotid artery disease, unspecified laterality (HCC)    Elevated LFTs    Elevated troponin level not due to acute coronary syndrome    Elevated liver enzymes     Rhabdomyolysis    Plan:  1. Discussed risks, benefits, alternatives of a thigh muscle biopsy. More right thigh dominant, will biopsy the left thigh. Muscle biopsy requires special testing, transport, timing and arrangements need to be made ahead of time. Tuesday morning is most likely time anticipated for procedure but will continue to follow interim  2. Cardiology input noted but previously signed off as at the time no intervention was needed. Will need anesthesia for the procedure and request formal recommendations/clearance before proceding  3. Defer management of remainder of medical comorbidities to primary and consulting teams    This plan was discussed at length with the patient. He was understanding and in agreement with the plan  Thank you for the consult and allowing me to participate in the care of this patient. I look forward to following him this admission    Beto Cunningham MD, FACS  10/16/2022  12:17 PM

## 2022-10-16 NOTE — PROGRESS NOTES
Hospitalist Progress Note      PCP: Milla Lyles MD    Date of Admission: 10/10/2022    Chief Complaint: Shortness of breath    Hospital Course: Radha Gómez is 78 y.o. male with history of HTN, HLD and CKD. He presented to the ER with complaint of shortness of breath and leg swelling which started about 10 days ago, worse on exertion relieved by rest.  He was evaluated at an urgent care facility where bilateral leg swelling was noted and was sent to the ED for further evaluation. In the ED troponin was elevated to 0.9 but without any chest pain or chest discomfort. Per cardiology recommendation, he was started on low-dose heparin drip and admitted for further evaluation. Subjective:Patient seen and examined. Denies any chest pain or shortness of breath. Mild bilateral thigh pain but no weakness. CK and LFTs remain elevated. Worsening generalized edema. IV fluids discontinued.       Medications:  Reviewed    Infusion Medications    sodium chloride Stopped (10/16/22 1011)    sodium chloride      sodium chloride       Scheduled Medications    predniSONE  10 mg Oral Daily    vitamin B-12  1,000 mcg Oral Daily    enoxaparin  40 mg SubCUTAneous Nightly    sodium chloride flush  5-40 mL IntraVENous 2 times per day    aspirin  81 mg Oral Daily    losartan  50 mg Oral Daily    metoprolol tartrate  25 mg Oral BID    [Held by provider] triamterene-hydroCHLOROthiazide  1 tablet Oral Daily    therapeutic multivitamin-minerals  1 tablet Oral Daily    Vitamin D  1,000 Units Oral Daily    sodium chloride flush  5-40 mL IntraVENous 2 times per day     PRN Meds: sodium chloride flush, sodium chloride, ondansetron **OR** ondansetron, acetaminophen **OR** acetaminophen, polyethylene glycol, nitroGLYCERIN, perflutren lipid microspheres, sodium chloride flush, sodium chloride    No intake or output data in the 24 hours ending 10/16/22 1237      Physical Exam Performed:    /77   Pulse 92   Temp 98.3 °F (36.8 °C) (Oral)   Resp 18   Ht 6' 2\" (1.88 m)   Wt 254 lb 4.8 oz (115.3 kg)   SpO2 95%   BMI 32.65 kg/m²     General appearance: No apparent distress, appears stated age and cooperative. HEENT: Pupils equal, round, and reactive to light. Conjunctivae/corneas clear. Neck: Supple, with full range of motion. No jugular venous distention. Trachea midline. Respiratory:  Normal respiratory effort. Clear to auscultation, bilaterally without Rales/Wheezes/Rhonchi. Cardiovascular: Regular rate and rhythm with normal S1/S2 without murmurs, rubs or gallops. Abdomen: Soft, non-tender, non-distended with normal bowel sounds. Musculoskeletal: No clubbing, cyanosis, worsening edema bilaterally. Full range of motion without deformity. Skin: Skin color, texture, turgor normal.  No rashes or lesions. Neurologic:  Neurovascularly intact without any focal sensory/motor deficits.  Cranial nerves: II-XII intact, grossly non-focal.  Psychiatric: Alert and oriented, thought content appropriate, normal insight  Capillary Refill: Brisk, 3 seconds, normal   Peripheral Pulses: +2 palpable, equal bilaterally       Labs:   Recent Labs     10/14/22  0603 10/16/22  0721   WBC 3.3* 3.9*   HGB 11.4* 11.0*   HCT 36.1* 34.2*    160     Recent Labs     10/14/22  0603 10/15/22  0618 10/16/22  0721    142 143   K 4.0 3.8 4.0    112* 112*   CO2 23 23 24   BUN 12 10 11   CREATININE 0.9 0.7* 0.8   CALCIUM 8.6 8.2* 8.5   PHOS 3.6  --   --      Recent Labs     10/14/22  0603 10/15/22  0618 10/16/22  0721   * 322* 300*   * 176* 182*   BILITOT 0.3 <0.2 0.3   ALKPHOS 59 57 58     Recent Labs     10/16/22  0721   INR 1.01     Recent Labs     10/14/22  0603 10/15/22  0618 10/16/22  0721   CKTOTAL 11,190* 10,841* 10,439*       Urinalysis:      Lab Results   Component Value Date/Time    NITRU Negative 10/12/2022 06:14 PM    WBCUA 1 10/12/2022 06:14 PM    BACTERIA None Seen 10/12/2022 06:14 PM    RBCUA 2 10/12/2022 06:14 PM    BLOODU LARGE 10/12/2022 06:14 PM    SPECGRAV >=1.030 10/12/2022 06:14 PM    GLUCOSEU Negative 10/12/2022 06:14 PM       Radiology:  MRI ABDOMEN W WO CONTRAST MRCP   Final Result   1. Cholelithiasis without evidence of acute cholecystitis. 2. No biliary ductal dilatation or choledocholithiasis. 3. Within the liver, there is a 1.8 cm hemangioma near the gallbladder. 4. Simple and mildly complicated (Bosniak 1 and 2) bilateral renal cysts   measuring up to 8.4 cm. No dedicated follow-up recommended. 5. In the pancreatic uncinate process, there is a 1.5 x 1.4 cm cystic lesion   which could represent a branch duct IPMN. Recommend 6 month follow-up. RECOMMENDATIONS:   Incidental Pancreatic Cysts      65-79 years at presentation      1.5-2.5 cm      Main pancreatic duct communication absent or cannot be determined: Pancreas   protocol MRI (or CT) in 6 months, consider EUS/FNA      Reference: Tyra et al. Management of incidental pancreatic cysts: A white   paper of the ACR Incidental Findings Committee. [address] [place] [address];41:[id number]. US GALLBLADDER RUQ   Final Result   Mild hydrops of the gallbladder and associated 1.6 cm gallstone with no   secondary signs of cholecystitis. 2 cm rounded echogenic focus in the right lobe of the liver which is   suspicious for a liver hemangioma. Recommend a follow-up CT scan with   contrast further characterization. Borderline enlargement of the common bile duct and partial obscuration of the   pancreas due to overlying bowel gas. 7 cm cyst in the right kidney inferiorly with a thin septation in the cyst in   keeping with a Bosniak 2 cyst.         CT CHEST PULMONARY EMBOLISM W CONTRAST   Final Result   No evidence of a pulmonary embolus. Mildly dilated and atherosclerotic thoracic aorta with no aneurysm or   dissection. Minimal bibasilar atelectasis or scarring posteriorly.   Suggest follow-up   with chest x-rays. 8 cm benign cyst upper pole left kidney                 Assessment/Plan:    Active Hospital Problems    Diagnosis     NSTEMI (non-ST elevated myocardial infarction) ([facility] [address] [I21.4]      Priority: Medium    Elevated troponin level not due to acute coronary syndrome [R77.8]      Priority: Medium    Elevated liver enzymes [R74.8]      Priority: Medium    Elevated LFTs [R79.89]      Priority: Medium    CKD (chronic kidney disease), stage III ([facility] [address] [N18.30]     HTN (hypertension) [I10]      Rhabdomyolysis: ?  Statin induced autoimmune myositis. No preceding trauma or known muscle injury. No muscle weakness noted but mild bilateral thigh pain. CPK levels remain elevated despite IV hydration. Crestor 40 mg daily discontinued. Monitor CPK levels. IV hydration discontinued due to worsening edema. CRP wnl. ESR 61. Aldolase level elevated. IDALIA positive. Discussed with rheumatology: Recommended myositis panel and HMG CoA reductase level. Empirically started on prednisone but discontinued after discussion with rheumatology. High-dose steroids often ineffective with high risk of side effects. Surgery consult consulted for muscle biopsy. Tentatively scheduled for Tuesday pending cardiac risk assessment by cardiology. Elevated liver enzymes: Alk phos within normal limits. AST and ALT elevated. ?  Due to rhabdomyolysis vs. autoimmune hepatitis. Hepatitis panel negative. RUQ US with large stone but no evidence of Cholecystitis. CBD mildly elevated. MRCP: Cholelithiasis without choledocholithiasis or CBD dilatation. Off Crestor. Monitor LFT's.  GI consulted: Appreciate input. Autoimmune work-up ordered. Elevated troponin:  Peak 0.9 trended down to 0.64. Cardiology consulted: status post [facility] 10/11/2022 with no findings of obstructive CAD. ECHO unremarkable. Suspected to be related to rhabdomyolysis.   Cardiology recommended medical management with outpatient cardiac MRI and repeat sed rate and CRP in 2 weeks. Vitamin B12 deficiency: Started on supplementation. HLD: Monitor on Cozaar and Lopressor. Maxzide on hold. Liver lesion:  Incidental finding on ultrasound. MRI consistent with hemangioma. Pancreatic cyst:  MRI: Pancreatic uncinate process with a 1.5 x 1.4 cm cystic lesion? IPMN. 6-month follow-up recommended. Mild leukopenia: Continue monitoring CBC. DVT Prophylaxis: Lovenox  Diet: ADULT DIET; Regular; Low Fat/Low Chol/High Fiber/2 gm Na  Code Status: Full Code  PT/OT Eval Status: Independent    Dispo -Home once medically stable.       Madison Huitron MD

## 2022-10-16 NOTE — PROGRESS NOTES
[address]   Progress Note  Cardiology      Rachel Hayley   Admission date:  10/10/2022  CC-f/up cardiac cath and elevated troponin  Subjective:  Cardiac cath reviewed with no significant CAD, echo reviewed and is normal. Has not had any chest pain or indigestion    We are asked to see patient again to give the okay for muscle biopsy. Patient is feeling well. Denies chest pain or shortness of breath. He has been walking in the halls without symptoms.     Objective:  Medications/Labs all Reviewed     predniSONE  10 mg Oral Daily    vitamin B-12  1,000 mcg Oral Daily    enoxaparin  40 mg SubCUTAneous Nightly    sodium chloride flush  5-40 mL IntraVENous 2 times per day    aspirin  81 mg Oral Daily    losartan  50 mg Oral Daily    metoprolol tartrate  25 mg Oral BID    [Held by provider] triamterene-hydroCHLOROthiazide  1 tablet Oral Daily    therapeutic multivitamin-minerals  1 tablet Oral Daily    Vitamin D  1,000 Units Oral Daily    sodium chloride flush  5-40 mL IntraVENous 2 times per day       BMP:   Lab Results   Component Value Date/Time     10/16/2022 07:21 AM    K 4.0 10/16/2022 07:21 AM    K 3.8 10/12/2022 05:16 AM     10/16/2022 07:21 AM    CO2 24 10/16/2022 07:21 AM    BUN 11 10/16/2022 07:21 AM    CREATININE 0.8 10/16/2022 07:21 AM    MG 2.00 10/14/2022 06:03 AM     CBC:    Lab Results   Component Value Date/Time    WBC 3.9 10/16/2022 07:21 AM    RBC 4.20 10/16/2022 07:21 AM    HGB 11.0 10/16/2022 07:21 AM    HCT 34.2 10/16/2022 07:21 AM    MCV 81.4 10/16/2022 07:21 AM    RDW 16.9 10/16/2022 07:21 AM     10/16/2022 07:21 AM      PT/INR:    Lab Results   Component Value Date    INR 1.01 10/16/2022    PROTIME 13.2 10/16/2022     Cardiac Enzymes:    Lab Results   Component Value Date/Time    CKTOTAL 10,439 10/16/2022 07:21 AM     Lab Results   Component Value Date    TROPONINI 0.64 () 10/11/2022    TROPONINI 0.71 ([place]) 10/11/2022    TROPONINI 0.90 ([place]) 10/10/2022 BNP:  No results found for: BNP  FASTING LIPID PANEL:    Lab Results   Component Value Date/Time    CHOL 137 10/12/2022 05:16 AM    HDL 34 10/12/2022 05:16 AM    TRIG 190 10/12/2022 05:16 AM       Physical Examination:    /77   Pulse 92   Temp 98.3 °F (36.8 °C) (Oral)   Resp 18   Ht 6' 2\" (1.88 m)   Wt 254 lb 4.8 oz (115.3 kg)   SpO2 95%   BMI 32.65 kg/m²      Respiratory:  Resp Assessment: Normal respiratory effort  Resp Auscultation: Clear to auscultation bilaterally   Cardiovascular: Auscultation: regular rate and rhythm, normal S1S2, no murmur, rub or gallop  Palpation:  Nl PMI  JVP:  normal  Extremities: No Edema  Abdomen:  Soft, non-tender  Normal bowel sounds  Extremities:   No Cyanosis or Clubbing  Neurological/Psychiatric:  Oriented to time, place, and person  Non-anxious  Skin Warm and dry    Assessment:    Principal Problem:    Elevated troponin with noncardiac source of elevated troponin, no sign of MI,  Needs muscle biopsy to evaluate cause of elevated CPK  Active Problems:    Elevated LFTs  Plan: he does have cholelithiasis but no symptoms to support acute cholecystitis    HTN (hypertension)  Plan: well controlled    CKD (chronic kidney disease), stage III ([address]  Plan: stable    Preop cardiovascular clearance    Plan:  Patient denies chest pain or shortness of breath. He had a cardiac cath earlier in this admission without significant CAD  He is low risk to proceed with muscle biopsy in 2 days. There is nothing I can do to lower his risk of surgery. The patient was seen for > 35 minutes. I reviewed interval history, physical exam, review of data including labs, imaging, development and implementation of treatment plan and coordination of complex care.  More than 50% of the time was devoted to counseling the patient on their diagnoses/treatments, as well as coordination of care with the other care teams      Mat Salazar M.D., [address] [place] [address]  [facility]  [phone]

## 2022-10-17 ENCOUNTER — ANESTHESIA EVENT (OUTPATIENT)
Dept: OPERATING ROOM | Age: 77
DRG: 501 | End: 2022-10-17
Payer: COMMERCIAL

## 2022-10-17 LAB
A/G RATIO: 0.9 (ref 1.1–2.2)
ALBUMIN SERPL-MCNC: 2.5 G/DL (ref 3.4–5)
ALP BLD-CCNC: 55 U/L (ref 40–129)
ALT SERPL-CCNC: 182 U/L (ref 10–40)
ANION GAP SERPL CALCULATED.3IONS-SCNC: 8 MMOL/L (ref 3–16)
AST SERPL-CCNC: 310 U/L (ref 15–37)
BASOPHILS ABSOLUTE: 0 K/UL (ref 0–0.2)
BASOPHILS RELATIVE PERCENT: 0.3 %
BILIRUB SERPL-MCNC: 0.3 MG/DL (ref 0–1)
BUN BLDV-MCNC: 12 MG/DL (ref 7–20)
CALCIUM SERPL-MCNC: 8.7 MG/DL (ref 8.3–10.6)
CHLORIDE BLD-SCNC: 111 MMOL/L (ref 99–110)
CO2: 24 MMOL/L (ref 21–32)
CREAT SERPL-MCNC: 0.9 MG/DL (ref 0.8–1.3)
EOSINOPHILS ABSOLUTE: 0.1 K/UL (ref 0–0.6)
EOSINOPHILS RELATIVE PERCENT: 3 %
EPSTEIN BARR VIRUS NUCLEAR AB IGG: 445 U/ML (ref 0–21.9)
EPSTEIN-BARR EARLY ANTIGEN ANTIBODY: 63.1 U/ML (ref 0–10.9)
EPSTEIN-BARR VCA IGG: >750 U/ML (ref 0–21.9)
EPSTEIN-BARR VCA IGM: <10 U/ML (ref 0–43.9)
GFR AFRICAN AMERICAN: >60
GFR NON-AFRICAN AMERICAN: >60
GLUCOSE BLD-MCNC: 88 MG/DL (ref 70–99)
HCT VFR BLD CALC: 34.1 % (ref 40.5–52.5)
HEMOGLOBIN: 10.8 G/DL (ref 13.5–17.5)
HERPES TYPE 1/2 IGM COMBINED: 0.41 IV
HERPES TYPE I/II IGG COMBINED: 17.6 IV
LYMPHOCYTES ABSOLUTE: 1.4 K/UL (ref 1–5.1)
LYMPHOCYTES RELATIVE PERCENT: 33.1 %
MCH RBC QN AUTO: 25.8 PG (ref 26–34)
MCHC RBC AUTO-ENTMCNC: 31.8 G/DL (ref 31–36)
MCV RBC AUTO: 81 FL (ref 80–100)
MONOCYTES ABSOLUTE: 0.4 K/UL (ref 0–1.3)
MONOCYTES RELATIVE PERCENT: 9.5 %
NEUTROPHILS ABSOLUTE: 2.3 K/UL (ref 1.7–7.7)
NEUTROPHILS RELATIVE PERCENT: 54.1 %
PDW BLD-RTO: 17 % (ref 12.4–15.4)
PLATELET # BLD: 168 K/UL (ref 135–450)
PMV BLD AUTO: 8 FL (ref 5–10.5)
POTASSIUM SERPL-SCNC: 3.7 MMOL/L (ref 3.5–5.1)
RBC # BLD: 4.21 M/UL (ref 4.2–5.9)
SODIUM BLD-SCNC: 143 MMOL/L (ref 136–145)
TOTAL CK: ABNORMAL U/L (ref 39–308)
TOTAL PROTEIN: 5.2 G/DL (ref 6.4–8.2)
WBC # BLD: 4.2 K/UL (ref 4–11)

## 2022-10-17 PROCEDURE — 6370000000 HC RX 637 (ALT 250 FOR IP): Performed by: INTERNAL MEDICINE

## 2022-10-17 PROCEDURE — 99232 SBSQ HOSP IP/OBS MODERATE 35: CPT | Performed by: SURGERY

## 2022-10-17 PROCEDURE — APPNB30 APP NON BILLABLE TIME 0-30 MINS: Performed by: NURSE PRACTITIONER

## 2022-10-17 PROCEDURE — 82550 ASSAY OF CK (CPK): CPT

## 2022-10-17 PROCEDURE — 80053 COMPREHEN METABOLIC PANEL: CPT

## 2022-10-17 PROCEDURE — APPSS15 APP SPLIT SHARED TIME 0-15 MINUTES: Performed by: NURSE PRACTITIONER

## 2022-10-17 PROCEDURE — 99233 SBSQ HOSP IP/OBS HIGH 50: CPT | Performed by: INTERNAL MEDICINE

## 2022-10-17 PROCEDURE — 94760 N-INVAS EAR/PLS OXIMETRY 1: CPT

## 2022-10-17 PROCEDURE — 82390 ASSAY OF CERULOPLASMIN: CPT

## 2022-10-17 PROCEDURE — 85025 COMPLETE CBC W/AUTO DIFF WBC: CPT

## 2022-10-17 PROCEDURE — 82104 ALPHA-1-ANTITRYPSIN PHENO: CPT

## 2022-10-17 PROCEDURE — 82103 ALPHA-1-ANTITRYPSIN TOTAL: CPT

## 2022-10-17 PROCEDURE — 6360000002 HC RX W HCPCS: Performed by: INTERNAL MEDICINE

## 2022-10-17 PROCEDURE — 2580000003 HC RX 258: Performed by: INTERNAL MEDICINE

## 2022-10-17 PROCEDURE — 2060000000 HC ICU INTERMEDIATE R&B

## 2022-10-17 RX ADMIN — METOPROLOL TARTRATE 25 MG: 25 TABLET, FILM COATED ORAL at 09:28

## 2022-10-17 RX ADMIN — LOSARTAN POTASSIUM 50 MG: 25 TABLET, FILM COATED ORAL at 09:27

## 2022-10-17 RX ADMIN — ENOXAPARIN SODIUM 40 MG: 100 INJECTION SUBCUTANEOUS at 20:37

## 2022-10-17 RX ADMIN — Medication 1000 UNITS: at 09:27

## 2022-10-17 RX ADMIN — ASPIRIN 81 MG 81 MG: 81 TABLET ORAL at 09:27

## 2022-10-17 RX ADMIN — PREDNISONE 10 MG: 10 TABLET ORAL at 09:27

## 2022-10-17 RX ADMIN — METOPROLOL TARTRATE 25 MG: 25 TABLET, FILM COATED ORAL at 20:37

## 2022-10-17 RX ADMIN — Medication 10 ML: at 09:28

## 2022-10-17 RX ADMIN — CYANOCOBALAMIN TAB 1000 MCG 1000 MCG: 1000 TAB at 09:28

## 2022-10-17 RX ADMIN — MULTIPLE VITAMINS W/ MINERALS TAB 1 TABLET: TAB at 09:27

## 2022-10-17 ASSESSMENT — PAIN SCALES - GENERAL
PAINLEVEL_OUTOF10: 0

## 2022-10-17 NOTE — PLAN OF CARE
Problem: Pain  Goal: Verbalizes/displays adequate comfort level or baseline comfort level  10/17/2022 1143 by Awa Baker RN  Outcome: Progressing   Pt can rate pain on a 0-10 scale. Pt pain will remain at a level that is tolerable to pt. PRN pain medication as needed. Problem: Chronic Conditions and Co-morbidities  Goal: Patient's chronic conditions and co-morbidity symptoms are monitored and maintained or improved  10/17/2022 0135 by Shania Deluna RN  Outcome: Progressing     Problem: Safety - Adult  Goal: Free from fall injury  Outcome: Progressing   Pt will remain free from falls. Fall precautions in place and alarm engaged. Bedside table and call light within reach. Pt aware to use call light for assistance ambulating.

## 2022-10-17 NOTE — PROGRESS NOTES
Office : [phone]     Fax :[phone]       Nephrology progress  Note      Patient's Name: Jessica Alexandra  8:16 AM  10/17/2022    Reason for Consult:  Rhabdomyolysis       Requesting Physician:  Kathryn Neff MD      Chief Complaint:    Chief Complaint   Patient presents with    Leg Swelling     Patient in with complaints of bilateral edema in his legs that is new. PCP told him to come into the er. History of Present iIlness:      Jessica Alexandra is a 78 y.o. male with prior history of CKD, HTN, RA , simple renal cyst who came to ER with c/o pain in thighs. He was found to have elevated CK levels. Denies any trauma. Denies any heavy exercise. [place] at [address] . Has h/o CKD 2/2 NSaids which improved. 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 Denies any muscle pain . Making urine. Renal function stable . No intake/output data recorded.     Past Medical History:   Diagnosis Date    Arthritis     Colon polyp     Diabetes mellitus ([facility]     denies being diabetic    Hyperlipemia     Hypertension     Kidney disease     Rheumatoid arthritis ([facility]        Past Surgical History:   Procedure Laterality Date    COLONOSCOPY      COLONOSCOPY N/A 8/14/2020    COLONOSCOPY DIAGNOSTIC performed by Demario Diaz MD at [id number] [place] [address] Drive,[address] Left        Family History   Problem Relation Age of Onset    High Blood Pressure Sister          Current Medications:    predniSONE (DELTASONE) tablet 10 mg, Daily  vitamin B-12 (CYANOCOBALAMIN) tablet 1,000 mcg, Daily  enoxaparin (LOVENOX) injection 40 mg, Nightly  0.9 % sodium chloride infusion, Continuous  sodium chloride flush 0.9 % injection 5-40 mL, 2 times per day  sodium chloride flush 0.9 % injection 5-40 mL, PRN  0.9 % sodium chloride infusion, PRN  ondansetron (ZOFRAN-ODT) disintegrating tablet 4 mg, Q8H PRN   Or  ondansetron (ZOFRAN) injection 4 mg, Q6H PRN  acetaminophen (TYLENOL) tablet 650 mg, Q6H PRN   Or  acetaminophen (TYLENOL) suppository 650 mg, Q6H PRN  polyethylene glycol (GLYCOLAX) packet 17 g, Daily PRN  aspirin chewable tablet 81 mg, Daily  nitroGLYCERIN (NITROSTAT) SL tablet 0.4 mg, Q5 Min PRN  losartan (COZAAR) tablet 50 mg, Daily  metoprolol tartrate (LOPRESSOR) tablet 25 mg, BID  [Held by provider] triamterene-hydroCHLOROthiazide (MAXZIDE-25) 37.5-25 MG per tablet 1 tablet, Daily  therapeutic multivitamin-minerals 1 tablet, Daily  Vitamin D (CHOLECALCIFEROL) tablet 1,000 Units, Daily  perflutren lipid microspheres (DEFINITY) injection 1.65 mg, ONCE PRN  sodium chloride flush 0.9 % injection 5-40 mL, 2 times per day  sodium chloride flush 0.9 % injection 5-40 mL, PRN  0.9 % sodium chloride infusion, PRN        Physical exam:     Vitals:  /71   Pulse 79   Temp 98.4 °F (36.9 °C) (Oral)   Resp 16   Ht 6' 2\" (1.88 m)   Wt 255 lb 6.4 oz (115.8 kg)   SpO2 95%   BMI 32.79 kg/m²   Constitutional:  OAA X3 NAD  Skin: no rash, turgor wnl  Heent:  eomi, mmm  Neck: no bruits or jvd noted  Cardiovascular:  S1, S2 without m/r/g  Respiratory: CTA B without w/r/r  Abdomen:  +bs, soft, nt, nd  Ext: no  lower extremity edema      Labs:  CBC:   Recent Labs     10/16/22  0721 10/17/22  0523   WBC 3.9* 4.2   HGB 11.0* 10.8*    168     BMP:    Recent Labs     10/15/22  0618 10/16/22  0721 10/17/22  0523    143 143   K 3.8 4.0 3.7   * 112* 111*   CO2 23 24 24   BUN 10 11 12   CREATININE 0.7* 0.8 0.9   GLUCOSE 86 82 88     Ca/Mg/Phos:   Recent Labs     10/15/22  0618 10/16/22  0721 10/17/22  0523   CALCIUM 8.2* 8.5 8.7     Hepatic:   Recent Labs     10/15/22  0618 10/16/22  0721 10/17/22  0523   * 300* 310*   * 182* 182*   BILITOT <0.2 0.3 0. 3   ALKPHOS 57 58 55     Troponin:   No results for input(s): TROPONINI in the last 72 hours. BNP: No results for input(s): BNP in the last 72 hours. Lipids:   No results for input(s): CHOL, TRIG, HDL, LDLCALC, LABVLDL in the last 72 hours. ABGs: No results for input(s): PHART, PO2ART, HNR2XUR in the last 72 hours. INR:   Recent Labs     10/16/22  0721   INR 1.01     UA:  No results for input(s): COLORU, CLARITYU, GLUCOSEU, BILIRUBINUR, KETUA, SPECGRAV, BLOODU, PHUR, PROTEINU, UROBILINOGEN, NITRU, LEUKOCYTESUR, Champ Rouse in the last 72 hours. Urine Microscopic:   No results for input(s): LABCAST, BACTERIA, COMU, HYALCAST, WBCUA, RBCUA, EPIU in the last 72 hours. Urine Culture: No results for input(s): LABURIN in the last 72 hours. Urine Chemistry: No results for input(s): Juarez Yin, PROTEINUR, NAUR in the last 72 hours. IMAGING:  MRI ABDOMEN W WO CONTRAST MRCP   Final Result   1. Cholelithiasis without evidence of acute cholecystitis. 2. No biliary ductal dilatation or choledocholithiasis. 3. Within the liver, there is a 1.8 cm hemangioma near the gallbladder. 4. Simple and mildly complicated (Bosniak 1 and 2) bilateral renal cysts   measuring up to 8.4 cm. No dedicated follow-up recommended. 5. In the pancreatic uncinate process, there is a 1.5 x 1.4 cm cystic lesion   which could represent a branch duct IPMN. Recommend 6 month follow-up. RECOMMENDATIONS:   Incidental Pancreatic Cysts      65-79 years at presentation      1.5-2.5 cm      Main pancreatic duct communication absent or cannot be determined: Pancreas   protocol MRI (or CT) in 6 months, consider EUS/FNA      Reference: Tyra et al. Management of incidental pancreatic cysts: A white   paper of the ACR Incidental Findings Committee. 10 [facility]   0437;61:[id number].          US GALLBLADDER RUQ   Final Result   Mild hydrops of the gallbladder and associated 1.6 cm gallstone with no   secondary signs of cholecystitis. 2 cm rounded echogenic focus in the right lobe of the liver which is   suspicious for a liver hemangioma. Recommend a follow-up CT scan with   contrast further characterization. Borderline enlargement of the common bile duct and partial obscuration of the   pancreas due to overlying bowel gas. 7 cm cyst in the right kidney inferiorly with a thin septation in the cyst in   keeping with a Bosniak 2 cyst.         CT CHEST PULMONARY EMBOLISM W CONTRAST   Final Result   No evidence of a pulmonary embolus. Mildly dilated and atherosclerotic thoracic aorta with no aneurysm or   dissection. Minimal bibasilar atelectasis or scarring posteriorly. Suggest follow-up   with chest x-rays. 8 cm benign cyst upper pole left kidney             Assessment/Plan :      1. Rhabdomyolysis  likely 2/2 Statins   Stopped  rosuvastatin. Dc iv fluids. Monitor renal function     IDALIA - positive   Autoimmune process   Specific antibodies ordered    hepatitis panel - negative     Elevated LFTS      Will need muscle biopsy . Plan for tomorrow   D/w Dr. Jackson Green. Started prednisone 10 mg. Hospitalist discussed with rheumatology. Will defer dose to hospitalist.    2. HTN  Controlled off meds. 3. Anemia. Hb 11.4  monitor     4. Acid- base disorder.   Monitor       Monitor UOP             D/w primary team      Thank you for allowing us to participate in care of Inge Pandya         Electronically signed by: Paz Martinez MD, 10/17/2022, 8:16 AM      [facility] [address]  Office : [phone]  Fax :[phone]

## 2022-10-17 NOTE — ANESTHESIA PRE PROCEDURE
Department of Anesthesiology  Preprocedure Note       Name:  Rodger Zepeda   Age:  78 y.o.  :  1945                                          MRN:  [id number]         Date:  10/17/2022      Surgeon: Gerald Bills):  Duke Pedro MD    Procedure: Procedure(s):  COLONOSCOPY DIAGNOSTIC    Medications prior to admission:   Prior to Admission medications    Medication Sig Start Date End Date Taking? Authorizing Provider   metoprolol tartrate (LOPRESSOR) 25 MG tablet TAKE 1 TABLET TWICE A DAY  Patient taking differently: 25 mg daily TAKE 1 TABLET TWICE A DAY 10/6/22   Rhona Cowan MD   rosuvastatin (CRESTOR) 40 MG tablet TAKE 1 TABLET EVERY EVENING 10/6/22   Rhona Cowan MD   losartan (COZAAR) 50 MG tablet TAKE 1 TABLET DAILY 10/6/22   Rhona Cowan MD   triamterene-hydroCHLOROthiazide [facility]) 37.5-25 MG per tablet TAKE 1 TABLET DAILY  Patient not taking: Reported on 10/11/2022 10/6/22   Rhona Cowan MD   sildenafil (VIAGRA) 100 MG tablet Take 1 tablet by mouth daily as needed for Erectile Dysfunction 21   Rhona Cowan MD   clobetasol (TEMOVATE) 0.05 % ointment Apply topically to bilateral feet 2 times daily. Patient not taking: Reported on 10/11/2022 12/20/21   Rhona Cowan MD   Multiple Vitamins-Minerals (THERAPEUTIC MULTIVITAMIN-MINERALS) tablet Take 1 tablet by mouth daily    Historical Provider, MD   Vitamin D (CHOLECALCIFEROL) 1000 UNITS CAPS capsule Take 1,000 Units by mouth daily. Historical Provider, MD       Current medications:    No current facility-administered medications for this visit. No current outpatient medications on file.      Facility-Administered Medications Ordered in Other Visits   Medication Dose Route Frequency Provider Last Rate Last Admin    predniSONE (DELTASONE) tablet 10 mg  10 mg Oral Daily Aleksandar Houston MD   10 mg at 10/17/22 0927    vitamin B-12 (CYANOCOBALAMIN) tablet 1,000 mcg  1,000 mcg Oral Daily Lisa Chris MD   1,000 mcg at 10/17/22 0928    enoxaparin (LOVENOX) injection 40 mg  40 mg SubCUTAneous Nightly Lisa Chris MD   40 mg at 10/16/22 2044    0.9 % sodium chloride infusion   IntraVENous Continuous Ayden Holliday MD   Stopped at 10/16/22 1011    sodium chloride flush 0.9 % injection 5-40 mL  5-40 mL IntraVENous 2 times per day Meredith Wagner MD   10 mL at 10/15/22 2208    sodium chloride flush 0.9 % injection 5-40 mL  5-40 mL IntraVENous PRN Meredith Wagner MD   10 mL at 10/14/22 0846    0.9 % sodium chloride infusion   IntraVENous PRN Meredith Wagner MD        ondansetron (ZOFRAN-ODT) disintegrating tablet 4 mg  4 mg Oral Q8H PRN Meredith Wagner MD        Or    ondansetron [facility]) injection 4 mg  4 mg IntraVENous Q6H PRN Meredith Wagner MD        acetaminophen (TYLENOL) tablet 650 mg  650 mg Oral Q6H PRN Meredith Wagner MD        Or    acetaminophen (TYLENOL) suppository 650 mg  650 mg Rectal Q6H PRN Meredith Wagner MD        polyethylene glycol [facility]) packet 17 g  17 g Oral Daily PRN Meredith Wagner MD        aspirin chewable tablet 81 mg  81 mg Oral Daily Meredith Wagner MD   81 mg at 10/17/22 0927    nitroGLYCERIN (NITROSTAT) SL tablet 0.4 mg  0.4 mg SubLINGual Q5 Min PRN Meredith Wagner MD        losartan (COZAAR) tablet 50 mg  50 mg Oral Daily Meredith Wagner MD   50 mg at 10/17/22 3841    metoprolol tartrate (LOPRESSOR) tablet 25 mg  25 mg Oral BID Meredith Wagner MD   25 mg at 10/17/22 3958    [Held by provider] triamterene-hydroCHLOROthiazide (MAXZIDE-25) 37.5-25 MG per tablet 1 tablet  1 tablet Oral Daily Dior Lopez MD        therapeutic multivitamin-minerals 1 tablet  1 tablet Oral Daily Meredith Wagner MD   1 tablet at 10/17/22 3601    Vitamin D (CHOLECALCIFEROL) tablet 1,000 Units  1,000 Units Oral Daily Meredith Wagner MD   1,000 Units at 10/17/22 8337    perflutren lipid microspheres (DEFINITY) injection 1.65 mg  1.5 mL IntraVENous ONCE PRN Tanner Steen MD        sodium chloride flush 0.9 % injection 5-40 mL  5-40 mL IntraVENous 2 times per day Tanner Steen MD   10 mL at 10/17/22 0928    sodium chloride flush 0.9 % injection 5-40 mL  5-40 mL IntraVENous PRN Tanner Steen MD        0.9 % sodium chloride infusion   IntraVENous PRN Tanner Steen MD           Allergies:  No Known Allergies    Problem List:    Patient Active Problem List   Diagnosis Code    HTN (hypertension) I10    Bilateral shoulder pain M25.511, M25.512    CKD (chronic kidney disease), stage III (HCC) N18.30    Carotid artery disease, unspecified laterality (HCC) I77.9    Elevated LFTs R79.89    Elevated troponin level not due to acute coronary syndrome R77.8    Elevated CPK R74.8    NSTEMI (non-ST elevated myocardial infarction) ([facility] I21.4    Preop cardiovascular exam Z01.810       Past Medical History:        Diagnosis Date    Arthritis     Colon polyp     [facility]     denies being diabetic    Hyperlipemia     Hypertension     Kidney disease     Rhe[facility] arthritis [facility]        Past Surgical History:        Procedure Laterality Date    COLONOSCOPY      COLONOSCOPY N/A 2020    COLONOSCOPY DIAGNOSTIC performed by Brady Wild MD at [facility] [address] Left        Social History:    Social History     Tobacco Use    Smoking status: Former     Packs/day: 0.50     Years: 10.00     Pack years: 5.00     Types: Cigarettes     Start date: 67     Quit date: 1984     Years since quittin.7    Smokeless tobacco: Never    Tobacco comments:     quit smoking 30 years    Substance Use Topics    Alcohol use: Yes     Comment: rare                                Counseling given: Not Answered  Tobacco comments: quit smoking 30 years       Vital Signs (Current): There were no vitals filed for this visit.                                            BP Readings from Last 3 Encounters:   10/17/22 127/64   07/14/22 124/66   05/02/22 116/64       NPO Status:                                                                                 BMI:   Wt Readings from Last 3 Encounters:   10/17/22 255 lb 6.4 oz (115.8 kg)   07/14/22 240 lb 12.8 oz (109.2 kg)   05/02/22 243 lb (110.2 kg)     There is no height or weight on file to calculate BMI.    CBC:   Lab Results   Component Value Date/Time    WBC 4.2 10/17/2022 05:23 AM    RBC 4.21 10/17/2022 05:23 AM    HGB 10.8 10/17/2022 05:23 AM    HCT 34.1 10/17/2022 05:23 AM    MCV 81.0 10/17/2022 05:23 AM    RDW 17.0 10/17/2022 05:23 AM     10/17/2022 05:23 AM       CMP:   Lab Results   Component Value Date/Time     10/17/2022 05:23 AM    K 3.7 10/17/2022 05:23 AM    K 3.8 10/12/2022 05:16 AM     10/17/2022 05:23 AM    CO2 24 10/17/2022 05:23 AM    BUN 12 10/17/2022 05:23 AM    CREATININE 0.9 10/17/2022 05:23 AM    GFRAA >60 10/17/2022 05:23 AM    AGRATIO 0.9 10/17/2022 05:23 AM    LABGLOM >60 10/17/2022 05:23 AM    GLUCOSE 88 10/17/2022 05:23 AM    PROT 5.2 10/17/2022 05:23 AM    CALCIUM 8.7 10/17/2022 05:23 AM    BILITOT 0.3 10/17/2022 05:23 AM    ALKPHOS 55 10/17/2022 05:23 AM     10/17/2022 05:23 AM     10/17/2022 05:23 AM       POC Tests:   Recent Labs     10/14/22  2000   POCGLU 83       Coags:   Lab Results   Component Value Date/Time    PROTIME 13.2 10/16/2022 07:21 AM    INR 1.01 10/16/2022 07:21 AM    APTT 59.8 10/11/2022 05:30 AM       HCG (If Applicable): No results found for: PREGTESTUR, PREGSERUM, HCG, HCGQUANT     ABGs: No results found for: PHART, PO2ART, BNP7NTL, WMR9VTD, BEART, G2RRJPNO     Type & Screen (If Applicable):  No results found for: LABABO, LABRH    Drug/Infectious Status (If Applicable):  No results found for: HIV, HEPCAB    COVID-19 Screening (If Applicable):   Lab Results   Component Value Date/Time    COVID19 NOT DETECTED 08/07/2020 05:39 PM         Anesthesia Evaluation  Patient summary reviewed and Nursing notes reviewed  Airway: Mallampati: II          Dental:          Pulmonary:                              Cardiovascular:    (+) hypertension:, past MI: < 1 month,                ROS comment: Echo 10/2022    Normal left ventricle size, wall thickness, and systolic function with an   estimated ejection fraction of 55-60%. No regional wall motion abnormalities are seen. Normal diastolic function. Avg. E/e'=7.0   Normal function of all valves. Incidental Finding: Large gallstone seen. Neuro/Psych:               GI/Hepatic/Renal:             Endo/Other:    (+) Diabetes, : arthritis:., .                 Abdominal:             Vascular: Other Findings:             Anesthesia Plan      MAC     ASA 4       Induction: intravenous. Plan discussed with attending.                     Leanord Sacks, [facility]   10/17/2022

## 2022-10-17 NOTE — PROGRESS NOTES
Component Value Date/Time    MG 2.00 10/14/2022 06:03 AM    MG 2.10 10/13/2022 05:55 AM     Phos:     Lab Results   Component Value Date/Time    PHOS 3.6 10/14/2022 06:03 AM    PHOS 4.1 10/13/2022 05:55 AM      Coags:   Lab Results   Component Value Date/Time    PROTIME 13.2 10/16/2022 07:21 AM    INR 1.01 10/16/2022 07:21 AM    APTT 59.8 10/11/2022 05:30 AM       Cultures:  Anaerobic culture  No results found for: LABANAE  Fungus stain  No results found for requested labs within last 30 days. Gram stain  No results found for requested labs within last 30 days. Organism  No results found for: [facility]  Surgical culture  No results found for: CXSURG  Blood culture 1  No results found for requested labs within last 30 days. Blood culture 2  No results found for requested labs within last 30 days. Fecal occult  No results found for requested labs within last 30 days. GI bacterial pathogens by PCR  No results found for requested labs within last 30 days. C. difficile  No results found for requested labs within last 30 days. Urine culture  No results found for: LABURIN    Pathology:  No relevant pathology     Imaging:  I have personally reviewed the following films:    No results found.     Scheduled Meds:   predniSONE  10 mg Oral Daily    vitamin B-12  1,000 mcg Oral Daily    enoxaparin  40 mg SubCUTAneous Nightly    sodium chloride flush  5-40 mL IntraVENous 2 times per day    aspirin  81 mg Oral Daily    losartan  50 mg Oral Daily    metoprolol tartrate  25 mg Oral BID    [Held by provider] triamterene-hydroCHLOROthiazide  1 tablet Oral Daily    therapeutic multivitamin-minerals  1 tablet Oral Daily    Vitamin D  1,000 Units Oral Daily    sodium chloride flush  5-40 mL IntraVENous 2 times per day     Continuous Infusions:   sodium chloride Stopped (10/16/22 1011)    sodium chloride      sodium chloride       PRN Meds:.sodium chloride flush, sodium chloride, ondansetron **OR** ondansetron, acetaminophen **OR** acetaminophen, polyethylene glycol, nitroGLYCERIN, perflutren lipid microspheres, sodium chloride flush, sodium chloride      Assessment:  78 y.o. male admitted with   1. NSTEMI (non-ST elevated myocardial infarction) ([facility] [address]        Rhabdomyolysis       Plan:  1. No complaints, benign physical exam, vitals/labs stable, CK trending down; continued supportive care, tentatively planning for left thigh muscle biopsy tomorrow with Dr. Agnes Ford  2. NPO at midnight  3. IV hydration and electrolyte correction per nephrology  4. Management of medical comorbid etiologies per primary team and consulting services    EDUCATION:  Educated patient on plan of care and disease process--all questions answered. Plans discussed with patient and nursing. Reviewed and discussed with Dr. Agnes Ford. Signed:  NHI Soriano CNP  10/17/2022 11:40 AM    I have personally performed a face to face diagnostic evaluation on this patient. I have interviewed and examined the patient and I agree with the assessment above. Time was spent reviewing patient chart (including but not limited to notes, labs, imaging and other testing), interviewing and counseling patient and present family members, performing physical exam, documentation of my findings and subsequent follow up of ordered medication and testing, placing referrals and communication with patient care providers, coordinating future care as well as documentation in the EHR. This encompassed more than 50% of the total encounter time. In summary, my findings and plan are the following:   Mr. Km Ellis is a 78 y.o. male who presents with   Rhabdomyolysis     Plan:  1. Discussed risks, benefits, alternatives of a thigh muscle biopsy. More right thigh dominant, will biopsy the left thigh. Details of procedure discussed and all questions answered. Understands, agrees, and wishes to proceed. Tentatively scheduled for left thigh muscle biopsy tomorrow morning  2.  Cardiology input noted, low risk for surgery  3. NPO after midnight  4. Perioperative antibiotics  5. Defer management of remainder of medical comorbidities to primary and consulting teams    Beto An MD, FACS  10/17/2022  1:34 PM

## 2022-10-17 NOTE — PROGRESS NOTES
medical management. He was was also noted to have persistent elevation of CK levels of 10,000 and elevated transaminases which are improving. IDALIA was positive with reflex being pending, hepatitis panel negative. Ferritin nl. Mri liver hemangioma otherwise nl and nl biliary images. U/s showed hemangioma as well otherwise nl liver. At this point elevated transaminases could be related to rhabdomyolysis. Given that IDALIA was positive there was some concern about autoimmune myositis causing rhabdomyolysis. Nephrology planning for muscle biopsy rule out autoimmune myositis. PLAN :  - Follow LFT per primary service  - F/U F-Actin, AMA. pending  - Check Ceruloplasmin and Alpha-1 antitrypsin  - Expect LFT to improve with improvement of underlying rhabdomyolysis will sign off call with questions  - repeat mri with pcp 6 months follow up pancreatic small cystic lesion (1.5 cm), if enlarging then f/u dr Richa Andrew.   - Plan follow up with Dr. Aly Nolan 1-2 weeks after discharge    Discussed with Dr. Cristin Scott, [address] [place] [address]    I have personally performed a face to face diagnostic evaluation on this patient. I have interviewed and examined the patient and I agree with the findings and recommended plan of care. In summary, my findings and plan are the following: clinically pt states feeling better, no abd pain, lft overall stable last multiple days and improved vs admit levels, ck remains >10,000. Overall appears elevated ck is main issue, liver more likely incidental/not main issue. Plans for muscle biopsy/ ck workup noted per primary team. Will check rest of liver eval panel as above, pt can f/u dr Richa Andrew 2 weeks outpt to f/u lft and liver eval labs, in meantime f/u lft with primary team and recall me if signif change but suspect will improve as underlying etiology elevated ck resolves. Agree repeat mri pancreas 6 months f/u small cystic lesion.    Otherwise will sign off for now, call if needed Discussed with pt and dr Marly Hughes.      Beryl Mcdermott MD  [address] [facility]

## 2022-10-17 NOTE — PROGRESS NOTES
Hospitalist Progress Note      PCP: Blade Benedict MD    Date of Admission: 10/10/2022    Chief Complaint: Shortness of breath    Hospital Course: Redd Gaytan is 78 y.o. male with history of HTN, HLD and CKD. He presented to the ER with complaint of shortness of breath and leg swelling which started about 10 days ago, worse on exertion relieved by rest.  He was evaluated at an urgent care facility where bilateral leg swelling was noted and was sent to the ED for further evaluation. In the ED troponin was elevated to 0.9 but without any chest pain or chest discomfort. Per cardiology recommendation, he was started on low-dose heparin drip and admitted for further evaluation. Subjective:Patient seen and examined. Denies any chest pain or shortness of breath. Mild bilateral thigh pain but no weakness. CK and LFTs remain elevated. Worsening generalized edema. IV fluids discontinued.       Medications:  Reviewed    Infusion Medications    sodium chloride Stopped (10/16/22 1011)    sodium chloride      sodium chloride       Scheduled Medications    predniSONE  10 mg Oral Daily    vitamin B-12  1,000 mcg Oral Daily    enoxaparin  40 mg SubCUTAneous Nightly    sodium chloride flush  5-40 mL IntraVENous 2 times per day    aspirin  81 mg Oral Daily    losartan  50 mg Oral Daily    metoprolol tartrate  25 mg Oral BID    [Held by provider] triamterene-hydroCHLOROthiazide  1 tablet Oral Daily    therapeutic multivitamin-minerals  1 tablet Oral Daily    Vitamin D  1,000 Units Oral Daily    sodium chloride flush  5-40 mL IntraVENous 2 times per day     PRN Meds: sodium chloride flush, sodium chloride, ondansetron **OR** ondansetron, acetaminophen **OR** acetaminophen, polyethylene glycol, nitroGLYCERIN, perflutren lipid microspheres, sodium chloride flush, sodium chloride      Intake/Output Summary (Last 24 hours) at 10/17/2022 1302  Last data filed at 10/17/2022 0926  Gross per 24 hour   Intake 240 ml   Output --   Net 240 ml         Physical Exam Performed:    /71   Pulse 65   Temp 97.9 °F (36.6 °C) (Oral)   Resp 18   Ht 6' 2\" (1.88 m)   Wt 255 lb 6.4 oz (115.8 kg)   SpO2 95%   BMI 32.79 kg/m²     General appearance: No apparent distress, appears stated age and cooperative. HEENT: Pupils equal, round, and reactive to light. Conjunctivae/corneas clear. Neck: Supple, with full range of motion. No jugular venous distention. Trachea midline. Respiratory:  Normal respiratory effort. Clear to auscultation, bilaterally without Rales/Wheezes/Rhonchi. Cardiovascular: Regular rate and rhythm with normal S1/S2 without murmurs, rubs or gallops. Abdomen: Soft, non-tender, non-distended with normal bowel sounds. Musculoskeletal: No clubbing, cyanosis, worsening edema bilaterally. Full range of motion without deformity. Skin: Skin color, texture, turgor normal.  No rashes or lesions. Neurologic:  Neurovascularly intact without any focal sensory/motor deficits.  Cranial nerves: II-XII intact, grossly non-focal.  Psychiatric: Alert and oriented, thought content appropriate, normal insight  Capillary Refill: Brisk, 3 seconds, normal   Peripheral Pulses: +2 palpable, equal bilaterally       Labs:   Recent Labs     10/16/22  0721 10/17/22  0523   WBC 3.9* 4.2   HGB 11.0* 10.8*   HCT 34.2* 34.1*    168     Recent Labs     10/15/22  0618 10/16/22  0721 10/17/22  0523    143 143   K 3.8 4.0 3.7   * 112* 111*   CO2 23 24 24   BUN 10 11 12   CREATININE 0.7* 0.8 0.9   CALCIUM 8.2* 8.5 8.7     Recent Labs     10/15/22  0618 10/16/22  0721 10/17/22  0523   * 300* 310*   * 182* 182*   BILITOT <0.2 0.3 0.3   ALKPHOS 57 58 55     Recent Labs     10/16/22  0721   INR 1.01     Recent Labs     10/15/22  0618 10/16/22  0721 10/17/22  0523   CKTOTAL 10,841* 10,439* 10,126*       Urinalysis:      Lab Results   Component Value Date/Time    NITRU Negative 10/12/2022 06:14 PM    WBCUA 1 10/12/2022 06:14 PM    BACTERIA None Seen 10/12/2022 06:14 PM    RBCUA 2 10/12/2022 06:14 PM    BLOODU LARGE 10/12/2022 06:14 PM    SPECGRAV >=1.030 10/12/2022 06:14 PM    GLUCOSEU Negative 10/12/2022 06:14 PM       Radiology:  MRI ABDOMEN W WO CONTRAST MRCP   Final Result   1. Cholelithiasis without evidence of acute cholecystitis. 2. No biliary ductal dilatation or choledocholithiasis. 3. Within the liver, there is a 1.8 cm hemangioma near the gallbladder. 4. Simple and mildly complicated (Bosniak 1 and 2) bilateral renal cysts   measuring up to 8.4 cm. No dedicated follow-up recommended. 5. In the pancreatic uncinate process, there is a 1.5 x 1.4 cm cystic lesion   which could represent a branch duct IPMN. Recommend 6 month follow-up. RECOMMENDATIONS:   Incidental Pancreatic Cysts      65-79 years at presentation      1.5-2.5 cm      Main pancreatic duct communication absent or cannot be determined: Pancreas   protocol MRI (or CT) in 6 months, consider EUS/FNA      Reference: Tyra et al. Management of incidental pancreatic cysts: A white   paper of the ACR Incidental Findings Committee. [address] [place] [address];95:[id number]. US GALLBLADDER RUQ   Final Result   Mild hydrops of the gallbladder and associated 1.6 cm gallstone with no   secondary signs of cholecystitis. 2 cm rounded echogenic focus in the right lobe of the liver which is   suspicious for a liver hemangioma. Recommend a follow-up CT scan with   contrast further characterization. Borderline enlargement of the common bile duct and partial obscuration of the   pancreas due to overlying bowel gas. 7 cm cyst in the right kidney inferiorly with a thin septation in the cyst in   keeping with a Bosniak 2 cyst.         CT CHEST PULMONARY EMBOLISM W CONTRAST   Final Result   No evidence of a pulmonary embolus. Mildly dilated and atherosclerotic thoracic aorta with no aneurysm or   dissection.       Minimal bibasilar atelectasis or scarring posteriorly. Suggest follow-up   with chest x-rays. 8 cm benign cyst upper pole left kidney                 Assessment/Plan:    Active Hospital Problems    Diagnosis     NSTEMI (non-ST elevated myocardial infarction) ([facility] [I21.4]      Priority: Medium    Preop cardiovascular exam [Z01.810]      Priority: Medium    Elevated troponin level not due to acute coronary syndrome [R77.8]      Priority: Medium    Elevated CPK [R74.8]      Priority: Medium    Elevated LFTs [R79.89]      Priority: Medium    CKD (chronic kidney disease), stage III ([facility] [address] [N18.30]     HTN (hypertension) [I10]      Rhabdomyolysis: ?  Statin induced autoimmune myositis. No preceding trauma or known muscle injury. No muscle weakness noted but mild bilateral thigh pain. CPK levels remain elevated despite IV hydration. Crestor 40 mg daily discontinued. Monitor CPK levels. IV hydration discontinued due to worsening edema. CRP wnl. ESR 61. Aldolase level elevated. IDALIA positive. Discussed with rheumatology: Recommended myositis panel and HMG CoA reductase level. Empirically started on prednisone but discontinued after discussion with rheumatology. High-dose steroids often ineffective with high risk of side effects. Surgery consult consulted for muscle biopsy. Tentatively scheduled for Tuesday pending cardiac risk assessment by cardiology. Elevated liver enzymes: Alk phos within normal limits. AST and ALT elevated. ?  Due to rhabdomyolysis vs. autoimmune hepatitis. Hepatitis panel negative. RUQ US with large stone but no evidence of Cholecystitis. CBD mildly elevated. MRCP: Cholelithiasis without choledocholithiasis or CBD dilatation. Off Crestor. Monitor LFT's.  GI consulted: Appreciate input. Autoimmune work-up ordered. Elevated troponin:  Peak 0.9 trended down to 0.64. Cardiology consulted: status post [facility] 10/11/2022 with no findings of obstructive CAD.   ECHO unremarkable. Suspected to be related to rhabdomyolysis. Cardiology recommended medical management with outpatient cardiac MRI and repeat sed rate and CRP in 2 weeks. Vitamin B12 deficiency: Started on supplementation. HLD: Monitor on Cozaar and Lopressor. Maxzide on hold. Liver lesion:  Incidental finding on ultrasound. MRI consistent with hemangioma. Pancreatic cyst:  MRI: Pancreatic uncinate process with a 1.5 x 1.4 cm cystic lesion? IPMN. 6-month follow-up recommended. Mild leukopenia: Continue monitoring CBC. DVT Prophylaxis: Lovenox  Diet: ADULT DIET; Regular; Low Fat/Low Chol/High Fiber/2 gm Na  Diet NPO Exceptions are: Sips of Water with Meds  Code Status: Full Code  PT/OT Eval Status: Independent    Dispo -Home once medically stable.       Cindy Borjas MD

## 2022-10-18 ENCOUNTER — ANESTHESIA (OUTPATIENT)
Dept: OPERATING ROOM | Age: 77
DRG: 501 | End: 2022-10-18
Payer: COMMERCIAL

## 2022-10-18 LAB
A/G RATIO: 1.5 (ref 1.1–2.2)
ALBUMIN SERPL-MCNC: 2.9 G/DL (ref 3.4–5)
ALP BLD-CCNC: 56 U/L (ref 40–129)
ALT SERPL-CCNC: 201 U/L (ref 10–40)
ANION GAP SERPL CALCULATED.3IONS-SCNC: 8 MMOL/L (ref 3–16)
AST SERPL-CCNC: 303 U/L (ref 15–37)
BASOPHILS ABSOLUTE: 0 K/UL (ref 0–0.2)
BASOPHILS RELATIVE PERCENT: 0.7 %
BILIRUB SERPL-MCNC: 0.3 MG/DL (ref 0–1)
BUN BLDV-MCNC: 13 MG/DL (ref 7–20)
CALCIUM SERPL-MCNC: 8.7 MG/DL (ref 8.3–10.6)
CHLORIDE BLD-SCNC: 111 MMOL/L (ref 99–110)
CO2: 24 MMOL/L (ref 21–32)
CREAT SERPL-MCNC: 0.7 MG/DL (ref 0.8–1.3)
EOSINOPHILS ABSOLUTE: 0.1 K/UL (ref 0–0.6)
EOSINOPHILS RELATIVE PERCENT: 2.2 %
F-ACTIN AB IGG: 9 UNITS (ref 0–19)
GFR SERPL CREATININE-BSD FRML MDRD: >60 ML/MIN/{1.73_M2}
GLUCOSE BLD-MCNC: 80 MG/DL (ref 70–99)
HCT VFR BLD CALC: 35.2 % (ref 40.5–52.5)
HEMOGLOBIN: 11.1 G/DL (ref 13.5–17.5)
LYMPHOCYTES ABSOLUTE: 1.6 K/UL (ref 1–5.1)
LYMPHOCYTES RELATIVE PERCENT: 33.6 %
MCH RBC QN AUTO: 25.2 PG (ref 26–34)
MCHC RBC AUTO-ENTMCNC: 31.4 G/DL (ref 31–36)
MCV RBC AUTO: 80.3 FL (ref 80–100)
MONOCYTES ABSOLUTE: 0.4 K/UL (ref 0–1.3)
MONOCYTES RELATIVE PERCENT: 8.8 %
NEUTROPHILS ABSOLUTE: 2.6 K/UL (ref 1.7–7.7)
NEUTROPHILS RELATIVE PERCENT: 54.7 %
PDW BLD-RTO: 17.1 % (ref 12.4–15.4)
PLATELET # BLD: 169 K/UL (ref 135–450)
PMV BLD AUTO: 7.9 FL (ref 5–10.5)
POTASSIUM SERPL-SCNC: 3.6 MMOL/L (ref 3.5–5.1)
RBC # BLD: 4.39 M/UL (ref 4.2–5.9)
SODIUM BLD-SCNC: 143 MMOL/L (ref 136–145)
TOTAL CK: 9560 U/L (ref 39–308)
TOTAL PROTEIN: 4.9 G/DL (ref 6.4–8.2)
WBC # BLD: 4.7 K/UL (ref 4–11)

## 2022-10-18 PROCEDURE — 1200000000 HC SEMI PRIVATE

## 2022-10-18 PROCEDURE — 3600000002 HC SURGERY LEVEL 2 BASE: Performed by: SURGERY

## 2022-10-18 PROCEDURE — 7100000001 HC PACU RECOVERY - ADDTL 15 MIN: Performed by: SURGERY

## 2022-10-18 PROCEDURE — 6370000000 HC RX 637 (ALT 250 FOR IP): Performed by: SURGERY

## 2022-10-18 PROCEDURE — 3700000000 HC ANESTHESIA ATTENDED CARE: Performed by: SURGERY

## 2022-10-18 PROCEDURE — 6360000002 HC RX W HCPCS: Performed by: SURGERY

## 2022-10-18 PROCEDURE — 94150 VITAL CAPACITY TEST: CPT

## 2022-10-18 PROCEDURE — 2709999900 HC NON-CHARGEABLE SUPPLY: Performed by: SURGERY

## 2022-10-18 PROCEDURE — 80053 COMPREHEN METABOLIC PANEL: CPT

## 2022-10-18 PROCEDURE — 2580000003 HC RX 258: Performed by: SURGERY

## 2022-10-18 PROCEDURE — 20205 DEEP MUSCLE BIOPSY: CPT | Performed by: SURGERY

## 2022-10-18 PROCEDURE — 3700000001 HC ADD 15 MINUTES (ANESTHESIA): Performed by: SURGERY

## 2022-10-18 PROCEDURE — 85025 COMPLETE CBC W/AUTO DIFF WBC: CPT

## 2022-10-18 PROCEDURE — 7100000000 HC PACU RECOVERY - FIRST 15 MIN: Performed by: SURGERY

## 2022-10-18 PROCEDURE — 36415 COLL VENOUS BLD VENIPUNCTURE: CPT

## 2022-10-18 PROCEDURE — 6360000002 HC RX W HCPCS: Performed by: NURSE ANESTHETIST, CERTIFIED REGISTERED

## 2022-10-18 PROCEDURE — 99232 SBSQ HOSP IP/OBS MODERATE 35: CPT | Performed by: INTERNAL MEDICINE

## 2022-10-18 PROCEDURE — 3600000012 HC SURGERY LEVEL 2 ADDTL 15MIN: Performed by: SURGERY

## 2022-10-18 PROCEDURE — 6360000002 HC RX W HCPCS: Performed by: ANESTHESIOLOGY

## 2022-10-18 PROCEDURE — 82550 ASSAY OF CK (CPK): CPT

## 2022-10-18 PROCEDURE — 2500000003 HC RX 250 WO HCPCS: Performed by: NURSE ANESTHETIST, CERTIFIED REGISTERED

## 2022-10-18 PROCEDURE — 2580000003 HC RX 258: Performed by: NURSE ANESTHETIST, CERTIFIED REGISTERED

## 2022-10-18 PROCEDURE — A4217 STERILE WATER/SALINE, 500 ML: HCPCS | Performed by: SURGERY

## 2022-10-18 PROCEDURE — 94760 N-INVAS EAR/PLS OXIMETRY 1: CPT

## 2022-10-18 PROCEDURE — 0KBR0ZX EXCISION OF LEFT UPPER LEG MUSCLE, OPEN APPROACH, DIAGNOSTIC: ICD-10-PCS | Performed by: SURGERY

## 2022-10-18 RX ORDER — DEXMEDETOMIDINE HYDROCHLORIDE 100 UG/ML
INJECTION, SOLUTION INTRAVENOUS PRN
Status: DISCONTINUED | OUTPATIENT
Start: 2022-10-18 | End: 2022-10-18 | Stop reason: SDUPTHER

## 2022-10-18 RX ORDER — ONDANSETRON 4 MG/1
4 TABLET, ORALLY DISINTEGRATING ORAL EVERY 8 HOURS PRN
Status: DISCONTINUED | OUTPATIENT
Start: 2022-10-18 | End: 2022-10-18

## 2022-10-18 RX ORDER — HYDRALAZINE HYDROCHLORIDE 20 MG/ML
10 INJECTION INTRAMUSCULAR; INTRAVENOUS
Status: DISCONTINUED | OUTPATIENT
Start: 2022-10-18 | End: 2022-10-18 | Stop reason: HOSPADM

## 2022-10-18 RX ORDER — PROPOFOL 10 MG/ML
INJECTION, EMULSION INTRAVENOUS PRN
Status: DISCONTINUED | OUTPATIENT
Start: 2022-10-18 | End: 2022-10-18 | Stop reason: SDUPTHER

## 2022-10-18 RX ORDER — HYDROMORPHONE HCL 110MG/55ML
0.5 PATIENT CONTROLLED ANALGESIA SYRINGE INTRAVENOUS EVERY 5 MIN PRN
Status: DISCONTINUED | OUTPATIENT
Start: 2022-10-18 | End: 2022-10-18 | Stop reason: HOSPADM

## 2022-10-18 RX ORDER — ACETAMINOPHEN 325 MG/1
650 TABLET ORAL EVERY 6 HOURS
Status: DISCONTINUED | OUTPATIENT
Start: 2022-10-18 | End: 2022-10-21 | Stop reason: HOSPADM

## 2022-10-18 RX ORDER — SODIUM CHLORIDE 0.9 % (FLUSH) 0.9 %
5-40 SYRINGE (ML) INJECTION PRN
Status: DISCONTINUED | OUTPATIENT
Start: 2022-10-18 | End: 2022-10-21 | Stop reason: HOSPADM

## 2022-10-18 RX ORDER — PHENYLEPHRINE HYDROCHLORIDE 10 MG/ML
INJECTION INTRAVENOUS PRN
Status: DISCONTINUED | OUTPATIENT
Start: 2022-10-18 | End: 2022-10-18 | Stop reason: SDUPTHER

## 2022-10-18 RX ORDER — OXYCODONE HYDROCHLORIDE 5 MG/1
10 TABLET ORAL EVERY 4 HOURS PRN
Status: DISCONTINUED | OUTPATIENT
Start: 2022-10-18 | End: 2022-10-21 | Stop reason: HOSPADM

## 2022-10-18 RX ORDER — ONDANSETRON 2 MG/ML
INJECTION INTRAMUSCULAR; INTRAVENOUS PRN
Status: DISCONTINUED | OUTPATIENT
Start: 2022-10-18 | End: 2022-10-18 | Stop reason: SDUPTHER

## 2022-10-18 RX ORDER — MORPHINE SULFATE 2 MG/ML
2 INJECTION, SOLUTION INTRAMUSCULAR; INTRAVENOUS
Status: DISCONTINUED | OUTPATIENT
Start: 2022-10-18 | End: 2022-10-21 | Stop reason: HOSPADM

## 2022-10-18 RX ORDER — ENOXAPARIN SODIUM 100 MG/ML
30 INJECTION SUBCUTANEOUS 2 TIMES DAILY
Status: DISCONTINUED | OUTPATIENT
Start: 2022-10-18 | End: 2022-10-21 | Stop reason: HOSPADM

## 2022-10-18 RX ORDER — OXYCODONE HYDROCHLORIDE 5 MG/1
5 TABLET ORAL EVERY 4 HOURS PRN
Status: DISCONTINUED | OUTPATIENT
Start: 2022-10-18 | End: 2022-10-21 | Stop reason: HOSPADM

## 2022-10-18 RX ORDER — MEPERIDINE HYDROCHLORIDE 25 MG/ML
12.5 INJECTION INTRAMUSCULAR; INTRAVENOUS; SUBCUTANEOUS EVERY 5 MIN PRN
Status: DISCONTINUED | OUTPATIENT
Start: 2022-10-18 | End: 2022-10-18 | Stop reason: HOSPADM

## 2022-10-18 RX ORDER — MAGNESIUM HYDROXIDE 1200 MG/15ML
LIQUID ORAL CONTINUOUS PRN
Status: COMPLETED | OUTPATIENT
Start: 2022-10-18 | End: 2022-10-18

## 2022-10-18 RX ORDER — CEFAZOLIN 2 G/1
INJECTION, POWDER, FOR SOLUTION INTRAMUSCULAR; INTRAVENOUS
Status: DISPENSED
Start: 2022-10-18 | End: 2022-10-18

## 2022-10-18 RX ORDER — ONDANSETRON 2 MG/ML
4 INJECTION INTRAMUSCULAR; INTRAVENOUS
Status: DISCONTINUED | OUTPATIENT
Start: 2022-10-18 | End: 2022-10-18 | Stop reason: HOSPADM

## 2022-10-18 RX ORDER — ONDANSETRON 2 MG/ML
4 INJECTION INTRAMUSCULAR; INTRAVENOUS EVERY 6 HOURS PRN
Status: DISCONTINUED | OUTPATIENT
Start: 2022-10-18 | End: 2022-10-18

## 2022-10-18 RX ORDER — LABETALOL HYDROCHLORIDE 5 MG/ML
10 INJECTION, SOLUTION INTRAVENOUS
Status: DISCONTINUED | OUTPATIENT
Start: 2022-10-18 | End: 2022-10-18 | Stop reason: HOSPADM

## 2022-10-18 RX ORDER — SODIUM CHLORIDE 0.9 % (FLUSH) 0.9 %
5-40 SYRINGE (ML) INJECTION EVERY 12 HOURS SCHEDULED
Status: DISCONTINUED | OUTPATIENT
Start: 2022-10-18 | End: 2022-10-21 | Stop reason: HOSPADM

## 2022-10-18 RX ORDER — DEXAMETHASONE SODIUM PHOSPHATE 4 MG/ML
INJECTION, SOLUTION INTRA-ARTICULAR; INTRALESIONAL; INTRAMUSCULAR; INTRAVENOUS; SOFT TISSUE PRN
Status: DISCONTINUED | OUTPATIENT
Start: 2022-10-18 | End: 2022-10-18 | Stop reason: SDUPTHER

## 2022-10-18 RX ORDER — SODIUM CHLORIDE 9 MG/ML
INJECTION, SOLUTION INTRAVENOUS CONTINUOUS PRN
Status: DISCONTINUED | OUTPATIENT
Start: 2022-10-18 | End: 2022-10-18 | Stop reason: SDUPTHER

## 2022-10-18 RX ORDER — SODIUM CHLORIDE 9 MG/ML
INJECTION, SOLUTION INTRAVENOUS PRN
Status: DISCONTINUED | OUTPATIENT
Start: 2022-10-18 | End: 2022-10-21 | Stop reason: HOSPADM

## 2022-10-18 RX ORDER — MORPHINE SULFATE 4 MG/ML
4 INJECTION, SOLUTION INTRAMUSCULAR; INTRAVENOUS
Status: DISCONTINUED | OUTPATIENT
Start: 2022-10-18 | End: 2022-10-21 | Stop reason: HOSPADM

## 2022-10-18 RX ORDER — FENTANYL CITRATE 50 UG/ML
INJECTION, SOLUTION INTRAMUSCULAR; INTRAVENOUS PRN
Status: DISCONTINUED | OUTPATIENT
Start: 2022-10-18 | End: 2022-10-18 | Stop reason: SDUPTHER

## 2022-10-18 RX ORDER — OXYCODONE HYDROCHLORIDE 5 MG/1
5 TABLET ORAL
Status: DISCONTINUED | OUTPATIENT
Start: 2022-10-18 | End: 2022-10-18 | Stop reason: HOSPADM

## 2022-10-18 RX ORDER — KETOROLAC TROMETHAMINE 30 MG/ML
INJECTION, SOLUTION INTRAMUSCULAR; INTRAVENOUS PRN
Status: DISCONTINUED | OUTPATIENT
Start: 2022-10-18 | End: 2022-10-18 | Stop reason: SDUPTHER

## 2022-10-18 RX ORDER — BUPIVACAINE HYDROCHLORIDE AND EPINEPHRINE 5; 5 MG/ML; UG/ML
INJECTION, SOLUTION PERINEURAL
Status: COMPLETED | OUTPATIENT
Start: 2022-10-18 | End: 2022-10-18

## 2022-10-18 RX ORDER — LIDOCAINE HYDROCHLORIDE 20 MG/ML
INJECTION, SOLUTION EPIDURAL; INFILTRATION; INTRACAUDAL; PERINEURAL PRN
Status: DISCONTINUED | OUTPATIENT
Start: 2022-10-18 | End: 2022-10-18 | Stop reason: SDUPTHER

## 2022-10-18 RX ADMIN — HYDROMORPHONE HYDROCHLORIDE 0.5 MG: 2 INJECTION, SOLUTION INTRAMUSCULAR; INTRAVENOUS; SUBCUTANEOUS at 09:01

## 2022-10-18 RX ADMIN — DEXMEDETOMIDINE HYDROCHLORIDE 10 MCG: 100 INJECTION, SOLUTION INTRAVENOUS at 07:31

## 2022-10-18 RX ADMIN — CYANOCOBALAMIN TAB 1000 MCG 1000 MCG: 1000 TAB at 09:41

## 2022-10-18 RX ADMIN — Medication 10 ML: at 21:00

## 2022-10-18 RX ADMIN — ASPIRIN 81 MG 81 MG: 81 TABLET ORAL at 09:41

## 2022-10-18 RX ADMIN — SODIUM CHLORIDE: 9 INJECTION, SOLUTION INTRAVENOUS at 07:30

## 2022-10-18 RX ADMIN — ENOXAPARIN SODIUM 30 MG: 100 INJECTION SUBCUTANEOUS at 21:49

## 2022-10-18 RX ADMIN — MULTIPLE VITAMINS W/ MINERALS TAB 1 TABLET: TAB at 09:41

## 2022-10-18 RX ADMIN — Medication 5 ML: at 23:38

## 2022-10-18 RX ADMIN — DEXMEDETOMIDINE HYDROCHLORIDE 10 MCG: 100 INJECTION, SOLUTION INTRAVENOUS at 07:29

## 2022-10-18 RX ADMIN — LIDOCAINE HYDROCHLORIDE 100 MG: 20 INJECTION, SOLUTION EPIDURAL; INFILTRATION; INTRACAUDAL; PERINEURAL at 07:40

## 2022-10-18 RX ADMIN — PROPOFOL 200 MG: 10 INJECTION, EMULSION INTRAVENOUS at 07:45

## 2022-10-18 RX ADMIN — DEXAMETHASONE SODIUM PHOSPHATE 10 MG: 4 INJECTION, SOLUTION INTRAMUSCULAR; INTRAVENOUS at 07:49

## 2022-10-18 RX ADMIN — ENOXAPARIN SODIUM 30 MG: 100 INJECTION SUBCUTANEOUS at 10:34

## 2022-10-18 RX ADMIN — PROPOFOL 200 MG: 10 INJECTION, EMULSION INTRAVENOUS at 07:40

## 2022-10-18 RX ADMIN — PHENYLEPHRINE HYDROCHLORIDE 200 MCG: 10 INJECTION INTRAVENOUS at 07:59

## 2022-10-18 RX ADMIN — ACETAMINOPHEN 650 MG: 325 TABLET ORAL at 16:04

## 2022-10-18 RX ADMIN — ACETAMINOPHEN 650 MG: 325 TABLET ORAL at 10:33

## 2022-10-18 RX ADMIN — ACETAMINOPHEN 650 MG: 325 TABLET ORAL at 21:50

## 2022-10-18 RX ADMIN — Medication 1000 UNITS: at 09:41

## 2022-10-18 RX ADMIN — CEFAZOLIN 2000 MG: 2 INJECTION, POWDER, FOR SOLUTION INTRAMUSCULAR; INTRAVENOUS at 16:04

## 2022-10-18 RX ADMIN — PHENYLEPHRINE HYDROCHLORIDE 200 MCG: 10 INJECTION INTRAVENOUS at 08:11

## 2022-10-18 RX ADMIN — ONDANSETRON 8 MG: 2 INJECTION INTRAMUSCULAR; INTRAVENOUS at 07:49

## 2022-10-18 RX ADMIN — LOSARTAN POTASSIUM 50 MG: 25 TABLET, FILM COATED ORAL at 09:41

## 2022-10-18 RX ADMIN — CEFAZOLIN 2000 MG: 2 INJECTION, POWDER, FOR SOLUTION INTRAMUSCULAR; INTRAVENOUS at 23:39

## 2022-10-18 RX ADMIN — KETOROLAC TROMETHAMINE 30 MG: 30 INJECTION, SOLUTION INTRAMUSCULAR at 08:17

## 2022-10-18 RX ADMIN — METOPROLOL TARTRATE 25 MG: 25 TABLET, FILM COATED ORAL at 21:50

## 2022-10-18 RX ADMIN — CEFAZOLIN 2000 MG: 2 INJECTION, POWDER, FOR SOLUTION INTRAMUSCULAR; INTRAVENOUS at 07:28

## 2022-10-18 RX ADMIN — METOPROLOL TARTRATE 25 MG: 25 TABLET, FILM COATED ORAL at 09:42

## 2022-10-18 RX ADMIN — PREDNISONE 10 MG: 10 TABLET ORAL at 09:42

## 2022-10-18 RX ADMIN — SODIUM CHLORIDE: 9 INJECTION, SOLUTION INTRAVENOUS at 16:03

## 2022-10-18 RX ADMIN — FENTANYL CITRATE 100 MCG: 50 INJECTION, SOLUTION INTRAMUSCULAR; INTRAVENOUS at 07:35

## 2022-10-18 ASSESSMENT — PAIN DESCRIPTION - ORIENTATION
ORIENTATION: LEFT

## 2022-10-18 ASSESSMENT — PAIN DESCRIPTION - LOCATION
LOCATION: LEG

## 2022-10-18 ASSESSMENT — PAIN - FUNCTIONAL ASSESSMENT
PAIN_FUNCTIONAL_ASSESSMENT: NONE - DENIES PAIN
PAIN_FUNCTIONAL_ASSESSMENT: ACTIVITIES ARE NOT PREVENTED

## 2022-10-18 ASSESSMENT — PAIN DESCRIPTION - DESCRIPTORS
DESCRIPTORS: DULL
DESCRIPTORS: ACHING
DESCRIPTORS: SHARP

## 2022-10-18 ASSESSMENT — PAIN SCALES - GENERAL
PAINLEVEL_OUTOF10: 0
PAINLEVEL_OUTOF10: 3
PAINLEVEL_OUTOF10: 0
PAINLEVEL_OUTOF10: 6
PAINLEVEL_OUTOF10: 0
PAINLEVEL_OUTOF10: 3
PAINLEVEL_OUTOF10: 6

## 2022-10-18 ASSESSMENT — LIFESTYLE VARIABLES: SMOKING_STATUS: 0

## 2022-10-18 ASSESSMENT — PAIN DESCRIPTION - PAIN TYPE
TYPE: SURGICAL PAIN
TYPE: SURGICAL PAIN

## 2022-10-18 NOTE — PROGRESS NOTES
Hospitalist Progress Note      PCP: Marylee Pollock, MD    Date of Admission: 10/10/2022    Chief Complaint: Shortness of breath    Hospital Course: Jc Reyes is 78 y.o. male with history of HTN, HLD and CKD. He presented to the ER with complaint of shortness of breath and leg swelling which started about 10 days ago, worse on exertion relieved by rest.  He was evaluated at an urgent care facility where bilateral leg swelling was noted and was sent to the ED for further evaluation. In the ED troponin was elevated to 0.9 but without any chest pain or chest discomfort. Per cardiology recommendation, he was started on low-dose heparin drip and admitted for further evaluation. Subjective:Patient seen and examined. Denies any chest pain or shortness of breath. Mild bilateral thigh pain but no weakness. CK and LFTs remain elevated. Worsening generalized edema. IV fluids discontinued.       Medications:  Reviewed    Infusion Medications    sodium chloride      sodium chloride Stopped (10/16/22 1011)    sodium chloride      sodium chloride       Scheduled Medications    dextrose        ceFAZolin        sodium chloride flush  5-40 mL IntraVENous 2 times per day    enoxaparin  30 mg SubCUTAneous BID    ceFAZolin  2,000 mg IntraVENous Q8H    acetaminophen  650 mg Oral Q6H    predniSONE  10 mg Oral Daily    vitamin B-12  1,000 mcg Oral Daily    sodium chloride flush  5-40 mL IntraVENous 2 times per day    aspirin  81 mg Oral Daily    losartan  50 mg Oral Daily    metoprolol tartrate  25 mg Oral BID    [Held by provider] triamterene-hydroCHLOROthiazide  1 tablet Oral Daily    therapeutic multivitamin-minerals  1 tablet Oral Daily    Vitamin D  1,000 Units Oral Daily    sodium chloride flush  5-40 mL IntraVENous 2 times per day     PRN Meds: sodium chloride flush, sodium chloride, morphine **OR** morphine, oxyCODONE **OR** oxyCODONE, sodium chloride flush, sodium chloride, polyethylene glycol, nitroGLYCERIN, perflutren lipid microspheres, sodium chloride flush, sodium chloride      Intake/Output Summary (Last 24 hours) at 10/18/2022 1053  Last data filed at 10/18/2022 0907  Gross per 24 hour   Intake 1405 ml   Output --   Net 1405 ml         Physical Exam Performed:    /74   Pulse 70   Temp 97.6 °F (36.4 °C) (Oral)   Resp 14   Ht 6' 2\" (1.88 m)   Wt 256 lb 6.4 oz (116.3 kg)   SpO2 90%   BMI 32.92 kg/m²     General appearance: No apparent distress, appears stated age and cooperative. HEENT: Pupils equal, round, and reactive to light. Conjunctivae/corneas clear. Neck: Supple, with full range of motion. No jugular venous distention. Trachea midline. Respiratory:  Normal respiratory effort. Clear to auscultation, bilaterally without Rales/Wheezes/Rhonchi. Cardiovascular: Regular rate and rhythm with normal S1/S2 without murmurs, rubs or gallops. Abdomen: Soft, non-tender, non-distended with normal bowel sounds. Musculoskeletal: No clubbing, cyanosis, worsening edema bilaterally. Full range of motion without deformity. Skin: Skin color, texture, turgor normal.  No rashes or lesions. Neurologic:  Neurovascularly intact without any focal sensory/motor deficits.  Cranial nerves: II-XII intact, grossly non-focal.  Psychiatric: Alert and oriented, thought content appropriate, normal insight  Capillary Refill: Brisk, 3 seconds, normal   Peripheral Pulses: +2 palpable, equal bilaterally       Labs:   Recent Labs     10/16/22  0721 10/17/22  0523 10/18/22  0505   WBC 3.9* 4.2 4.7   HGB 11.0* 10.8* 11.1*   HCT 34.2* 34.1* 35.2*    168 169     Recent Labs     10/16/22  0721 10/17/22  0523 10/18/22  0505    143 143   K 4.0 3.7 3.6   * 111* 111*   CO2 24 24 24   BUN 11 12 13   CREATININE 0.8 0.9 0.7*   CALCIUM 8.5 8.7 8.7     Recent Labs     10/16/22  0721 10/17/22  0523 10/18/22  0505   * 310* 303*   * 182* 201*   BILITOT 0.3 0.3 0.3   ALKPHOS 58 55 56     Recent Labs 10/16/22  0721   INR 1.01     Recent Labs     10/16/22  0721 10/17/22  0523 10/18/22  0505   CKTOTAL 10,439* 10,126* 9,560*       Urinalysis:      Lab Results   Component Value Date/Time    NITRU Negative 10/12/2022 06:14 PM    WBCUA 1 10/12/2022 06:14 PM    BACTERIA None Seen 10/12/2022 06:14 PM    RBCUA 2 10/12/2022 06:14 PM    BLOODU LARGE 10/12/2022 06:14 PM    SPECGRAV >=1.030 10/12/2022 06:14 PM    GLUCOSEU Negative 10/12/2022 06:14 PM       Radiology:  MRI ABDOMEN W WO CONTRAST MRCP   Final Result   1. Cholelithiasis without evidence of acute cholecystitis. 2. No biliary ductal dilatation or choledocholithiasis. 3. Within the liver, there is a 1.8 cm hemangioma near the gallbladder. 4. Simple and mildly complicated (Bosniak 1 and 2) bilateral renal cysts   measuring up to 8.4 cm. No dedicated follow-up recommended. 5. In the pancreatic uncinate process, there is a 1.5 x 1.4 cm cystic lesion   which could represent a branch duct IPMN. Recommend 6 month follow-up. RECOMMENDATIONS:   Incidental Pancreatic Cysts      65-79 years at presentation      1.5-2.5 cm      Main pancreatic duct communication absent or cannot be determined: Pancreas   protocol MRI (or CT) in 6 months, consider EUS/FNA      Reference: Tyra et al. Management of incidental pancreatic cysts: A white   paper of the ACR Incidental Findings Committee. [address] [address];75:[id number]. US GALLBLADDER RUQ   Final Result   Mild hydrops of the gallbladder and associated 1.6 cm gallstone with no   secondary signs of cholecystitis. 2 cm rounded echogenic focus in the right lobe of the liver which is   suspicious for a liver hemangioma. Recommend a follow-up CT scan with   contrast further characterization. Borderline enlargement of the common bile duct and partial obscuration of the   pancreas due to overlying bowel gas.       7 cm cyst in the right kidney inferiorly with a thin septation in the cyst in keeping with a Bosniak 2 cyst.         CT CHEST PULMONARY EMBOLISM W CONTRAST   Final Result   No evidence of a pulmonary embolus. Mildly dilated and atherosclerotic thoracic aorta with no aneurysm or   dissection. Minimal bibasilar atelectasis or scarring posteriorly. Suggest follow-up   with chest x-rays. 8 cm benign cyst upper pole left kidney                 Assessment/Plan:    Active Hospital Problems    Diagnosis     NSTEMI (non-ST elevated myocardial infarction) ([facility] [address] [I21.4]      Priority: Medium    Preop cardiovascular exam [Z01.810]      Priority: Medium    Elevated troponin level not due to acute coronary syndrome [R77.8]      Priority: Medium    Elevated CPK [R74.8]      Priority: Medium    Elevated LFTs [R79.89]      Priority: Medium    CKD (chronic kidney disease), stage III ([facility] [address] [N18.30]     HTN (hypertension) [I10]      Rhabdomyolysis: ?  Statin induced autoimmune myositis. No preceding trauma or known muscle injury. No muscle weakness noted but mild bilateral thigh pain. CPK levels remain elevated despite IV hydration. Patient is status postbiopsy    Crestor 40 mg daily discontinued. Monitor CPK levels. Discussed with rheumatology: Recommended myositis panel and HMG CoA reductase level. Empirically started on prednisone but discontinued after discussion with rheumatology. High-dose steroids often ineffective with high risk of side effects. Elevated liver enzymes: Alk phos within normal limits. AST and ALT elevated. ?  Due to rhabdomyolysis vs. autoimmune hepatitis. Hepatitis panel negative. RUQ US with large stone but no evidence of Cholecystitis. CBD mildly elevated. MRCP: Cholelithiasis without choledocholithiasis or CBD dilatation. Off Crestor. Monitor LFT's.  GI consulted: Appreciate inpu-outpatient follow-up      Elevated troponin:  Peak 0.9 trended down to 0.64.   Cardiology consulted: status post [facility] 10/11/2022 with no findings of obstructive CAD. ECHO unremarkable. Suspected to be related to rhabdomyolysis. Cardiology recommended medical management with outpatient cardiac MRI and repeat sed rate and CRP in 2 weeks. Vitamin B12 deficiency: Started on supplementation. HLD: Monitor on Cozaar and Lopressor. Maxzide on hold. Liver lesion:  Incidental finding on ultrasound. MRI consistent with hemangioma. Pancreatic cyst:  MRI: Pancreatic uncinate process with a 1.5 x 1.4 cm cystic lesion? IPMN. 6-month follow-up recommended. Mild leukopenia: Continue monitoring CBC. DVT Prophylaxis: Lovenox  Diet: ADULT DIET;  Regular  Code Status: Full Code  PT/OT Eval Status: Independent    Dispo awaiting pathology result and normalization of CK      Mayito Oliva MD

## 2022-10-18 NOTE — PROGRESS NOTES
Nutrition Note    RECOMMENDATIONS  PO Diet: Regular    NUTRITION ASSESSMENT   LOS. Pt with adequate intake greater than 75% of meals. Weight is stable per EMR and pt report. No GI upset. Last BM 10/17. No nutrition concerns at this time. Nutrition Related Findings: Labs reviewed; BLE +1 edema  Wounds: None  Nutrition Education:  Education not indicated   Nutrition Goals: PO intake 75% or greater     MALNUTRITION ASSESSMENT   Acute Illness  Malnutrition Status: No malnutrition      NUTRITION DIAGNOSIS   No nutrition diagnosis at this time     CURRENT NUTRITION THERAPIES  ADULT DIET; Regular     PO Intake: %   PO Supplement Intake:None Ordered      ANTHROPOMETRICS  Current Height: 6' 2\" (188 cm)  Current Weight: 256 lb 6.4 oz (116.3 kg)    Ideal Body Weight (IBW): 190 lbs  (86 kg)      BMI: 32.9      The patient will be monitored per nutrition standards of care. Consult dietitian if additional nutrition interventions are needed prior to RD reassessment.      Tracey Aviles LD    Contact: [phone]

## 2022-10-18 NOTE — ANESTHESIA POSTPROCEDURE EVALUATION
Department of Anesthesiology  Postprocedure Note    Patient: Radha Gómez  MRN: [id number]  YOB: 1945  Date of evaluation: 10/18/2022      Procedure Summary     Date: 10/18/22 Room / Location: [address]    Anesthesia Start: 0730 Anesthesia Stop: 6589    Procedure: LEFT LEG MUSCLE BIOPSY (Left: Leg Upper) Diagnosis:       Traumatic rhabdomyolysis, initial encounter ([facility] [address]      (NON ISCHEMIC MYOCARDIAL INJURY RELATED TO RHABDOMYOLYSIS)    Surgeons: Mateo Lion MD Responsible Provider: Zak Patino MD    Anesthesia Type: General ASA Status: 3          Anesthesia Type: General    Shukri Phase I: Shukri Score: 7    Shukri Phase II:        Anesthesia Post Evaluation    Patient location during evaluation: PACU  Patient participation: complete - patient participated  Level of consciousness: awake and alert  Airway patency: patent  Nausea & Vomiting: no vomiting and no nausea  Complications: no  Cardiovascular status: hemodynamically stable  Respiratory status: acceptable  Hydration status: stable

## 2022-10-18 NOTE — BRIEF OP NOTE
[address] and Laparoscopic Surgery  Brief Operative Note  Pt Name: Vivian Velasquez  CSN: [id number]  YOB: 1945    Date of Procedure: 10/18/2022    Pre-operative Diagnosis:  Rhabdomyolysis    Post-operative Diagnosis: same     Procedure:  Left thigh muscle biopsy    Surgeon(s):  Harjeet Jay MD     Surgical Assistant: Netta Delatorre RN    Anesthesia:  General anesthesia    Findings: Full note dictated, Dictation Job Number: [id number]    Estimated Blood Loss: less than 50  ml    Complications: None    Specimens:  left thigh muscle biopsy  ID Type Source Tests Collected by Time Destination   A : A) LEFT THIGH MUSCLE Tissue Tissue SURGICAL PATHOLOGY Harjeet Jay MD 10/18/2022 4552       Implants:  * No implants in log *    Drains: none   * No LDAs found *    Condition: stable     Disposition and Post-operative plan: PACU, med/surg lopez     Beto Pryor MD, FACS  10/18/2022  8:14 AM

## 2022-10-18 NOTE — PROGRESS NOTES
Pharmacist Review and Automatic Dose Adjustment of Prophylactic Enoxaparin    The reviewing pharmacist has made an adjustment to the ordered enoxaparin dose or converted to UFH per the approved [facility] protocol and table as identified below. Danna Grijalva is a 78 y.o. male. Recent Labs     10/16/22  0721 10/17/22  0523 10/18/22  0505   CREATININE 0.8 0.9 0.7*       Estimated Creatinine Clearance: 124 mL/min (A) (based on SCr of 0.7 mg/dL (L)). Recent Labs     10/17/22  0523 10/18/22  0505   HGB 10.8* 11.1*   HCT 34.1* 35.2*    169     Recent Labs     10/16/22  0721   INR 1.01       Height:   Ht Readings from Last 1 Encounters:   10/11/22 6' 2\" (1.88 m)     Weight:  Wt Readings from Last 1 Encounters:   10/18/22 256 lb 6.4 oz (116.3 kg)               Plan: Based upon the patient's weight and renal function, the ordered enoxaparin dose of 40 mg daily has been changed/converted to 30 mg twice daily.       Thank you,  Calista Jean, [facility]  10/18/2022, 9:30 AM

## 2022-10-18 NOTE — PROGRESS NOTES
Pt awake resting in bed, VSS- on RA satting low 90s. X1 surgical incision to L thigh CDI with no signs of hematoma. Pt states pain is tolerable and denies nausea- tolerating ice chips.  Phase one criteria met, will transfer back to 3A

## 2022-10-18 NOTE — FLOWSHEET NOTE
pt returning to unit from PACU. Surgical site pain addressed prior to returning to unit. Pulses WDL. Surgical site is C/D/I. All LDA's remain C/D/I.  see below vitals. call light, bedside table, and all personal belongings are within reach. will continue to closely monitor.         10/18/22 0915   Vital Signs   Temp 97.6 °F (36.4 °C)   Temp Source Oral   Heart Rate 67   Heart Rate Source Monitor   Resp 14   /76   BP Location Left Arm   BP Method Automatic   MAP (Calculated) 92.33   Patient Position Semi fowlers   Pain Assessment   Pain Assessment None - Denies Pain   Pain Level 6   Pain Location Leg   Pain Orientation Left   Pain Descriptors Aching   Pain Type Surgical pain   Non-Pharmaceutical Pain Intervention(s) Ice   Oxygen Therapy   SpO2 91 %   Pulse Oximetry Type Intermittent   Pulse Oximeter Device Mode Continuous   Pulse Oximeter Device Location Finger   O2 Device None (Room air)

## 2022-10-18 NOTE — PROGRESS NOTES
Office : [phone]     Fax :[phone]       Nephrology progress  Note      Patient's Name: Deshaun Orellana  10:36 AM  10/18/2022    Reason for Consult:  Rhabdomyolysis       Requesting Physician:  Gretchen Nazario MD      Chief Complaint:    Chief Complaint   Patient presents with    Leg Swelling     Patient in with complaints of bilateral edema in his legs that is new. PCP told him to come into the er. History of Present iIlness:      Deshaun Orellana is a 78 y.o. male with prior history of CKD, HTN, RA , simple renal cyst who came to ER with c/o pain in thighs. He was found to have elevated CK levels. Denies any trauma. Denies any heavy exercise. [place] elevated at [address] . Has h/o CKD 2/2 NSaids which improved. 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 Denies any muscle pain . Making urine. Renal function stable . I/O last 3 completed shifts:   In: 5 [P.O.:720]  Out: -     Past Medical History:   Diagnosis Date    Arthritis     Colon polyp     Diabetes mellitus ([facility] [address]     denies being diabetic    Hyperlipemia     Hypertension     Kidney disease     Rheumatoid arthritis [facility])        Past Surgical History:   Procedure Laterality Date    COLONOSCOPY      COLONOSCOPY N/A 8/14/2020    COLONOSCOPY DIAGNOSTIC performed by Neris Platt MD at [facility] 10/18/2022    LEFT LEG MUSCLE BIOPSY performed by Kun Alva MD at [address]        Family History   Problem Relation Age of Onset    High Blood Pressure Sister          Current Medications:    dextrose 5 % infusion,   ceFAZolin (ANCEF) 2 g injection,   sodium chloride flush 0.9 % injection 5-40 mL, 2 times per day  sodium chloride flush 0.9 % injection 5-40 mL, PRN  0.9 % sodium chloride infusion, PRN  enoxaparin Sodium (LOVENOX) injection 30 mg, BID  ceFAZolin (ANCEF) 2,000 mg in dextrose 5 % 50 mL IVPB (mini-bag), Q8H  morphine (PF) injection 2 mg, Q2H PRN   Or  morphine injection 4 mg, Q2H PRN  oxyCODONE (ROXICODONE) immediate release tablet 5 mg, Q4H PRN   Or  oxyCODONE (ROXICODONE) immediate release tablet 10 mg, Q4H PRN  acetaminophen (TYLENOL) tablet 650 mg, Q6H  predniSONE (DELTASONE) tablet 10 mg, Daily  vitamin B-12 (CYANOCOBALAMIN) tablet 1,000 mcg, Daily  0.9 % sodium chloride infusion, Continuous  sodium chloride flush 0.9 % injection 5-40 mL, 2 times per day  sodium chloride flush 0.9 % injection 5-40 mL, PRN  0.9 % sodium chloride infusion, PRN  polyethylene glycol (GLYCOLAX) packet 17 g, Daily PRN  aspirin chewable tablet 81 mg, Daily  nitroGLYCERIN (NITROSTAT) SL tablet 0.4 mg, Q5 Min PRN  losartan (COZAAR) tablet 50 mg, Daily  metoprolol tartrate (LOPRESSOR) tablet 25 mg, BID  [Held by provider] triamterene-hydroCHLOROthiazide (MAXZIDE-25) 37.5-25 MG per tablet 1 tablet, Daily  therapeutic multivitamin-minerals 1 tablet, Daily  Vitamin D (CHOLECALCIFEROL) tablet 1,000 Units, Daily  perflutren lipid microspheres (DEFINITY) injection 1.65 mg, ONCE PRN  sodium chloride flush 0.9 % injection 5-40 mL, 2 times per day  sodium chloride flush 0.9 % injection 5-40 mL, PRN  0.9 % sodium chloride infusion, PRN        Physical exam:     Vitals:  /74   Pulse 70   Temp 97.6 °F (36.4 °C) (Oral)   Resp 14   Ht 6' 2\" (1.88 m)   Wt 256 lb 6.4 oz (116.3 kg)   SpO2 90%   BMI 32.92 kg/m²   Constitutional:  OAA X3 NAD  Skin: no rash, turgor wnl  Heent:  eomi, mmm  Neck: no bruits or jvd noted  Cardiovascular:  S1, S2 without m/r/g  Respiratory: CTA B without w/r/r  Abdomen:  +bs, soft, nt, nd  Ext: no  lower extremity edema      Labs:  CBC:   Recent Labs     10/16/22  0721 10/17/22  0523 10/18/22  0505   WBC 3.9* 4.2 4.7 HGB 11.0* 10.8* 11.1*    168 169     BMP:    Recent Labs     10/16/22  0721 10/17/22  0523 10/18/22  0505    143 143   K 4.0 3.7 3.6   * 111* 111*   CO2 24 24 24   BUN 11 12 13   CREATININE 0.8 0.9 0.7*   GLUCOSE 82 88 80     Ca/Mg/Phos:   Recent Labs     10/16/22  0721 10/17/22  0523 10/18/22  0505   CALCIUM 8.5 8.7 8.7     Hepatic:   Recent Labs     10/16/22  0721 10/17/22  0523 10/18/22  0505   * 310* 303*   * 182* 201*   BILITOT 0.3 0.3 0.3   ALKPHOS 58 55 56     Troponin:   No results for input(s): TROPONINI in the last 72 hours. BNP: No results for input(s): BNP in the last 72 hours. Lipids:   No results for input(s): CHOL, TRIG, HDL, LDLCALC, LABVLDL in the last 72 hours. ABGs: No results for input(s): PHART, PO2ART, APE8DRL in the last 72 hours. INR:   Recent Labs     10/16/22  0721   INR 1.01     UA:  No results for input(s): COLORU, CLARITYU, GLUCOSEU, BILIRUBINUR, KETUA, SPECGRAV, BLOODU, PHUR, PROTEINU, UROBILINOGEN, NITRU, LEUKOCYTESUR, Champ Rouse in the last 72 hours. Urine Microscopic:   No results for input(s): LABCAST, BACTERIA, COMU, HYALCAST, WBCUA, RBCUA, EPIU in the last 72 hours. Urine Culture: No results for input(s): LABURIN in the last 72 hours. Urine Chemistry: No results for input(s): Juarez Yin, PROTEINUR, NAUR in the last 72 hours. IMAGING:  MRI ABDOMEN W WO CONTRAST MRCP   Final Result   1. Cholelithiasis without evidence of acute cholecystitis. 2. No biliary ductal dilatation or choledocholithiasis. 3. Within the liver, there is a 1.8 cm hemangioma near the gallbladder. 4. Simple and mildly complicated (Bosniak 1 and 2) bilateral renal cysts   measuring up to 8.4 cm. No dedicated follow-up recommended. 5. In the pancreatic uncinate process, there is a 1.5 x 1.4 cm cystic lesion   which could represent a branch duct IPMN. Recommend 6 month follow-up.       RECOMMENDATIONS:   Incidental Pancreatic Cysts 65-79 years at presentation      1.5-2.5 cm      Main pancreatic duct communication absent or cannot be determined: Pancreas   protocol MRI (or CT) in 6 months, consider EUS/FNA      Reference: Tyra et al. Management of incidental pancreatic cysts: A white   paper of the ACR Incidental Findings Committee. [address] [place] [address];24:851-199. US GALLBLADDER RUQ   Final Result   Mild hydrops of the gallbladder and associated 1.6 cm gallstone with no   secondary signs of cholecystitis. 2 cm rounded echogenic focus in the right lobe of the liver which is   suspicious for a liver hemangioma. Recommend a follow-up CT scan with   contrast further characterization. Borderline enlargement of the common bile duct and partial obscuration of the   pancreas due to overlying bowel gas. 7 cm cyst in the right kidney inferiorly with a thin septation in the cyst in   keeping with a Bosniak 2 cyst.         CT CHEST PULMONARY EMBOLISM W CONTRAST   Final Result   No evidence of a pulmonary embolus. Mildly dilated and atherosclerotic thoracic aorta with no aneurysm or   dissection. Minimal bibasilar atelectasis or scarring posteriorly. Suggest follow-up   with chest x-rays. 8 cm benign cyst upper pole left kidney             Assessment/Plan :      1. Rhabdomyolysis  likely 2/2 Statins   Stopped  rosuvastatin. Dc iv fluids. Monitor renal function     IDALIA - positive   Autoimmune process   Specific antibodies ordered    hepatitis panel - negative     Elevated LFTS  Had muscle biopsy today       Started prednisone 10 mg. Hospitalist discussed with rheumatology. Will defer dose to hospitalist.    2. HTN  Controlled off meds. 3. Anemia. Hb 11.4  monitor     4. Acid- base disorder.   Monitor       Monitor UOP             D/w primary team      Thank you for allowing us to participate in care of Jessica Alexandra         Electronically signed by: Valeria Hoskins MD, 10/18/2022, 10:36 AM      [facility] [address]  Office : [phone]  Fax :[phone]

## 2022-10-18 NOTE — ANESTHESIA PRE PROCEDURE
Department of Anesthesiology  Preprocedure Note       Name:  Gretchen Shirley   Age:  78 y.o.  :  1945                                          MRN:  [id number]         Date:  10/18/2022      Surgeon: Tess Mercado):  Aziza Jason MD    Procedure: Procedure(s):  LEFT LEG MUSCLE/NERVE BIOPSY    Medications prior to admission:   Prior to Admission medications    Medication Sig Start Date End Date Taking? Authorizing Provider   metoprolol tartrate (LOPRESSOR) 25 MG tablet TAKE 1 TABLET TWICE A DAY  Patient taking differently: 25 mg daily TAKE 1 TABLET TWICE A DAY 10/6/22   Mark Dahl MD   rosuvastatin (CRESTOR) 40 MG tablet TAKE 1 TABLET EVERY EVENING 10/6/22   Mark Dahl MD   losartan (COZAAR) 50 MG tablet TAKE 1 TABLET DAILY 10/6/22   Mark Dahl MD   triamterene-hydroCHLOROthiazide [facility]) 37.5-25 MG per tablet TAKE 1 TABLET DAILY  Patient not taking: Reported on 10/11/2022 10/6/22   Mark Dahl MD   sildenafil (VIAGRA) 100 MG tablet Take 1 tablet by mouth daily as needed for Erectile Dysfunction 21   Mark Dahl MD   clobetasol (TEMOVATE) 0.05 % ointment Apply topically to bilateral feet 2 times daily. Patient not taking: Reported on 10/11/2022 12/20/21   Mark Dahl MD   Multiple Vitamins-Minerals (THERAPEUTIC MULTIVITAMIN-MINERALS) tablet Take 1 tablet by mouth daily    Historical Provider, MD   Vitamin D (CHOLECALCIFEROL) 1000 UNITS CAPS capsule Take 1,000 Units by mouth daily.     Historical Provider, MD       Current medications:    Current Facility-Administered Medications   Medication Dose Route Frequency Provider Last Rate Last Admin    predniSONE (DELTASONE) tablet 10 mg  10 mg Oral Daily Logan Jama MD   10 mg at 10/17/22 1102    vitamin B-12 (CYANOCOBALAMIN) tablet 1,000 mcg  1,000 mcg Oral Daily Faith Rosen MD   1,000 mcg at 10/17/22 1401    enoxaparin (LOVENOX) injection 40 mg  40 mg SubCUTAneous Nightly Mary Ann MD   40 mg at 10/17/22 2037    0.9 % sodium chloride infusion   IntraVENous Continuous Rebecca Morales MD   Stopped at 10/16/22 1011    sodium chloride flush 0.9 % injection 5-40 mL  5-40 mL IntraVENous 2 times per day Pily Senior MD   10 mL at 10/15/22 2208    sodium chloride flush 0.9 % injection 5-40 mL  5-40 mL IntraVENous PRN Pily Senior MD   10 mL at 10/14/22 0846    0.9 % sodium chloride infusion   IntraVENous PRN Pily Senior MD        ondansetron (ZOFRAN-ODT) disintegrating tablet 4 mg  4 mg Oral Q8H PRN Pily Senior MD        Or    [facility]ron [facility] injection 4 mg  4 mg IntraVENous Q6H PRN Pily Senior MD        acetaminophen (TYLENOL) tablet 650 mg  650 mg Oral Q6H PRN Pily Senior MD        Or    acetaminophen (TYLENOL) suppository 650 mg  650 mg Rectal Q6H PRN Pily Senior MD        polyethylene glycol [facility]) packet 17 g  17 g Oral Daily PRN Pily Senior MD        aspirin chewable tablet 81 mg  81 mg Oral Daily Pily Senior MD   81 mg at 10/17/22 0927    nitroGLYCERIN (NITROSTAT) SL tablet 0.4 mg  0.4 mg SubLINGual Q5 Min PRN Pily Senior MD        losartan (COZAAR) tablet 50 mg  50 mg Oral Daily Pily Senior MD   50 mg at 10/17/22 7598    metoprolol tartrate (LOPRESSOR) tablet 25 mg  25 mg Oral BID Pily Senior MD   25 mg at 10/17/22 2037    [Held by provider] triamterene-hydroCHLOROthiazide (MAXZIDE-25) 37.5-25 MG per tablet 1 tablet  1 tablet Oral Daily Nina Gatica MD        therapeutic multivitamin-minerals 1 tablet  1 tablet Oral Daily Pily Senior MD   1 tablet at 10/17/22 0069    Vitamin D (CHOLECALCIFEROL) tablet 1,000 Units  1,000 Units Oral Daily Pily Senior MD   1,000 Units at 10/17/22 9167    perflutren lipid microspheres (DEFINITY) injection 1.65 mg  1.5 mL IntraVENous ONCE PRN Pily Senior MD        sodium chloride flush 0.9 % injection 5-40 mL  5-40 mL IntraVENous 2 times per day Faina Olmedo Dg Gomez MD   10 mL at 10/17/22 0928    sodium chloride flush 0.9 % injection 5-40 mL  5-40 mL IntraVENous PRN Elise Gonsalez MD        0.9 % sodium chloride infusion   IntraVENous PRN Elise Gonsalez MD           Allergies:  No Known Allergies    Problem List:    Patient Active Problem List   Diagnosis Code    HTN (hypertension) I10    Bilateral shoulder pain M25.511, M25.512    CKD (chronic kidney disease), stage III (HCC) N18.30    Carotid artery disease, unspecified laterality (HCC) I77.9    Elevated LFTs R79.89    Elevated troponin level not due to acute coronary syndrome R77.8    Elevated CPK R74.8    NSTEMI (non-ST elevated myocardial infarction) ([facility] [address] I21.4    Preop cardiovascular exam Z01.810       Past Medical History:        Diagnosis Date    Arthritis     Colon polyp     [facility]     denies being diabetic    Hyperlipemia     Hypertension     Kidney disease     Rheumatoid arthritis ([facility]        Past Surgical History:        Procedure Laterality Date    COLONOSCOPY      COLONOSCOPY N/A 2020    COLONOSCOPY DIAGNOSTIC performed by Epifanio Pearson MD at [facility] [address] Left        Social History:    Social History     Tobacco Use    Smoking status: Former     Packs/day: 0.50     Years: 10.00     Pack years: 5.00     Types: Cigarettes     Start date: 67     Quit date: 1984     Years since quittin.7    Smokeless tobacco: Never    Tobacco comments:     quit smoking 30 years    Substance Use Topics    Alcohol use: Yes     Comment: rare                                Counseling given: Not Answered  Tobacco comments: quit smoking 30 years       Vital Signs (Current):   Vitals:    10/17/22 2033 10/18/22 0051 10/18/22 0546 10/18/22 0600   BP: 130/74 100/63 139/88    Pulse: 76 75 96    Resp:     Temp: 97.9 °F (36.6 °C) 98.5 °F (36.9 °C) 97.6 °F (36.4 °C)    TempSrc: Oral Oral Oral    SpO2: 95% 94% 96%    Weight:    256 lb 6.4 oz (116.3 kg)   Height:                                                  BP Readings from Last 3 Encounters:   10/18/22 139/88   07/14/22 124/66   05/02/22 116/64       NPO Status:                                                                                 BMI:   Wt Readings from Last 3 Encounters:   10/18/22 256 lb 6.4 oz (116.3 kg)   07/14/22 240 lb 12.8 oz (109.2 kg)   05/02/22 243 lb (110.2 kg)     Body mass index is 32.92 kg/m². CBC:   Lab Results   Component Value Date/Time    WBC 4.7 10/18/2022 05:05 AM    RBC 4.39 10/18/2022 05:05 AM    HGB 11.1 10/18/2022 05:05 AM    HCT 35.2 10/18/2022 05:05 AM    MCV 80.3 10/18/2022 05:05 AM    RDW 17.1 10/18/2022 05:05 AM     10/18/2022 05:05 AM       CMP:   Lab Results   Component Value Date/Time     10/18/2022 05:05 AM    K 3.6 10/18/2022 05:05 AM    K 3.8 10/12/2022 05:16 AM     10/18/2022 05:05 AM    CO2 24 10/18/2022 05:05 AM    BUN 13 10/18/2022 05:05 AM    CREATININE 0.7 10/18/2022 05:05 AM    GFRAA >60 10/17/2022 05:23 AM    AGRATIO 1.5 10/18/2022 05:05 AM    LABGLOM >60 10/18/2022 05:05 AM    GLUCOSE 80 10/18/2022 05:05 AM    PROT 4.9 10/18/2022 05:05 AM    CALCIUM 8.7 10/18/2022 05:05 AM    BILITOT 0.3 10/18/2022 05:05 AM    ALKPHOS 56 10/18/2022 05:05 AM     10/18/2022 05:05 AM     10/18/2022 05:05 AM       POC Tests: No results for input(s): POCGLU, POCNA, POCK, POCCL, POCBUN, POCHEMO, POCHCT in the last 72 hours.     Coags:   Lab Results   Component Value Date/Time    PROTIME 13.2 10/16/2022 07:21 AM    INR 1.01 10/16/2022 07:21 AM    APTT 59.8 10/11/2022 05:30 AM       HCG (If Applicable): No results found for: PREGTESTUR, PREGSERUM, HCG, HCGQUANT     ABGs: No results found for: PHART, PO2ART, KPK9PFT, TBF6FTQ, BEART, K9WYHJKG     Type & Screen (If Applicable):  No results found for: LABABO, LABRH    Drug/Infectious Status (If Applicable):  No results found for: HIV, HEPCAB    COVID-19 Screening (If Applicable): Lab Results   Component Value Date/Time    COVID19 NOT DETECTED 08/07/2020 05:39 PM           Anesthesia Evaluation  Patient summary reviewed and Nursing notes reviewed no history of anesthetic complications:   Airway: Mallampati: III  TM distance: >3 FB   Neck ROM: full  Mouth opening: > = 3 FB   Dental: normal exam         Pulmonary:normal exam  breath sounds clear to auscultation      (-) COPD, asthma, sleep apnea and not a current smoker (former)                           Cardiovascular:    (+) hypertension:, past MI (NSTEMI):, CAD (cath 10/22 nonobst dz): non-obstructive, hyperlipidemia    (-) CABG/stent, dysrhythmias,  angina and  CHF (echo 2022 EF 55-60 no rwma)    ECG reviewed  Rhythm: regular  Rate: normal  Echocardiogram reviewed         Beta Blocker:  Dose within 24 Hrs         Neuro/Psych:   Negative Neuro/Psych ROS     (-) seizures, TIA and CVA           GI/Hepatic/Renal:   (+) renal disease: CRI,      (-) GERD and liver disease       Endo/Other:    (+) Diabetes (a1c 5.9)Type II DM, , : arthritis (RA and OA, on pred currently, no apparent c-spine issues): rheumatoid and OA., .    (-) hypothyroidism, hyperthyroidism               Abdominal:   (+) obese,           Vascular: Other Findings:           Anesthesia Plan      general     ASA 3       Induction: intravenous. MIPS: Postoperative opioids intended and Prophylactic antiemetics administered. Anesthetic plan and risks discussed with patient. Plan discussed with CRNA.                     Kimber Meigs, MD   10/18/2022

## 2022-10-18 NOTE — PROGRESS NOTES
Pt to PACU from OR, arouses to pain. VSS- on 6L simple mask satting high 90s, oral and trumpet airway in place. NSR on monitor. X1 surgical incision to L thigh CDI, ice applied. L pedal pulse palpable.  Will continue to monitor

## 2022-10-18 NOTE — PLAN OF CARE
Problem: Discharge Planning  Goal: Discharge to home or other facility with appropriate resources  10/18/2022 1323 by Mateo Trevino RN  Outcome: Progressing  10/18/2022 0213 by Sabrina Aceves RN  Outcome: Progressing     Problem: Pain  Goal: Verbalizes/displays adequate comfort level or baseline comfort level  10/18/2022 1323 by Mateo Trevino RN  Outcome: Progressing  10/18/2022 0213 by Sabrina Aceves RN  Outcome: Progressing     Problem: Chronic Conditions and Co-morbidities  Goal: Patient's chronic conditions and co-morbidity symptoms are monitored and maintained or improved  10/18/2022 1323 by Mateo Trevino RN  Outcome: Progressing  10/18/2022 0213 by Sabrina Aceves RN  Outcome: Progressing     Problem: Safety - Adult  Goal: Free from fall injury  Outcome: Progressing

## 2022-10-19 LAB
A/G RATIO: 1 (ref 1.1–2.2)
ALBUMIN SERPL-MCNC: 3 G/DL (ref 3.4–5)
ALP BLD-CCNC: 70 U/L (ref 40–129)
ALT SERPL-CCNC: 202 U/L (ref 10–40)
ANA PATTERN: ABNORMAL
ANA TITER: ABNORMAL
ANION GAP SERPL CALCULATED.3IONS-SCNC: 10 MMOL/L (ref 3–16)
ANTINUCLEAR AB INTERPRETIVE COMMENT: ABNORMAL
ANTINUCLEAR ANTIBODY, HEP-2, IGG: DETECTED
AST SERPL-CCNC: 293 U/L (ref 15–37)
BASOPHILS ABSOLUTE: 0 K/UL (ref 0–0.2)
BASOPHILS RELATIVE PERCENT: 0.3 %
BILIRUB SERPL-MCNC: <0.2 MG/DL (ref 0–1)
BUN BLDV-MCNC: 20 MG/DL (ref 7–20)
CALCIUM SERPL-MCNC: 9.5 MG/DL (ref 8.3–10.6)
CERULOPLASMIN: 21 MG/DL (ref 15–30)
CHLORIDE BLD-SCNC: 109 MMOL/L (ref 99–110)
CO2: 24 MMOL/L (ref 21–32)
CREAT SERPL-MCNC: 1.2 MG/DL (ref 0.8–1.3)
CYTOPLASM PATTERN: ABNORMAL
CYTOPLASMIC PATTERN TITER: ABNORMAL
EOSINOPHILS ABSOLUTE: 0 K/UL (ref 0–0.6)
EOSINOPHILS RELATIVE PERCENT: 0 %
GFR SERPL CREATININE-BSD FRML MDRD: >60 ML/MIN/{1.73_M2}
GLUCOSE BLD-MCNC: 111 MG/DL (ref 70–99)
HCT VFR BLD CALC: 39.4 % (ref 40.5–52.5)
HEMOGLOBIN: 12.4 G/DL (ref 13.5–17.5)
LYMPHOCYTES ABSOLUTE: 1.1 K/UL (ref 1–5.1)
LYMPHOCYTES RELATIVE PERCENT: 10 %
MCH RBC QN AUTO: 25.6 PG (ref 26–34)
MCHC RBC AUTO-ENTMCNC: 31.4 G/DL (ref 31–36)
MCV RBC AUTO: 81.6 FL (ref 80–100)
MONOCYTES ABSOLUTE: 1.2 K/UL (ref 0–1.3)
MONOCYTES RELATIVE PERCENT: 10.4 %
NEUTROPHILS ABSOLUTE: 9 K/UL (ref 1.7–7.7)
NEUTROPHILS RELATIVE PERCENT: 79.3 %
PDW BLD-RTO: 17 % (ref 12.4–15.4)
PLATELET # BLD: 226 K/UL (ref 135–450)
PMV BLD AUTO: 7.6 FL (ref 5–10.5)
POTASSIUM REFLEX MAGNESIUM: 4.5 MMOL/L (ref 3.5–5.1)
RBC # BLD: 4.83 M/UL (ref 4.2–5.9)
SODIUM BLD-SCNC: 143 MMOL/L (ref 136–145)
TOTAL CK: 9284 U/L (ref 39–308)
TOTAL PROTEIN: 6.1 G/DL (ref 6.4–8.2)
WBC # BLD: 11.3 K/UL (ref 4–11)

## 2022-10-19 PROCEDURE — 36415 COLL VENOUS BLD VENIPUNCTURE: CPT

## 2022-10-19 PROCEDURE — 2580000003 HC RX 258: Performed by: SURGERY

## 2022-10-19 PROCEDURE — 85025 COMPLETE CBC W/AUTO DIFF WBC: CPT

## 2022-10-19 PROCEDURE — 82550 ASSAY OF CK (CPK): CPT

## 2022-10-19 PROCEDURE — 6370000000 HC RX 637 (ALT 250 FOR IP): Performed by: SURGERY

## 2022-10-19 PROCEDURE — 1200000000 HC SEMI PRIVATE

## 2022-10-19 PROCEDURE — 6360000002 HC RX W HCPCS: Performed by: SURGERY

## 2022-10-19 PROCEDURE — APPSS15 APP SPLIT SHARED TIME 0-15 MINUTES: Performed by: NURSE PRACTITIONER

## 2022-10-19 PROCEDURE — 99233 SBSQ HOSP IP/OBS HIGH 50: CPT | Performed by: INTERNAL MEDICINE

## 2022-10-19 PROCEDURE — 99024 POSTOP FOLLOW-UP VISIT: CPT | Performed by: SURGERY

## 2022-10-19 PROCEDURE — 6370000000 HC RX 637 (ALT 250 FOR IP): Performed by: INTERNAL MEDICINE

## 2022-10-19 PROCEDURE — APPNB30 APP NON BILLABLE TIME 0-30 MINS: Performed by: NURSE PRACTITIONER

## 2022-10-19 PROCEDURE — 80053 COMPREHEN METABOLIC PANEL: CPT

## 2022-10-19 RX ORDER — FUROSEMIDE 20 MG/1
20 TABLET ORAL DAILY
Status: DISCONTINUED | OUTPATIENT
Start: 2022-10-19 | End: 2022-10-21 | Stop reason: HOSPADM

## 2022-10-19 RX ADMIN — METOPROLOL TARTRATE 25 MG: 25 TABLET, FILM COATED ORAL at 08:55

## 2022-10-19 RX ADMIN — ACETAMINOPHEN 650 MG: 325 TABLET ORAL at 08:54

## 2022-10-19 RX ADMIN — FUROSEMIDE 20 MG: 20 TABLET ORAL at 16:50

## 2022-10-19 RX ADMIN — ENOXAPARIN SODIUM 30 MG: 100 INJECTION SUBCUTANEOUS at 08:54

## 2022-10-19 RX ADMIN — CEFAZOLIN 2000 MG: 2 INJECTION, POWDER, FOR SOLUTION INTRAMUSCULAR; INTRAVENOUS at 07:51

## 2022-10-19 RX ADMIN — Medication 10 ML: at 09:27

## 2022-10-19 RX ADMIN — ACETAMINOPHEN 650 MG: 325 TABLET ORAL at 03:52

## 2022-10-19 RX ADMIN — ENOXAPARIN SODIUM 30 MG: 100 INJECTION SUBCUTANEOUS at 21:18

## 2022-10-19 RX ADMIN — ACETAMINOPHEN 650 MG: 325 TABLET ORAL at 15:41

## 2022-10-19 RX ADMIN — SODIUM CHLORIDE: 9 INJECTION, SOLUTION INTRAVENOUS at 07:50

## 2022-10-19 RX ADMIN — Medication 1000 UNITS: at 08:54

## 2022-10-19 RX ADMIN — ACETAMINOPHEN 650 MG: 325 TABLET ORAL at 21:18

## 2022-10-19 RX ADMIN — PREDNISONE 10 MG: 10 TABLET ORAL at 08:55

## 2022-10-19 RX ADMIN — ASPIRIN 81 MG 81 MG: 81 TABLET ORAL at 08:55

## 2022-10-19 RX ADMIN — Medication 10 ML: at 07:47

## 2022-10-19 RX ADMIN — LOSARTAN POTASSIUM 50 MG: 25 TABLET, FILM COATED ORAL at 08:54

## 2022-10-19 RX ADMIN — METOPROLOL TARTRATE 25 MG: 25 TABLET, FILM COATED ORAL at 21:18

## 2022-10-19 RX ADMIN — MULTIPLE VITAMINS W/ MINERALS TAB 1 TABLET: TAB at 08:54

## 2022-10-19 RX ADMIN — CYANOCOBALAMIN TAB 1000 MCG 1000 MCG: 1000 TAB at 08:54

## 2022-10-19 ASSESSMENT — PAIN SCALES - GENERAL
PAINLEVEL_OUTOF10: 3
PAINLEVEL_OUTOF10: 3
PAINLEVEL_OUTOF10: 2
PAINLEVEL_OUTOF10: 3

## 2022-10-19 NOTE — PROGRESS NOTES
[facility] PROGRESS NOTE    10/19/2022 10:48 AM        Name: Danna Grijalva .              Admitted: 10/10/2022  Primary Care Provider: Binu Pandey MD (Tel: [phone])                        Subjective:  . No acute events overnight. Resting well. Pain control. Diet ok. Labs reviewed  Denies any chest pain sob.      Reviewed interval ancillary notes    Current Medications  sodium chloride flush 0.9 % injection 5-40 mL, 2 times per day  sodium chloride flush 0.9 % injection 5-40 mL, PRN  0.9 % sodium chloride infusion, PRN  enoxaparin Sodium (LOVENOX) injection 30 mg, BID  morphine (PF) injection 2 mg, Q2H PRN   Or  morphine injection 4 mg, Q2H PRN  oxyCODONE (ROXICODONE) immediate release tablet 5 mg, Q4H PRN   Or  oxyCODONE (ROXICODONE) immediate release tablet 10 mg, Q4H PRN  acetaminophen (TYLENOL) tablet 650 mg, Q6H  predniSONE (DELTASONE) tablet 10 mg, Daily  vitamin B-12 (CYANOCOBALAMIN) tablet 1,000 mcg, Daily  sodium chloride flush 0.9 % injection 5-40 mL, 2 times per day  sodium chloride flush 0.9 % injection 5-40 mL, PRN  0.9 % sodium chloride infusion, PRN  polyethylene glycol (GLYCOLAX) packet 17 g, Daily PRN  aspirin chewable tablet 81 mg, Daily  nitroGLYCERIN (NITROSTAT) SL tablet 0.4 mg, Q5 Min PRN  losartan (COZAAR) tablet 50 mg, Daily  metoprolol tartrate (LOPRESSOR) tablet 25 mg, BID  [Held by provider] triamterene-hydroCHLOROthiazide (MAXZIDE-25) 37.5-25 MG per tablet 1 tablet, Daily  therapeutic multivitamin-minerals 1 tablet, Daily  Vitamin D (CHOLECALCIFEROL) tablet 1,000 Units, Daily  perflutren lipid microspheres (DEFINITY) injection 1.65 mg, ONCE PRN  sodium chloride flush 0.9 % injection 5-40 mL, 2 times per day  sodium chloride flush 0.9 % injection 5-40 mL, PRN  0.9 % sodium chloride infusion, PRN        Objective:  /84   Pulse 83   Temp 98.2 °F (36.8 °C) (Oral)   Resp 16   Ht 6' 2\" (1.88 m)   Wt 259 lb (117.5 kg)   SpO2 96%   BMI 33.25 kg/m²     Intake/Output Summary (Last 24 hours) at 10/19/2022 1048  Last data filed at 10/19/2022 0927  Gross per 24 hour   Intake 10 ml   Output 250 ml   Net -240 ml      Wt Readings from Last 3 Encounters:   10/19/22 259 lb (117.5 kg)   07/14/22 240 lb 12.8 oz (109.2 kg)   05/02/22 243 lb (110.2 kg)       General appearance:  Appears comfortable  Eyes: Sclera clear. Pupils equal.  ENT: Moist oral mucosa. Trachea midline, no adenopathy. Cardiovascular: Regular rhythm, normal S1, S2. No murmur. No edema in lower extremities  Respiratory: Not using accessory muscles. Good inspiratory effort. Clear to auscultation bilaterally, no wheeze or crackles. GI: Abdomen soft, no tenderness, not distended, normal bowel sounds  Musculoskeletal: No cyanosis in digits, neck supple  Neurology: CN 2-12 grossly intact. No speech or motor deficits  Psych: Normal affect. Alert and oriented in time, place and person  Skin: Warm, dry, normal turgor    Labs and Tests:  CBC:   Recent Labs     10/17/22  0523 10/18/22  0505   WBC 4.2 4.7   HGB 10.8* 11.1*    169     BMP:    Recent Labs     10/17/22  0523 10/18/22  0505    143   K 3.7 3.6   * 111*   CO2 24 24   BUN 12 13   CREATININE 0.9 0.7*   GLUCOSE 88 80     Hepatic:   Recent Labs     10/17/22  0523 10/18/22  0505   * 303*   * 201*   BILITOT 0.3 0.3   ALKPHOS 55 56       Discussed care with patient             Problem List  Principal Problem:    Elevated troponin level not due to acute coronary syndrome  Active Problems:    Elevated LFTs    Elevated CPK    NSTEMI (non-ST elevated myocardial infarction) (HCC)    Preop cardiovascular exam    HTN (hypertension)    CKD (chronic kidney disease), stage III (HCC)  Resolved Problems:    * No resolved hospital problems.  *       Assessment & Plan:   Acute rhabdomyolysis  -Probably concern for possible statin induced myositis versus autoimmune process  -Work-up thus far is negative CK still trending down but in 10,000 still  -Patient renal function is stable  -Underwent biopsy we are awaiting results  -No preceding trauma or known muscle injury. No muscle weakness noted but mild bilateral thigh pain. CPK levels remain elevated despite IV hydration    Elevated liver enzymes stable    Elevated troponin stable  Patient is status post left heart cath on 10/11/2022 with acute          Diet: ADULT DIET;  Regular  Code:Full Code  DVT PPX lovenox   Pending improvement in CK level we will discuss with rheumatology today over the phone    Mayito Oliva MD   10/19/2022 10:48 AM

## 2022-10-19 NOTE — PROGRESS NOTES
10/18/22 2049   Incentive Spirometry Tx   Treatment Effort Initial;Assisted by RT   Predicted Volume 673   Achieved Volume (mL) 1200 mL

## 2022-10-19 NOTE — PROGRESS NOTES
[facility]        Progress Note    Patient Name: Rema Yancey  MRN: [id number]  YOB: 1945  Date of Evaluation: 10/19/2022    Postoperative Day #1    Subjective:  No acute events overnight  Denies significant pain  Up to chair at this time      Vital Signs:  Patient Vitals for the past 24 hrs:   BP Temp Temp src Pulse Resp SpO2 Weight   10/19/22 1030 -- -- -- 83 -- -- --   10/19/22 0815 134/84 98.2 °F (36.8 °C) Oral (!) 108 16 96 % --   10/19/22 0356 (!) 152/73 98 °F (36.7 °C) Oral 75 16 95 % --   10/19/22 0352 -- -- -- -- -- -- 259 lb (117.5 kg)   10/18/22 2330 128/69 98.2 °F (36.8 °C) Oral 70 16 93 % --   10/18/22 2049 -- -- -- -- -- 94 % --   10/18/22 204 128/73 97.9 °F (36.6 °C) Oral 73 16 92 % --   10/18/22 1604 122/77 98 °F (36.7 °C) Oral 70 16 93 % --   10/18/22 1215 110/69 97.5 °F (36.4 °C) Oral 67 16 94 % --        TEMPERATURE HISTORY 24H: Temp (24hrs), Av °F (36.7 °C), Min:97.5 °F (36.4 °C), Max:98.2 °F (36.8 °C)    BLOOD PRESSURE HISTORY: Systolic (06GWY), LZN:740 , Min:100 , WPY:347    Diastolic (40JPX), YXQ:66, Min:61, Max:88      Intake/Output:  I/O last 3 completed shifts:   In: 925 [I.V.:925]  Out: 250 [Urine:250]  I/O this shift:  In: 10 [I.V.:10]  Out: -   Drain/tube Output:       Physical Exam:  General: awake, alert, oriented to  person, place, time  Lungs: unlabored respirations  Skin/Wound: healing well, no drainage, no erythema, well approximated    Labs:  CBC:    Recent Labs     10/17/22  0523 10/18/22  0505   WBC 4.2 4.7   HGB 10.8* 11.1*   HCT 34.1* 35.2*    169       BMP:    Recent Labs     10/17/22  0523 10/18/22  0505    143   K 3.7 3.6   * 111*   CO2 24 24   BUN 12 13   CREATININE 0.9 0.7*   GLUCOSE 88 80       Hepatic:    Recent Labs     10/17/22  0523 10/18/22  0505   * 303*   * 201*   BILITOT 0.3 0.3   ALKPHOS 55 56       Amylase:  No results found for: AMYLASE  Lipase:  No results found for: LIPASE   Mag:    Lab Results   Component Value Date/Time    MG 2.00 10/14/2022 06:03 AM    MG 2.10 10/13/2022 05:55 AM     Phos:     Lab Results   Component Value Date/Time    PHOS 3.6 10/14/2022 06:03 AM    PHOS 4.1 10/13/2022 05:55 AM      Coags:   Lab Results   Component Value Date/Time    PROTIME 13.2 10/16/2022 07:21 AM    INR 1.01 10/16/2022 07:21 AM    APTT 59.8 10/11/2022 05:30 AM       Cultures:  Anaerobic culture  No results found for: LABANAE  Fungus stain  No results found for requested labs within last 30 days. Gram stain  No results found for requested labs within last 30 days. Organism  No results found for: [facility]  Surgical culture  No results found for: CXSURG  Blood culture 1  No results found for requested labs within last 30 days. Blood culture 2  No results found for requested labs within last 30 days. Fecal occult  No results found for requested labs within last 30 days. GI bacterial pathogens by PCR  No results found for requested labs within last 30 days. C. difficile  No results found for requested labs within last 30 days. Urine culture  No results found for: LABURIN    Pathology:  Pathology results pending     Imaging:  I have personally reviewed the following films:    No results found.     Scheduled Meds:   sodium chloride flush  5-40 mL IntraVENous 2 times per day    enoxaparin  30 mg SubCUTAneous BID    acetaminophen  650 mg Oral Q6H    predniSONE  10 mg Oral Daily    vitamin B-12  1,000 mcg Oral Daily    sodium chloride flush  5-40 mL IntraVENous 2 times per day    aspirin  81 mg Oral Daily    losartan  50 mg Oral Daily    metoprolol tartrate  25 mg Oral BID    [Held by provider] triamterene-hydroCHLOROthiazide  1 tablet Oral Daily    therapeutic multivitamin-minerals  1 tablet Oral Daily    Vitamin D  1,000 Units Oral Daily    sodium chloride flush  5-40 mL IntraVENous 2 times per day     Continuous Infusions:   sodium chloride 10 mL/hr at 10/19/22 6770 sodium chloride      sodium chloride       PRN Meds:.sodium chloride flush, sodium chloride, morphine **OR** morphine, oxyCODONE **OR** oxyCODONE, sodium chloride flush, sodium chloride, polyethylene glycol, nitroGLYCERIN, perflutren lipid microspheres, sodium chloride flush, sodium chloride      Assessment:  78 y.o. male admitted with   1. NSTEMI (non-ST elevated myocardial infarction) ([facility] [address]    2. Traumatic rhabdomyolysis, initial encounter [facility])        OR Date 10/18/2022, left thigh muscle biopsy for rhabdomyolysis       Plan:  1. No significant pain, incision unremarkable, vitals/labs stable, CK trending down; continued supportive care, keep incision clean/dry, okay to shower  2. Diet as tolerated  3. IV hydration and electrolyte correction per nephrology  4. Management of medical comorbid etiologies per primary team and consulting services  5. Disposition: Per primary team; okay for discharge from a surgical perspective; follow up with Dr. Jonna Beyer only as needed    EDUCATION:  Educated patient on plan of care and disease process--all questions answered. Plans discussed with patient and nursing. Reviewed and discussed with Dr. Jonna Beyer. Signed:  NHI Saldivar - CNP  10/19/2022 10:43 AM    I have personally performed a face to face diagnostic evaluation on this patient. I have interviewed and examined the patient and I agree with the assessment above. Time was spent reviewing patient chart (including but not limited to notes, labs, imaging and other testing), interviewing and counseling patient and present family members, performing physical exam, documentation of my findings and subsequent follow up of ordered medication and testing, placing referrals and communication with patient care providers, coordinating future care as well as documentation in the EHR. This encompassed more than 50% of the total encounter time.  In summary, my findings and plan are the following:   Mr. Kehinde Wong is a 78 y.o. male who presents with   OR Date 10/18/2022, left thigh muscle biopsy for rhabdomyolysis     Plan:  1. Pain controlled  2. Ambulating  3. Incision clean  4. Defer management of remainder of medical comorbidities to primary and consulting teams  5. Will sign off, please call with questions, follow electively as needed, contact information provided    Beto Yadav MD, FACS  10/19/2022  7:08 PM

## 2022-10-19 NOTE — OP NOTE
[address]                     [facility], [facility]                                OPERATIVE REPORT    PATIENT NAME: Ban Loza                     :        1945  MED REC NO:   [id number]                          ROOM:       [id number]  ACCOUNT NO:   [de-identified]                           ADMIT DATE: 10/10/2022  PROVIDER:     Anatoly Wooten MD    DATE OF PROCEDURE:  10/18/2022    PREOPERATIVE DIAGNOSIS:  Rhabdomyolysis. POSTOPERATIVE DIAGNOSIS:  Rhabdomyolysis. OPERATION PERFORMED:  Left thigh muscle biopsy. SURGEON:  Anatoly Wooten MD    ANESTHESIA:  General.    INDICATIONS:  This is a 70-year-old male who presents to [facility]  with multitude of symptoms, but was found to have rhabdomyolysis upon  radiology. The need for a muscle biopsy was discussed. The risks,  benefits, and alternative treatments of left thigh muscle biopsy were  discussed. Details of the procedures were discussed. All questions  were answered. The patient understood, agreed, and wished to proceed. OPERATIVE PROCEDURE:  The patient was brought to the operating suite and  laid in the supine position. General anesthesia was induced and well  tolerated. The patient's left thigh was prepped and draped in the usual  sterile fashion. After a proper time-out was performed, verifying the  operative site, the procedure, and the patient, a longitudinal incision  was made with a scalpel. This was deepened through the subcutaneous  tissue to the fascia. The fascia was opened with cautery. A large  aspect of the muscle belly was  with a right inguinal clamp and  a piece was cut several centimeters in length and 1 cm in width and sent  off to Pathology for analysis. Additionally, the plastic clamp was used  to clamp a large segment of the muscle fibers as well. This was divided  proximally and distally and sent off as well.   The wound was inspected  for hemostasis, which had been obtained with pressure and cautery. The  fascia was closed with a running 2-0 Vicryl suture. Local anesthetic  was injected in the wounds. The deep tissue was closed with a 3-0  suture and the skin was then closed with a running 4-0 suture. The  wound was then cleaned and dressed with Dermabond. The patient  tolerated the procedure well and was transferred to the PACU in stable  condition. SPECIMENS:  Left thigh muscle biopsy. DRAINS:  None. COMPLICATIONS:  None. ESTIMATED BLOOD LOSS:  Less than 50 mL.         Genesis August MD    D: 10/18/2022 16:04:09       T: 10/18/2022 19:15:43     [id number]  Job#: [id number]     Doc#: [id number]    CC:

## 2022-10-19 NOTE — PLAN OF CARE
Problem: Discharge Planning  Goal: Discharge to home or other facility with appropriate resources  10/19/2022 0212 by Maynor Walls RN  Outcome: Progressing     Problem: Pain  Goal: Verbalizes/displays adequate comfort level or baseline comfort level  10/19/2022 0212 by Maynor Walls RN  Outcome: Progressing     Problem: Chronic Conditions and Co-morbidities  Goal: Patient's chronic conditions and co-morbidity symptoms are monitored and maintained or improved  10/19/2022 0212 by Maynor Walls RN  Outcome: Progressing

## 2022-10-19 NOTE — PROGRESS NOTES
Office : [phone]     Fax :[phone]       Nephrology progress  Note      Patient's Name: Natalie Ross  9:17 AM  10/19/2022    Reason for Consult:  Rhabdomyolysis       Requesting Physician:  Yessica Deluna MD      Chief Complaint:    Chief Complaint   Patient presents with    Leg Swelling     Patient in with complaints of bilateral edema in his legs that is new. PCP told him to come into the er. History of Present iIlness:      Natalie Ross is a 78 y.o. male with prior history of CKD, HTN, RA , simple renal cyst who came to ER with c/o pain in thighs. He was found to have elevated CK levels. Denies any trauma. Denies any heavy exercise. CK [place] at [address] . Has h/o CKD 2/2 NSaids which improved. 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 Making urine. Renal function stable . I/O last 3 completed shifts:   In: 36 [I.V.:925]  Out: 250 [Urine:250]    Past Medical History:   Diagnosis Date    Arthritis     Colon polyp     Diabetes mellitus ([address]     denies being diabetic    Hyperlipemia     Hypertension     Kidney disease     Rheumatoid arthritis ([facility] [address]        Past Surgical History:   Procedure Laterality Date    COLONOSCOPY      COLONOSCOPY N/A 8/14/2020    COLONOSCOPY DIAGNOSTIC performed by Barbara Young MD at [address] [facility] Left 10/18/2022    LEFT LEG MUSCLE BIOPSY performed by Radha Yeboah MD at [address] Left        Family History   Problem Relation Age of Onset    High Blood Pressure Sister          Current Medications:    sodium chloride flush 0.9 % injection 5-40 mL, 2 times per day  sodium chloride flush 0.9 % injection 5-40 mL, PRN  0.9 % sodium chloride infusion, PRN  enoxaparin Sodium (LOVENOX) injection 30 mg, BID  morphine (PF) injection 2 mg, Q2H PRN   Or  morphine injection 4 mg, Q2H PRN  oxyCODONE (ROXICODONE) immediate release tablet 5 mg, Q4H PRN   Or  oxyCODONE (ROXICODONE) immediate release tablet 10 mg, Q4H PRN  acetaminophen (TYLENOL) tablet 650 mg, Q6H  predniSONE (DELTASONE) tablet 10 mg, Daily  vitamin B-12 (CYANOCOBALAMIN) tablet 1,000 mcg, Daily  0.9 % sodium chloride infusion, Continuous  sodium chloride flush 0.9 % injection 5-40 mL, 2 times per day  sodium chloride flush 0.9 % injection 5-40 mL, PRN  0.9 % sodium chloride infusion, PRN  polyethylene glycol (GLYCOLAX) packet 17 g, Daily PRN  aspirin chewable tablet 81 mg, Daily  nitroGLYCERIN (NITROSTAT) SL tablet 0.4 mg, Q5 Min PRN  losartan (COZAAR) tablet 50 mg, Daily  metoprolol tartrate (LOPRESSOR) tablet 25 mg, BID  [Held by provider] triamterene-hydroCHLOROthiazide (MAXZIDE-25) 37.5-25 MG per tablet 1 tablet, Daily  therapeutic multivitamin-minerals 1 tablet, Daily  Vitamin D (CHOLECALCIFEROL) tablet 1,000 Units, Daily  perflutren lipid microspheres (DEFINITY) injection 1.65 mg, ONCE PRN  sodium chloride flush 0.9 % injection 5-40 mL, 2 times per day  sodium chloride flush 0.9 % injection 5-40 mL, PRN  0.9 % sodium chloride infusion, PRN        Physical exam:     Vitals:  /84   Pulse (!) 108 Comment: JACK Do notified  Temp 98.2 °F (36.8 °C) (Oral)   Resp 16   Ht 6' 2\" (1.88 m)   Wt 259 lb (117.5 kg)   SpO2 96%   BMI 33.25 kg/m²   Constitutional:  OAA X3 NAD  Skin: no rash, turgor wnl  Heent:  eomi, mmm  Neck: no bruits or jvd noted  Cardiovascular:  S1, S2 without m/r/g  Respiratory: CTA B without w/r/r  Abdomen:  +bs, soft, nt, nd  Ext: no  lower extremity edema      Labs:  CBC:   Recent Labs     10/17/22  0523 10/18/22  0505   WBC 4.2 4.7   HGB 10.8* 11.1*    169     BMP:    Recent Labs     10/17/22  0523 10/18/22  0505    143   K 3.7 3.6   CL 111* 111*   CO2 24 24   BUN 12 13   CREATININE 0.9 0.7*   GLUCOSE 88 80     Ca/Mg/Phos:   Recent Labs     10/17/22  0523 10/18/22  0505   CALCIUM 8.7 8.7     Hepatic:   Recent Labs     10/17/22  0523 10/18/22  0505   * 303*   * 201*   BILITOT 0.3 0.3   ALKPHOS 55 56     Troponin:   No results for input(s): TROPONINI in the last 72 hours. BNP: No results for input(s): BNP in the last 72 hours. Lipids:   No results for input(s): CHOL, TRIG, HDL, LDLCALC, LABVLDL in the last 72 hours. ABGs: No results for input(s): PHART, PO2ART, ZEX7HKI in the last 72 hours. INR:   No results for input(s): INR in the last 72 hours. UA:  No results for input(s): Samuel Crown, GLUCOSEU, BILIRUBINUR, KETUA, SPECGRAV, BLOODU, PHUR, PROTEINU, UROBILINOGEN, NITRU, LEUKOCYTESUR, Clarisa Coil in the last 72 hours. Urine Microscopic:   No results for input(s): LABCAST, BACTERIA, COMU, HYALCAST, WBCUA, RBCUA, EPIU in the last 72 hours. Urine Culture: No results for input(s): LABURIN in the last 72 hours. Urine Chemistry: No results for input(s): Peola Core, PROTEINUR, NAUR in the last 72 hours. IMAGING:  MRI ABDOMEN W WO CONTRAST MRCP   Final Result   1. Cholelithiasis without evidence of acute cholecystitis. 2. No biliary ductal dilatation or choledocholithiasis. 3. Within the liver, there is a 1.8 cm hemangioma near the gallbladder. 4. Simple and mildly complicated (Bosniak 1 and 2) bilateral renal cysts   measuring up to 8.4 cm. No dedicated follow-up recommended. 5. In the pancreatic uncinate process, there is a 1.5 x 1.4 cm cystic lesion   which could represent a branch duct IPMN. Recommend 6 month follow-up.       RECOMMENDATIONS:   Incidental Pancreatic Cysts      65-79 years at presentation      1.5-2.5 cm      Main pancreatic duct communication absent or cannot be determined: Pancreas   protocol MRI (or CT) in 6 months, consider EUS/FNA      Reference: Tyra casper al. Management of incidental pancreatic cysts: A white   paper of the ACR Incidental Findings Committee. [address] [address];24:[id number]. US GALLBLADDER RUQ   Final Result   Mild hydrops of the gallbladder and associated 1.6 cm gallstone with no   secondary signs of cholecystitis. 2 cm rounded echogenic focus in the right lobe of the liver which is   suspicious for a liver hemangioma. Recommend a follow-up CT scan with   contrast further characterization. Borderline enlargement of the common bile duct and partial obscuration of the   pancreas due to overlying bowel gas. 7 cm cyst in the right kidney inferiorly with a thin septation in the cyst in   keeping with a Bosniak 2 cyst.         CT CHEST PULMONARY EMBOLISM W CONTRAST   Final Result   No evidence of a pulmonary embolus. Mildly dilated and atherosclerotic thoracic aorta with no aneurysm or   dissection. Minimal bibasilar atelectasis or scarring posteriorly. Suggest follow-up   with chest x-rays. 8 cm benign cyst upper pole left kidney             Assessment/Plan :      1. Rhabdomyolysis  likely 2/2 Statins   Stopped  rosuvastatin. Dc iv fluids. Monitor renal function     IDALIA - positive   Autoimmune process   Specific antibodies ordered - results pending    hepatitis panel - negative     Elevated LFTS  Had muscle biopsy. Results awaited. On  prednisone 10 mg. Hospitalist discussed with rheumatology. Will defer dose to hospitalist for dose. Should have appointment with rheumatology. 2. HTN  Controlled off meds. 3. Anemia. Hb 11.4  monitor     4. Acid- base disorder.   Monitor       Monitor UOP             D/w primary team      Thank you for allowing us to participate in care of Ino Gomez         Electronically signed by: Lissa Rivera MD, 10/19/2022, 9:17 AM      [facility] [address]  Office : [phone]  Fax :[phone]

## 2022-10-20 LAB
ANION GAP SERPL CALCULATED.3IONS-SCNC: 9 MMOL/L (ref 3–16)
BASOPHILS ABSOLUTE: 0 K/UL (ref 0–0.2)
BASOPHILS RELATIVE PERCENT: 0.3 %
BUN BLDV-MCNC: 20 MG/DL (ref 7–20)
CALCIUM SERPL-MCNC: 8.7 MG/DL (ref 8.3–10.6)
CHLORIDE BLD-SCNC: 113 MMOL/L (ref 99–110)
CO2: 19 MMOL/L (ref 21–32)
CREAT SERPL-MCNC: 1 MG/DL (ref 0.8–1.3)
EOSINOPHILS ABSOLUTE: 0.1 K/UL (ref 0–0.6)
EOSINOPHILS RELATIVE PERCENT: 1.1 %
GFR SERPL CREATININE-BSD FRML MDRD: >60 ML/MIN/{1.73_M2}
GLUCOSE BLD-MCNC: 107 MG/DL (ref 70–99)
HCT VFR BLD CALC: 37.7 % (ref 40.5–52.5)
HEMOGLOBIN: 12.2 G/DL (ref 13.5–17.5)
LYMPHOCYTES ABSOLUTE: 1.6 K/UL (ref 1–5.1)
LYMPHOCYTES RELATIVE PERCENT: 24.6 %
MCH RBC QN AUTO: 26.2 PG (ref 26–34)
MCHC RBC AUTO-ENTMCNC: 32.3 G/DL (ref 31–36)
MCV RBC AUTO: 81.3 FL (ref 80–100)
MONOCYTES ABSOLUTE: 0.6 K/UL (ref 0–1.3)
MONOCYTES RELATIVE PERCENT: 9.2 %
NEUTROPHILS ABSOLUTE: 4.2 K/UL (ref 1.7–7.7)
NEUTROPHILS RELATIVE PERCENT: 64.8 %
PDW BLD-RTO: 17.6 % (ref 12.4–15.4)
PLATELET # BLD: 200 K/UL (ref 135–450)
PMV BLD AUTO: 8.2 FL (ref 5–10.5)
POTASSIUM SERPL-SCNC: 3.5 MMOL/L (ref 3.5–5.1)
RBC # BLD: 4.64 M/UL (ref 4.2–5.9)
SODIUM BLD-SCNC: 141 MMOL/L (ref 136–145)
TOTAL CK: 7168 U/L (ref 39–308)
WBC # BLD: 6.4 K/UL (ref 4–11)

## 2022-10-20 PROCEDURE — 6360000002 HC RX W HCPCS: Performed by: SURGERY

## 2022-10-20 PROCEDURE — 6370000000 HC RX 637 (ALT 250 FOR IP): Performed by: SURGERY

## 2022-10-20 PROCEDURE — 6370000000 HC RX 637 (ALT 250 FOR IP): Performed by: INTERNAL MEDICINE

## 2022-10-20 PROCEDURE — 82550 ASSAY OF CK (CPK): CPT

## 2022-10-20 PROCEDURE — 2580000003 HC RX 258: Performed by: SURGERY

## 2022-10-20 PROCEDURE — 80048 BASIC METABOLIC PNL TOTAL CA: CPT

## 2022-10-20 PROCEDURE — 85025 COMPLETE CBC W/AUTO DIFF WBC: CPT

## 2022-10-20 PROCEDURE — 99232 SBSQ HOSP IP/OBS MODERATE 35: CPT | Performed by: INTERNAL MEDICINE

## 2022-10-20 PROCEDURE — 1200000000 HC SEMI PRIVATE

## 2022-10-20 PROCEDURE — 36415 COLL VENOUS BLD VENIPUNCTURE: CPT

## 2022-10-20 RX ORDER — FUROSEMIDE 20 MG/1
20 TABLET ORAL DAILY
Qty: 60 TABLET | Refills: 3 | Status: SHIPPED | OUTPATIENT
Start: 2022-10-21 | End: 2022-10-21 | Stop reason: SDUPTHER

## 2022-10-20 RX ORDER — ASPIRIN 81 MG/1
81 TABLET, CHEWABLE ORAL DAILY
Qty: 30 TABLET | Refills: 3 | Status: SHIPPED | OUTPATIENT
Start: 2022-10-20 | End: 2022-10-21 | Stop reason: SDUPTHER

## 2022-10-20 RX ADMIN — Medication 10 ML: at 23:50

## 2022-10-20 RX ADMIN — Medication 10 ML: at 09:02

## 2022-10-20 RX ADMIN — FUROSEMIDE 20 MG: 20 TABLET ORAL at 08:51

## 2022-10-20 RX ADMIN — ASPIRIN 81 MG 81 MG: 81 TABLET ORAL at 08:49

## 2022-10-20 RX ADMIN — ENOXAPARIN SODIUM 30 MG: 100 INJECTION SUBCUTANEOUS at 08:52

## 2022-10-20 RX ADMIN — ACETAMINOPHEN 650 MG: 325 TABLET ORAL at 15:46

## 2022-10-20 RX ADMIN — ACETAMINOPHEN 650 MG: 325 TABLET ORAL at 20:15

## 2022-10-20 RX ADMIN — ACETAMINOPHEN 650 MG: 325 TABLET ORAL at 08:52

## 2022-10-20 RX ADMIN — ACETAMINOPHEN 650 MG: 325 TABLET ORAL at 05:03

## 2022-10-20 RX ADMIN — Medication 1000 UNITS: at 08:50

## 2022-10-20 RX ADMIN — MULTIPLE VITAMINS W/ MINERALS TAB 1 TABLET: TAB at 08:52

## 2022-10-20 RX ADMIN — ENOXAPARIN SODIUM 30 MG: 100 INJECTION SUBCUTANEOUS at 20:15

## 2022-10-20 RX ADMIN — CYANOCOBALAMIN TAB 1000 MCG 1000 MCG: 1000 TAB at 08:50

## 2022-10-20 RX ADMIN — OXYCODONE 5 MG: 5 TABLET ORAL at 11:44

## 2022-10-20 RX ADMIN — METOPROLOL TARTRATE 25 MG: 25 TABLET, FILM COATED ORAL at 20:15

## 2022-10-20 ASSESSMENT — PAIN DESCRIPTION - LOCATION
LOCATION: LEG

## 2022-10-20 ASSESSMENT — PAIN SCALES - GENERAL
PAINLEVEL_OUTOF10: 1
PAINLEVEL_OUTOF10: 4
PAINLEVEL_OUTOF10: 3
PAINLEVEL_OUTOF10: 4
PAINLEVEL_OUTOF10: 4
PAINLEVEL_OUTOF10: 0
PAINLEVEL_OUTOF10: 4
PAINLEVEL_OUTOF10: 4
PAINLEVEL_OUTOF10: 0

## 2022-10-20 ASSESSMENT — PAIN DESCRIPTION - ORIENTATION
ORIENTATION: LEFT

## 2022-10-20 ASSESSMENT — PAIN - FUNCTIONAL ASSESSMENT: PAIN_FUNCTIONAL_ASSESSMENT: ACTIVITIES ARE NOT PREVENTED

## 2022-10-20 ASSESSMENT — PAIN DESCRIPTION - PAIN TYPE: TYPE: SURGICAL PAIN

## 2022-10-20 NOTE — PLAN OF CARE
Problem: Discharge Planning  Goal: Discharge to home or other facility with appropriate resources  Outcome: Progressing     Problem: Pain  Goal: Verbalizes/displays adequate comfort level or baseline comfort level  Outcome: Progressing     Problem: Chronic Conditions and Co-morbidities  Goal: Patient's chronic conditions and co-morbidity symptoms are monitored and maintained or improved  Outcome: Progressing     Problem: Respiratory - Adult  Goal: Achieves optimal ventilation and oxygenation  Outcome: Progressing     Problem: Hematologic - Adult  Goal: Maintains hematologic stability  Outcome: Progressing

## 2022-10-20 NOTE — PLAN OF CARE
Problem: Discharge Planning  Goal: Discharge to home or other facility with appropriate resources  Outcome: Progressing     Problem: Pain  Goal: Verbalizes/displays adequate comfort level or baseline comfort level  Outcome: Progressing     Problem: Chronic Conditions and Co-morbidities  Goal: Patient's chronic conditions and co-morbidity symptoms are monitored and maintained or improved  Outcome: Progressing     Problem: Safety - Adult  Goal: Free from fall injury  Outcome: Progressing     Problem: Cardiovascular - Adult  Goal: Maintains optimal cardiac output and hemodynamic stability  Outcome: Progressing     Problem: Metabolic/Fluid and Electrolytes - Adult  Goal: Electrolytes maintained within normal limits  Outcome: Progressing

## 2022-10-20 NOTE — PROGRESS NOTES
Office : [phone]     Fax :[phone]       Nephrology progress  Note      Patient's Name: Rachel Hardy  8:28 AM  10/20/2022    Reason for Consult:  Rhabdomyolysis       Requesting Physician:  Marty Frausto MD      Chief Complaint:    Chief Complaint   Patient presents with    Leg Swelling     Patient in with complaints of bilateral edema in his legs that is new. PCP told him to come into the er. History of Present iIlness:      Rachel Hardy is a 78 y.o. male with prior history of CKD, HTN, RA , simple renal cyst who came to ER with c/o pain in thighs. He was found to have elevated CK levels. Denies any trauma. Denies any heavy exercise. [place] [address] . Has h/o CKD 2/2 NSaids which improved. 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 Making urine. Renal function stable . I/O last 3 completed shifts: In: 1372.9 [P.O.:1200;  I.V.:22.9; IV Piggyback:150]  Out: -     Past Medical History:   Diagnosis Date    Arthritis     Colon polyp     Diabetes mellitus ([facility] [address]     denies being diabetic    Hyperlipemia     Hypertension     Kidney disease     Rheumatoid arthritis ([facility] [address]        Past Surgical History:   Procedure Laterality Date    COLONOSCOPY      COLONOSCOPY N/A 8/14/2020    COLONOSCOPY DIAGNOSTIC performed by Flakito Spencer MD at [address] [facility] [address] Left 10/18/2022    LEFT LEG MUSCLE BIOPSY performed by Aleena Talbot MD at [address] Left        Family History   Problem Relation Age of Onset    High Blood Pressure Sister          Current Medications:    furosemide (LASIX) tablet 20 mg, Daily  sodium chloride flush 0.9 % injection 5-40 mL, 2 times per day  sodium chloride flush 0.9 % injection 5-40 mL, PRN  0.9 % sodium chloride infusion, PRN  enoxaparin Sodium (LOVENOX) injection 30 mg, BID  morphine (PF) injection 2 mg, Q2H PRN   Or  morphine injection 4 mg, Q2H PRN  oxyCODONE (ROXICODONE) immediate release tablet 5 mg, Q4H PRN   Or  oxyCODONE (ROXICODONE) immediate release tablet 10 mg, Q4H PRN  acetaminophen (TYLENOL) tablet 650 mg, Q6H  vitamin B-12 (CYANOCOBALAMIN) tablet 1,000 mcg, Daily  sodium chloride flush 0.9 % injection 5-40 mL, 2 times per day  sodium chloride flush 0.9 % injection 5-40 mL, PRN  0.9 % sodium chloride infusion, PRN  polyethylene glycol (GLYCOLAX) packet 17 g, Daily PRN  aspirin chewable tablet 81 mg, Daily  nitroGLYCERIN (NITROSTAT) SL tablet 0.4 mg, Q5 Min PRN  losartan (COZAAR) tablet 50 mg, Daily  metoprolol tartrate (LOPRESSOR) tablet 25 mg, BID  [Held by provider] triamterene-hydroCHLOROthiazide (MAXZIDE-25) 37.5-25 MG per tablet 1 tablet, Daily  therapeutic multivitamin-minerals 1 tablet, Daily  Vitamin D (CHOLECALCIFEROL) tablet 1,000 Units, Daily  perflutren lipid microspheres (DEFINITY) injection 1.65 mg, ONCE PRN  sodium chloride flush 0.9 % injection 5-40 mL, 2 times per day  sodium chloride flush 0.9 % injection 5-40 mL, PRN  0.9 % sodium chloride infusion, PRN        Physical exam:     Vitals:  /64   Pulse 68   Temp 97.9 °F (36.6 °C) (Oral)   Resp 16   Ht 6' 2\" (1.88 m)   Wt 259 lb (117.5 kg)   SpO2 95%   BMI 33.25 kg/m²   Constitutional:  OAA X3 NAD  Skin: no rash, turgor wnl  Heent:  eomi, mmm  Neck: no bruits or jvd noted  Cardiovascular:  S1, S2 without m/r/g  Respiratory: CTA B without w/r/r  Abdomen:  +bs, soft, nt, nd  Ext: no  lower extremity edema      Labs:  CBC:   Recent Labs     10/18/22  0505 10/19/22  1036   WBC 4.7 11.3*   HGB 11.1* 12.4*    226     BMP:    Recent Labs     10/18/22  0505 10/19/22  1036    143   K 3.6 4.5   * 109   CO2 24 24   BUN 13 20   CREATININE 0.7* 1.2 GLUCOSE 80 111*     Ca/Mg/Phos:   Recent Labs     10/18/22  0505 10/19/22  1036   CALCIUM 8.7 9.5     Hepatic:   Recent Labs     10/18/22  0505 10/19/22  1036   * 293*   * 202*   BILITOT 0.3 <0.2   ALKPHOS 56 70     Troponin:   No results for input(s): TROPONINI in the last 72 hours. BNP: No results for input(s): BNP in the last 72 hours. Lipids:   No results for input(s): CHOL, TRIG, HDL, LDLCALC, LABVLDL in the last 72 hours. ABGs: No results for input(s): PHART, PO2ART, [id number] in the last 72 hours. INR:   No results for input(s): INR in the last 72 hours. UA:  No results for input(s): Molly [place], GLUCOSEU, BILIRUBINUR, KETUA, SPECGRAV, BLOODU, PHUR, PROTEINU, UROBILINOGEN, NITRU, LEUKOCYTESUR, Mao Hires in the last 72 hours. Urine Microscopic:   No results for input(s): LABCAST, BACTERIA, COMU, HYALCAST, WBCUA, RBCUA, EPIU in the last 72 hours. Urine Culture: No results for input(s): LABURIN in the last 72 hours. Urine Chemistry: No results for input(s): Lizzie Area, PROTEINUR, NAUR in the last 72 hours. IMAGING:  MRI ABDOMEN W WO CONTRAST MRCP   Final Result   1. Cholelithiasis without evidence of acute cholecystitis. 2. No biliary ductal dilatation or choledocholithiasis. 3. Within the liver, there is a 1.8 cm hemangioma near the gallbladder. 4. Simple and mildly complicated (Bosniak 1 and 2) bilateral renal cysts   measuring up to 8.4 cm. No dedicated follow-up recommended. 5. In the pancreatic uncinate process, there is a 1.5 x 1.4 cm cystic lesion   which could represent a branch duct IPMN. Recommend 6 month follow-up.       RECOMMENDATIONS:   Incidental Pancreatic Cysts      65-79 years at presentation      1.5-2.5 cm      Main pancreatic duct communication absent or cannot be determined: Pancreas   protocol MRI (or CT) in 6 months, consider EUS/FNA      Reference: Tyra et al. Management of incidental pancreatic cysts: A white paper of the ACR Incidental Findings Committee. [address] [address];41:[id number]. US GALLBLADDER RUQ   Final Result   Mild hydrops of the gallbladder and associated 1.6 cm gallstone with no   secondary signs of cholecystitis. 2 cm rounded echogenic focus in the right lobe of the liver which is   suspicious for a liver hemangioma. Recommend a follow-up CT scan with   contrast further characterization. Borderline enlargement of the common bile duct and partial obscuration of the   pancreas due to overlying bowel gas. 7 cm cyst in the right kidney inferiorly with a thin septation in the cyst in   keeping with a Bosniak 2 cyst.         CT CHEST PULMONARY EMBOLISM W CONTRAST   Final Result   No evidence of a pulmonary embolus. Mildly dilated and atherosclerotic thoracic aorta with no aneurysm or   dissection. Minimal bibasilar atelectasis or scarring posteriorly. Suggest follow-up   with chest x-rays. 8 cm benign cyst upper pole left kidney             Assessment/Plan :      1. Rhabdomyolysis  likely 2/2 Statins   Stopped  rosuvastatin. Dc iv fluids. Monitor renal function     IDALIA - positive   Autoimmune process   Specific antibodies ordered - results pending    hepatitis panel - negative     Elevated LFTS  Had muscle biopsy. Results awaited. On  prednisone 10 mg. Hospitalist discussed with rheumatology. Will defer dose to hospitalist for dose. Should have appointment with rheumatology. 2. HTN  Controlled off meds. 3. Anemia. Hb 11.4  monitor     4. Acid- base disorder.   Monitor       Monitor UOP       Stable form nephrology standpoint       D/w primary team      Thank you for allowing us to participate in care of Bryant Heath         Electronically signed by: Ramsey Pimentel MD, 10/20/2022, 8:28 AM      [facility] [address]  Office : [phone]  Fax :[phone]

## 2022-10-20 NOTE — PROGRESS NOTES
[facility] PROGRESS NOTE    10/20/2022 12:27 PM        Name: Rodger Zepeda .              Admitted: 10/10/2022  Primary Care Provider: Sadia Pardo MD (Tel: [phone])                        Subjective:  . No acute events overnight. Resting well. Pain control. Diet ok. Labs reviewed  Denies any chest pain sob.      Reviewed interval ancillary notes    Current Medications  furosemide (LASIX) tablet 20 mg, Daily  sodium chloride flush 0.9 % injection 5-40 mL, 2 times per day  sodium chloride flush 0.9 % injection 5-40 mL, PRN  0.9 % sodium chloride infusion, PRN  enoxaparin Sodium (LOVENOX) injection 30 mg, BID  morphine (PF) injection 2 mg, Q2H PRN   Or  morphine injection 4 mg, Q2H PRN  oxyCODONE (ROXICODONE) immediate release tablet 5 mg, Q4H PRN   Or  oxyCODONE (ROXICODONE) immediate release tablet 10 mg, Q4H PRN  acetaminophen (TYLENOL) tablet 650 mg, Q6H  vitamin B-12 (CYANOCOBALAMIN) tablet 1,000 mcg, Daily  sodium chloride flush 0.9 % injection 5-40 mL, 2 times per day  sodium chloride flush 0.9 % injection 5-40 mL, PRN  0.9 % sodium chloride infusion, PRN  polyethylene glycol (GLYCOLAX) packet 17 g, Daily PRN  aspirin chewable tablet 81 mg, Daily  nitroGLYCERIN (NITROSTAT) SL tablet 0.4 mg, Q5 Min PRN  losartan (COZAAR) tablet 50 mg, Daily  metoprolol tartrate (LOPRESSOR) tablet 25 mg, BID  [Held by provider] triamterene-hydroCHLOROthiazide (MAXZIDE-25) 37.5-25 MG per tablet 1 tablet, Daily  therapeutic multivitamin-minerals 1 tablet, Daily  Vitamin D (CHOLECALCIFEROL) tablet 1,000 Units, Daily  perflutren lipid microspheres (DEFINITY) injection 1.65 mg, ONCE PRN  sodium chloride flush 0.9 % injection 5-40 mL, 2 times per day  sodium chloride flush 0.9 % injection 5-40 mL, PRN  0.9 % sodium chloride infusion, PRN      Objective:  /65   Pulse 72   Temp 97.9 °F (36.6 °C) (Oral)   Resp 18   Ht 6' 2\" (1.88 m)   Wt 260 lb 14.4 oz (118.3 kg)   SpO2 95%   BMI 33.50 kg/m²     Intake/Output Summary (Last 24 hours) at 10/20/2022 1227  Last data filed at 10/19/2022 2001  Gross per 24 hour   Intake 642.87 ml   Output --   Net 642.87 ml      Wt Readings from Last 3 Encounters:   10/20/22 260 lb 14.4 oz (118.3 kg)   07/14/22 240 lb 12.8 oz (109.2 kg)   05/02/22 243 lb (110.2 kg)       General appearance:  Appears comfortable  Eyes: Sclera clear. Pupils equal.  ENT: Moist oral mucosa. Trachea midline, no adenopathy. Cardiovascular: Regular rhythm, normal S1, S2. No murmur. No edema in lower extremities  Respiratory: Not using accessory muscles. Good inspiratory effort. Clear to auscultation bilaterally, no wheeze or crackles. GI: Abdomen soft, no tenderness, not distended, normal bowel sounds  Musculoskeletal: No cyanosis in digits, neck supple  Neurology: CN 2-12 grossly intact. No speech or motor deficits  Psych: Normal affect. Alert and oriented in time, place and person  Skin: Warm, dry, normal turgor    Labs and Tests:  CBC:   Recent Labs     10/18/22  0505 10/19/22  1036 10/20/22  1004   WBC 4.7 11.3* 6.4   HGB 11.1* 12.4* 12.2*    226 200     BMP:    Recent Labs     10/18/22  0505 10/19/22  1036 10/20/22  1004    143 141   K 3.6 4.5 3.5   * 109 113*   CO2 24 24 19*   BUN 13 20 20   CREATININE 0.7* 1.2 1.0   GLUCOSE 80 111* 107*     Hepatic:   Recent Labs     10/18/22  0505 10/19/22  1036   * 293*   * 202*   BILITOT 0.3 <0.2   ALKPHOS 56 70       Discussed care with patient             Problem List  Principal Problem:    Elevated troponin level not due to acute coronary syndrome  Active Problems:    Elevated LFTs    Elevated CPK    NSTEMI (non-ST elevated myocardial infarction) (HCC)    Preop cardiovascular exam    HTN (hypertension)    CKD (chronic kidney disease), stage III (HCC)  Resolved Problems:    * No resolved hospital problems. *       Assessment & Plan:   Acute rhabdomyolysis  -Probably concern for possible statin induced myositis versus autoimmune process  -Work-up thus far is negative CK trending down to 7k  - discussed with rheum  - dc po sterids and monitor.   -Patient renal function is stable  -Underwent biopsy we are awaiting results  -No preceding trauma or known muscle injury. No muscle weakness noted but mild bilateral thigh pain. - if trending down plan for dc likely tomorrow    Elevated liver enzymes stable    Elevated troponin stable  Patient is status post left heart cath on 10/11/2022 with acute          Diet: ADULT DIET;  Regular  Code:Full Code  DVT PPX lovenox     Ryan Philip MD   10/20/2022 12:27 PM

## 2022-10-21 VITALS
HEART RATE: 99 BPM | HEIGHT: 74 IN | DIASTOLIC BLOOD PRESSURE: 71 MMHG | BODY MASS INDEX: 33.6 KG/M2 | SYSTOLIC BLOOD PRESSURE: 109 MMHG | WEIGHT: 261.8 LBS | RESPIRATION RATE: 18 BRPM | TEMPERATURE: 97.9 F | OXYGEN SATURATION: 96 %

## 2022-10-21 LAB
ALPHA-1 ANTITRYPSIN PHENOTYPE: NORMAL
ALPHA-1 ANTITRYPSIN: 146 MG/DL (ref 90–200)
ANION GAP SERPL CALCULATED.3IONS-SCNC: 9 MMOL/L (ref 3–16)
BUN BLDV-MCNC: 18 MG/DL (ref 7–20)
CALCIUM SERPL-MCNC: 8.6 MG/DL (ref 8.3–10.6)
CHLORIDE BLD-SCNC: 109 MMOL/L (ref 99–110)
CO2: 23 MMOL/L (ref 21–32)
CREAT SERPL-MCNC: 0.8 MG/DL (ref 0.8–1.3)
GFR SERPL CREATININE-BSD FRML MDRD: >60 ML/MIN/{1.73_M2}
GLUCOSE BLD-MCNC: 81 MG/DL (ref 70–99)
MISCELLANEOUS LAB TEST RESULT: NORMAL
POTASSIUM SERPL-SCNC: 3.7 MMOL/L (ref 3.5–5.1)
SODIUM BLD-SCNC: 141 MMOL/L (ref 136–145)
TOTAL CK: 6446 U/L (ref 39–308)

## 2022-10-21 PROCEDURE — 99232 SBSQ HOSP IP/OBS MODERATE 35: CPT | Performed by: INTERNAL MEDICINE

## 2022-10-21 PROCEDURE — 6370000000 HC RX 637 (ALT 250 FOR IP): Performed by: SURGERY

## 2022-10-21 PROCEDURE — 2580000003 HC RX 258: Performed by: SURGERY

## 2022-10-21 PROCEDURE — 36415 COLL VENOUS BLD VENIPUNCTURE: CPT

## 2022-10-21 PROCEDURE — 6370000000 HC RX 637 (ALT 250 FOR IP): Performed by: INTERNAL MEDICINE

## 2022-10-21 PROCEDURE — 80048 BASIC METABOLIC PNL TOTAL CA: CPT

## 2022-10-21 PROCEDURE — 82550 ASSAY OF CK (CPK): CPT

## 2022-10-21 PROCEDURE — 6360000002 HC RX W HCPCS: Performed by: SURGERY

## 2022-10-21 RX ORDER — ASPIRIN 81 MG/1
81 TABLET, CHEWABLE ORAL DAILY
Qty: 30 TABLET | Refills: 2 | Status: SHIPPED | OUTPATIENT
Start: 2022-10-21

## 2022-10-21 RX ORDER — FUROSEMIDE 20 MG/1
20 TABLET ORAL DAILY
Qty: 60 TABLET | Refills: 1 | Status: SHIPPED | OUTPATIENT
Start: 2022-10-21 | End: 2022-10-25 | Stop reason: DRUGHIGH

## 2022-10-21 RX ADMIN — Medication 1000 UNITS: at 08:39

## 2022-10-21 RX ADMIN — METOPROLOL TARTRATE 25 MG: 25 TABLET, FILM COATED ORAL at 08:38

## 2022-10-21 RX ADMIN — ACETAMINOPHEN 650 MG: 325 TABLET ORAL at 08:38

## 2022-10-21 RX ADMIN — ENOXAPARIN SODIUM 30 MG: 100 INJECTION SUBCUTANEOUS at 08:39

## 2022-10-21 RX ADMIN — LOSARTAN POTASSIUM 50 MG: 25 TABLET, FILM COATED ORAL at 08:38

## 2022-10-21 RX ADMIN — Medication 10 ML: at 08:39

## 2022-10-21 RX ADMIN — MULTIPLE VITAMINS W/ MINERALS TAB 1 TABLET: TAB at 08:38

## 2022-10-21 RX ADMIN — ACETAMINOPHEN 650 MG: 325 TABLET ORAL at 04:02

## 2022-10-21 RX ADMIN — CYANOCOBALAMIN TAB 1000 MCG 1000 MCG: 1000 TAB at 08:38

## 2022-10-21 RX ADMIN — FUROSEMIDE 20 MG: 20 TABLET ORAL at 08:38

## 2022-10-21 RX ADMIN — ASPIRIN 81 MG 81 MG: 81 TABLET ORAL at 08:38

## 2022-10-21 ASSESSMENT — PAIN SCALES - GENERAL
PAINLEVEL_OUTOF10: 3
PAINLEVEL_OUTOF10: 0
PAINLEVEL_OUTOF10: 1

## 2022-10-21 NOTE — CARE COORDINATION
Discharge Plan:   Patient discharged  on 10/21 /22 to home with family .     All discharge needs met per case management

## 2022-10-21 NOTE — PROGRESS NOTES
Office : [phone]     Fax :[phone]       Nephrology progress  Note      Patient's Name: Redd Gaytan  10:44 AM  10/21/2022    Reason for Consult:  Rhabdomyolysis       Requesting Physician:  Blade Benedict MD      Chief Complaint:    Chief Complaint   Patient presents with    Leg Swelling     Patient in with complaints of bilateral edema in his legs that is new. PCP told him to come into the er. History of Present iIlness:      Redd Gaytan is a 78 y.o. male with prior history of CKD, HTN, RA , simple renal cyst who came to ER with c/o pain in thighs. He was found to have elevated CK levels. Denies any trauma. Denies any heavy exercise. [place] at [address] . Has h/o CKD 2/2 NSaids which improved. 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 Making urine. Renal function stable . I/O last 3 completed shifts: In: 2082.9 [P.O.:1920; I.V.:12.9;  IV Piggyback:150]  Out: -     Past Medical History:   Diagnosis Date    Arthritis     Colon polyp     Diabetes mellitus ([facility] [address]     denies being diabetic    Hyperlipemia     Hypertension     Kidney disease     Rheumatoid arthritis ([facility] [address]        Past Surgical History:   Procedure Laterality Date    COLONOSCOPY      COLONOSCOPY N/A 8/14/2020    COLONOSCOPY DIAGNOSTIC performed by Kyle Wilder MD at [address] [facility] [address] Left 10/18/2022    LEFT LEG MUSCLE BIOPSY performed by Juana Andino MD at [address]        Family History   Problem Relation Age of Onset    High Blood Pressure Sister          Current Medications:    furosemide (LASIX) tablet 20 mg, Daily  sodium chloride flush 0.9 % injection 5-40 mL, 2 times per day  sodium chloride flush 0.9 % injection 5-40 mL, PRN  0.9 % sodium chloride infusion, PRN  enoxaparin Sodium (LOVENOX) injection 30 mg, BID  morphine (PF) injection 2 mg, Q2H PRN   Or  morphine injection 4 mg, Q2H PRN  oxyCODONE (ROXICODONE) immediate release tablet 5 mg, Q4H PRN   Or  oxyCODONE (ROXICODONE) immediate release tablet 10 mg, Q4H PRN  acetaminophen (TYLENOL) tablet 650 mg, Q6H  vitamin B-12 (CYANOCOBALAMIN) tablet 1,000 mcg, Daily  sodium chloride flush 0.9 % injection 5-40 mL, 2 times per day  sodium chloride flush 0.9 % injection 5-40 mL, PRN  0.9 % sodium chloride infusion, PRN  polyethylene glycol (GLYCOLAX) packet 17 g, Daily PRN  aspirin chewable tablet 81 mg, Daily  nitroGLYCERIN (NITROSTAT) SL tablet 0.4 mg, Q5 Min PRN  losartan (COZAAR) tablet 50 mg, Daily  metoprolol tartrate (LOPRESSOR) tablet 25 mg, BID  [Held by provider] triamterene-hydroCHLOROthiazide (MAXZIDE-25) 37.5-25 MG per tablet 1 tablet, Daily  therapeutic multivitamin-minerals 1 tablet, Daily  Vitamin D (CHOLECALCIFEROL) tablet 1,000 Units, Daily  perflutren lipid microspheres (DEFINITY) injection 1.65 mg, ONCE PRN  sodium chloride flush 0.9 % injection 5-40 mL, 2 times per day  sodium chloride flush 0.9 % injection 5-40 mL, PRN  0.9 % sodium chloride infusion, PRN        Physical exam:     Vitals:  /71   Pulse 99   Temp 97.9 °F (36.6 °C) (Oral)   Resp 18   Ht 6' 2\" (1.88 m)   Wt 261 lb 12.8 oz (118.8 kg)   SpO2 96%   BMI 33.61 kg/m²   Constitutional:  OAA X3 NAD  Skin: no rash, turgor wnl  Heent:  eomi, mmm  Neck: no bruits or jvd noted  Cardiovascular:  S1, S2 without m/r/g  Respiratory: CTA B without w/r/r  Abdomen:  +bs, soft, nt, nd  Ext: no  lower extremity edema      Labs:  CBC:   Recent Labs     10/19/22  1036 10/20/22  1004   WBC 11.3* 6.4   HGB 12.4* 12.2*    200     BMP:    Recent Labs     10/19/22  1036 10/20/22  1004 10/21/22  0700    141 141   K 4.5 3.5 3.7    113* 109   CO2 24 19* 23   BUN 20 20 18   CREATININE 1.2 1.0 0.8   GLUCOSE 111* 107* 81     Ca/Mg/Phos:   Recent Labs     10/19/22  1036 10/20/22  1004 10/21/22  0700   CALCIUM 9.5 8.7 8.6     Hepatic:   Recent Labs     10/19/22  1036   *   *   BILITOT <0.2   ALKPHOS 70     Troponin:   No results for input(s): TROPONINI in the last 72 hours. BNP: No results for input(s): BNP in the last 72 hours. Lipids:   No results for input(s): CHOL, TRIG, HDL, LDLCALC, LABVLDL in the last 72 hours. ABGs: No results for input(s): PHART, PO2ART, [id number] in the last 72 hours. INR:   No results for input(s): INR in the last 72 hours. UA:  No results for input(s): Molly [place], GLUCOSEU, BILIRUBINUR, KETUA, SPECGRAV, BLOODU, PHUR, PROTEINU, UROBILINOGEN, NITRU, LEUKOCYTESUR, Mao Hires in the last 72 hours. Urine Microscopic:   No results for input(s): LABCAST, BACTERIA, COMU, HYALCAST, WBCUA, RBCUA, EPIU in the last 72 hours. Urine Culture: No results for input(s): LABURIN in the last 72 hours. Urine Chemistry: No results for input(s): Lizzie Area, PROTEINUR, NAUR in the last 72 hours. IMAGING:  MRI ABDOMEN W WO CONTRAST MRCP   Final Result   1. Cholelithiasis without evidence of acute cholecystitis. 2. No biliary ductal dilatation or choledocholithiasis. 3. Within the liver, there is a 1.8 cm hemangioma near the gallbladder. 4. Simple and mildly complicated (Bosniak 1 and 2) bilateral renal cysts   measuring up to 8.4 cm. No dedicated follow-up recommended. 5. In the pancreatic uncinate process, there is a 1.5 x 1.4 cm cystic lesion   which could represent a branch duct IPMN. Recommend 6 month follow-up.       RECOMMENDATIONS:   Incidental Pancreatic Cysts      65-79 years at presentation      1.5-2.5 cm      Main pancreatic duct communication absent or cannot be determined: Pancreas   protocol MRI (or CT) in 6 months, consider EUS/FNA      Reference: Tyra casper al. Management of incidental pancreatic cysts: A white   paper of the ACR Incidental Findings Committee. [address]   [id number]. US GALLBLADDER RUQ   Final Result   Mild hydrops of the gallbladder and associated 1.6 cm gallstone with no   secondary signs of cholecystitis. 2 cm rounded echogenic focus in the right lobe of the liver which is   suspicious for a liver hemangioma. Recommend a follow-up CT scan with   contrast further characterization. Borderline enlargement of the common bile duct and partial obscuration of the   pancreas due to overlying bowel gas. 7 cm cyst in the right kidney inferiorly with a thin septation in the cyst in   keeping with a Bosniak 2 cyst.         CT CHEST PULMONARY EMBOLISM W CONTRAST   Final Result   No evidence of a pulmonary embolus. Mildly dilated and atherosclerotic thoracic aorta with no aneurysm or   dissection. Minimal bibasilar atelectasis or scarring posteriorly. Suggest follow-up   with chest x-rays. 8 cm benign cyst upper pole left kidney             Assessment/Plan :      1. Rhabdomyolysis  likely 2/2 Statins   CK level trending down       Monitor renal function     IDALIA - positive   Autoimmune process   Specific antibodies ordered - results pending    hepatitis panel - negative     Elevated LFTS  Had muscle biopsy. Results awaited. On  prednisone 10 mg. Hospitalist discussed with rheumatology. Will defer dose to hospitalist for dose. Should have appointment with rheumatology. 2. HTN  Controlled off meds. 3. Anemia. Hb 11.4  monitor     4. Acid- base disorder. Monitor     5. Edema .  Continue lasix 20 mg po daily     Monitor UOP       Stable form nephrology standpoint       D/w primary team      Thank you for allowing us to participate in care of Karen Justice         Electronically signed by: Miranda Rene MD, 10/21/2022, 10:44 AM      [facility] [address]  Office : [phone]  Fax :[phone]

## 2022-10-21 NOTE — PROGRESS NOTES
Data- discharge order received, pt verbalized agreement to discharge, disposition to previous residence, no needs for HHC/DME. Action- discharge instructions prepared and given to pt, pt verbalized understanding. Medication information packet given r/t NEW and/or CHANGED prescriptions emphasizing name/purpose/side effects, pt verbalized understanding. Discharge instruction summary: Diet- as tolerated, Activity- as tolerated, Primary Care Physician as follows: Blade Benedict [phone] f/u appointment scheduled, immunizations reviewed, prescription medications filled [facility]. Inpatient surgical procedure precautions reviewed: handout given. CHF Education reviewed. Pt/ Family has had a total of 60 minutes CHF education this admission encounter. 1. WEIGHT: Admit Weight: 242 lb (109.8 kg) (10/10/22 2329)        Today  Weight: 261 lb 12.8 oz (118.8 kg) (10/21/22 0400)       2. O2 SAT.: SpO2: 96 % (10/21/22 0836)    Response- Pt belongings gathered, IV removed. Disposition is home (no HHC/DME needs), transported with belongings, taken to lobby via w/c w/ staff, no complications.

## 2022-10-23 NOTE — PROGRESS NOTES
[address], [place]                                                           [address] [place] [address]                                                              (P) [phone] (F)      Note is transcribed using voice recognition software. Inadvertent computerized transcription errors may be present. Patient identification: Jose E Bell, male : [birth year],85 y.o.        ASSESSMENT AND PLAN:  Shade Rubinstein was seen today for establish care and abnormal test results. Diagnoses and all orders for this visit:    Elevated CPK  -     Aldolase; Future  -     CK; Future  -     Miscellaneous lab test #2; Future  -     Miscellaneous Sendout; Future  -     IDALIA Reflex to Antibody Cascade; Future    Screening for rheumatic disorder    Edema of both lower extremities    Elevated CPK-detected at the time of hospitalization 10/12/2022-,648. Trending down. Clinically-bilateral quads and hamstring (thigh muscle) weakness. Muscle biopsy left quadriceps-rhabdomyolysis. I could not review detailed history of pathology noted. Statin induced myositis versus traumatic myositis versus inflammatory myopathy. Patient was on statin, check antibodies against [employer] a reductase and myositis panel. Repeat CK and aldolase. Rheum testing during hospital IDALIA low titer 1-80 speckled pattern    Pitting edema-in both lower extremities from thigh, and actually has pitting edema in his anterior abdominal wall as well. 20 pound weight gain since hospitalization. He is on low-dose Lasix 20 mg daily. Advised patient to call nephrology, perhaps need higher dose given so much of water retention. I will follow-up on test results, office visit in 2 to 3 weeks.       Patient indicates understanding and agrees with the management plan. #################################################################################################      REASON FOR CONSULTATION:  Patient is being seen at the request of  Timothy Baker MD / Natalie Gresham MD for evaluation management of elevated CK. HISTORY OF PRESENT ILLNESS:  78 y.o. male with history of hypertension was hospitalized couple weeks ago because of shortness of breath, cough, and just felt overall weak. Cardiac work-up was negative-cardiology note reviewed. Patient was found to have elevated CK 11,600, underwent left quadriceps muscle biopsy that revealed rhabdomyolysis. Detailed histopathology report is unavailable. Patient received IV hydration, CK has trended down to 6446 on 10/21/2022, and discharged home. He continues to have weakness in both lower extremities mainly in his thighs. Both legs are swollen, also has swelling of anterior abdominal wall subcu tissue. This is affecting him the most.  He denies any discomfort in any other areas. No joint pain, muscle pain in upper extremities. Patient does not recall having muscle pain, muscle weakness prior to hospitalization. He believes that aggressive IV hydration caused edema in his lower extremities and he also gained 20 pounds pre-/post hospital discharge. Patient was on rosuvastatin long-term, was discontinued at the time of this hospitalization. All other review of systems are negative. He is up-to-date with age-specific cancer screening. Denies diplopia, dysphagia, neck muscle weakness, and weakness in other areas other than lower extremities. All other ROS are negative. PMH, PSH,Social history , Meds reviewed. FH-no family history of autoimmune rheumatic disorders. PHYSICAL EXAM:    Vitals:    /86   Ht 6' 2\" (1.88 m)   Wt 263 lb (119.3 kg)   BMI 33.77 kg/m²   AA)x3 well nourished, and well groomed, normal judgement.     MKS: No appreciable tender, swollen or inflamed joints in upper or lower extremities, F ROM in all peripheral joints. He is unable to get up from sitting position without assistance. Unable to squat. No weakness noted in his upper extremities or distal to knees. 2+ edema-below his thighs bilaterally, and also has pitting edema in anterior abdominal wall subcu tissue.  skin changes in his lower extremities, likely edema. Skin: No rashes, no induration or skin thickening or nodules. HEENT: Normal lids, lacrimal glands and pupils. No oral or nasal ulcers. Salivary glands reveal no evidence of abnormality. External inspection of the ears and nose within normal limits. Normal extraocular movements. Neck: Supple no adenopathy. Chest: Normal effort  Heart: S1-S2 regular.   Abdomen:   Lymph nodes:   Neurologic:     DATA:   Lab Results   Component Value Date    CKTOTAL 6,446 (H) 10/21/2022    TROPONINI 0.64 (HH) 10/11/2022     Lab Results   Component Value Date    IDALIA POSITIVE (A) 10/14/2022     Lab Results   Component Value Date    WBC 6.4 10/20/2022    HGB 12.2 (L) 10/20/2022    HCT 37.7 (L) 10/20/2022    MCV 81.3 10/20/2022     10/20/2022     Lab Results   Component Value Date     10/21/2022    K 3.7 10/21/2022     10/21/2022    CO2 23 10/21/2022    BUN 18 10/21/2022    CREATININE 0.8 10/21/2022    GLUCOSE 81 10/21/2022    CALCIUM 8.6 10/21/2022    PROT 6.1 (L) 10/19/2022    LABALBU 3.0 (L) 10/19/2022    BILITOT <0.2 10/19/2022    ALKPHOS 70 10/19/2022     (H) 10/19/2022     (H) 10/19/2022    LABGLOM >60 10/21/2022    GFRAA >60 10/17/2022    AGRATIO 1.0 (L) 10/19/2022    GLOB 3.2 10/04/2021       Lab Results   Component Value Date    SEDRATE 62 (H) 10/11/2022     Lab Results   Component Value Date    CRP 4.7 10/11/2022         Total time  >60 minutes that includes the following-  Preparing to see the patient such as reviewing patients records, pre-charting, preparing the visit on the same day, performing a medically appropriate history and physical examination, counseling and educating patient about diagnosis, management plan, ordering appropriate testings, prescriptions, communicating findings to other care providers, and documenting clinical information in electronic medical record. I thank you for giving me the opportunity to be involved in [facility] and I look forward following Amanda Andrade along with you. If you have any questions or concerns please feel free to contact me at any time.   Ayla Fragoso MD 10/25/22

## 2022-10-24 ENCOUNTER — CARE COORDINATION (OUTPATIENT)
Dept: CASE MANAGEMENT | Age: 77
End: 2022-10-24

## 2022-10-24 DIAGNOSIS — I21.4 NSTEMI (NON-ST ELEVATED MYOCARDIAL INFARCTION) (HCC): Primary | ICD-10-CM

## 2022-10-24 LAB — MISCELLANEOUS LAB TEST ORDER: ABNORMAL

## 2022-10-24 PROCEDURE — 1111F DSCHRG MED/CURRENT MED MERGE: CPT | Performed by: INTERNAL MEDICINE

## 2022-10-24 NOTE — CARE COORDINATION
[facility] Care Transitions Initial Follow Up Call    Call within 2 business days of discharge: Yes    Care Transition Nurse contacted the patient by telephone to perform post hospital discharge assessment. Verified name and  with patient as identifiers. Provided introduction to self, and explanation of the Care Transition Nurse role. Patient: Jc Reyes Patient : 1945   MRN: [id number]  Reason for Admission: cardiac cath- no stents needed; Rhabdomyolysis- left thigh muscle biopsy  Discharge Date: 10/21/22 RARS: Readmission Risk Score: 10.2      Last Discharge  [address]       Date Complaint Diagnosis Description Type Department Provider    10/10/22 Leg Swelling NSTEMI (non-ST elevated myocardial infarction) ([facility] [address] . .. ED to Hosp-Admission (Discharged) (ADMITTED) Carrie Hines MD; Payton Rogers. .. Was this an external facility discharge? No Discharge Facility:     Challenges to be reviewed by the provider   Additional needs identified to be addressed with provider: No  none               Method of communication with provider: none. Patient reports that he is doing well, denies any chest pain, does report some LE swelling & SOB. He was started on Lasix and reports that this is working slowly. He is noticing some decrease in swelling. CTN encouraged him to call PCP if any increase in Swelling and/or SOB, he verbalized understanding. CTN explained that they do have someone on-call  at PCP office. He will follow up with PCP this Wednesday. His cardiac cath site/wrist is without redness, swelling, and/or drainage. CTN explained that he may remove dressing on the wrist.  He denies any questions ore concerns at this time. CTN will continue with outreach follow up calls.     Remote Patient Monitoring Enrollment Note      Date/Time:  10/24/2022 3:06 PM    Offered patient enrollment in the Maribel Vargas Remote Patient Monitoring (RPM) program for in home monitoring for Kidney Disease . Patient Not eligible- RPM services [facility] HE[facility] Transition Nurse reviewed discharge instructions and red flags with patient who verbalized understanding. The patient was given an opportunity to ask questions and does not have any further questions or concerns at this time. Were discharge instructions available to patient? Yes. Reviewed appropriate site of care based on symptoms and resources available to patient including: PCP  Urgent care clinics  When to call 911. The patient agrees to contact the PCP office for questions related to their healthcare. Advance Care Planning:   Does patient have an Advance Directive: not on file. Medication reconciliation was performed with patient, who verbalizes understanding of administration of home medications.  Medications reviewed, 1111F entered: yes    Was patient discharged with a pulse oximeter? no    Non-face-to-face services provided:  Obtained and reviewed discharge summary and/or continuity of care documents    Offered patient enrollment in the Remote Patient Monitoring (RPM) program for in-home monitoring: Patient is not eligible for RPM program.    Care Transitions 24 Hour Call    Do you have a copy of your discharge instructions?: Yes  Do you have all of your prescriptions and are they filled?: Yes  Have you been contacted by a [facility] Pharmacist?: No  Have you scheduled your follow up appointment?: Yes  How are you going to get to your appointment?: Car - family or friend to transport  Do you feel like you have everything you need to keep you well at home?: Yes  Care Transitions Interventions         Follow Up  Future Appointments   Date Time Provider Eliana French   10/25/2022  9:45 AM Brent Lester MD [facility]UM [facility]   10/26/2022  3:40 PM Mamie Fuentes Client, [facility] - CNP [facility] [place] - DYD   11/7/2022  3:15 PM Rodolfo Delgado MD [facility] AFL Nephrolo   1/16/2023  2:30 PM Demi Schwartz MD  [address] 7/17/2023  1:00 PM [address] Krishnamurthy MD [address] Transition Nurse provided contact information. Plan for follow-up call in 5-7 days based on severity of symptoms and risk factors.   Plan for next call: symptom management-.  self management-.  follow-up appointment-.    Marivel Carpenter RN

## 2022-10-25 ENCOUNTER — OFFICE VISIT (OUTPATIENT)
Dept: RHEUMATOLOGY | Age: 77
End: 2022-10-25
Payer: COMMERCIAL

## 2022-10-25 VITALS
SYSTOLIC BLOOD PRESSURE: 136 MMHG | BODY MASS INDEX: 33.75 KG/M2 | DIASTOLIC BLOOD PRESSURE: 86 MMHG | HEIGHT: 74 IN | WEIGHT: 263 LBS

## 2022-10-25 DIAGNOSIS — R60.0 EDEMA OF BOTH LOWER EXTREMITIES: ICD-10-CM

## 2022-10-25 DIAGNOSIS — Z13.89 SCREENING FOR RHEUMATIC DISORDER: ICD-10-CM

## 2022-10-25 DIAGNOSIS — R74.8 ELEVATED CPK: ICD-10-CM

## 2022-10-25 DIAGNOSIS — R74.8 ELEVATED CPK: Primary | ICD-10-CM

## 2022-10-25 LAB — TOTAL CK: ABNORMAL U/L (ref 39–308)

## 2022-10-25 PROCEDURE — 1123F ACP DISCUSS/DSCN MKR DOCD: CPT | Performed by: INTERNAL MEDICINE

## 2022-10-25 PROCEDURE — 99205 OFFICE O/P NEW HI 60 MIN: CPT | Performed by: INTERNAL MEDICINE

## 2022-10-26 ENCOUNTER — HOSPITAL ENCOUNTER (INPATIENT)
Age: 77
LOS: 2 days | Discharge: HOME | DRG: 558 | End: 2022-10-28
Attending: INTERNAL MEDICINE | Admitting: INTERNAL MEDICINE
Payer: COMMERCIAL

## 2022-10-26 ENCOUNTER — APPOINTMENT (OUTPATIENT)
Dept: GENERAL RADIOLOGY | Age: 77
DRG: 558 | End: 2022-10-26
Payer: COMMERCIAL

## 2022-10-26 DIAGNOSIS — I21.4 NSTEMI (NON-ST ELEVATED MYOCARDIAL INFARCTION) (HCC): ICD-10-CM

## 2022-10-26 DIAGNOSIS — M62.82 NON-TRAUMATIC RHABDOMYOLYSIS: Primary | ICD-10-CM

## 2022-10-26 PROBLEM — E66.9 OBESITY (BMI 30-39.9): Status: ACTIVE | Noted: 2022-10-26

## 2022-10-26 PROBLEM — E87.70 VOLUME OVERLOAD: Status: ACTIVE | Noted: 2022-10-26

## 2022-10-26 LAB
A/G RATIO: 1.3 (ref 1.1–2.2)
ALBUMIN SERPL-MCNC: 3.5 G/DL (ref 3.4–5)
ALP BLD-CCNC: 70 U/L (ref 40–129)
ALT SERPL-CCNC: 263 U/L (ref 10–40)
ANION GAP SERPL CALCULATED.3IONS-SCNC: 9 MMOL/L (ref 3–16)
AST SERPL-CCNC: 426 U/L (ref 15–37)
BASOPHILS ABSOLUTE: 0 K/UL (ref 0–0.2)
BASOPHILS RELATIVE PERCENT: 0.5 %
BILIRUB SERPL-MCNC: 0.4 MG/DL (ref 0–1)
BUN BLDV-MCNC: 12 MG/DL (ref 7–20)
CALCIUM SERPL-MCNC: 9.4 MG/DL (ref 8.3–10.6)
CHLORIDE BLD-SCNC: 105 MMOL/L (ref 99–110)
CO2: 29 MMOL/L (ref 21–32)
CREAT SERPL-MCNC: 0.8 MG/DL (ref 0.8–1.3)
EOSINOPHILS ABSOLUTE: 0.1 K/UL (ref 0–0.6)
EOSINOPHILS RELATIVE PERCENT: 2.2 %
GFR SERPL CREATININE-BSD FRML MDRD: >60 ML/MIN/{1.73_M2}
GLUCOSE BLD-MCNC: 109 MG/DL (ref 70–99)
HCT VFR BLD CALC: 40.2 % (ref 40.5–52.5)
HEMOGLOBIN: 12.8 G/DL (ref 13.5–17.5)
LYMPHOCYTES ABSOLUTE: 1 K/UL (ref 1–5.1)
LYMPHOCYTES RELATIVE PERCENT: 24 %
MCH RBC QN AUTO: 25.9 PG (ref 26–34)
MCHC RBC AUTO-ENTMCNC: 31.8 G/DL (ref 31–36)
MCV RBC AUTO: 81.3 FL (ref 80–100)
MONOCYTES ABSOLUTE: 0.4 K/UL (ref 0–1.3)
MONOCYTES RELATIVE PERCENT: 8.5 %
NEUTROPHILS ABSOLUTE: 2.8 K/UL (ref 1.7–7.7)
NEUTROPHILS RELATIVE PERCENT: 64.8 %
PDW BLD-RTO: 18 % (ref 12.4–15.4)
PLATELET # BLD: 225 K/UL (ref 135–450)
PMV BLD AUTO: 8.1 FL (ref 5–10.5)
POTASSIUM SERPL-SCNC: 4 MMOL/L (ref 3.5–5.1)
PRO-BNP: 488 PG/ML (ref 0–449)
RBC # BLD: 4.94 M/UL (ref 4.2–5.9)
SODIUM BLD-SCNC: 143 MMOL/L (ref 136–145)
TOTAL CK: ABNORMAL U/L (ref 39–308)
TOTAL PROTEIN: 6.3 G/DL (ref 6.4–8.2)
TROPONIN: 1.65 NG/ML
WBC # BLD: 4.3 K/UL (ref 4–11)

## 2022-10-26 PROCEDURE — 6360000002 HC RX W HCPCS: Performed by: INTERNAL MEDICINE

## 2022-10-26 PROCEDURE — 82550 ASSAY OF CK (CPK): CPT

## 2022-10-26 PROCEDURE — 93005 ELECTROCARDIOGRAM TRACING: CPT | Performed by: PHYSICIAN ASSISTANT

## 2022-10-26 PROCEDURE — 2580000003 HC RX 258: Performed by: INTERNAL MEDICINE

## 2022-10-26 PROCEDURE — 80053 COMPREHEN METABOLIC PANEL: CPT

## 2022-10-26 PROCEDURE — G0378 HOSPITAL OBSERVATION PER HR: HCPCS

## 2022-10-26 PROCEDURE — 1200000000 HC SEMI PRIVATE

## 2022-10-26 PROCEDURE — 83880 ASSAY OF NATRIURETIC PEPTIDE: CPT

## 2022-10-26 PROCEDURE — 96372 THER/PROPH/DIAG INJ SC/IM: CPT

## 2022-10-26 PROCEDURE — 96374 THER/PROPH/DIAG INJ IV PUSH: CPT

## 2022-10-26 PROCEDURE — 71045 X-RAY EXAM CHEST 1 VIEW: CPT

## 2022-10-26 PROCEDURE — 84484 ASSAY OF TROPONIN QUANT: CPT

## 2022-10-26 PROCEDURE — 96376 TX/PRO/DX INJ SAME DRUG ADON: CPT

## 2022-10-26 PROCEDURE — 6370000000 HC RX 637 (ALT 250 FOR IP): Performed by: INTERNAL MEDICINE

## 2022-10-26 PROCEDURE — 99285 EMERGENCY DEPT VISIT HI MDM: CPT

## 2022-10-26 PROCEDURE — 85025 COMPLETE CBC W/AUTO DIFF WBC: CPT

## 2022-10-26 PROCEDURE — 36415 COLL VENOUS BLD VENIPUNCTURE: CPT

## 2022-10-26 PROCEDURE — 99223 1ST HOSP IP/OBS HIGH 75: CPT | Performed by: INTERNAL MEDICINE

## 2022-10-26 RX ORDER — ASPIRIN 81 MG/1
81 TABLET, CHEWABLE ORAL DAILY
Status: DISCONTINUED | OUTPATIENT
Start: 2022-10-27 | End: 2022-10-28 | Stop reason: HOSPADM

## 2022-10-26 RX ORDER — ACETAMINOPHEN 325 MG/1
650 TABLET ORAL EVERY 6 HOURS PRN
Status: DISCONTINUED | OUTPATIENT
Start: 2022-10-26 | End: 2022-10-28 | Stop reason: HOSPADM

## 2022-10-26 RX ORDER — LOSARTAN POTASSIUM 25 MG/1
50 TABLET ORAL DAILY
Status: DISCONTINUED | OUTPATIENT
Start: 2022-10-27 | End: 2022-10-28 | Stop reason: HOSPADM

## 2022-10-26 RX ORDER — ONDANSETRON 4 MG/1
4 TABLET, ORALLY DISINTEGRATING ORAL EVERY 8 HOURS PRN
Status: DISCONTINUED | OUTPATIENT
Start: 2022-10-26 | End: 2022-10-28 | Stop reason: HOSPADM

## 2022-10-26 RX ORDER — SODIUM CHLORIDE 0.9 % (FLUSH) 0.9 %
5-40 SYRINGE (ML) INJECTION PRN
Status: DISCONTINUED | OUTPATIENT
Start: 2022-10-26 | End: 2022-10-28 | Stop reason: HOSPADM

## 2022-10-26 RX ORDER — ACETAMINOPHEN 650 MG/1
650 SUPPOSITORY RECTAL EVERY 6 HOURS PRN
Status: DISCONTINUED | OUTPATIENT
Start: 2022-10-26 | End: 2022-10-28 | Stop reason: HOSPADM

## 2022-10-26 RX ORDER — FUROSEMIDE 10 MG/ML
40 INJECTION INTRAMUSCULAR; INTRAVENOUS DAILY
Status: DISCONTINUED | OUTPATIENT
Start: 2022-10-26 | End: 2022-10-27

## 2022-10-26 RX ORDER — SODIUM CHLORIDE 9 MG/ML
INJECTION, SOLUTION INTRAVENOUS PRN
Status: DISCONTINUED | OUTPATIENT
Start: 2022-10-26 | End: 2022-10-28 | Stop reason: HOSPADM

## 2022-10-26 RX ORDER — ONDANSETRON 2 MG/ML
4 INJECTION INTRAMUSCULAR; INTRAVENOUS EVERY 6 HOURS PRN
Status: DISCONTINUED | OUTPATIENT
Start: 2022-10-26 | End: 2022-10-28 | Stop reason: HOSPADM

## 2022-10-26 RX ORDER — VITAMIN B COMPLEX
1000 TABLET ORAL DAILY
Status: DISCONTINUED | OUTPATIENT
Start: 2022-10-27 | End: 2022-10-28 | Stop reason: HOSPADM

## 2022-10-26 RX ORDER — M-VIT,TX,IRON,MINS/CALC/FOLIC 27MG-0.4MG
1 TABLET ORAL DAILY
Status: DISCONTINUED | OUTPATIENT
Start: 2022-10-27 | End: 2022-10-28 | Stop reason: HOSPADM

## 2022-10-26 RX ORDER — ENOXAPARIN SODIUM 100 MG/ML
30 INJECTION SUBCUTANEOUS 2 TIMES DAILY
Status: DISCONTINUED | OUTPATIENT
Start: 2022-10-26 | End: 2022-10-28 | Stop reason: HOSPADM

## 2022-10-26 RX ORDER — POLYETHYLENE GLYCOL 3350 17 G/17G
17 POWDER, FOR SOLUTION ORAL DAILY PRN
Status: DISCONTINUED | OUTPATIENT
Start: 2022-10-26 | End: 2022-10-28 | Stop reason: HOSPADM

## 2022-10-26 RX ORDER — SODIUM CHLORIDE 0.9 % (FLUSH) 0.9 %
5-40 SYRINGE (ML) INJECTION EVERY 12 HOURS SCHEDULED
Status: DISCONTINUED | OUTPATIENT
Start: 2022-10-26 | End: 2022-10-28 | Stop reason: HOSPADM

## 2022-10-26 RX ORDER — METHYLPREDNISOLONE SODIUM SUCCINATE 40 MG/ML
40 INJECTION, POWDER, LYOPHILIZED, FOR SOLUTION INTRAMUSCULAR; INTRAVENOUS EVERY 6 HOURS
Status: DISCONTINUED | OUTPATIENT
Start: 2022-10-26 | End: 2022-10-28 | Stop reason: HOSPADM

## 2022-10-26 RX ADMIN — ENOXAPARIN SODIUM 30 MG: 100 INJECTION SUBCUTANEOUS at 12:58

## 2022-10-26 RX ADMIN — SODIUM CHLORIDE, PRESERVATIVE FREE 10 ML: 5 INJECTION INTRAVENOUS at 20:27

## 2022-10-26 RX ADMIN — METHYLPREDNISOLONE SODIUM SUCCINATE 40 MG: 40 INJECTION, POWDER, FOR SOLUTION INTRAMUSCULAR; INTRAVENOUS at 20:26

## 2022-10-26 RX ADMIN — METHYLPREDNISOLONE SODIUM SUCCINATE 40 MG: 40 INJECTION, POWDER, FOR SOLUTION INTRAMUSCULAR; INTRAVENOUS at 12:54

## 2022-10-26 RX ADMIN — ENOXAPARIN SODIUM 30 MG: 100 INJECTION SUBCUTANEOUS at 20:26

## 2022-10-26 RX ADMIN — METOPROLOL TARTRATE 25 MG: 25 TABLET, FILM COATED ORAL at 20:27

## 2022-10-26 ASSESSMENT — ENCOUNTER SYMPTOMS
WHEEZING: 0
COUGH: 0
RHINORRHEA: 0
DIARRHEA: 0
SHORTNESS OF BREATH: 0
VOMITING: 0
ABDOMINAL PAIN: 0
NAUSEA: 0

## 2022-10-26 ASSESSMENT — PAIN SCALES - GENERAL
PAINLEVEL_OUTOF10: 0

## 2022-10-26 ASSESSMENT — LIFESTYLE VARIABLES
HOW MANY STANDARD DRINKS CONTAINING ALCOHOL DO YOU HAVE ON A TYPICAL DAY: 3 OR 4
HOW OFTEN DO YOU HAVE A DRINK CONTAINING ALCOHOL: MONTHLY OR LESS

## 2022-10-26 NOTE — H&P
HOSPITALISTS HISTORY AND PHYSICAL    10/26/2022 3:11 PM    Patient Information:  Ania Viera is a 78 y.o. male [id number]  PCP:  Sadia Pardo MD (Tel: [phone] )    Chief complaint:    Chief Complaint   Patient presents with    Abnormal Lab     States his kidney doctor sent him d/t high CK levels. Also states he was just recently hospitalized for the same thing         History of Present Illness:  Rodger Zepeda is a 78 y.o. male with history of IDALIA positive, recent admit b/w 10/10 to 10/21/22 with non traumatic rhabdomyolysis, CKD 3, transaminitis, LHC on 10/11/22: Anatomy:   LM-normal  LAD-10%  to 20% mid  Cx-normal, dominant  OM1-normal  RCA-normal, nondominant  LVEF-55%     Impression:  1. Minimal 1V CAD involving the LAD. 2.  Normal LV systolic function. 3.  Troponin elevation possibly related to myocarditis. Plan:  1. Medical management. 2.  Outpatient cardiac MRI. 3.  Sed rate, CRP in approximately 2 weeks. , obesity, transaminitis, s/p L thigh muscle biopsy on 10/18/22 who was sent back to ER for worsening non traumatic rhabdomyolysis. Patient saw Rheumatology as outpatient. CK was between 6K-7K at time of last discharge. Patient still with BL LE edema, improving with PO Lasix. No CP, SOB, HA or abdominal pain. Denies muscle cramps or back pain. No nausea, vomiting, diarrhea, melena or hematochezia. Otherwise complete ROS is negative unless listed above. REVIEW OF SYSTEMS:   Pertinent positives as noted in HPI. All other systems were reviewed and are negative. Past Medical History:   has a past medical history of Arthritis, Colon polyp, Diabetes mellitus ([address] Hyperlipemia, Hypertension, Kidney disease, and Rheumatoid arthritis ([facility] [address] Past Surgical History:   has a past surgical history that includes Rotator cuff repair (Left);  Colonoscopy; Colonoscopy (N/A, 8/14/2020); and Muscle biopsy (Left, 10/18/2022). Medications:  No current facility-administered medications on file prior to encounter. Current Outpatient Medications on File Prior to Encounter   Medication Sig Dispense Refill    furosemide (LASIX) 40 MG tablet Take 1 tablet by mouth daily 90 tablet 1    aspirin 81 MG chewable tablet Take 1 tablet by mouth daily 30 tablet 2    metoprolol tartrate (LOPRESSOR) 25 MG tablet TAKE 1 TABLET TWICE A DAY (Patient taking differently: Take 25 mg by mouth 2 times daily TAKE 1 TABLET TWICE A DAY) 180 tablet 3    losartan (COZAAR) 50 MG tablet TAKE 1 TABLET DAILY 90 tablet 0    sildenafil (VIAGRA) 100 MG tablet Take 1 tablet by mouth daily as needed for Erectile Dysfunction 30 tablet 3    Multiple Vitamins-Minerals (THERAPEUTIC MULTIVITAMIN-MINERALS) tablet Take 1 tablet by mouth daily      Vitamin D (CHOLECALCIFEROL) 1000 UNITS CAPS capsule Take 1,000 Units by mouth daily. Allergies:  No Known Allergies     Social History:  Patient Lives with wife   reports that he quit smoking about 38 years ago. His smoking use included cigarettes. He started smoking about 48 years ago. He has a 5.00 pack-year smoking history. He has never used smokeless tobacco. He reports current alcohol use. He reports that he does not use drugs. Family History:  family history includes High Blood Pressure in his sister. Physical Exam:  /76   Pulse 79   Temp 98.1 °F (36.7 °C) (Oral)   Resp 16   Ht 6' 2\" (1.88 m)   Wt 259 lb (117.5 kg)   SpO2 97%   BMI 33.25 kg/m²     General appearance:  Appears comfortable. Well nourished  Eyes: Sclera clear, pupils equal  ENT: Moist mucus membranes, no thrush. Trachea midline. Cardiovascular: Regular rhythm, normal S1, S2.  No murmur, gallop, rub. 2-3+ BL edema in lower extremities  Respiratory: Clear to auscultation bilaterally, no wheeze, good inspiratory effort  Gastrointestinal: Abdomen soft, non-tender, not distended, normal bowel sounds  Musculoskeletal: No cyanosis in digits, neck supple  Neurology: Cranial nerves grossly intact. Alert and oriented in time, place and person. No speech or motor deficits  Psychiatry: Appropriate affect. Not agitated  Skin: Warm, dry, normal turgor, no rash  Brisk capillary refill, peripheral pulses palpable   Labs:  CBC:   Lab Results   Component Value Date/Time    WBC 4.3 10/26/2022 09:25 AM    RBC 4.94 10/26/2022 09:25 AM    HGB 12.8 10/26/2022 09:25 AM    HCT 40.2 10/26/2022 09:25 AM    MCV 81.3 10/26/2022 09:25 AM    MCH 25.9 10/26/2022 09:25 AM    MCHC 31.8 10/26/2022 09:25 AM    RDW 18.0 10/26/2022 09:25 AM     10/26/2022 09:25 AM    MPV 8.1 10/26/2022 09:25 AM     BMP:    Lab Results   Component Value Date/Time     10/26/2022 09:25 AM    K 4.0 10/26/2022 09:25 AM    K 4.5 10/19/2022 10:36 AM     10/26/2022 09:25 AM    CO2 29 10/26/2022 09:25 AM    BUN 12 10/26/2022 09:25 AM    CREATININE 0.8 10/26/2022 09:25 AM    CALCIUM 9.4 10/26/2022 09:25 AM    GFRAA >60 10/17/2022 05:23 AM    LABGLOM >60 10/26/2022 09:25 AM    GLUCOSE 109 10/26/2022 09:25 AM     XR CHEST PORTABLE   Final Result   No acute cardiopulmonary findings             Problem List  Principal Problem:    Non-traumatic rhabdomyolysis  Active Problems:    Elevated LFTs    Elevated troponin level not due to acute coronary syndrome    Elevated CPK    Obesity (BMI 30-39. 9)    Volume overload    Essential hypertension    CKD (chronic kidney disease), stage III (HCC)    Carotid artery disease, unspecified laterality ([address]  Resolved Problems:    * No resolved hospital problems.  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 I anticipate hospitalization spanning more than two midnights for investigation and treatment of the above medically necessary diagnoses. Discussed with patient and Dr Mignon Rubinstein (Renal). D/W wife at bedside. Hopefully steroids help improve this situation.       Lana Acosta MD    10/26/2022 3:11 PM

## 2022-10-26 NOTE — CONSULTS
Office : [phone]     Fax :[phone]       Nephrology Consult Note      Patient's Name: Radha Gómez  11:57 AM  10/26/2022    Reason for Consult:  Edema, CK level trending up       Requesting Physician:  Milla Lyles MD      Chief Complaint:    Chief Complaint   Patient presents with    Abnormal Lab     States his kidney doctor sent him d/t high CK levels. Also states he was just recently hospitalized for the same thing            History of Present Ilness:    Radha Gómez is a 78 y.o. male with recent h/o admission for rhabdomyolysis and discharged last week was admitted from ER today for increase in [place] level to [address] and increased edema LE. He was positive for IDALIA and had work up done and biopsy done ( showed non ischemic injury ). Was treated last admission with prednisone.    He received IV fluid last admission but he did not respond to iv fluids     His CK level started trending down after started on prednisone     He was on rosuvastatin which was stopped 2 weeks ago on  previous admission      Latest Reference Range & Units 10/16/22 07:21 10/17/22 05:23 10/18/22 05:05 10/19/22 10:36 10/20/22 10:04 10/21/22 07:00 10/25/22 10:25 10/26/22 09:25   Total CK 39 - 308 U/L 10,439 (H) 10,126 (H) 9,560 (H) 9,284 (H) 7,168 (H) 6,446 (H) 13,736 (H) 13,018 (H)     Past Medical History:   Diagnosis Date    Arthritis     Colon polyp     Diabetes mellitus ([facility] [address]     denies being diabetic    Hyperlipemia     Hypertension     Kidney disease     Rheumatoid arthritis ([facility] [address]        Past Surgical History:   Procedure Laterality Date    COLONOSCOPY      COLONOSCOPY N/A 8/14/2020    COLONOSCOPY DIAGNOSTIC performed by Epifanio Pearson MD at [facility] 10/18/2022    LEFT LEG MUSCLE BIOPSY performed by Ellen Valera MD at [address]        Family History   Problem Relation Age of Onset    High Blood Pressure Sister         reports that he quit smoking about 38 years ago. His smoking use included cigarettes. He started smoking about 48 years ago. He has a 5.00 pack-year smoking history. He has never used smokeless tobacco. He reports current alcohol use. He reports that he does not use drugs. Allergies:  Patient has no known allergies. Current Medications:    Prior to Admission medications    Medication Sig Start Date End Date Taking? Authorizing Provider   furosemide (LASIX) 40 MG tablet Take 1 tablet by mouth daily 10/25/22   Jenifer Crouch MD   aspirin 81 MG chewable tablet Take 1 tablet by mouth daily 10/21/22   Ken Briceno MD   metoprolol tartrate (LOPRESSOR) 25 MG tablet TAKE 1 TABLET TWICE A DAY  Patient taking differently: Take 25 mg by mouth 2 times daily TAKE 1 TABLET TWICE A DAY 10/6/22   Edmond Campo MD   losartan (COZAAR) 50 MG tablet TAKE 1 TABLET DAILY 10/6/22   Edmond Campo MD   sildenafil (VIAGRA) 100 MG tablet Take 1 tablet by mouth daily as needed for Erectile Dysfunction 12/20/21   Edmond Campo MD   Multiple Vitamins-Minerals (THERAPEUTIC MULTIVITAMIN-MINERALS) tablet Take 1 tablet by mouth daily    Historical Provider, MD   Vitamin D (CHOLECALCIFEROL) 1000 UNITS CAPS capsule Take 1,000 Units by mouth daily.     Historical Provider, MD         Review of Systems:   14 point ROS obtained but were negative except mentioned in HPI      Physical exam:     Vitals:  /81   Pulse 73   Temp 98.2 °F (36.8 °C) (Oral)   Resp 15   Wt 259 lb (117.5 kg)   SpO2 97%   BMI 33.25 kg/m²   Constitutional:  OAA X3 NAD  Skin: no rash, turgor wnl  Heent:  eomi, mmm  Neck: no bruits or jvd noted  Cardiovascular:  S1, S2 without m/r/g  Respiratory: CTA B without w/r/r  Abdomen:  +bs, soft, nt, nd  Ext: 2 +  lower extremity edema  Psychiatric: mood and affect appropriate  Musculoskeletal:  Rom, muscular strength intact    Labs:  CBC:   Recent Labs     10/26/22  0925   WBC 4.3   HGB 12.8*        BMP:    Recent Labs     10/26/22  0925      K 4.0      CO2 29   BUN 12   CREATININE 0.8   GLUCOSE 109*     Ca/Mg/Phos:   Recent Labs     10/26/22  0925   CALCIUM 9.4     Hepatic:   Recent Labs     10/26/22  0925   *   *   BILITOT 0.4   ALKPHOS 70     Troponin:   Recent Labs     10/26/22  0925   TROPONINI 1.65*     BNP: No results for input(s): BNP in the last 72 hours. Lipids: No results for input(s): CHOL, TRIG, HDL, LDLCALC, LABVLDL in the last 72 hours. ABGs: No results for input(s): PHART, PO2ART, WMV6QVL in the last 72 hours. INR: No results for input(s): INR in the last 72 hours. UA:No results for input(s): Minna [place], GLUCOSEU, BILIRUBINUR, Hermenia Tomás, BLOODU, PHUR, PROTEINU, UROBILINOGEN, NITRU, LEUKOCYTESUR, LABMICR, URINETYPE in the last 72 hours. Urine Microscopic: No results for input(s): LABCAST, BACTERIA, COMU, HYALCAST, WBCUA, RBCUA, EPIU in the last 72 hours. Urine Culture: No results for input(s): LABURIN in the last 72 hours. Urine Chemistry: No results for input(s): [place], PROTEINUR, NAUR in the last 72 hours. IMAGING:  XR CHEST PORTABLE   Final Result   No acute cardiopulmonary findings                             Assessment/Plan :      1.  Volume overload. Continue lasix 40 mg iv daily   Monitor volume status     2. Rhabdomyolysis   Seen by rheumatology   Immune mediated rhabdomyolysis   Start steroids. Will defer to rheumatology/ hospitalist    3.  HTN   Controlled on meds     Trend CK levels            D/w primary team      Thank you for allowing us to participate in care of [facility]         Electronically signed by: Alisia Velásquez MD, 10/26/2022, 11:57 AM      [facility] of [address] [place] [address] : [phone]  Fax :[phone]

## 2022-10-26 NOTE — ED PROVIDER NOTES
[facility]        Pt Name: Rema Yancey  MRN: [id number]  [place] 1945  Date of evaluation: 10/26/2022  Provider: Leslie Lazaro PA-C  PCP: Yefri Souza MD  Note Started: 9:37 AM EDT       MARIANNE. I have evaluated this patient. My supervising physician was available for consultation. CHIEF COMPLAINT       Chief Complaint   Patient presents with    Abnormal Lab     States his kidney doctor sent him d/t high CK levels. Also states he was just recently hospitalized for the same thing        HISTORY OF PRESENT ILLNESS   (Location, Timing/Onset, Context/Setting, Quality, Duration, Modifying Factors, Severity, Associated Signs and Symptoms)  Note limiting factors. Chief Complaint: Abnormal labs     Rema Yancey is a 78 y.o. male who presents for evaluation of abnormal labs. Patient got a call from his nephrologist today that his CK was 13,000. Patient states that he was just released after extended stay in the hospital for similar complaints, a traumatic rhabdomyolysis. At that time he did have a thigh muscle biopsy, results pending. Patient states that he does feel fluid overloaded, legs are edematous and he feels generally weak and is having difficulty with ambulation secondary to this. He denies any muscle cramping. No chest pain or shortness of breath. No abdominal pain nausea vomiting. No dizziness/lightheadedness, focal weakness, visual disturbances or syncope. He has no other complaints or concerns at this time. Nursing Notes were all reviewed and agreed with or any disagreements were addressed in the HPI. REVIEW OF SYSTEMS    (2-9 systems for level 4, 10 or more for level 5)     Review of Systems   Constitutional:  Negative for appetite change, chills and fever. HENT:  Negative for congestion and rhinorrhea. Eyes:  Negative for visual disturbance.    Respiratory:  Negative for cough, shortness of breath and wheezing. Cardiovascular:  Positive for leg swelling. Negative for chest pain. Gastrointestinal:  Negative for abdominal pain, diarrhea, nausea and vomiting. Genitourinary:  Negative for difficulty urinating, dysuria and hematuria. Musculoskeletal:  Negative for neck pain and neck stiffness. Skin:  Negative for rash. Neurological:  Positive for weakness. Negative for dizziness, syncope, light-headedness and headaches. Positives and Pertinent negatives as per HPI. Except as noted above in the ROS, all other systems were reviewed and negative. PAST MEDICAL HISTORY     Past Medical History:   Diagnosis Date    Arthritis     Colon polyp     Diabetes mellitus ([facility] [address]     denies being diabetic    Hyperlipemia     Hypertension     Kidney disease     Rheumatoid arthritis ([facility] [address]          SURGICAL HISTORY     Past Surgical History:   Procedure Laterality Date    COLONOSCOPY      COLONOSCOPY N/A 8/14/2020    COLONOSCOPY DIAGNOSTIC performed by Giovani Ceballos MD at [address] [facility] [address] Left 10/18/2022    LEFT LEG MUSCLE BIOPSY performed by Huyen Coronel MD at [address] Left          CURRENTMEDICATIONS       Previous Medications    ASPIRIN 81 MG CHEWABLE TABLET    Take 1 tablet by mouth daily    FUROSEMIDE (LASIX) 40 MG TABLET    Take 1 tablet by mouth daily    LOSARTAN (COZAAR) 50 MG TABLET    TAKE 1 TABLET DAILY    METOPROLOL TARTRATE (LOPRESSOR) 25 MG TABLET    TAKE 1 TABLET TWICE A DAY    MULTIPLE VITAMINS-MINERALS (THERAPEUTIC MULTIVITAMIN-MINERALS) TABLET    Take 1 tablet by mouth daily    SILDENAFIL (VIAGRA) 100 MG TABLET    Take 1 tablet by mouth daily as needed for Erectile Dysfunction    VITAMIN D (CHOLECALCIFEROL) 1000 UNITS CAPS CAPSULE    Take 1,000 Units by mouth daily. ALLERGIES     Patient has no known allergies.     FAMILYHISTORY       Family History   Problem Relation Age of Onset    High Blood Pressure Sister           SOCIAL HISTORY Social History     Tobacco Use    Smoking status: Former     Packs/day: 0.50     Years: 10.00     Pack years: 5.00     Types: Cigarettes     Start date:      Quit date: 1984     Years since quittin.7    Smokeless tobacco: Never    Tobacco comments:     quit smoking 30 years    Substance Use Topics    Alcohol use: Yes     Comment: rare    Drug use: No       SCREENINGS             PHYSICAL EXAM    (up to 7 for level 4, 8 or more for level 5)     ED Triage Vitals   BP Temp Temp Source Heart Rate Resp SpO2 Height Weight   10/26/22 0921 10/26/22 0919 10/26/22 0919 10/26/22 0919 10/26/22 0919 10/26/22 0919 -- 10/26/22 0919   (!) 149/88 98.2 °F (36.8 °C) Oral (!) 103 20 98 %  259 lb (117.5 kg)       Physical Exam  Vitals and nursing note reviewed. Constitutional:       Appearance: He is well-developed. He is not diaphoretic. HENT:      Head: Normocephalic and atraumatic. Right Ear: External ear normal.      Left Ear: External ear normal.      Nose: Nose normal.   Eyes:      General:         Right eye: No discharge. Left eye: No discharge. Cardiovascular:      Rate and Rhythm: Normal rate and regular rhythm. Heart sounds: Normal heart sounds. Pulmonary:      Effort: Pulmonary effort is normal. No respiratory distress. Breath sounds: Normal breath sounds. Chest:      Chest wall: No tenderness. Abdominal:      General: There is no distension. Palpations: Abdomen is soft. Tenderness: There is no abdominal tenderness. Musculoskeletal:         General: Normal range of motion. Cervical back: Normal range of motion and neck supple. Right lower leg: Edema present. Left lower leg: Edema present. Skin:     General: Skin is warm and dry. Neurological:      Mental Status: He is alert and oriented to person, place, and time. Mental status is at baseline. GCS: GCS eye subscore is 4. GCS verbal subscore is 5. GCS motor subscore is 6.       Cranial Nerves: Cranial nerves 2-12 are intact. Psychiatric:         Behavior: Behavior normal.       DIAGNOSTIC RESULTS   LABS:    Labs Reviewed   CBC WITH AUTO DIFFERENTIAL - Abnormal; Notable for the following components:       Result Value    Hemoglobin 12.8 (*)     Hematocrit 40.2 (*)     MCH 25.9 (*)     RDW 18.0 (*)     All other components within normal limits   COMPREHENSIVE METABOLIC PANEL - Abnormal; Notable for the following components:    Glucose 109 (*)     Total Protein 6.3 (*)      (*)      (*)     All other components within normal limits    Narrative:     Henrik Melchor  [facility] tel. [phone],  Chemistry results called to and read back by aubrey valencia, 10/26/2022 10:20,  by [facility]    CK - Abnormal; Notable for the following components: Total CK 13,018 (*)     All other components within normal limits    Narrative:     [facility]  [facility] tel. [phone],  Chemistry results called to and read back by aubrey valencia, 10/26/2022 10:20,  by [address] - Abnormal; Notable for the following components:    Pro- (*)     All other components within normal limits    Narrative:     Henrik Melchor  [facility] tel. [phone],  Chemistry results called to and read back by aubrey valencia, 10/26/2022 10:20,  by [facility]    TROPONIN - Abnormal; Notable for the following components:    Troponin 1.65 (*)     All other components within normal limits    Narrative:     Henrik Melchor  [facility] tel. [phone],  Chemistry results called to and read back by aubrey valencia, 10/26/2022 10:20,  by [facility]        When ordered only abnormal lab results are displayed. All other labs were within normal range or not returned as of this dictation. EKG: When ordered, EKG's are interpreted by the Emergency Department Physician in the absence of a cardiologist.  Please see their note for interpretation of EKG.     RADIOLOGY:   Non-plain film images such as CT, Ultrasound and MRI are read by the radiologist. Plain radiographic images are visualized and preliminarily interpreted by the ED Provider with the below findings:        Interpretation per the Radiologist below, if available at the time of this note:    XR CHEST PORTABLE   Final Result   No acute cardiopulmonary findings           No results found. PROCEDURES   Unless otherwise noted below, none     Procedures    CRITICAL CARE TIME   There was a high probability of life-threatening clinical deterioration in the patient's condition requiring my urgent intervention. I personally saw the patient and independently provided 33 minutes of non-concurrent critical care out of the total shared critical care time provided, excluding separately reportable procedures. CONSULTS:  IP CONSULT TO NEPHROLOGY      EMERGENCY DEPARTMENT COURSE and DIFFERENTIAL DIAGNOSIS/MDM:   Vitals:    Vitals:    10/26/22 0919 10/26/22 0921 10/26/22 0956 10/26/22 1026   BP:  (!) 149/88 (!) 159/79 123/81   Pulse: (!) 103  79 73   Resp: 20  16 15   Temp: 98.2 °F (36.8 °C)      TempSrc: Oral      SpO2: 98%  99% 97%   Weight: 259 lb (117.5 kg)          Patient was given the following medications:  Medications - No data to display  ED Course as of 10/26/22 1103   Wed Oct 26, 2022   5768 EKG 12 Lead  Attending ED physician EKG interpretation: Normal sinus rhythm, rate 83, QTc 453, normal axis, no ST segment or T wave changes indicative of acute ischemia, baseline wander, similar to October 12, 2022 EKG but lower voltage today [SY]      ED Course User Index  [SY] Guerline Bliss, DO      Is this patient to be included in the SEP-1 Core Measure due to severe sepsis or septic shock? No   Exclusion criteria - the patient is NOT to be included for SEP-1 Core Measure due to: Infection is not suspected    Patient presents for evaluation of abnormal labs. On exam, he is resting comfortably in bed no acute distress and nontoxic. He is mildly hypertensive but vitals otherwise stable and he is afebrile.   Lungs are clear to auscultation bilaterally, abdomen is soft, nontender. He has 2+ pitting edema to bilateral lower extremities. Please see attending note for EKG interpretation. CBC and CMP are remarkable for a transaminitis that is trending upward. Troponin also elevated at 1.65. No active chest pain. CK 13,018. Chest x-ray is negative. I spoke with on-call nephrology, Dr. Tahira Voss, who recommends admission, rheumatology consultation and will management patient volume. Concern for fluid overload due to leg swelling and 20 pound weight gain. Will increase Lasix dosing. Hospitalist resume care the patient at this time. Patient informed and agreeable. He is stable for admission. FINAL IMPRESSION      1. Non-traumatic rhabdomyolysis    2. NSTEMI (non-ST elevated myocardial infarction) [facility])          DISPOSITION/PLAN   DISPOSITION Decision To Admit 10/26/2022 10:39:51 AM      PATIENT REFERRED TO:  No follow-up provider specified.     DISCHARGE MEDICATIONS:  New Prescriptions    No medications on file       DISCONTINUED MEDICATIONS:  Discontinued Medications    No medications on file              (Please note that portions of this note were completed with a voice recognition program.  Efforts were made to edit the dictations but occasionally words are mis-transcribed.)    Edy Black PA-C (electronically signed)            Heidy Thrasher PA-C  10/26/22 3476

## 2022-10-26 NOTE — ACP (ADVANCE CARE PLANNING)
Advanced Care Planning Note. Purpose of Encounter: Advanced care planning in light of non traumatic rhabdomyolysis  Parties In Attendance: Patient  Decisional Capacity: Yes  Subjective: Patient with BL LE edema  Objective: Cr 0.8  Goals of Care Determination: Patient wants full support (CPR, vent, surgery, HD, trach, PEG)  Plan:  IV Steroids, IV Lasix, Renal consult  Code Status: Full code   Time spent on Advanced care Plannin minutes  Advanced Care Planning Documents: Completed advanced directives on chart, wife is the POA.     Tima Self MD  10/26/2022 3:12 PM

## 2022-10-26 NOTE — ED NOTES
Bedside handoff given to [employer], wheelchair in room. Pt family updated on room.       Magda Botello, RN  10/26/22 [address] A, RN  10/26/22 4817

## 2022-10-26 NOTE — PROGRESS NOTES
Patient seen in ED, room 22. Admission completed except for: 4 Eyes Assessment, Rights and Responsibilities, orientation to room, Plan of Care, education, white board, height and weight, pain assessment and head to toe assessment. Patient is alert and oriented X 4. Patient lives at home in a two story house with his wife and is being admitted for Non-traumatic rhabdomyolysis. Medication reconciliation has been verified verbally with patient, but not verified with pharmacy, thus it is marked In Process. All questions answered.

## 2022-10-27 LAB
A/G RATIO: 1.2 (ref 1.1–2.2)
ALBUMIN SERPL-MCNC: 3.1 G/DL (ref 3.4–5)
ALDOLASE: 122 U/L (ref 1.2–7.6)
ALP BLD-CCNC: 61 U/L (ref 40–129)
ALT SERPL-CCNC: 218 U/L (ref 10–40)
ANION GAP SERPL CALCULATED.3IONS-SCNC: 7 MMOL/L (ref 3–16)
ANTI-CENTROMERE B IGG: <0.2 AI (ref 0–0.9)
ANTI-CHROMATIN IGG: <0.2 AI (ref 0–0.9)
ANTI-DSDNA IGG: <1 IU/ML (ref 0–9)
ANTI-JO1 IGG: <0.2 AI (ref 0–0.9)
ANTI-NUCLEAR ANTIBODY (ANA): POSITIVE
ANTI-RIBOSOMAL P IGG: <0.2 AI (ref 0–0.9)
ANTI-RNP IGG: <0.2 AI (ref 0–0.9)
ANTI-SCL70 IGG: <0.2 AI (ref 0–0.9)
ANTI-SMITH IGG: <0.2 AI (ref 0–0.9)
ANTI-SMRNP IGG: <0.2 AI (ref 0–0.9)
ANTI-SS-A IGG: >8 AI (ref 0–0.9)
ANTI-SS-B IGG: <0.2 AI (ref 0–0.9)
AST SERPL-CCNC: 290 U/L (ref 15–37)
BASOPHILS ABSOLUTE: 0 K/UL (ref 0–0.2)
BASOPHILS RELATIVE PERCENT: 0.2 %
BILIRUB SERPL-MCNC: 0.3 MG/DL (ref 0–1)
BUN BLDV-MCNC: 16 MG/DL (ref 7–20)
CALCIUM SERPL-MCNC: 9 MG/DL (ref 8.3–10.6)
CHLORIDE BLD-SCNC: 104 MMOL/L (ref 99–110)
CO2: 30 MMOL/L (ref 21–32)
CREAT SERPL-MCNC: 0.8 MG/DL (ref 0.8–1.3)
EKG ATRIAL RATE: 83 BPM
EKG DIAGNOSIS: NORMAL
EKG P AXIS: 46 DEGREES
EKG P-R INTERVAL: 188 MS
EKG Q-T INTERVAL: 386 MS
EKG QRS DURATION: 92 MS
EKG QTC CALCULATION (BAZETT): 453 MS
EKG R AXIS: 7 DEGREES
EKG T AXIS: 50 DEGREES
EKG VENTRICULAR RATE: 83 BPM
EOSINOPHILS ABSOLUTE: 0 K/UL (ref 0–0.6)
EOSINOPHILS RELATIVE PERCENT: 0 %
GFR SERPL CREATININE-BSD FRML MDRD: >60 ML/MIN/{1.73_M2}
GLUCOSE BLD-MCNC: 123 MG/DL (ref 70–99)
HCT VFR BLD CALC: 38.1 % (ref 40.5–52.5)
HEMOGLOBIN: 12.1 G/DL (ref 13.5–17.5)
LYMPHOCYTES ABSOLUTE: 0.8 K/UL (ref 1–5.1)
LYMPHOCYTES RELATIVE PERCENT: 11.9 %
MCH RBC QN AUTO: 25.6 PG (ref 26–34)
MCHC RBC AUTO-ENTMCNC: 31.6 G/DL (ref 31–36)
MCV RBC AUTO: 81 FL (ref 80–100)
MONOCYTES ABSOLUTE: 0.2 K/UL (ref 0–1.3)
MONOCYTES RELATIVE PERCENT: 2.6 %
NEUTROPHILS ABSOLUTE: 5.8 K/UL (ref 1.7–7.7)
NEUTROPHILS RELATIVE PERCENT: 85.3 %
PDW BLD-RTO: 18.3 % (ref 12.4–15.4)
PLATELET # BLD: 202 K/UL (ref 135–450)
PMV BLD AUTO: 7.8 FL (ref 5–10.5)
POTASSIUM REFLEX MAGNESIUM: 4.4 MMOL/L (ref 3.5–5.1)
RBC # BLD: 4.71 M/UL (ref 4.2–5.9)
SODIUM BLD-SCNC: 141 MMOL/L (ref 136–145)
TOTAL CK: 8680 U/L (ref 39–308)
TOTAL PROTEIN: 5.6 G/DL (ref 6.4–8.2)
WBC # BLD: 6.8 K/UL (ref 4–11)

## 2022-10-27 PROCEDURE — 36415 COLL VENOUS BLD VENIPUNCTURE: CPT

## 2022-10-27 PROCEDURE — G0378 HOSPITAL OBSERVATION PER HR: HCPCS

## 2022-10-27 PROCEDURE — 99233 SBSQ HOSP IP/OBS HIGH 50: CPT | Performed by: INTERNAL MEDICINE

## 2022-10-27 PROCEDURE — 80053 COMPREHEN METABOLIC PANEL: CPT

## 2022-10-27 PROCEDURE — 6360000002 HC RX W HCPCS: Performed by: INTERNAL MEDICINE

## 2022-10-27 PROCEDURE — 93010 ELECTROCARDIOGRAM REPORT: CPT | Performed by: INTERNAL MEDICINE

## 2022-10-27 PROCEDURE — 6370000000 HC RX 637 (ALT 250 FOR IP): Performed by: INTERNAL MEDICINE

## 2022-10-27 PROCEDURE — 2580000003 HC RX 258: Performed by: INTERNAL MEDICINE

## 2022-10-27 PROCEDURE — 85025 COMPLETE CBC W/AUTO DIFF WBC: CPT

## 2022-10-27 PROCEDURE — 96372 THER/PROPH/DIAG INJ SC/IM: CPT

## 2022-10-27 PROCEDURE — 97161 PT EVAL LOW COMPLEX 20 MIN: CPT

## 2022-10-27 PROCEDURE — 1200000000 HC SEMI PRIVATE

## 2022-10-27 PROCEDURE — 82550 ASSAY OF CK (CPK): CPT

## 2022-10-27 PROCEDURE — 97165 OT EVAL LOW COMPLEX 30 MIN: CPT

## 2022-10-27 PROCEDURE — 96375 TX/PRO/DX INJ NEW DRUG ADDON: CPT

## 2022-10-27 PROCEDURE — 96376 TX/PRO/DX INJ SAME DRUG ADON: CPT

## 2022-10-27 RX ORDER — FUROSEMIDE 10 MG/ML
40 INJECTION INTRAMUSCULAR; INTRAVENOUS 2 TIMES DAILY
Status: DISCONTINUED | OUTPATIENT
Start: 2022-10-27 | End: 2022-10-28 | Stop reason: HOSPADM

## 2022-10-27 RX ORDER — PANTOPRAZOLE SODIUM 40 MG/1
40 TABLET, DELAYED RELEASE ORAL
Status: DISCONTINUED | OUTPATIENT
Start: 2022-10-28 | End: 2022-10-28 | Stop reason: HOSPADM

## 2022-10-27 RX ADMIN — ENOXAPARIN SODIUM 30 MG: 100 INJECTION SUBCUTANEOUS at 19:48

## 2022-10-27 RX ADMIN — METOPROLOL TARTRATE 25 MG: 25 TABLET, FILM COATED ORAL at 08:09

## 2022-10-27 RX ADMIN — ASPIRIN 81 MG 81 MG: 81 TABLET ORAL at 08:09

## 2022-10-27 RX ADMIN — FUROSEMIDE 40 MG: 10 INJECTION, SOLUTION INTRAMUSCULAR; INTRAVENOUS at 08:09

## 2022-10-27 RX ADMIN — Medication 1000 UNITS: at 08:09

## 2022-10-27 RX ADMIN — METHYLPREDNISOLONE SODIUM SUCCINATE 40 MG: 40 INJECTION, POWDER, FOR SOLUTION INTRAMUSCULAR; INTRAVENOUS at 08:09

## 2022-10-27 RX ADMIN — LOSARTAN POTASSIUM 50 MG: 25 TABLET, FILM COATED ORAL at 08:09

## 2022-10-27 RX ADMIN — METOPROLOL TARTRATE 25 MG: 25 TABLET, FILM COATED ORAL at 19:48

## 2022-10-27 RX ADMIN — METHYLPREDNISOLONE SODIUM SUCCINATE 40 MG: 40 INJECTION, POWDER, FOR SOLUTION INTRAMUSCULAR; INTRAVENOUS at 03:28

## 2022-10-27 RX ADMIN — SODIUM CHLORIDE, PRESERVATIVE FREE 10 ML: 5 INJECTION INTRAVENOUS at 19:49

## 2022-10-27 RX ADMIN — Medication 1 TABLET: at 08:09

## 2022-10-27 RX ADMIN — FUROSEMIDE 40 MG: 10 INJECTION, SOLUTION INTRAMUSCULAR; INTRAVENOUS at 17:02

## 2022-10-27 RX ADMIN — ENOXAPARIN SODIUM 30 MG: 100 INJECTION SUBCUTANEOUS at 08:09

## 2022-10-27 RX ADMIN — METHYLPREDNISOLONE SODIUM SUCCINATE 40 MG: 40 INJECTION, POWDER, FOR SOLUTION INTRAMUSCULAR; INTRAVENOUS at 14:15

## 2022-10-27 RX ADMIN — SODIUM CHLORIDE, PRESERVATIVE FREE 10 ML: 5 INJECTION INTRAVENOUS at 08:13

## 2022-10-27 RX ADMIN — SODIUM CHLORIDE, PRESERVATIVE FREE 10 ML: 5 INJECTION INTRAVENOUS at 03:28

## 2022-10-27 RX ADMIN — METHYLPREDNISOLONE SODIUM SUCCINATE 40 MG: 40 INJECTION, POWDER, FOR SOLUTION INTRAMUSCULAR; INTRAVENOUS at 19:48

## 2022-10-27 ASSESSMENT — PAIN SCALES - GENERAL
PAINLEVEL_OUTOF10: 0
PAINLEVEL_OUTOF10: 0

## 2022-10-27 NOTE — PROGRESS NOTES
Hospitalist Progress Note      PCP: Clifton Valentine MD    Date of Admission: 10/26/2022    Chief Complaint: Abnormal labs    Hospital Course:  78 y.o. male with history of IDALIA positive, recent admit b/w 10/10 to 10/21/22 with non traumatic rhabdomyolysis, CKD 3, transaminitis, LHC on 10/11/22: Anatomy:   LM-normal  LAD-10%  to 20% mid  Cx-normal, dominant  OM1-normal  RCA-normal, nondominant  LVEF-55%     Impression:  1. Minimal 1V CAD involving the LAD. 2.  Normal LV systolic function. 3.  Troponin elevation possibly related to myocarditis. Plan:  1. Medical management. 2.  Outpatient cardiac MRI. 3.  Sed rate, CRP in approximately 2 weeks. , obesity, transaminitis, s/p L thigh muscle biopsy on 10/18/22 who was sent back to ER for worsening non traumatic rhabdomyolysis. Patient saw Rheumatology as outpatient. CK was between 6K-7K at time of last discharge. Patient still with BL LE edema, improving with PO Lasix. Admitted as inpatient for worsening non traumatic rhabdomyolysis and LE edema. Started on IV Solumedrol and IV Lasix. Followed by Renal.    Subjective:  Patient denies CP, SOB, HA or abdominal pain.  -1.1 L since admission.       Medications:  Reviewed    Infusion Medications    sodium chloride       Scheduled Medications    furosemide  40 mg IntraVENous BID    [START ON 10/28/2022] pantoprazole  40 mg Oral QAM AC    aspirin  81 mg Oral Daily    losartan  50 mg Oral Daily    therapeutic multivitamin-minerals  1 tablet Oral Daily    Vitamin D  1,000 Units Oral Daily    sodium chloride flush  5-40 mL IntraVENous 2 times per day    enoxaparin  30 mg SubCUTAneous BID    methylPREDNISolone  40 mg IntraVENous Q6H    metoprolol tartrate  25 mg Oral BID     PRN Meds: sodium chloride flush, sodium chloride, ondansetron **OR** ondansetron, polyethylene glycol, acetaminophen **OR** acetaminophen      Intake/Output Summary (Last 24 hours) at 10/27/2022 9344  Last data filed at 10/27/2022 1419  Gross per 24 hour   Intake 735 ml   Output 1825 ml   Net -1090 ml       Physical Exam Performed:    /73   Pulse 70   Temp 98.3 °F (36.8 °C) (Oral)   Resp 16   Ht 6' 2\" (1.88 m)   Wt 259 lb (117.5 kg)   SpO2 92%   BMI 33.25 kg/m²     General appearance:  Appears comfortable. Well nourished  Eyes: Sclera clear, pupils equal  ENT: Moist mucus membranes, no thrush. Trachea midline. Cardiovascular: Regular rhythm, normal S1, S2. No murmur, gallop, rub. 2+ BL edema in lower extremities  Respiratory: Clear to auscultation bilaterally, no wheeze, good inspiratory effort  Gastrointestinal: Abdomen soft, non-tender, not distended, normal bowel sounds  Musculoskeletal: No cyanosis in digits, neck supple  Neurology: Cranial nerves grossly intact. Alert and oriented in time, place and person. No speech or motor deficits  Psychiatry: Appropriate affect. Not agitated  Skin: Warm, dry, normal turgor, no rash  Brisk capillary refill, peripheral pulses palpable      Labs:   Recent Labs     10/26/22  0925 10/27/22  0548   WBC 4.3 6.8   HGB 12.8* 12.1*   HCT 40.2* 38.1*    202     Recent Labs     10/26/22  0925 10/27/22  0548    141   K 4.0 4.4    104   CO2 29 30   BUN 12 16   CREATININE 0.8 0.8   CALCIUM 9.4 9.0     Recent Labs     10/26/22  0925 10/27/22  0548   * 290*   * 218*   BILITOT 0.4 0.3   ALKPHOS 70 61     No results for input(s): INR in the last 72 hours.   Recent Labs     10/25/22  1025 10/26/22  0925 10/27/22  0548   CKTOTAL 13,736* 13,018* 8,680*   TROPONINI  --  1.65*  --        Urinalysis:      Lab Results   Component Value Date/Time    NITRU Negative 10/12/2022 06:14 PM    WBCUA 1 10/12/2022 06:14 PM    BACTERIA None Seen 10/12/2022 06:14 PM    RBCUA 2 10/12/2022 06:14 PM    BLOODU LARGE 10/12/2022 06:14 PM    SPECGRAV >=1.030 10/12/2022 06:14 PM    GLUCOSEU Negative 10/12/2022 06:14 PM       Radiology:  XR CHEST PORTABLE   Final Result   No acute cardiopulmonary findings                 Assessment/Plan:    Active Hospital Problems    Diagnosis     Non-traumatic rhabdomyolysis [M62.82]      Priority: Medium    Obesity (BMI 30-39. 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 Home in next 24-48 hrs if CK/LFTs keep improving.     Angelika Louis MD

## 2022-10-27 NOTE — CARE COORDINATION
Discharge Planning Assessment  Discharge Planning Assessment  RN/SW discharge planner met with patient/ (and family member) to discuss reason for admission, current living situation, and potential needs at the time of discharge    Demographics/Insurance verified Yes    Current type of dwellin story home with one step to enter from garage. 13 steps between levels with handrail     Patient from ECF/SW confirmed with:n/a     Living arrangements:with spouse     Level of function/Support: independent with supportive family     Melecio Cid     Last Visit to PCP: had appt yesterday but came to hospital and was admitted     DME: none     Active with any community resources/agencies/skilled home care: none     Medication compliance issues: Independent     Financial issues that could impact healthcare:    None      Tentative discharge plan: home with no needs     Discussed and provided facilities of choice if transition to a skilled nursing facility is required at the time of discharge therapy has no recommendations at discharge. Discussed with patient and/or family that on the day of discharge home tentative time of discharge will be between 10 AM and noon.     Transportation at the time of discharge: spouse in private vehicle     Suzanne PatiñoWellesley, [place]  [phone]

## 2022-10-27 NOTE — PROGRESS NOTES
[address] [address]   Phone: [phone]    Occupational Therapy    [x] Initial Evaluation            [] Daily Treatment Note         [x] Discharge Summary      Patient: Moisés Sandoval   : 1945   MRN: [id number]   Date of Service:  10/27/2022    Admitting Diagnosis:  Non-traumatic rhabdomyolysis  Current Admission Summary: 78 y.o. male with history of IDALIA positive, recent admit b/w 10/10 to 10/21/22 with non traumatic rhabdomyolysis, CKD 3, transaminitis, C on 10/11/22  Past Medical History:  has a past medical history of Arthritis, Colon polyp, Diabetes mellitus ([facility] [address] Hyperlipemia, Hypertension, Kidney disease, and Rheumatoid arthritis ([facility] [address] Past Surgical History:  has a past surgical history that includes Rotator cuff repair (Left); Colonoscopy; Colonoscopy (N/A, 2020); and Muscle biopsy (Left, 10/18/2022). Discharge Recommendations: Moisés Sandoval scored a  on the [facility] ADL Inpatient form. At this time, no further OT is recommended upon discharge due to patient at independent level. Recommend patient returns to prior setting with prior services.       DME Required For Discharge: No DME required    Precautions/Restrictions: no restrictions  Positional Restrictions:no positional restrictions    Pre-Admission Information   Lives With: spouse                  Type of Home: house  Home Layout: two level, bedroom/bathroom upstairs  Home Access: 1 step to enter without rails   Bathroom Layout: walk in shower  Bathroom Equipment: no equipment   Toilet Height: standard height  Home Equipment: no prior equipment  Transfer Assistance: Independent without use of device  Ambulation Assistance:Independent without use of device  ADL Assistance: independent with all ADL's  IADL Assistance: independent with homemaking tasks  Active :        [x] Yes                 [] No  Hand Dominance: [] Left                 [x] Right  Current Employment: part time employment. Occupation: [employer]   Hobbies: golf  Recent Falls: no falls within last 6 months      Examination   Vision:   Vision Gross Assessment: WFL and Vision Corrective Device: wears glasses for reading  Hearing:   WFL  Posture:   Good   Sensation:   denies numbness and tingling  ROM:   (B) UE ROM WFL  Strength:   (B) UE 5/5     Decision Making: low complexity  Clinical Presentation: stable      Subjective  General: Patient supine in bed, agreeable to evaluation  Pain: 0/10  Pain Interventions: not applicable        Activities of Daily Living  Basic Activities of Daily Living  Lower Extremity Dressing: Independent  Instrumental Activities of Daily Living  No IADL completed on this date. Functional Mobility  Bed Mobility  Supine to Sit: Independent  Scooting: Independent  Comments:  Transfers  Sit to stand transfer:Independent  Stand to sit transfer: Independent  Bed / Chair transfer: Independent. Bed / Chair comments: without AD  Comments:  Functional Mobility:  Functional Mobility: . Independent  Functional Mobility Comment: ~400 feet    Other Therapeutic Interventions    Functional Outcomes  AM-PAC Inpatient Daily Activity Raw Score: 24    Cognition  WFL  Orientation:    A&O x 4  Command Following:   [facility]     Education  Barriers To Learning: none  Patient Education: Patient educated on discharge recommendations  Learning Assessment:  Patient verbalized and demonstrates understanding    Assessment  Impairments Requiring Therapeutic Intervention: none - eval with same day discharge  Prognosis: good without need for therapy intervention  Clinical Assessment: Patient presenting at independent level for completion of required self care tasks for return to home. Eval with d/c at this time. No therapy services indicated. Safety Interventions: patient left in chair, call light within reach, and nurse notified    Plan  Frequency: Eval with same day discharge. No follow up required.   Current Treatment Recommendations: Not applicable, evaluation completed with same day discharge. Goals  Patient eval with same day discharge. No goals set as patient is at baseline self care status.       Therapy Session Time     Individual Group Co-treatment   Time In    4559   Time Out    1411   Minutes    8        Timed Code Treatment Minutes:   0  Total Treatment Minutes:  8       Electronically Signed By: FÉLIX Hobson/ARIANA [id number]

## 2022-10-27 NOTE — PROGRESS NOTES
Physician Progress Note      PATIENTZirodriguez Brown  CSN #:                  [id number]  :                       1945  ADMIT DATE:       10/26/2022 9:16 AM  DISCH DATE:  RESPONDING  PROVIDER #:        Tima Self MD          QUERY TEXT:    Patient admitted with rhabdomyolysis. Noted documentation of NSTEMI in ED   note and elevated troponin level not due to acute coronary syndrome in H&P   note. The medical record reflects the following:  Risk Factors: elevated troponin  Clinical Indicators: Patient admitted with rhabdomyolysis and troponin level   of 1.65. ED note documented \"NSTEMI. \" H&P note documented \"Elevated troponin   level not due to acute coronary syndrome. \"  Treatment: troponin levels  Options provided:  -- NSTEMI confirmed  -- NSTEMI ruled out  -- Other - I will add my own diagnosis  -- Disagree - Not applicable / Not valid  -- Disagree - Clinically unable to determine / Unknown  -- Refer to Clinical Documentation Reviewer    PROVIDER RESPONSE TEXT:    After study, NSTEMI ruled out.     Query created by: Mavis Loredo on 10/27/2022 11:58 AM      Electronically signed by:  Tima Self MD 10/27/2022 12:15 PM

## 2022-10-27 NOTE — PLAN OF CARE
Problem: Cardiovascular - Adult  Goal: Maintains optimal cardiac output and hemodynamic stability  Outcome: Progressing     Problem: Hematologic - Adult  Goal: Maintains hematologic stability  Outcome: Progressing

## 2022-10-27 NOTE — PROGRESS NOTES
Physical Therapy    [address]   Phone: [phone]    Physical Therapy    [x] Initial Evaluation            [] Daily Treatment Note         [x] Discharge Summary      Patient: Yang Parker   : 1945   MRN: [id number]   Date of Service:  10/27/2022  Admitting Diagnosis: Non-traumatic rhabdomyolysis  Current Admission Summary:  78 y.o. male with history of IDALIA positive, recent admit b/w 10/10 to 10/21/22 with non traumatic rhabdomyolysis, CKD 3, transaminitis, LHC on 10/11/22  Past Medical History:  has a past medical history of Arthritis, Colon polyp, Diabetes mellitus ([facility] [address] Hyperlipemia, Hypertension, Kidney disease, and Rheumatoid arthritis ([facility] [address] Past Surgical History:  has a past surgical history that includes Rotator cuff repair (Left); Colonoscopy; Colonoscopy (N/A, 2020); and Muscle biopsy (Left, 10/18/2022). Discharge Recommendations: Yang Parker scored a / on the AM-PAC short mobility form. At this time, no further PT is recommended upon discharge due to patient at independent level. Recommend patient returns to prior setting with prior services.     DME Required For Discharge: No new DME required  Precautions/Restrictions: low fall risk  Positional Restrictions:no positional restrictions    Pre-Admission Information   Lives With: spouse                  Type of Home: house  Home Layout: two level, bedroom/bathroom upstairs  Home Access: 1 step to enter without rails   Bathroom Layout: walk in shower  Bathroom Equipment: no equipment   Toilet Height: standard height  Home Equipment: no prior equipment  Transfer Assistance: Independent without use of device  Ambulation Assistance:Independent without use of device  ADL Assistance: independent with all ADL's  IADL Assistance: independent with homemaking tasks  Active :        [x] Yes                 [] No  Hand Dominance: [] Left                 [x] Right  Current Employment: part time employment. Occupation: [employer]   Hobbies: golf  Recent Falls: no falls within last 6 months     Examination   Vision:   Vision Gross Assessment: Impaired and Vision Corrective Device: wears glasses for reading  Hearing:   WFL  Posture:   WFL  Sensation:   denies numbness and tingling  ROM:   (B) LE ROM WFL  Strength:   (B) LE gross strength WFL  Decision Making: low complexity  Clinical Presentation: stable      Subjective  General: Pt supine in bed upon arrival. Pt agreeable to PT/OT eval.   Pain: 0/10  Pain Interventions: not applicable       Functional Mobility  Bed Mobility  Supine to Sit: Independent  Scooting: Independent  Comments:  Transfers  Sit to stand transfer: Independent  Stand to sit transfer: Independent  Comments:  Ambulation  Surface:level surface  Assistive Device: no device  Assistance: Independent  Distance: 400ft  Gait Mechanics: steady, no LOB   Comments:    Stair Mobility  Stair mobility not completed on this date.   Comments:  Balance  Static Sitting Balance: good(+): independent with high level dynamic balance in unsupported position  Dynamic Sitting Balance: good(+): independent with high level dynamic balance in unsupported position  Static Standing Balance: good(+): independent with high level dynamic balance in unsupported position  Dynamic Standing Balance: good(+): independent with high level dynamic balance in unsupported position  Comments:    Other Therapeutic Interventions    Functional Outcomes  AM-PAC Inpatient Mobility Raw Score : 24              Cognition  WFL  Orientation:    A&O x 4    Education  Barriers To Learning: none  Patient Education: patient educated on goals, PT role and benefits, plan of care, discharge recommendations  Learning Assessment:  Pt verbalized and demonstrates understanding    Assessment  Impairments Requiring Therapeutic Intervention: none - eval with same day discharge  Prognosis: good without need for therapy intervention  Clinical Assessment: Patient presenting at independent level for completion of required mobility tasks for return to home. Eval with d/c at this time. No therapy services indicated. Safety Interventions: patient left in chair, call light within reach, and nurse notified    Plan  Frequency: Eval with same day discharge. No follow up required. Current Treatment Recommendations: Not applicable, evaluation completed with same day discharge. Goals  Patient eval with same day discharge. No goals set as patient is at baseline mobility status.       Therapy Session Time      Individual Group Co-treatment   Time In     0287   Time Out     1411   Minutes     8     Timed Code Treatment Minutes:   0  Total Treatment Minutes:  8       Electronically Signed By: Freda Harper Do [address], [place] [address]

## 2022-10-27 NOTE — PROGRESS NOTES
Office : [phone]     Fax :[phone]       Nephrology progress  Note      Patient's Name: Rema Yancey  8:18 AM  10/27/2022    Reason for Consult:  Edema, CK level trending up       Requesting Physician:  Yefri Souza MD      Chief Complaint:    Chief Complaint   Patient presents with    Abnormal Lab     States his kidney doctor sent him d/t high CK levels. Also states he was just recently hospitalized for the same thing            History of Present Ilness:    Rema Yancey is a 78 y.o. male with recent h/o admission for rhabdomyolysis and discharged last week was admitted from ER today for increase in CK level to [address] and increased edema LE. He was positive for IDALIA and had work up done and biopsy done ( showed non ischemic injury ). Was treated last admission with prednisone.    He received IV fluid last admission but he did not respond to iv fluids     His CK level started trending down after started on prednisone     He was on rosuvastatin which was stopped 2 weeks ago on  previous admission      Latest Reference Range & Units 10/16/22 07:21 10/17/22 05:23 10/18/22 05:05 10/19/22 10:36 10/20/22 10:04 10/21/22 07:00 10/25/22 10:25 10/26/22 09:25   Total CK 39 - 308 U/L 10,439 (H) 10,126 (H) 9,560 (H) 9,284 (H) 7,168 (H) 6,446 (H) 13,736 (H) 13,018 (H)         Interval hx     Feels better   CK level trended down         Past Medical History:   Diagnosis Date    Arthritis     Colon polyp     Diabetes mellitus ([facility] [address]     denies being diabetic    Hyperlipemia     Hypertension     Kidney disease     Rheumatoid arthritis ([facility] [address]        Past Surgical History:   Procedure Laterality Date    COLONOSCOPY      COLONOSCOPY N/A 8/14/2020    COLONOSCOPY DIAGNOSTIC performed by Danay Wong MD at [facility] Left 10/18/2022    LEFT LEG MUSCLE BIOPSY performed by Jamey Mixon MD at [address] Left        Family History   Problem Relation Age of Onset    High Blood Pressure Sister         reports that he quit smoking about 38 years ago. His smoking use included cigarettes. He started smoking about 48 years ago. He has a 5.00 pack-year smoking history. He has never used smokeless tobacco. He reports current alcohol use. He reports that he does not use drugs. Allergies:  Patient has no known allergies.     Current Medications:   Current Facility-Administered Medications   Medication Dose Route Frequency Provider Last Rate Last Admin    aspirin chewable tablet 81 mg  81 mg Oral Daily Dakotah Dais MD   81 mg at 10/27/22 0809    losartan (COZAAR) tablet 50 mg  50 mg Oral Daily Dakotah Dias MD   50 mg at 10/27/22 0809    therapeutic multivitamin-minerals 1 tablet  1 tablet Oral Daily Dakotah Dias MD   1 tablet at 10/27/22 0809    Vitamin D (CHOLECALCIFEROL) tablet 1,000 Units  1,000 Units Oral Daily Dakotah Dias MD   1,000 Units at 10/27/22 0809    sodium chloride flush 0.9 % injection 5-40 mL  5-40 mL IntraVENous 2 times per day Dakotah Dias MD   10 mL at 10/27/22 0813    sodium chloride flush 0.9 % injection 5-40 mL  5-40 mL IntraVENous PRN Dakotah Dias MD   10 mL at 10/27/22 0328    0.9 % sodium chloride infusion   IntraVENous PRN Dakotah Dias MD        enoxaparin Sodium (LOVENOX) injection 30 mg  30 mg SubCUTAneous BID Dakotah Dias MD   30 mg at 10/27/22 0809    ondansetron (ZOFRAN-ODT) disintegrating tablet 4 mg  4 mg Oral Q8H PRN Dakotah Dias MD        Or    ondansetron (ZOFRAN) injection 4 mg  4 mg IntraVENous Q6H PRN Dakotah Dias MD        polyethylene glycol (GLYCOLAX) packet 17 g  17 g Oral Daily PRN Dakotah Dias MD        acetaminophen (TYLENOL) tablet 650 mg  650 mg Oral Q6H PRN Dakotah Dias MD        Or    acetaminophen (TYLENOL) suppository 650 mg  650 mg Rectal Q6H PRN Chris Farris MD        methylPREDNISolone sodium (SOLU-MEDROL) injection 40 mg  40 mg IntraVENous Q6H Chris Farris MD   40 mg at 10/27/22 0809    furosemide (LASIX) injection 40 mg  40 mg IntraVENous Daily Chris Farris MD   40 mg at 10/27/22 0809    metoprolol tartrate (LOPRESSOR) tablet 25 mg  25 mg Oral BID Chris Farris MD   25 mg at 10/27/22 0809            Review of Systems:   14 point ROS obtained but were negative except mentioned in HPI      Physical exam:     Vitals:  BP (!) 149/73   Pulse 71   Temp 98.2 °F (36.8 °C) (Oral)   Resp 16   Ht 6' 2\" (1.88 m)   Wt 259 lb (117.5 kg)   SpO2 94%   BMI 33.25 kg/m²   Constitutional:  OAA X3 NAD  Skin: no rash, turgor wnl  Heent:  eomi, mmm  Neck: no bruits or jvd noted  Cardiovascular:  S1, S2 without m/r/g  Respiratory: CTA B without w/r/r  Abdomen:  +bs, soft, nt, nd  Ext: 2 +  lower extremity edema  Psychiatric: mood and affect appropriate  Musculoskeletal:  Rom, muscular strength intact    Labs:  CBC:   Recent Labs     10/26/22  0925 10/27/22  0548   WBC 4.3 6.8   HGB 12.8* 12.1*    202     BMP:    Recent Labs     10/26/22  0925 10/27/22  0548    141   K 4.0 4.4    104   CO2 29 30   BUN 12 16   CREATININE 0.8 0.8   GLUCOSE 109* 123*     Ca/Mg/Phos:   Recent Labs     10/26/22  0925 10/27/22  0548   CALCIUM 9.4 9.0     Hepatic:   Recent Labs     10/26/22  0925 10/27/22  0548   * 290*   * 218*   BILITOT 0.4 0.3   ALKPHOS 70 61     Troponin:   Recent Labs     10/26/22  0925   TROPONINI 1.65*     BNP: No results for input(s): BNP in the last 72 hours. Lipids: No results for input(s): CHOL, TRIG, HDL, LDLCALC, LABVLDL in the last 72 hours. ABGs: No results for input(s): PHART, PO2ART, MYX1KMQ in the last 72 hours. INR: No results for input(s): INR in the last 72 hours.   UA:No results for input(s): Nolvia [address], [facility], [address] [address], [facility], [address] [facility], [facility], UROBILINOGEN, NITRU, LEUKOCYTESUR, Chelita Abena in the last 72 hours. Urine Microscopic: No results for input(s): LABCAST, BACTERIA, COMU, HYALCAST, WBCUA, RBCUA, EPIU in the last 72 hours. Urine Culture: No results for input(s): LABURIN in the last 72 hours. Urine Chemistry: No results for input(s): Contreras Oms, PROTEINUR, NAUR in the last 72 hours. IMAGING:  XR CHEST PORTABLE   Final Result   No acute cardiopulmonary findings                             Assessment/Plan :      1.  Volume overload. LE edema   Increase lasix to 40 mg iv BID   Monitor volume status     2. Rhabdomyolysis   Seen by rheumatology   Immune mediated rhabdomyolysis   On steroids. Mgmt per hospitalist   CK level coming down     3.  HTN   Controlled on meds               D/w primary team      Thank you for allowing us to participate in care of Jc eRyes         Electronically signed by: Navdeep Garcia MD, 10/27/2022, 8:18 AM      [facility]  [address]  Office : [phone]  Fax :[phone]

## 2022-10-28 ENCOUNTER — TELEPHONE (OUTPATIENT)
Dept: INTERNAL MEDICINE CLINIC | Age: 77
End: 2022-10-28

## 2022-10-28 VITALS
TEMPERATURE: 98.1 F | OXYGEN SATURATION: 95 % | HEART RATE: 68 BPM | WEIGHT: 259 LBS | DIASTOLIC BLOOD PRESSURE: 75 MMHG | HEIGHT: 74 IN | BODY MASS INDEX: 33.24 KG/M2 | SYSTOLIC BLOOD PRESSURE: 117 MMHG | RESPIRATION RATE: 16 BRPM

## 2022-10-28 LAB
A/G RATIO: 1.2 (ref 1.1–2.2)
ALBUMIN SERPL-MCNC: 3 G/DL (ref 3.4–5)
ALP BLD-CCNC: 63 U/L (ref 40–129)
ALT SERPL-CCNC: 216 U/L (ref 10–40)
ANION GAP SERPL CALCULATED.3IONS-SCNC: 7 MMOL/L (ref 3–16)
AST SERPL-CCNC: 245 U/L (ref 15–37)
BASOPHILS ABSOLUTE: 0 K/UL (ref 0–0.2)
BASOPHILS RELATIVE PERCENT: 0.1 %
BILIRUB SERPL-MCNC: 0.3 MG/DL (ref 0–1)
BUN BLDV-MCNC: 24 MG/DL (ref 7–20)
CALCIUM SERPL-MCNC: 8.8 MG/DL (ref 8.3–10.6)
CHLORIDE BLD-SCNC: 103 MMOL/L (ref 99–110)
CO2: 28 MMOL/L (ref 21–32)
CREAT SERPL-MCNC: 0.8 MG/DL (ref 0.8–1.3)
EOSINOPHILS ABSOLUTE: 0 K/UL (ref 0–0.6)
EOSINOPHILS RELATIVE PERCENT: 0 %
GFR SERPL CREATININE-BSD FRML MDRD: >60 ML/MIN/{1.73_M2}
GLUCOSE BLD-MCNC: 119 MG/DL (ref 70–99)
HCT VFR BLD CALC: 36.4 % (ref 40.5–52.5)
HEMOGLOBIN: 11.5 G/DL (ref 13.5–17.5)
LYMPHOCYTES ABSOLUTE: 0.8 K/UL (ref 1–5.1)
LYMPHOCYTES RELATIVE PERCENT: 6.4 %
MCH RBC QN AUTO: 25.4 PG (ref 26–34)
MCHC RBC AUTO-ENTMCNC: 31.6 G/DL (ref 31–36)
MCV RBC AUTO: 80.4 FL (ref 80–100)
MISCELLANEOUS LAB TEST ORDER: NORMAL
MONOCYTES ABSOLUTE: 0.3 K/UL (ref 0–1.3)
MONOCYTES RELATIVE PERCENT: 2.7 %
NEUTROPHILS ABSOLUTE: 11.9 K/UL (ref 1.7–7.7)
NEUTROPHILS RELATIVE PERCENT: 90.8 %
PDW BLD-RTO: 17.9 % (ref 12.4–15.4)
PLATELET # BLD: 209 K/UL (ref 135–450)
PMV BLD AUTO: 8 FL (ref 5–10.5)
POTASSIUM REFLEX MAGNESIUM: 4.1 MMOL/L (ref 3.5–5.1)
RBC # BLD: 4.53 M/UL (ref 4.2–5.9)
SODIUM BLD-SCNC: 138 MMOL/L (ref 136–145)
TOTAL CK: 5980 U/L (ref 39–308)
TOTAL PROTEIN: 5.5 G/DL (ref 6.4–8.2)
WBC # BLD: 13.1 K/UL (ref 4–11)

## 2022-10-28 PROCEDURE — 6360000002 HC RX W HCPCS: Performed by: INTERNAL MEDICINE

## 2022-10-28 PROCEDURE — 85025 COMPLETE CBC W/AUTO DIFF WBC: CPT

## 2022-10-28 PROCEDURE — 2580000003 HC RX 258: Performed by: INTERNAL MEDICINE

## 2022-10-28 PROCEDURE — 6370000000 HC RX 637 (ALT 250 FOR IP): Performed by: INTERNAL MEDICINE

## 2022-10-28 PROCEDURE — G0378 HOSPITAL OBSERVATION PER HR: HCPCS

## 2022-10-28 PROCEDURE — 96376 TX/PRO/DX INJ SAME DRUG ADON: CPT

## 2022-10-28 PROCEDURE — 96372 THER/PROPH/DIAG INJ SC/IM: CPT

## 2022-10-28 PROCEDURE — 99233 SBSQ HOSP IP/OBS HIGH 50: CPT | Performed by: INTERNAL MEDICINE

## 2022-10-28 PROCEDURE — 36415 COLL VENOUS BLD VENIPUNCTURE: CPT

## 2022-10-28 PROCEDURE — 80053 COMPREHEN METABOLIC PANEL: CPT

## 2022-10-28 PROCEDURE — 82550 ASSAY OF CK (CPK): CPT

## 2022-10-28 PROCEDURE — 94760 N-INVAS EAR/PLS OXIMETRY 1: CPT

## 2022-10-28 RX ORDER — FUROSEMIDE 40 MG/1
40 TABLET ORAL 2 TIMES DAILY
Qty: 60 TABLET | Refills: 0 | Status: SHIPPED | OUTPATIENT
Start: 2022-10-28 | End: 2022-11-07

## 2022-10-28 RX ORDER — PREDNISONE 20 MG/1
60 TABLET ORAL DAILY
Qty: 90 TABLET | Refills: 0 | Status: SHIPPED | OUTPATIENT
Start: 2022-10-28 | End: 2022-11-27

## 2022-10-28 RX ORDER — PANTOPRAZOLE SODIUM 40 MG/1
40 TABLET, DELAYED RELEASE ORAL
Qty: 30 TABLET | Refills: 0 | Status: SHIPPED | OUTPATIENT
Start: 2022-10-29

## 2022-10-28 RX ADMIN — SODIUM CHLORIDE, PRESERVATIVE FREE 10 ML: 5 INJECTION INTRAVENOUS at 08:25

## 2022-10-28 RX ADMIN — ENOXAPARIN SODIUM 30 MG: 100 INJECTION SUBCUTANEOUS at 08:25

## 2022-10-28 RX ADMIN — PANTOPRAZOLE SODIUM 40 MG: 40 TABLET, DELAYED RELEASE ORAL at 05:52

## 2022-10-28 RX ADMIN — METOPROLOL TARTRATE 25 MG: 25 TABLET, FILM COATED ORAL at 08:25

## 2022-10-28 RX ADMIN — LOSARTAN POTASSIUM 50 MG: 25 TABLET, FILM COATED ORAL at 08:25

## 2022-10-28 RX ADMIN — ASPIRIN 81 MG 81 MG: 81 TABLET ORAL at 08:25

## 2022-10-28 RX ADMIN — FUROSEMIDE 40 MG: 10 INJECTION, SOLUTION INTRAMUSCULAR; INTRAVENOUS at 08:25

## 2022-10-28 RX ADMIN — METHYLPREDNISOLONE SODIUM SUCCINATE 40 MG: 40 INJECTION, POWDER, FOR SOLUTION INTRAMUSCULAR; INTRAVENOUS at 01:50

## 2022-10-28 RX ADMIN — Medication 1 TABLET: at 08:25

## 2022-10-28 RX ADMIN — METHYLPREDNISOLONE SODIUM SUCCINATE 40 MG: 40 INJECTION, POWDER, FOR SOLUTION INTRAMUSCULAR; INTRAVENOUS at 08:25

## 2022-10-28 RX ADMIN — Medication 1000 UNITS: at 08:25

## 2022-10-28 ASSESSMENT — PAIN SCALES - GENERAL: PAINLEVEL_OUTOF10: 0

## 2022-10-28 NOTE — PROGRESS NOTES
Data- discharge order received, pt verbalized agreement to discharge, disposition to previous residence, no needs for HHC/DME. Action- discharge instructions prepared and given to pt, pt verbalized understanding. Medication information packet given r/t NEW and/or CHANGED prescriptions emphasizing name/purpose/side effects, pt verbalized understanding. Discharge instruction summary: Diet- general, Activity- as negro, Primary Care Physician as follows: Marty Frausto -[phone] f/u appointment for 1 week, prescription medications filled out pt pharmacy    1. WEIGHT: Admit Weight: 259 lb (117.5 kg) (10/26/22 0919)        Today  Weight: 259 lb (117.5 kg) (10/26/22 0919)       2. O2 SAT.: SpO2: 95 % (10/28/22 1133)    Response- Pt belongings gathered, IV removed. Disposition is home (no HHC/DME needs), transported with papers and belongings, taken to lobby via w/c w/ RN, no complications.

## 2022-10-28 NOTE — PLAN OF CARE
Problem: Safety - Adult  Goal: Free from fall injury  Outcome: Progressing  Flowsheets (Taken 10/28/2022 0810)  Free From Fall Injury: Based on caregiver fall risk screen, instruct family/caregiver to ask for assistance with transferring infant if caregiver noted to have fall risk factors  Note: Patient remains absent from falls at this time. Remains alert and oriented, up to chair with call light and belongings in reach. Non-slip footwear on and fall precautions in place. Patient encouraged to use call light to request assistance, v/u.  Will continue to monitor. Problem: Pain  Goal: Verbalizes/displays adequate comfort level or baseline comfort level  Outcome: Progressing  Note: Patient denies any pain at this time. Will continue to monitor. Problem: Hematologic - Adult  Goal: Maintains hematologic stability  Outcome: Progressing  Note: Patient VSS at this time on room air. Will continue to monitor.

## 2022-10-28 NOTE — PROGRESS NOTES
Office : [phone]     Fax :[phone]       Nephrology progress  Note      Patient's Name: Ryley Ritchie  8:48 AM  10/28/2022    Reason for Consult:  Edema, CK level trending up       Requesting Physician:  Carlita Pérez MD      Chief Complaint:    Chief Complaint   Patient presents with    Abnormal Lab     States his kidney doctor sent him d/t high CK levels. Also states he was just recently hospitalized for the same thing            History of Present Ilness:    Ryley Ritchie is a 78 y.o. male with recent h/o admission for rhabdomyolysis and discharged last week was admitted from ER today for increase in CK level to [address] and increased edema LE. He was positive for IDALIA and had work up done and biopsy done ( showed non ischemic injury ). Was treated last admission with prednisone.    He received IV fluid last admission but he did not respond to iv fluids     His CK level started trending down after started on prednisone     He was on rosuvastatin which was stopped 2 weeks ago on  previous admission      Latest Reference Range & Units 10/16/22 07:21 10/17/22 05:23 10/18/22 05:05 10/19/22 10:36 10/20/22 10:04 10/21/22 07:00 10/25/22 10:25 10/26/22 09:25   Total CK 39 - 308 U/L 10,439 (H) 10,126 (H) 9,560 (H) 9,284 (H) 7,168 (H) 6,446 (H) 13,736 (H) 13,018 (H)         Interval hx :    Feels better   CK level trended down   5980 today     Past Medical History:   Diagnosis Date    Arthritis     Colon polyp     Diabetes mellitus ([address]     denies being diabetic    Hyperlipemia     Hypertension     Kidney disease     Rheumatoid arthritis ([address]        Past Surgical History:   Procedure Laterality Date    COLONOSCOPY      COLONOSCOPY N/A 8/14/2020    COLONOSCOPY DIAGNOSTIC performed by Angely Montero MD at [address] Left 10/18/2022    LEFT LEG MUSCLE BIOPSY performed by Nieves Rosales MD at [address] Left        Family History   Problem Relation Age of Onset    High Blood Pressure Sister         reports that he quit smoking about 38 years ago. His smoking use included cigarettes. He started smoking about 48 years ago. He has a 5.00 pack-year smoking history. He has never used smokeless tobacco. He reports current alcohol use. He reports that he does not use drugs. Allergies:  Patient has no known allergies.     Current Medications:   Current Facility-Administered Medications   Medication Dose Route Frequency Provider Last Rate Last Admin    furosemide (LASIX) injection 40 mg  40 mg IntraVENous BID Jose Retana MD   40 mg at 10/28/22 0825    pantoprazole (PROTONIX) tablet 40 mg  40 mg Oral QAM AC Valerio Ball MD   40 mg at 10/28/22 3121    aspirin chewable tablet 81 mg  81 mg Oral Daily Valerio Ball MD   81 mg at 10/28/22 0825    losartan (COZAAR) tablet 50 mg  50 mg Oral Daily Valerio Ball MD   50 mg at 10/28/22 0825    therapeutic multivitamin-minerals 1 tablet  1 tablet Oral Daily Valerio Ball MD   1 tablet at 10/28/22 0825    Vitamin D (CHOLECALCIFEROL) tablet 1,000 Units  1,000 Units Oral Daily Valerio Ball MD   1,000 Units at 10/28/22 0825    sodium chloride flush 0.9 % injection 5-40 mL  5-40 mL IntraVENous 2 times per day Valerio Ball MD   10 mL at 10/28/22 0825    sodium chloride flush 0.9 % injection 5-40 mL  5-40 mL IntraVENous PRN Valerio Ball MD   10 mL at 10/28/22 0825    0.9 % sodium chloride infusion   IntraVENous PRN Valerio Ball MD        enoxaparin Sodium (LOVENOX) injection 30 mg  30 mg SubCUTAneous BID Valerio Ball MD   30 mg at 10/28/22 0825    ondansetron (ZOFRAN-ODT) disintegrating tablet 4 mg  4 mg Oral Q8H PRN Valerio Ball MD        Or    ondansetron (ZOFRAN) injection 4 mg  4 mg IntraVENous Q6H PRN Emiliano Emanuel MD        polyethylene glycol (GLYCOLAX) packet 17 g  17 g Oral Daily PRN Emiliano Emanuel MD        acetaminophen (TYLENOL) tablet 650 mg  650 mg Oral Q6H PRN Emiliano Emanuel MD        Or    acetaminophen (TYLENOL) suppository 650 mg  650 mg Rectal Q6H PRN Emiliano Emanuel MD        methylPREDNISolone sodium (SOLU-MEDROL) injection 40 mg  40 mg IntraVENous Q6H Emiliano Emanuel MD   40 mg at 10/28/22 0825    metoprolol tartrate (LOPRESSOR) tablet 25 mg  25 mg Oral BID Emiliano Emanuel MD   25 mg at 10/28/22 0825            Review of Systems:   14 point ROS obtained but were negative except mentioned in HPI      Physical exam:     Vitals:  /66   Pulse 66   Temp 98.1 °F (36.7 °C) (Oral)   Resp 16   Ht 6' 2\" (1.88 m)   Wt 259 lb (117.5 kg)   SpO2 96%   BMI 33.25 kg/m²   Constitutional:  OAA X3 NAD  Skin: no rash, turgor wnl  Heent:  eomi, mmm  Neck: no bruits or jvd noted  Cardiovascular:  S1, S2 without m/r/g  Respiratory: CTA B without w/r/r  Abdomen:  +bs, soft, nt, nd  Ext: 2 +  lower extremity edema  Psychiatric: mood and affect appropriate  Musculoskeletal:  Rom, muscular strength intact    Labs:  CBC:   Recent Labs     10/26/22  0925 10/27/22  0548 10/28/22  0517   WBC 4.3 6.8 13.1*   HGB 12.8* 12.1* 11.5*    202 209     BMP:    Recent Labs     10/26/22  0925 10/27/22  0548 10/28/22  0517    141 138   K 4.0 4.4 4.1    104 103   CO2 29 30 28   BUN 12 16 24*   CREATININE 0.8 0.8 0.8   GLUCOSE 109* 123* 119*     Ca/Mg/Phos:   Recent Labs     10/26/22  0925 10/27/22  0548 10/28/22  0517   CALCIUM 9.4 9.0 8.8     Hepatic:   Recent Labs     10/26/22  0925 10/27/22  0548 10/28/22  0517   * 290* 245*   * 218* 216*   BILITOT 0.4 0.3 0.3   ALKPHOS 70 61 63     Troponin:   Recent Labs     10/26/22  0925   TROPONINI 1.65*     BNP: No results for input(s): BNP in the last 72 hours. Lipids: No results for input(s): CHOL, TRIG, HDL, LDLCALC, LABVLDL in the last 72 hours.   ABGs: No results for input(s): PHART, PO2ART, TWH0UIS in the last 72 hours. INR: No results for input(s): INR in the last 72 hours. UA:No results for input(s): Misty Savin, GLUCOSEU, BILIRUBINUR, Gertrudis Cuco, BLOODU, PHUR, PROTEINU, UROBILINOGEN, NITRU, LEUKOCYTESUR, LABMICR, URINETYPE in the last 72 hours. Urine Microscopic: No results for input(s): LABCAST, BACTERIA, COMU, HYALCAST, WBCUA, RBCUA, EPIU in the last 72 hours. Urine Culture: No results for input(s): LABURIN in the last 72 hours. Urine Chemistry: No results for input(s): Clearance Bookbinder, PROTEINUR, NAUR in the last 72 hours. IMAGING:  XR CHEST PORTABLE   Final Result   No acute cardiopulmonary findings                             Assessment/Plan :      1.  Volume overload. LE edema   Increase lasix to 40 mg iv BID   Monitor volume status     2. Rhabdomyolysis   Seen by rheumatology   Immune mediated rhabdomyolysis   On steroids. Mgmt per hospitalist   CK level coming down     3.  HTN   Controlled on meds     When discharged home give lasix 40 mg po bid     Outpt followup      D/w primary team      Thank you for allowing us to participate in care of Will Buckner         Electronically signed by: Aryan Cunningham MD, 10/28/2022, 8:48 AM      [facility]  [address]  Office : [phone]  Fax :[phone]

## 2022-10-28 NOTE — PROGRESS NOTES
CLINICAL PHARMACY NOTE: MEDS TO BEDS    Total # of Prescriptions Filled: 2   The following medications were delivered to the patient:  Pantoprazole 40mg  Prednisone 20mg    Additional Documentation:     Medications were picked up by patient in the [address] [place] [address]

## 2022-10-28 NOTE — DISCHARGE INSTRUCTIONS
Your information:  Name: Darren South  : 1945    Your Discharge Instructions    What to do after you leave the hospital:    Read, review and familiarize yourself with the information provided below and in a separate packet on     Diet: Regular    Recommended activity: as tolerated  Avoid strenuous activity until instructed by your physician to resume; balance rest with periods of light to normal activity. If you experience any of the following: Unusual or inadequately controlled pain; unusual transient shortness of breath; recurrent or persistent nausea, heartburn, palpitations or lightheadedness; increased swelling; increased fatigue; fever >100; please follow up with @PCP@ [unfilled] or go to the Emergency Room. Follow up: Make appointment with Primary Care physician for 1 week. Inform the  this is for hospital follow up. Home Health/ Outpatient Services: NA      Information obtained by:  By signing below, I understand and acknowledge receipt of the instructions indicated above, and I understand that if any problems occur once I leave the hospital I am to contact @[email or web address]@. Rhabdomyolysis: Care Instructions  Your Care Instructions     When you have rhabdomyolysis (say \"kdp-pza-xp-AH-tyler-suss\"), dying muscle cells cause toxins to build up in the blood. If not treated, it can cause life-threatening damage to the body's organs. It can be caused by many things, such as severe muscle injury, some medicines (like statins), the flu, and certain blood infections. Symptoms may include weak muscles, pain, stiffness, fever, and nausea. Your urine may also be dark. You will get treatment in the hospital. If possible, the doctor will stop the cause of muscle cell death. The doctor will take steps to protect your organs. You may have to stop taking certain medicines if they are the cause of the problem. You will also get treatment to help the kidneys remove the toxins from your blood. This includes plenty of fluids. You may get fluids through a vein (by IV). You may also need dialysis. Follow-up care is a key part of your treatment and safety. Be sure to make and go to all appointments, and call your doctor if you are having problems. It's also a good idea to know your test results and keep a list of the medicines you take. How can you care for yourself at home? Take pain medicines exactly as directed. If the doctor gave you a prescription medicine for pain, take it as prescribed. If you are not taking a prescription pain medicine, ask your doctor if you can take an over-the-counter medicine. Talk to your doctor about whether you need to stop taking any medicines. Follow your doctor's instructions about stopping medicines. Drink plenty of fluids. If you have kidney, heart, or liver disease and have to limit fluids, talk with your doctor before you increase the amount of fluids you drink. When should you call for help? Call your doctor now or seek immediate medical care if:    You have new or worse muscle pain. You have less urine than normal or no urine. You have new swelling in your arms or feet. You have blood in your urine. Watch closely for changes in your health, and be sure to contact your doctor if you do not get better as expected. Where can you learn more? Go to https://[employer]. org and sign in to your [employer] account. Enter F129 in the [facility] box to learn more about \"Rhabdomyolysis: Care Instructions. \"     If you do not have an account, please click on the \"Sign Up Now\" link. Current as of: May 4, 2022               Content Version: 13.4  © 9562-7228 [employer]wise, Incorporated. Care instructions adapted under license by [facility] [place] ([facility]).  If you have questions about a medical condition or this instruction, always ask your healthcare professional. [address] any warranty or liability for your use of this information.

## 2022-10-28 NOTE — CARE COORDINATION
Patient discharged  to home on 10/28/2022  All discharge needs met per case management     Sowmya Parks RN, BSN  [phone]

## 2022-10-28 NOTE — TELEPHONE ENCOUNTER
Pt had a recent hospital visit and I scheduled him for a hospital visit. [facility] Transitions Initial Follow Up Call    Outreach made within 2 business days of discharge: Yes    Patient: Danna Grijalva Patient : 1945   MRN: [id number]  Reason for Admission: There are no discharge diagnoses documented for the most recent discharge. Discharge Date: 10/28/22       Spoke with: Patient    Discharge department/facility: [facility]    TCM Interactive Patient Contact:  Was patient able to fill all prescriptions: Yes  Was patient instructed to bring all medications to the follow-up visit: Yes  Is patient taking all medications as directed in the discharge summary?  Yes  Does patient understand their discharge instructions: Yes  Does patient have questions or concerns that need addressed prior to 7-14 day follow up office visit: no    Scheduled appointment with PCP within 7-14 days    Follow Up  Future Appointments   Date Time Provider Eliana French   10/31/2022 10:30 AM MD VALERIE Montelongo   2022  3:15 PM Patricia Rivera MD [facility]   2022  2:00 PM Cecillia Claude, MD [facility]   2023  2:30 PM MD VALERIE Montelongo   2023  1:00 PM Darryl Valdovinos MD [address], [place]

## 2022-10-29 NOTE — DISCHARGE SUMMARY
Hospital Medicine Discharge Summary    Patient: Kathia Cid     Gender: male  : 1945   Age: 78 y.o. MRN: [id number]    Admitting Physician: Dakotah Dias MD  Discharge Physician: Dakotah Dias MD     Code Status: Full code    Admit Date: 10/26/2022   Discharge Date: 10/28/2022      Disposition:  Home    Discharge Diagnoses: Active Hospital Problems    Diagnosis Date Noted    Non-traumatic rhabdomyolysis [M62.82] 10/26/2022     Priority: Medium    Obesity (BMI 30-39. 9) [E66.9] 10/26/2022     Priority: Medium    Volume overload [E87.70] 10/26/2022     Priority: Medium    Elevated CPK [R74.8] 10/12/2022     Priority: Medium    Elevated troponin level not due to acute coronary syndrome [R77.8] 10/12/2022     Priority: Medium    Elevated LFTs [R79.89] 10/11/2022     Priority: Medium    Carotid artery disease, unspecified laterality ([facility] [address] [I77.9] 2020    CKD (chronic kidney disease), stage III ([facility] [address] [N18.30] 2019    Essential hypertension [I10] 2014       Follow-up appointments:  one week    Outpatient to do list: F/U with PCP, Rheum and Renal    Condition at Discharge:  [address]:   78 y.o. male with history of IDALIA positive, recent admit b/w 10/10 to 10/21/22 with non traumatic rhabdomyolysis, CKD 3, transaminitis, LHC on 10/11/22: Anatomy:   LM-normal  LAD-10%  to 20% mid  Cx-normal, dominant  OM1-normal  RCA-normal, nondominant  LVEF-55%     Impression:  1. Minimal 1V CAD involving the LAD. 2.  Normal LV systolic function. 3.  Troponin elevation possibly related to myocarditis. Plan:  1. Medical management. 2.  Outpatient cardiac MRI. 3.  Sed rate, CRP in approximately 2 weeks. , obesity, transaminitis, s/p L thigh muscle biopsy on 10/18/22 who was sent back to ER for worsening non traumatic rhabdomyolysis. Patient saw Rheumatology as outpatient. CK was between 6K-7K at time of last discharge. Patient still with BL LE edema, improving with PO Lasix. Admitted as inpatient for worsening non traumatic rhabdomyolysis and LE edema. Started on IV Solumedrol and IV Lasix. Followed by Renal.  Improved with IV steroids and IV Lasix. 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 Will be on Prednisone 60 mg daily and Protonix daily. Will be on Lasix 40 mg PO bid per Renal.  F/u with PCP, Rheum and Renal.    Discharge Medications:   Discharge Medication List as of 10/28/2022  1:28 PM        START taking these medications    Details   pantoprazole (PROTONIX) 40 MG tablet Take 1 tablet by mouth every morning (before breakfast), Disp-30 tablet, R-0Normal      predniSONE (DELTASONE) 20 MG tablet Take 3 tablets by mouth daily, Disp-90 tablet, R-0Normal           Discharge Medication List as of 10/28/2022  1:28 PM        CONTINUE these medications which have CHANGED    Details   furosemide (LASIX) 40 MG tablet Take 1 tablet by mouth 2 times daily, Disp-60 tablet, R-0Normal           Discharge Medication List as of 10/28/2022  1:28 PM        CONTINUE these medications which have NOT CHANGED    Details   aspirin 81 MG chewable tablet Take 1 tablet by mouth daily, Disp-30 tablet, R-2Normal      metoprolol tartrate (LOPRESSOR) 25 MG tablet TAKE 1 TABLET TWICE A DAY, Disp-180 tablet, R-3Normal      losartan (COZAAR) 50 MG tablet TAKE 1 TABLET DAILY, Disp-90 tablet, R-0Normal      sildenafil (VIAGRA) 100 MG tablet Take 1 tablet by mouth daily as needed for Erectile Dysfunction, Disp-30 tablet, R-3Normal      Multiple Vitamins-Minerals (THERAPEUTIC MULTIVITAMIN-MINERALS) tablet Take 1 tablet by mouth dailyHistorical Med      Vitamin D (CHOLECALCIFEROL) 1000 UNITS CAPS capsule Take 1,000 Units by mouth daily. Discharge Medication List as of 10/28/2022  1:28 PM          Discharge Exam:    /75   Pulse 68   Temp 98.1 °F (36.7 °C) (Oral)   Resp 16   Ht 6' 2\" (1.88 m)   Wt 259 lb (117.5 kg)   SpO2 95%   BMI 33.25 kg/m²   General appearance:  Appears comfortable.  Well nourished  Eyes: Sclera clear, pupils equal  ENT: Moist mucus membranes, no thrush. Trachea midline. Cardiovascular: Regular rhythm, normal S1, S2. No murmur, gallop, rub. 2+ BL edema in lower extremities  Respiratory: Clear to auscultation bilaterally, no wheeze, good inspiratory effort  Gastrointestinal: Abdomen soft, non-tender, not distended, normal bowel sounds  Musculoskeletal: No cyanosis in digits, neck supple  Neurology: Cranial nerves grossly intact. Alert and oriented in time, place and person. No speech or motor deficits  Psychiatry: Appropriate affect. Not agitated  Skin: Warm, dry, normal turgor, no rash  Brisk capillary refill, peripheral pulses palpable    Labs: For convenience and continuity at follow-up the following most recent labs are provided:    Lab Results   Component Value Date/Time    WBC 13.1 10/28/2022 05:17 AM    HGB 11.5 10/28/2022 05:17 AM    HCT 36.4 10/28/2022 05:17 AM    MCV 80.4 10/28/2022 05:17 AM     10/28/2022 05:17 AM     10/28/2022 05:17 AM    K 4.1 10/28/2022 05:17 AM     10/28/2022 05:17 AM    CO2 28 10/28/2022 05:17 AM    BUN 24 10/28/2022 05:17 AM    CREATININE 0.8 10/28/2022 05:17 AM    CALCIUM 8.8 10/28/2022 05:17 AM    PHOS 3.6 10/14/2022 06:03 AM    ALKPHOS 63 10/28/2022 05:17 AM     10/28/2022 05:17 AM     10/28/2022 05:17 AM    BILITOT 0.3 10/28/2022 05:17 AM    BILIDIR <0.2 10/12/2022 05:16 AM    LABALBU 3.0 10/28/2022 05:17 AM    LDLCALC 65 10/12/2022 05:16 AM    TRIG 190 10/12/2022 05:16 AM     Lab Results   Component Value Date    INR 1.01 10/16/2022       Radiology:  US GALLBLADDER RUQ    Result Date: 10/11/2022  EXAMINATION: RIGHT UPPER QUADRANT ULTRASOUND 10/11/2022 4:19 pm COMPARISON: None.  HISTORY: ORDERING SYSTEM PROVIDED HISTORY: Arlyss First noted on echo TECHNOLOGIST PROVIDED HISTORY: Reason for exam:->Galllstone noted on echo Reason for exam:->Elevated LFTs Reason for Exam: gallstone noted on echo/ Elevated LFTs Additional signs and symptoms: na Relevant Medical/Surgical History: na FINDINGS: LIVER:  The liver is borderline enlarged there is a 2 6 x 2 cm rounded echogenic lesion in the right lobe near the capsule which is well-circumscribed and homogeneous. There is a patent pedal flow in the portal vein. BILIARY SYSTEM:  The gallbladder is mildly dilated there is a 1.6 cm echogenic focus in the gallbladder which is shadowing and mobile consistent with gallstones. There is no wall thickening or edema and there is no pain upon compression. The common duct measures 7 mm. Common bile duct is within normal limits measuring . RIGHT KIDNEY: The right kidney measures 13 x 6 x 6 cm and there is a 7 x 5 x 4.6 cm cyst along the mid-polar region there is a mild land thin linear septation along the cyst inferiorly with no significant wall thickening and no hydronephrosis or renal stones. PANCREAS:  The pancreas is partially obscured due to overlying bowel gas. OTHER: No evidence of right upper quadrant ascites. Mild hydrops of the gallbladder and associated 1.6 cm gallstone with no secondary signs of cholecystitis. 2 cm rounded echogenic focus in the right lobe of the liver which is suspicious for a liver hemangioma. Recommend a follow-up CT scan with contrast further characterization. Borderline enlargement of the common bile duct and partial obscuration of the pancreas due to overlying bowel gas. 7 cm cyst in the right kidney inferiorly with a thin septation in the cyst in keeping with a Bosniak 2 cyst.     XR CHEST PORTABLE    Result Date: 10/26/2022  EXAMINATION: ONE XRAY VIEW OF THE CHEST 10/26/2022 10:05 am COMPARISON: Chest x-ray dated 4 June 2012 HISTORY: ORDERING SYSTEM PROVIDED HISTORY: SOB TECHNOLOGIST PROVIDED HISTORY: Reason for exam:->SOB Reason for Exam: shortness of breath FINDINGS: Normal cardiomediastinal silhouette. No acute airspace infiltrate.   No pneumothorax or pleural effusion     No acute cardiopulmonary findings     CT CHEST PULMONARY EMBOLISM W CONTRAST    Result Date: 10/10/2022  EXAMINATION: CTA OF THE CHEST 10/10/2022 10:06 pm TECHNIQUE: CTA of the chest was performed after the administration of intravenous contrast.  Multiplanar reformatted images are provided for review. MIP images are provided for review. Automated exposure control, iterative reconstruction, and/or weight based adjustment of the mA/kV was utilized to reduce the radiation dose to as low as reasonably achievable. COMPARISON: None. HISTORY: ORDERING SYSTEM PROVIDED HISTORY: pe TECHNOLOGIST PROVIDED HISTORY: Reason for exam:->pe Decision Support Exception - unselect if not a suspected or confirmed emergency medical condition->Emergency Medical Condition (MA) Reason for Exam: PE.Leg Swelling (Patient in with complaints of bilateral edema in his legs that is new. PCP told him to come into the er. ). FINDINGS: Pulmonary Arteries: Pulmonary arteries are adequately opacified for evaluation. No evidence of intraluminal filling defect to suggest pulmonary embolism. Main pulmonary artery is normal in caliber. Mediastinum: No evidence of mediastinal lymphadenopathy. The heart and pericardium demonstrate no acute abnormality. There is mild calcified plaque throughout the thoracic aorta which is mildly dilated throughout with no aneurysm or dissection. Lungs/pleura: There mild subsegmental linear densities scattered along the lung bases posterolaterally. No effusion is seen. There is no pulmonary nodule or mass. Upper Abdomen: The adrenals are normal.  There is an 8 cm cyst upper pole left kidney Soft Tissues/Bones: No acute bone or soft tissue abnormality. No evidence of a pulmonary embolus. Mildly dilated and atherosclerotic thoracic aorta with no aneurysm or dissection. Minimal bibasilar atelectasis or scarring posteriorly. Suggest follow-up with chest x-rays.  8 cm benign cyst upper pole left kidney     MRI ABDOMEN W WO CONTRAST MRCP    Result Date: 10/12/2022  EXAMINATION: MRI OF THE ABDOMEN WITH AND WITHOUT CONTRAST AND MRCP 10/12/2022 4:56 pm TECHNIQUE: Multiplanar multisequence MRI of the abdomen was performed without and with the administration of 20 mL MultiHance intravenous contrast.  After initial T2 axial and coronal images, thick slab, thin slab and 3D coronal MRCP sequences were obtained without the administration of intravenous contrast. MIP images are provided for review. COMPARISON: Ultrasound from yesterday HISTORY: Elevated LFTs. Cholelithiasis. FINDINGS: Liver demonstrates normal morphology without steatosis. Adjacent to the gallbladder, there is a 1.8 cm T2 hyperintense lesion with enhancement characteristics of a cavernous hemangioma. Hepatic vasculature is patent. Gallbladder contains a 1.9 cm stone but there is no gallbladder wall thickening or pericholecystic edema. No biliary ductal dilatation or intraductal filling defect. Best seen on the MRCP sequences, there is a cystic lesion in the pancreatic uncinate process measuring 1.5 x 1.4 cm. No abnormal enhancement or restricted diffusion seen. Spleen and adrenals are unremarkable. Mildly complicated bilateral renal cysts measure up to 8.4 cm and there are smaller simple cysts. Symmetric enhancement of the kidneys without hydronephrosis. No ascites or significant lymphadenopathy. Visualized osseous structures and gastrointestinal tract are unremarkable. Abdominal aorta is normal caliber. 1. Cholelithiasis without evidence of acute cholecystitis. 2. No biliary ductal dilatation or choledocholithiasis. 3. Within the liver, there is a 1.8 cm hemangioma near the gallbladder. 4. Simple and mildly complicated (Bosniak 1 and 2) bilateral renal cysts measuring up to 8.4 cm. No dedicated follow-up recommended. 5. In the pancreatic uncinate process, there is a 1.5 x 1.4 cm cystic lesion which could represent a branch duct IPMN. Recommend 6 month follow-up. RECOMMENDATIONS: Incidental Pancreatic Cysts 65-79 years at presentation 1.5-2.5 cm Main pancreatic duct communication absent or cannot be determined: Pancreas protocol MRI (or CT) in 6 months, consider EUS/FNA Reference: Tyra et al. Management of incidental pancreatic cysts: A white paper of the ACR Incidental Findings Committee. [address] [place], [place] [id number];06:[id number]. The patient was seen and examined on day of discharge and this discharge summary is in conjunction with any daily progress note from day of discharge. Time Spent on discharge is 45 minutes  in the examination, evaluation, counseling and review of medications and discharge plan. Note that more than 30 minutes was spent in preparing discharge papers, discussing discharge with patient, medication review, etc.       Signed:    Maik Rivas MD   10/28/2022      Thank you Cordell Yoon MD for the opportunity to be involved in this patient's care.  If you have any questions or concerns please feel free to contact me at [address] [place] [address]

## 2022-10-30 LAB
ANTINUCLEAR AB INTERPRETIVE COMMENT: NORMAL
ANTINUCLEAR ANTIBODY, HEP-2, IGG: NORMAL
CYTOPLASM PATTERN: ABNORMAL
CYTOPLASMIC PATTERN TITER: ABNORMAL

## 2022-10-31 ENCOUNTER — CARE COORDINATION (OUTPATIENT)
Dept: CASE MANAGEMENT | Age: 77
End: 2022-10-31

## 2022-10-31 ENCOUNTER — OFFICE VISIT (OUTPATIENT)
Dept: INTERNAL MEDICINE CLINIC | Age: 77
End: 2022-10-31
Payer: COMMERCIAL

## 2022-10-31 VITALS
HEART RATE: 76 BPM | TEMPERATURE: 96.8 F | SYSTOLIC BLOOD PRESSURE: 132 MMHG | OXYGEN SATURATION: 98 % | HEIGHT: 74 IN | DIASTOLIC BLOOD PRESSURE: 80 MMHG | WEIGHT: 253.8 LBS | BODY MASS INDEX: 32.57 KG/M2

## 2022-10-31 DIAGNOSIS — M62.82 NON-TRAUMATIC RHABDOMYOLYSIS: ICD-10-CM

## 2022-10-31 DIAGNOSIS — Z09 HOSPITAL DISCHARGE FOLLOW-UP: Primary | ICD-10-CM

## 2022-10-31 DIAGNOSIS — I77.9 CAROTID ARTERY DISEASE, UNSPECIFIED LATERALITY (HCC): ICD-10-CM

## 2022-10-31 DIAGNOSIS — R74.8 ELEVATED CPK: ICD-10-CM

## 2022-10-31 DIAGNOSIS — E53.8 B12 DEFICIENCY: ICD-10-CM

## 2022-10-31 LAB
A/G RATIO: 1.5 (ref 1.1–2.2)
ALBUMIN SERPL-MCNC: 3.5 G/DL (ref 3.4–5)
ALP BLD-CCNC: 68 U/L (ref 40–129)
ALT SERPL-CCNC: 308 U/L (ref 10–40)
ANION GAP SERPL CALCULATED.3IONS-SCNC: 15 MMOL/L (ref 3–16)
AST SERPL-CCNC: 291 U/L (ref 15–37)
BILIRUB SERPL-MCNC: 0.4 MG/DL (ref 0–1)
BUN BLDV-MCNC: 24 MG/DL (ref 7–20)
CALCIUM SERPL-MCNC: 9.3 MG/DL (ref 8.3–10.6)
CHLORIDE BLD-SCNC: 102 MMOL/L (ref 99–110)
CO2: 29 MMOL/L (ref 21–32)
CREAT SERPL-MCNC: 1 MG/DL (ref 0.8–1.3)
GFR SERPL CREATININE-BSD FRML MDRD: >60 ML/MIN/{1.73_M2}
GLUCOSE BLD-MCNC: 91 MG/DL (ref 70–99)
HCT VFR BLD CALC: 44.6 % (ref 40.5–52.5)
HEMOGLOBIN: 13.9 G/DL (ref 13.5–17.5)
MCH RBC QN AUTO: 25.9 PG (ref 26–34)
MCHC RBC AUTO-ENTMCNC: 31.2 G/DL (ref 31–36)
MCV RBC AUTO: 83.1 FL (ref 80–100)
PDW BLD-RTO: 19.1 % (ref 12.4–15.4)
PLATELET # BLD: 229 K/UL (ref 135–450)
PMV BLD AUTO: 8.6 FL (ref 5–10.5)
POTASSIUM SERPL-SCNC: 3.9 MMOL/L (ref 3.5–5.1)
RBC # BLD: 5.37 M/UL (ref 4.2–5.9)
SODIUM BLD-SCNC: 146 MMOL/L (ref 136–145)
TOTAL CK: 8982 U/L (ref 39–308)
TOTAL PROTEIN: 5.8 G/DL (ref 6.4–8.2)
WBC # BLD: 9.4 K/UL (ref 4–11)

## 2022-10-31 PROCEDURE — 99214 OFFICE O/P EST MOD 30 MIN: CPT | Performed by: INTERNAL MEDICINE

## 2022-10-31 PROCEDURE — 3078F DIAST BP <80 MM HG: CPT | Performed by: INTERNAL MEDICINE

## 2022-10-31 PROCEDURE — 1111F DSCHRG MED/CURRENT MED MERGE: CPT | Performed by: INTERNAL MEDICINE

## 2022-10-31 PROCEDURE — 1123F ACP DISCUSS/DSCN MKR DOCD: CPT | Performed by: INTERNAL MEDICINE

## 2022-10-31 PROCEDURE — 3074F SYST BP LT 130 MM HG: CPT | Performed by: INTERNAL MEDICINE

## 2022-10-31 PROCEDURE — 96372 THER/PROPH/DIAG INJ SC/IM: CPT | Performed by: INTERNAL MEDICINE

## 2022-10-31 RX ORDER — CYANOCOBALAMIN 1000 UG/ML
1000 INJECTION, SOLUTION INTRAMUSCULAR; SUBCUTANEOUS ONCE
Status: COMPLETED | OUTPATIENT
Start: 2022-10-31 | End: 2022-10-31

## 2022-10-31 RX ADMIN — CYANOCOBALAMIN 1000 MCG: 1000 INJECTION, SOLUTION INTRAMUSCULAR; SUBCUTANEOUS at 12:07

## 2022-10-31 NOTE — PROGRESS NOTES
Post-Discharge Transitional Care Follow Up      Darren Blood   YOB: 1945    Date of Office Visit:  10/31/2022  Date of Hospital Admission: 10/26/22  Date of Hospital Discharge: 10/28/22  Readmission Risk Score (high >=14%. Medium >=10%):Readmission Risk Score: 19.7      Care management risk score Rising risk (score 2-5) and Complex Care (Scores >=6): No Risk Score On File     Non face to face  following discharge, date last encounter closed (first attempt may have been earlier): 10/28/2022     Call initiated 2 business days of discharge: Yes     Hospital discharge follow-up  -     NV DISCHARGE MEDS RECONCILED W/ CURRENT OUTPATIENT MED LIST  -     NV DISCHARGE MEDS RECONCILED W/ CURRENT OUTPATIENT MED LIST  Carotid artery disease, unspecified laterality (HCC)  Elevated CPK  -     CK  -     Comprehensive Metabolic Panel  -     NV DISCHARGE MEDS RECONCILED W/ CURRENT OUTPATIENT MED LIST  Non-traumatic rhabdomyolysis  -     CK  -     Comprehensive Metabolic Panel  -     cyanocobalamin ([facility] VITAMIN B12) 1000 MCG tablet; Take 1 tablet by mouth daily, Disp-30 tablet, R-3Normal  -     CBC; Future  B12 deficiency  -     cyanocobalamin injection 1,000 mcg; 1,000 mcg, IntraMUSCular, ONCE, 1 dose, On Mon 10/31/22 at [address] Making: moderate complexity  Return in 3 months (on 1/31/2023). Subjective:   HPI    Inpatient course: Discharge summary reviewed- see chart. Interval history/Current status: 77 yo male who presents  in follow up of hospitalization due to rhabdomyolosis. He is slowly improving. Denies chest pain but does complain of persistent weakness. He complains of fatigue. He is followed by Dr. Alexandro Gauthier and his CK is decreasing.        Lab Review   Admission on 10/26/2022, Discharged on 10/28/2022   Component Date Value    WBC 10/26/2022 4.3     RBC 10/26/2022 4.94     Hemoglobin 10/26/2022 12.8 (A)     Hematocrit 10/26/2022 40.2 (A)     MCV 10/26/2022 81.3     [facility] 10/26/2022 25.9 (A)     MCHC 10/26/2022 31.8     RDW 10/26/2022 18.0 (A)     Platelets 53/34/3135 225     MPV 10/26/2022 8.1     Neutrophils % 10/26/2022 64.8     Lymphocytes % 10/26/2022 24.0     Monocytes % 10/26/2022 8.5     Eosinophils % 10/26/2022 2.2     Basophils % 10/26/2022 0.5     Neutrophils Absolute 10/26/2022 2.8     Lymphocytes Absolute 10/26/2022 1.0     Monocytes Absolute 10/26/2022 0.4     Eosinophils Absolute 10/26/2022 0.1     Basophils Absolute 10/26/2022 0.0     Sodium 10/26/2022 143     Potassium 10/26/2022 4.0     Chloride 10/26/2022 105     CO2 10/26/2022 29     Anion Gap 10/26/2022 9     Glucose 10/26/2022 109 (A)     BUN 10/26/2022 12     Creatinine 10/26/2022 0.8     Est, Glom Filt Rate 10/26/2022 >60     Calcium 10/26/2022 9.4     Total Protein 10/26/2022 6.3 (A)     Albumin 10/26/2022 3.5     Albumin/Globulin Ratio 10/26/2022 1.3     Total Bilirubin 10/26/2022 0.4     Alkaline Phosphatase 10/26/2022 70     ALT 10/26/2022 263 (A)     AST 10/26/2022 426 (A)     Total CK 10/26/2022 13,018 (A)     Pro-BNP 10/26/2022 488 (A)     Troponin 10/26/2022 1.65 (A)     Ventricular Rate 10/26/2022 83     Atrial Rate 10/26/2022 83     P-R Interval 10/26/2022 188     QRS Duration 10/26/2022 92     Q-T Interval 10/26/2022 386     QTc Calculation (Bazett) 10/26/2022 453     P Axis 10/26/2022 46     R Axis 10/26/2022 7     T Axis 10/26/2022 50     Diagnosis 10/26/2022 Normal sinus rhythmLow voltage QRST wave abnormality, consider anterior ischemiaAbnormal ECGConfirmed by Andrew Zaragoza ([id number]) on 10/27/2022 3:20:08 PM     Sodium 10/27/2022 141     Potassium reflex Magnesi* 10/27/2022 4.4     Chloride 10/27/2022 104     CO2 10/27/2022 30     Anion Gap 10/27/2022 7     Glucose 10/27/2022 123 (A)     BUN 10/27/2022 16     Creatinine 10/27/2022 0.8     Est, Glom Filt Rate 10/27/2022 >60     Calcium 10/27/2022 9.0     Total Protein 10/27/2022 5.6 (A)     Albumin 10/27/2022 3.1 (A)     Albumin/Globulin Ratio 10/27/2022 1.2 Total Bilirubin 10/27/2022 0.3     Alkaline Phosphatase 10/27/2022 61     ALT 10/27/2022 218 (A)     AST 10/27/2022 290 (A)     WBC 10/27/2022 6.8     RBC 10/27/2022 4.71     Hemoglobin 10/27/2022 12.1 (A)     Hematocrit 10/27/2022 38.1 (A)     MCV 10/27/2022 81.0     MCH 10/27/2022 25.6 (A)     MCHC 10/27/2022 31.6     RDW 10/27/2022 18.3 (A)     Platelets 39/70/8246 202     MPV 10/27/2022 7.8     Neutrophils % 10/27/2022 85.3     Lymphocytes % 10/27/2022 11.9     Monocytes % 10/27/2022 2.6     Eosinophils % 10/27/2022 0.0     Basophils % 10/27/2022 0.2     Neutrophils Absolute 10/27/2022 5.8     Lymphocytes Absolute 10/27/2022 0.8 (A)     Monocytes Absolute 10/27/2022 0.2     Eosinophils Absolute 10/27/2022 0.0     Basophils Absolute 10/27/2022 0.0     Total CK 10/27/2022 8,680 (A)     Sodium 10/28/2022 138     Potassium reflex Magnesi* 10/28/2022 4.1     Chloride 10/28/2022 103     CO2 10/28/2022 28     Anion Gap 10/28/2022 7     Glucose 10/28/2022 119 (A)     BUN 10/28/2022 24 (A)     Creatinine 10/28/2022 0.8     Est, Glom Filt Rate 10/28/2022 >60     Calcium 10/28/2022 8.8     Total Protein 10/28/2022 5.5 (A)     Albumin 10/28/2022 3.0 (A)     Albumin/Globulin Ratio 10/28/2022 1.2     Total Bilirubin 10/28/2022 0.3     Alkaline Phosphatase 10/28/2022 63     ALT 10/28/2022 216 (A)     AST 10/28/2022 245 (A)     WBC 10/28/2022 13.1 (A)     RBC 10/28/2022 4.53     Hemoglobin 10/28/2022 11.5 (A)     Hematocrit 10/28/2022 36.4 (A)     MCV 10/28/2022 80.4     MCH 10/28/2022 25.4 (A)     MCHC 10/28/2022 31.6     RDW 10/28/2022 17.9 (A)     Platelets 58/67/0411 209     MPV 10/28/2022 8.0     Neutrophils % 10/28/2022 90.8     Lymphocytes % 10/28/2022 6.4     Monocytes % 10/28/2022 2.7     Eosinophils % 10/28/2022 0.0     Basophils % 10/28/2022 0.1     Neutrophils Absolute 10/28/2022 11.9 (A)     Lymphocytes Absolute 10/28/2022 0.8 (A)     Monocytes Absolute 10/28/2022 0.3     Eosinophils Absolute 10/28/2022 0.0 Basophils Absolute 10/28/2022 0.0     Total CK 10/28/2022 5,980 (A)    Orders Only on 10/25/2022   Component Date Value    IDALIA 10/25/2022 POSITIVE (A)     Total CK 10/25/2022 13,736 (A)     Aldolase 10/25/2022 122.0 (A)     Anti-Centromere B IgG 10/25/2022 <0.2     Anti-Chromatin IgG 10/25/2022 <0.2     Anti-JO1 IgG 10/25/2022 <0.2     Anti-Ribosomal P IgG 10/25/2022 <0.2     Anti-RNP IgG 10/25/2022 <0.2     Anti-Smith IgG 10/25/2022 <0.2     Anti-SmRNP IgG 10/25/2022 <0.2     Anti-dsDNA IgG 10/25/2022 <1     Anti-SCL70 IgG 10/25/2022 <0.2     Anti-SS-A IgG 10/25/2022 >8.0 (A)     Anti-SS-B IgG 10/25/2022 <0.2     Misc Test Order 10/25/2022 SEE NOTE     Antinuclear Antibody, He* 10/25/2022 <1:80     Antinuclear AB Interpret* 10/25/2022 See Note     Cytoplasmic Pattern Titer 10/25/2022 1:160 (A)     Cytoplasm Pattern 10/25/2022 Speckled (A)    No results displayed because visit has over 200 results. Patient Active Problem List   Diagnosis    Essential hypertension    Bilateral shoulder pain    CKD (chronic kidney disease), stage III (HCC)    Carotid artery disease, unspecified laterality (HCC)    Elevated LFTs    Elevated troponin level not due to acute coronary syndrome    Elevated CPK    NSTEMI (non-ST elevated myocardial infarction) ([facility] [address]    Preop cardiovascular exam    Non-traumatic rhabdomyolysis    Obesity (BMI 30-39. 9)    Volume overload       Medications listed as ordered at the time of discharge from hospital     Medication List            Accurate as of October 30, 2022  8:28 PM. If you have any questions, ask your nurse or doctor. CHANGE how you take these medications      metoprolol tartrate 25 MG tablet  Commonly known as: LOPRESSOR  TAKE 1 TABLET TWICE A DAY  What changed: See the new instructions.             CONTINUE taking these medications      aspirin 81 MG chewable tablet  Take 1 tablet by mouth daily     furosemide 40 MG tablet  Commonly known as: LASIX  Take 1 tablet by mouth 2 times daily     losartan 50 MG tablet  Commonly known as: COZAAR  TAKE 1 TABLET DAILY     pantoprazole 40 MG tablet  Commonly known as: PROTONIX  Take 1 tablet by mouth every morning (before breakfast)     predniSONE 20 MG tablet  Commonly known as: DELTASONE  Take 3 tablets by mouth daily     sildenafil 100 MG tablet  Commonly known as: VIAGRA  Take 1 tablet by mouth daily as needed for Erectile Dysfunction     therapeutic multivitamin-minerals tablet     Vitamin D 1000 units Caps capsule  Commonly known as: CHOLECALCIFEROL               Medications marked \"taking\" at this time  No outpatient medications have been marked as taking for the 10/31/22 encounter (Office Visit) with Lilia Crowell MD.        Medications patient taking as of now reconciled against medications ordered at time of hospital discharge: No    Review of Systems  @DOS@    No Known Allergies    Current Outpatient Medications   Medication Sig Dispense Refill    furosemide (LASIX) 40 MG tablet Take 1 tablet by mouth 2 times daily 60 tablet 0    pantoprazole (PROTONIX) 40 MG tablet Take 1 tablet by mouth every morning (before breakfast) 30 tablet 0    predniSONE (DELTASONE) 20 MG tablet Take 3 tablets by mouth daily 90 tablet 0    aspirin 81 MG chewable tablet Take 1 tablet by mouth daily 30 tablet 2    metoprolol tartrate (LOPRESSOR) 25 MG tablet TAKE 1 TABLET TWICE A DAY (Patient taking differently: Take 25 mg by mouth 2 times daily TAKE 1 TABLET TWICE A DAY) 180 tablet 3    losartan (COZAAR) 50 MG tablet TAKE 1 TABLET DAILY 90 tablet 0    sildenafil (VIAGRA) 100 MG tablet Take 1 tablet by mouth daily as needed for Erectile Dysfunction 30 tablet 3    Multiple Vitamins-Minerals (THERAPEUTIC MULTIVITAMIN-MINERALS) tablet Take 1 tablet by mouth daily      Vitamin D (CHOLECALCIFEROL) 1000 UNITS CAPS capsule Take 1,000 Units by mouth daily. No current facility-administered medications for this visit.        Vitals:    10/31/22 1027   BP: 132/80   Site: Right Upper Arm   Position: Sitting   Cuff Size: Medium Adult   Pulse: 76   Temp: 96.8 °F (36 °C)   TempSrc: Temporal   SpO2: 98%   Weight: 253 lb 12.8 oz (115.1 kg)   Height: 6' 2\" (1.88 m)     Body mass index is 32.59 kg/m². Wt Readings from Last 3 Encounters:   10/31/22 253 lb 12.8 oz (115.1 kg)   10/26/22 259 lb (117.5 kg)   10/25/22 263 lb (119.3 kg)     BP Readings from Last 3 Encounters:   10/31/22 132/80   10/28/22 117/75   10/25/22 136/86       Objective: There were no vitals taken for this visit. Physical Exam  Vitals and nursing note reviewed. Constitutional:       General: He is not in acute distress. Appearance: Normal appearance. He is well-developed. He is obese. He is not diaphoretic. HENT:      Head: Normocephalic and atraumatic. Right Ear: External ear normal.      Left Ear: External ear normal.      Nose: Nose normal.      Mouth/Throat:      Pharynx: No oropharyngeal exudate. Eyes:      General: No scleral icterus. Right eye: No discharge. Left eye: No discharge. Extraocular Movements: Extraocular movements intact. Conjunctiva/sclera: Conjunctivae normal.      Pupils: Pupils are equal, round, and reactive to light. Neck:      Thyroid: No thyromegaly. Vascular: No JVD. Trachea: No tracheal deviation. Cardiovascular:      Rate and Rhythm: Normal rate and regular rhythm. Pulses: Normal pulses. Heart sounds: Normal heart sounds. No murmur heard. Pulmonary:      Effort: Pulmonary effort is normal. No respiratory distress. Breath sounds: Normal breath sounds. No stridor. No wheezing or rales. Chest:      Chest wall: No tenderness. Abdominal:      General: Bowel sounds are normal. There is no distension. Palpations: Abdomen is soft. There is no mass. Tenderness: There is no abdominal tenderness. There is no guarding or rebound. Musculoskeletal:         General: No tenderness.  Normal range of motion. Cervical back: Normal range of motion and neck supple. Comments: Gross generalized weakness. Muscle strength is decreased throughout   Lymphadenopathy:      Cervical: No cervical adenopathy. Skin:     General: Skin is warm and dry. Capillary Refill: Capillary refill takes less than 2 seconds. Findings: No erythema or rash. Neurological:      General: No focal deficit present. Mental Status: He is alert and oriented to person, place, and time. Mental status is at baseline. Cranial Nerves: No cranial nerve deficit. Motor: No abnormal muscle tone. Coordination: Coordination normal.      Deep Tendon Reflexes: Reflexes are normal and symmetric. Reflexes normal.   Psychiatric:         Mood and Affect: Mood normal.         Behavior: Behavior normal.         Thought Content:  Thought content normal.         Judgment: Judgment normal.     Lab Review   Admission on 10/26/2022, Discharged on 10/28/2022   Component Date Value    WBC 10/26/2022 4.3     RBC 10/26/2022 4.94     Hemoglobin 10/26/2022 12.8 (A)     Hematocrit 10/26/2022 40.2 (A)     MCV 10/26/2022 81.3     MCH 10/26/2022 25.9 (A)     MCHC 10/26/2022 31.8     RDW 10/26/2022 18.0 (A)     Platelets 61/40/4874 225     MPV 10/26/2022 8.1     Neutrophils % 10/26/2022 64.8     Lymphocytes % 10/26/2022 24.0     Monocytes % 10/26/2022 8.5     Eosinophils % 10/26/2022 2.2     Basophils % 10/26/2022 0.5     Neutrophils Absolute 10/26/2022 2.8     Lymphocytes Absolute 10/26/2022 1.0     Monocytes Absolute 10/26/2022 0.4     Eosinophils Absolute 10/26/2022 0.1     Basophils Absolute 10/26/2022 0.0     Sodium 10/26/2022 143     Potassium 10/26/2022 4.0     Chloride 10/26/2022 105     CO2 10/26/2022 29     Anion Gap 10/26/2022 9     Glucose 10/26/2022 109 (A)     BUN 10/26/2022 12     Creatinine 10/26/2022 0.8     Est, Glom Filt Rate 10/26/2022 >60     Calcium 10/26/2022 9.4     Total Protein 10/26/2022 6.3 (A)     Albumin 10/26/2022 3.5     Albumin/Globulin Ratio 10/26/2022 1.3     Total Bilirubin 10/26/2022 0.4     Alkaline Phosphatase 10/26/2022 70     ALT 10/26/2022 263 (A)     AST 10/26/2022 426 (A)     Total CK 10/26/2022 13,018 (A)     Pro-BNP 10/26/2022 488 (A)     Troponin 10/26/2022 1.65 (A)     Ventricular Rate 10/26/2022 83     Atrial Rate 10/26/2022 83     P-R Interval 10/26/2022 188     QRS Duration 10/26/2022 92     Q-T Interval 10/26/2022 386     QTc Calculation (Bazett) 10/26/2022 453     P Axis 10/26/2022 46     R Axis 10/26/2022 7     T Axis 10/26/2022 50     Diagnosis 10/26/2022 Normal sinus rhythmLow voltage QRST wave abnormality, consider anterior ischemiaAbnormal ECGConfirmed by José Miguel Hyde ([id number]) on 10/27/2022 3:20:08 PM     Sodium 10/27/2022 141     Potassium reflex Magnesi* 10/27/2022 4.4     Chloride 10/27/2022 104     CO2 10/27/2022 30     Anion Gap 10/27/2022 7     Glucose 10/27/2022 123 (A)     BUN 10/27/2022 16     Creatinine 10/27/2022 0.8     Est, Glom Filt Rate 10/27/2022 >60     Calcium 10/27/2022 9.0     Total Protein 10/27/2022 5.6 (A)     Albumin 10/27/2022 3.1 (A)     Albumin/Globulin Ratio 10/27/2022 1.2     Total Bilirubin 10/27/2022 0.3     Alkaline Phosphatase 10/27/2022 61     ALT 10/27/2022 218 (A)     AST 10/27/2022 290 (A)     WBC 10/27/2022 6.8     RBC 10/27/2022 4.71     Hemoglobin 10/27/2022 12.1 (A)     Hematocrit 10/27/2022 38.1 (A)     MCV 10/27/2022 81.0     MCH 10/27/2022 25.6 (A)     MCHC 10/27/2022 31.6     RDW 10/27/2022 18.3 (A)     Platelets 87/30/7087 202     MPV 10/27/2022 7.8     Neutrophils % 10/27/2022 85.3     Lymphocytes % 10/27/2022 11.9     Monocytes % 10/27/2022 2.6     Eosinophils % 10/27/2022 0.0     Basophils % 10/27/2022 0.2     Neutrophils Absolute 10/27/2022 5.8     Lymphocytes Absolute 10/27/2022 0.8 (A)     Monocytes Absolute 10/27/2022 0.2     Eosinophils Absolute 10/27/2022 0.0     Basophils Absolute 10/27/2022 0.0     Total CK 10/27/2022 8,680 (A) Sodium 10/28/2022 138     Potassium reflex Magnesi* 10/28/2022 4.1     Chloride 10/28/2022 103     CO2 10/28/2022 28     Anion Gap 10/28/2022 7     Glucose 10/28/2022 119 (A)     BUN 10/28/2022 24 (A)     Creatinine 10/28/2022 0.8     Est, Glom Filt Rate 10/28/2022 >60     Calcium 10/28/2022 8.8     Total Protein 10/28/2022 5.5 (A)     Albumin 10/28/2022 3.0 (A)     Albumin/Globulin Ratio 10/28/2022 1.2     Total Bilirubin 10/28/2022 0.3     Alkaline Phosphatase 10/28/2022 63     ALT 10/28/2022 216 (A)     AST 10/28/2022 245 (A)     WBC 10/28/2022 13.1 (A)     RBC 10/28/2022 4.53     Hemoglobin 10/28/2022 11.5 (A)     Hematocrit 10/28/2022 36.4 (A)     MCV 10/28/2022 80.4     MCH 10/28/2022 25.4 (A)     MCHC 10/28/2022 31.6     RDW 10/28/2022 17.9 (A)     Platelets 56/49/3333 209     MPV 10/28/2022 8.0     Neutrophils % 10/28/2022 90.8     Lymphocytes % 10/28/2022 6.4     Monocytes % 10/28/2022 2.7     Eosinophils % 10/28/2022 0.0     Basophils % 10/28/2022 0.1     Neutrophils Absolute 10/28/2022 11.9 (A)     Lymphocytes Absolute 10/28/2022 0.8 (A)     Monocytes Absolute 10/28/2022 0.3     Eosinophils Absolute 10/28/2022 0.0     Basophils Absolute 10/28/2022 0.0     Total CK 10/28/2022 5,980 (A)    Orders Only on 10/25/2022   Component Date Value    IDALIA 10/25/2022 POSITIVE (A)     Total CK 10/25/2022 13,736 (A)     Aldolase 10/25/2022 122.0 (A)     Anti-Centromere B IgG 10/25/2022 <0.2     Anti-Chromatin IgG 10/25/2022 <0.2     Anti-JO1 IgG 10/25/2022 <0.2     Anti-Ribosomal P IgG 10/25/2022 <0.2     Anti-RNP IgG 10/25/2022 <0.2     Anti-Smith IgG 10/25/2022 <0.2     Anti-SmRNP IgG 10/25/2022 <0.2     Anti-dsDNA IgG 10/25/2022 <1     Anti-SCL70 IgG 10/25/2022 <0.2     Anti-SS-A IgG 10/25/2022 >8.0 (A)     Anti-SS-B IgG 10/25/2022 <0.2     Misc Test Order 10/25/2022 SEE NOTE     Antinuclear Antibody, He* 10/25/2022 <1:80     Antinuclear AB Interpret* 10/25/2022 See Note     Cytoplasmic Pattern Titer 10/25/2022 1:160 (A)     Cytoplasm Pattern 10/25/2022 Speckled (A)    No results displayed because visit has over 200 results. An electronic signature was used to authenticate this note.   --19 Erika Martins MD

## 2022-11-01 ENCOUNTER — CARE COORDINATION (OUTPATIENT)
Dept: CASE MANAGEMENT | Age: 77
End: 2022-11-01

## 2022-11-01 ENCOUNTER — TELEPHONE (OUTPATIENT)
Dept: INTERNAL MEDICINE CLINIC | Age: 77
End: 2022-11-01

## 2022-11-01 DIAGNOSIS — R74.8 ELEVATED CPK: Primary | ICD-10-CM

## 2022-11-01 LAB
MISCELLANEOUS LAB TEST ORDER: ABNORMAL
MITOCHONDRIAL M2 AB, IGG: 1 U/ML (ref 0–4)

## 2022-11-01 PROCEDURE — 1111F DSCHRG MED/CURRENT MED MERGE: CPT | Performed by: INTERNAL MEDICINE

## 2022-11-01 NOTE — TELEPHONE ENCOUNTER
----- Message from April Ranjeet sent at 11/1/2022  8:09 AM EDT -----  Subject: Message to Provider    QUESTIONS  Information for Provider? Patient received his lab results from [facility]. Patient states his CK is 8900, and he is concerned as it was this high the   last time he was admitted to the hospital. Please call patient back as   soon as possible to advise, thank you.   ---------------------------------------------------------------------------  --------------  [id number] [employer]  [phone]; OK to leave message on voicemail  ---------------------------------------------------------------------------  --------------  SCRIPT ANSWERS  Relationship to Patient?  Self

## 2022-11-01 NOTE — CARE COORDINATION
[address] Care Transitions Initial Follow Up Call    Call within 2 business days of discharge: Yes    Care Transition Nurse contacted the patient by telephone to perform post hospital discharge assessment. Verified name and  with patient as identifiers. Provided introduction to self, and explanation of the Care Transition Nurse role. Patient: Margarita Barnhart Patient : 1945   MRN: [id number]  Reason for Admission: Rhabdomyolosis, vol overload, elevated CPK  Discharge Date: 10/28/22 RARS: Readmission Risk Score: 19.7      Last Discharge  Street       Date Complaint Diagnosis Description Type Department Provider    10/26/22 Abnormal Lab Non-traumatic rhabdomyolysis . .. ED to Hosp-Admission (Discharged) (ADMITTED) Fani Portillo MD            Was this an external facility discharge? No Discharge Facility:     Challenges to be reviewed by the provider   Additional needs identified to be addressed with provider: No  none               Method of communication with provider: none. Patient followed up with PCP yesterday. He is feeling \"OK\", does get weak at times. He denies any fever and/or pain at this time. Discussed discharge instructions and reviewed medications, 1111F completed. He has all of his medications and is taking them as prescribed. Patient denies any questions at this time. He did have elevated CPK and contacted rheumatology office. Rheumatologist encouraged him to continue taking prednisone and some CPK fluctuations were to be expected. CTN will continue with outreach follow up calls. Care Transition Nurse reviewed discharge instructions and red flags with patient who verbalized understanding. The patient was given an opportunity to ask questions and does not have any further questions or concerns at this time. Were discharge instructions available to patient? Yes.  Reviewed appropriate site of care based on symptoms and resources available to patient including: PCP  Urgent care clinics  When to call 911. The patient agrees to contact the PCP office for questions related to their healthcare. Advance Care Planning:   Does patient have an Advance Directive: not on file; education provided. Medication reconciliation was performed with patient, who verbalizes understanding of administration of home medications. Medications reviewed, 1111F entered: yes    Was patient discharged with a pulse oximeter? no    Non-face-to-face services provided:  Obtained and reviewed discharge summary and/or continuity of care documents    Offered patient enrollment in the Remote Patient Monitoring (RPM) program for in-home monitoring: Patient is not eligible for RPM program.    Care Transitions 24 Hour Call    Do you have a copy of your discharge instructions?: Yes  Do you have all of your prescriptions and are they filled?: Yes  Have you been contacted by a [facility] Pharmacist?: No  Have you scheduled your follow up appointment?: Yes  How are you going to get to your appointment?: Car - family or friend to transport  Do you feel like you have everything you need to keep you well at home?: Yes  Care Transitions Interventions         Follow Up  Future Appointments   Date Time Provider Eliana French   11/7/2022  3:15 PM Wilberto England MD [facility]   11/17/2022  2:00 PM Jonnie Paige MD [facility]   1/16/2023  2:30 PM Elif Reilly MD [facility]   7/17/2023  1:00 PM Elif Reilly MD [address] Transition Nurse provided contact information. Plan for follow-up call in 5-7 days based on severity of symptoms and risk factors.   Plan for next call: symptom management-.  self management-.  follow-up appointment-.    Ulises Mendosa RN

## 2022-11-03 ENCOUNTER — OFFICE VISIT (OUTPATIENT)
Dept: RHEUMATOLOGY | Age: 77
End: 2022-11-03
Payer: COMMERCIAL

## 2022-11-03 VITALS
DIASTOLIC BLOOD PRESSURE: 82 MMHG | HEIGHT: 74 IN | BODY MASS INDEX: 32.47 KG/M2 | WEIGHT: 253 LBS | SYSTOLIC BLOOD PRESSURE: 120 MMHG

## 2022-11-03 DIAGNOSIS — R89.4 SEROLOGIC ABNORMALITY: ICD-10-CM

## 2022-11-03 DIAGNOSIS — M60.80 NECROTIZING MYOSITIS: Primary | ICD-10-CM

## 2022-11-03 DIAGNOSIS — M81.0 SENILE OSTEOPOROSIS: ICD-10-CM

## 2022-11-03 DIAGNOSIS — Z79.899 HIGH RISK MEDICATION USE: ICD-10-CM

## 2022-11-03 PROCEDURE — 3074F SYST BP LT 130 MM HG: CPT | Performed by: INTERNAL MEDICINE

## 2022-11-03 PROCEDURE — 1123F ACP DISCUSS/DSCN MKR DOCD: CPT | Performed by: INTERNAL MEDICINE

## 2022-11-03 PROCEDURE — 99215 OFFICE O/P EST HI 40 MIN: CPT | Performed by: INTERNAL MEDICINE

## 2022-11-03 PROCEDURE — 3078F DIAST BP <80 MM HG: CPT | Performed by: INTERNAL MEDICINE

## 2022-11-03 RX ORDER — PANTOPRAZOLE SODIUM 40 MG/1
40 TABLET, DELAYED RELEASE ORAL
Qty: 90 TABLET | Refills: 0 | Status: SHIPPED | OUTPATIENT
Start: 2022-11-03

## 2022-11-03 RX ORDER — PREDNISONE 20 MG/1
TABLET ORAL
Qty: 90 TABLET | Refills: 1 | Status: SHIPPED | OUTPATIENT
Start: 2022-11-03

## 2022-11-03 RX ORDER — FOLIC ACID 1 MG/1
TABLET ORAL
Qty: 90 TABLET | Refills: 1 | Status: SHIPPED | OUTPATIENT
Start: 2022-11-03

## 2022-11-03 NOTE — PROGRESS NOTES
[address], [place]                                                           [address] [place] [address] 51, [address] [place] [address]                                                             [phone] (P) [phone] (F)      Note is transcribed using voice recognition software. Inadvertent computerized transcription errors may be present. Patient identification: Blu Leon, male : [id number],13 y.o.        ASSESSMENT AND PLAN:  Елена Jama was seen today for follow-up. Diagnoses and all orders for this visit:    Necrotizing myositis  -     methotrexate (RHEUMATREX) 2.5 MG chemo tablet; Week 1: 4 tablets by mouth once a week. Week 2: 5 tablets /week. Week 3: 6 tablets/ week, thereafter: 7 tablets/ week. Same day of the week every week. Safety alert: Labs as recommended by prescribing MD.  -     predniSONE (DELTASONE) 20 MG tablet; Take 3 Daily.  -     CK; Standing  -     Aldolase; Standing  -     Comprehensive Metabolic Panel; Standing  -     CBC with Auto Differential; Standing    Senile osteoporosis  -     DEXA BONE DENSITY 2 SITES; Future    High risk medication use    Serologic abnormality    Other orders  -     folic acid (FOLVITE) 1 MG tablet; Take 1 tab po daily. 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 Muscle biopsy-rare necrotic fibers-no inflammatory markers or any findings that would suggest inflammatory myopathy. History of statin use. Negative HMG Co. a reductase antibody. 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 a reductase antibody is negative. Positive SSA, weak positive SRP.   Muscle biopsy -no evidence of inflammatory myopathy.  -Started prednisone 60 mg a day last week, tolerating well. Last CK 8000. -Given significantly elevated CK, and needing high-dose prednisone, we discussed about starting steroid sparing agents. Various options were discussed, methotrexate would be reasonable. Side effects, directions, monitoring explained in details, reading information provided.  -Lab work to be checked in 2 weeks from now and a few days prior to next visit.  -Continue 60 mg of prednisone and Protonix.  -Check DEXA scan. 2.  Bilateral pitting edema-better still significant on Lasix 40 mg twice daily. Ejection fraction 50% (10/10/2022). Prednisone might also be contributing to leg edema. RTC 4 weeks. Patient indicates understanding and agrees with the management plan. #################################################################################################    Vnslmwnpia-kezqqz-te for acute onset of rhabdomyolysis. Muscle biopsy reviewed-likely toxic myopathy-necrotizing myositis from statin use. Does not have typical path findings given acuity of his symptoms, no other alternative etiologies as well. He does have positive SSA and low titer SRP, but does not have features of inflammatory myopathy and muscle biopsy. He continues to have weakness in both lower extremity muscles mainly thighs. This is limiting some of the ADLs and recreational activities. Leg edema has improved but still has significant leg swelling bilaterally. Is on Lasix 40 mg twice daily. He continues to take prednisone 60 mg a day, that might be contributing to leg edema as well. No other muscle weakness. No joint pain, Raynaud's phenomena, rashes, cough, shortness of breath, GI/ symptoms, dysphagia or any weakness in other areas. Rest of review of systems are negative. PMH, PSH,Social history , Meds reviewed. FH-no family history of autoimmune rheumatic disorders.       PHYSICAL EXAM:    Vitals:    /82   Ht 6' 2\" (1.88 m)   Wt 253 lb (114.8 kg)   BMI 32.48 kg/m²   AA)x3 well nourished, and well groomed, normal judgement. Physical examination is unchanged since last visit. MKS: No appreciable tender, swollen or inflamed joints in upper or lower extremities, F ROM in all peripheral joints. He is unable to get up from sitting position without assistance. Unable to squat. No weakness noted in his upper extremities or distal to knees. 2+ edema-in his lower extremities below knees. Skin changes in his lower extremities, likely edema. Skin: No rashes, no induration or skin thickening or nodules. Neck: Supple no adenopathy. Chest: Normal effort  Heart: S1-S2 regular.   Abdomen:   Lymph nodes:   Neurologic:     DATA:   Lab Results   Component Value Date    CKTOTAL 8,982 (H) 10/31/2022    TROPONINI 1.65 ([place]) 10/26/2022     Lab Results   Component Value Date    IDALIA POSITIVE (A) 10/25/2022     Lab Results   Component Value Date    WBC 9.4 10/31/2022    HGB 13.9 10/31/2022    HCT 44.6 10/31/2022    MCV 83.1 10/31/2022     10/31/2022     Lab Results   Component Value Date     (H) 10/31/2022    K 3.9 10/31/2022     10/31/2022    CO2 29 10/31/2022    BUN 24 (H) 10/31/2022    CREATININE 1.0 10/31/2022    GLUCOSE 91 10/31/2022    CALCIUM 9.3 10/31/2022    PROT 5.8 (L) 10/31/2022    LABALBU 3.5 10/31/2022    BILITOT 0.4 10/31/2022    ALKPHOS 68 10/31/2022     (H) 10/31/2022     (H) 10/31/2022    LABGLOM >60 10/31/2022    GFRAA >60 10/17/2022    AGRATIO 1.5 10/31/2022    GLOB 3.2 10/04/2021       Lab Results   Component Value Date    SEDRATE 62 (H) 10/11/2022     Lab Results   Component Value Date    CRP 4.7 10/11/2022         Total time  >40 minutes that includes the following-  Preparing to see the patient such as reviewing patients records, pre-charting, preparing the visit on the same day, performing a medically appropriate history and physical examination, counseling and educating patient about diagnosis, management plan, ordering appropriate testings, prescriptions, communicating findings to other care providers, and documenting clinical information in electronic medical record. I thank you for giving me the opportunity to be involved in [facility] care and I look forward following Susan Hernandez along with you. If you have any questions or concerns please feel free to contact me at any time.   Dante Diaz MD 11/03/22

## 2022-11-03 NOTE — PATIENT INSTRUCTIONS
Methotrexate instructions-    1. PLEASE TAKE ALL TABLETS AT ONCE OR IF YOU ARE UNABLE TO TOLERATE ALL AT ONCE- CAN SPLIT HALF OF THE DOSE IN THE MORNING AND HALF IN THE EVENING OF THE SAME DAY. Week 1- 4 tablets by mouth once a week       Week 2- 5 tablets by mouth once a week        Week 3- 6 tablets by mouth once a week       Week 4 onwards take 7 tablets by mouth once a week. 2. Folic acid 1 mg daily as long as you are taking Methotrexate. 3. If you are unable to tolerate full dose of Methotrexate due to nausea/vomiting, diarrhea or other side effects, do not increase the dose instead decrease the number of tablets you are taking once a week. 4.  Blood work Q 2 weekly ( CB CD, CMP)  to monitor systemic side effects. 5. IF YOU DO NOT HAVE METHOTREXATE REFILLS< PLEASE CALL OFFICE.         PLEASE CALL WITH ANY QUESTIONS OR CONCERNS

## 2022-11-04 NOTE — DISCHARGE SUMMARY
[address] [place] [address] DISCHARGE SUMMARY    Patient Demographics    Patient. Susi Carrasco  Date of Birth. 1945  MRN. [id number]     Primary care provider. Coco Thompson MD  (Tel: [phone])    Admit date: 10/10/2022    Discharge date (blank if same as Note Date): 10/21/2022  Note Date: 11/4/2022     Reason for Hospitalization. Chief Complaint   Patient presents with    Leg Swelling     Patient in with complaints of bilateral edema in his legs that is new. PCP told him to come into the er. [facility]. Nontraumatic rhabdomyolysis  -With elevated CK. With no significant improvement had extensive work-up with no acute finding  -Work-up was negative patient was treated with aggressive IV fluid with improvement CK trending down with no symptoms.  -Statin was discontinued.  -Patient also with history of CHF on Lasix. Patient was discharged stable outpatient follow-up with rheumatology appointment was set  Patient did undergo biopsy results pending at the time of discharge likely probably concerning for myositis    Consults. IP CONSULT TO CARDIOLOGY  IP CONSULT TO NEPHROLOGY  IP CONSULT TO GI  IP CONSULT TO GENERAL SURGERY  IP CONSULT TO CARDIOLOGY    Physical examination on discharge day. /71   Pulse 99   Temp 97.9 °F (36.6 °C) (Oral)   Resp 18   Ht 6' 2\" (1.88 m)   Wt 261 lb 12.8 oz (118.8 kg)   SpO2 96%   BMI 33.61 kg/m²   General appearance. Alert. Looks comfortable. HEENT. Sclera clear. Moist mucus membranes. Cardiovascular. Regular rate and rhythm, normal S1, S2. No murmur. Respiratory. Not using accessory muscles. Clear to auscultation bilaterally, no wheeze. Gastrointestinal. Abdomen soft, non-tender, not distended, normal bowel sounds  Neurology. Facial symmetry. No speech deficits. Moving all extremities equally. Extremities.  No edema in lower extremities. Skin. Warm, dry, normal turgor    Condition at time of discharge stable     Medication instructions provided to patient at discharge. Medication List        START taking these medications      aspirin 81 MG chewable tablet  Take 1 tablet by mouth daily            CHANGE how you take these medications      metoprolol tartrate 25 MG tablet  Commonly known as: LOPRESSOR  TAKE 1 TABLET TWICE A DAY  What changed: See the new instructions. Notes to patient: Lopressor/Metoprolol is used to treat high blood pressure, chest pain or pressure. Side effects: dizziness, feeling tired, loose stools, throwing up            CONTINUE taking these medications      losartan 50 MG tablet  Commonly known as: COZAAR  TAKE 1 TABLET DAILY  Notes to patient: It can treat high blood pressure. sildenafil 100 MG tablet  Commonly known as: VIAGRA  Take 1 tablet by mouth daily as needed for Erectile Dysfunction  Notes to patient: Use: for erectile dysfunction  Side effect: headache, vision changes     therapeutic multivitamin-minerals tablet  Notes to patient: Use: dietary supplement  Side effects: upset stomach, nausea     Vitamin D 1000 units Caps capsule  Commonly known as: CHOLECALCIFEROL  Notes to patient: Use: Dietary supplement, helps absorb calcium better. Side effects: upset stomach, nausea            STOP taking these medications      clobetasol 0.05 % ointment  Commonly known as: Temovate     rosuvastatin 40 MG tablet  Commonly known as: CRESTOR     triamterene-hydroCHLOROthiazide 37.5-25 MG per tablet  Commonly known as: MAXZIDE-25               Where to Get Your Medications        These medications were sent to [address], Eliana Jaya, [address] [place] [address]      Phone: [phone]   aspirin 81 MG chewable tablet         Discharge recommendations given to patient. Follow Up. pcp in 1 week   Disposition. home  Activity. activity as tolerated  Diet: No diet orders on file      Spent 45  minutes in discharge process.     Signed:  Rashid Fuentes MD     11/4/2022 2:50 PM

## 2022-11-07 ENCOUNTER — CARE COORDINATION (OUTPATIENT)
Dept: CASE MANAGEMENT | Age: 77
End: 2022-11-07

## 2022-11-09 ENCOUNTER — TELEPHONE (OUTPATIENT)
Dept: RHEUMATOLOGY | Age: 77
End: 2022-11-09

## 2022-11-09 ENCOUNTER — CARE COORDINATION (OUTPATIENT)
Dept: CASE MANAGEMENT | Age: 77
End: 2022-11-09

## 2022-11-09 DIAGNOSIS — M60.80 NECROTIZING MYOSITIS: ICD-10-CM

## 2022-11-09 LAB
A/G RATIO: 1.6 (ref 1.1–2.2)
ALBUMIN SERPL-MCNC: 3.7 G/DL (ref 3.4–5)
ALP BLD-CCNC: 71 U/L (ref 40–129)
ALT SERPL-CCNC: 301 U/L (ref 10–40)
ANION GAP SERPL CALCULATED.3IONS-SCNC: 15 MMOL/L (ref 3–16)
AST SERPL-CCNC: 298 U/L (ref 15–37)
BASOPHILS ABSOLUTE: 0 K/UL (ref 0–0.2)
BASOPHILS RELATIVE PERCENT: 0.2 %
BILIRUB SERPL-MCNC: 0.5 MG/DL (ref 0–1)
BUN BLDV-MCNC: 21 MG/DL (ref 7–20)
CALCIUM SERPL-MCNC: 9.3 MG/DL (ref 8.3–10.6)
CHLORIDE BLD-SCNC: 101 MMOL/L (ref 99–110)
CO2: 29 MMOL/L (ref 21–32)
CREAT SERPL-MCNC: 1.1 MG/DL (ref 0.8–1.3)
EOSINOPHILS ABSOLUTE: 0 K/UL (ref 0–0.6)
EOSINOPHILS RELATIVE PERCENT: 0.1 %
GFR SERPL CREATININE-BSD FRML MDRD: >60 ML/MIN/{1.73_M2}
GLUCOSE BLD-MCNC: 92 MG/DL (ref 70–99)
HCT VFR BLD CALC: 43.6 % (ref 40.5–52.5)
HEMOGLOBIN: 14.2 G/DL (ref 13.5–17.5)
LYMPHOCYTES ABSOLUTE: 1 K/UL (ref 1–5.1)
LYMPHOCYTES RELATIVE PERCENT: 8.4 %
MCH RBC QN AUTO: 26.7 PG (ref 26–34)
MCHC RBC AUTO-ENTMCNC: 32.6 G/DL (ref 31–36)
MCV RBC AUTO: 81.9 FL (ref 80–100)
MONOCYTES ABSOLUTE: 0.6 K/UL (ref 0–1.3)
MONOCYTES RELATIVE PERCENT: 5 %
NEUTROPHILS ABSOLUTE: 10.6 K/UL (ref 1.7–7.7)
NEUTROPHILS RELATIVE PERCENT: 86.3 %
PDW BLD-RTO: 20.1 % (ref 12.4–15.4)
PLATELET # BLD: 204 K/UL (ref 135–450)
PMV BLD AUTO: 9.1 FL (ref 5–10.5)
POTASSIUM SERPL-SCNC: 3.8 MMOL/L (ref 3.5–5.1)
RBC # BLD: 5.32 M/UL (ref 4.2–5.9)
SODIUM BLD-SCNC: 145 MMOL/L (ref 136–145)
TOTAL CK: 7854 U/L (ref 39–308)
TOTAL PROTEIN: 6 G/DL (ref 6.4–8.2)
WBC # BLD: 12.2 K/UL (ref 4–11)

## 2022-11-09 NOTE — TELEPHONE ENCOUNTER
Spoke with patient advised to have labs completed and would discuss after review.  Pt will have labs completed today

## 2022-11-09 NOTE — CARE COORDINATION
[facility] Transitions Follow Up Call    2022    Patient: Alejandra Cochran  Patient : 1945   MRN: <[id number]>  Reason for Admission: Rhabdomyolosis, vol overload, elevated CPK  Discharge Date: 10/28/22 RARS: Readmission Risk Score: 19.7         Spoke with: Melly Care  Patient stated he is doing better. Denies fever, cp, abdominal pain, swelling, headache, nvd or back pain. Is sob and fatigued at times. Appetite and fluid intake has been good. No bladder or bowel problems. Taking medication as prescribed. No questions, needs or concerns. Will continue to follow. Care Transitions Follow Up Call    Needs to be reviewed by the provider   Additional needs identified to be addressed with provider: No  none             Method of communication with provider : none      Care Transition Nurse (CTN) contacted the patient by telephone to follow up after admission on 10/26/2022. Verified name and  with patient as identifiers. Addressed changes since last contact: none  Discussed follow-up appointments. If no appointment was previously scheduled, appointment scheduling offered: No.   Is follow up appointment scheduled within 7 days of discharge? Yes. Advance Care Planning:   Does patient have an Advance Directive: not on file. CTN reviewed discharge instructions, medical action plan and red flags with patient and discussed any barriers to care and/or understanding of plan of care after discharge. Discussed appropriate site of care based on symptoms and resources available to patient including: PCP  Specialist  Urgent care clinics. The patient agrees to contact the PCP office for questions related to their healthcare.      Patients top risk factors for readmission: ineffective coping  medical condition-ckd,dm,htn  Interventions to address risk factors: Education of patient/family/caregiver/guardian to support self-management-       Non-[facility] follow up appointment(s):     CTN provided contact information for future needs. Plan for follow-up call in 5-7 days based on severity of symptoms and risk factors. Plan for next call: self management-           Care Transitions Subsequent and Final Call    Subsequent and Final Calls  Do you have any ongoing symptoms?: No  Have your medications changed?: No  Do you have any questions related to your medications?: No  Do you currently have any active services?: No  Do you have any needs or concerns that I can assist you with?: No  Identified Barriers: None  Care Transitions Interventions  Other Interventions:              Follow Up  Future Appointments   Date Time Provider Eliana French   12/2/2022 11:45 AM Russ Rivas MD [facility] RHEUM [facility]   12/12/2022  1:30 PM Annia Maguire MD [facility] Nephrolo   1/16/2023  2:30 PM Willard Liriano MD [facility] [place] - DYD   7/17/2023  1:00 PM Willard Liriano MD [address]       Lindsay Augustine LPN Muscle Hinge Flap Text: The defect edges were debeveled with a #15 scalpel blade.  Given the size, depth and location of the defect and the proximity to free margins a muscle hinge flap was deemed most appropriate.  Using a sterile surgical marker, an appropriate hinge flap was drawn incorporating the defect. The area thus outlined was incised with a #15 scalpel blade.  The skin margins were undermined to an appropriate distance in all directions utilizing iris scissors.

## 2022-11-09 NOTE — TELEPHONE ENCOUNTER
Pt called stating he believes \"muscle weakness\" has gotten worse. Pt was unable to get up off the toilet this morning, needed assistance from his daughters. Pt can be reached at [phone]. Pt's daughters suggested patient possibly needing some form of physical therapy.

## 2022-11-11 LAB — ALDOLASE: 91.9 U/L (ref 1.2–7.6)

## 2022-11-15 PROBLEM — Z01.810 PREOP CARDIOVASCULAR EXAM: Status: RESOLVED | Noted: 2022-10-16 | Resolved: 2022-11-15

## 2022-11-16 ENCOUNTER — CARE COORDINATION (OUTPATIENT)
Dept: CASE MANAGEMENT | Age: 77
End: 2022-11-16

## 2022-11-16 NOTE — CARE COORDINATION
[facility] Care Transitions Follow Up Call    Care Transition Nurse contacted the patient by telephone. Verified name and  with patient as identifiers. Patient: Redd Gaytan  Patient : 1945   MRN: [id number]  Reason for Admission: Rhabdomyolysis   Discharge Date: 10/28/22 RARS: Readmission Risk Score: 19.7      Needs to be reviewed by the provider   Additional needs identified to be addressed with provider: No  none             Method of communication with provider: none. Final outreach call:  Patient reports that he is \"fine\". Denies any questions or  concerns. CTN will resolve episode & remain available. Follow Up  Future Appointments   Date Time Provider Eliana Constantinoi   2022 11:00 AM [facility] [employer] [facility]   2022 11:45 AM Navi Fairchild MD [facility]   2022  1:30 PM Addi Kang MD [facility] Nephrolo   2023  2:30 PM MD VALERIE Collado IM [place] - DYD   2023  1:00 PM MD VALERIE Collado  [place] - DYD     Non-[facility] follow up appointment(s):            Care Transitions Subsequent and Final Call    Subsequent and Final Calls  Care Transitions Interventions  Other Interventions:             Care Transition Nurse provided contact information for future needs. No further follow-up call indicated based on severity of symptoms and risk factors.       Miguel Angel Brian RN

## 2022-11-18 DIAGNOSIS — M60.80 NECROTIZING MYOSITIS: ICD-10-CM

## 2022-11-18 RX ORDER — METHOTREXATE 2.5 MG/1
TABLET ORAL
Qty: 32 TABLET | Refills: 0 | Status: SHIPPED | OUTPATIENT
Start: 2022-11-18 | End: 2022-12-27 | Stop reason: SDUPTHER

## 2022-11-23 DIAGNOSIS — M60.80 NECROTIZING MYOSITIS: ICD-10-CM

## 2022-11-23 LAB
A/G RATIO: 1.5 (ref 1.1–2.2)
ALBUMIN SERPL-MCNC: 3.3 G/DL (ref 3.4–5)
ALP BLD-CCNC: 77 U/L (ref 40–129)
ALT SERPL-CCNC: 266 U/L (ref 10–40)
ANION GAP SERPL CALCULATED.3IONS-SCNC: 14 MMOL/L (ref 3–16)
AST SERPL-CCNC: 229 U/L (ref 15–37)
BILIRUB SERPL-MCNC: 0.6 MG/DL (ref 0–1)
BUN BLDV-MCNC: 18 MG/DL (ref 7–20)
CALCIUM SERPL-MCNC: 9.1 MG/DL (ref 8.3–10.6)
CHLORIDE BLD-SCNC: 101 MMOL/L (ref 99–110)
CO2: 29 MMOL/L (ref 21–32)
CREAT SERPL-MCNC: 0.8 MG/DL (ref 0.8–1.3)
GFR SERPL CREATININE-BSD FRML MDRD: >60 ML/MIN/{1.73_M2}
GLUCOSE BLD-MCNC: 85 MG/DL (ref 70–99)
POTASSIUM SERPL-SCNC: 3.6 MMOL/L (ref 3.5–5.1)
SODIUM BLD-SCNC: 144 MMOL/L (ref 136–145)
TOTAL CK: 6124 U/L (ref 39–308)
TOTAL PROTEIN: 5.5 G/DL (ref 6.4–8.2)

## 2022-11-24 LAB
BANDED NEUTROPHILS RELATIVE PERCENT: 2 % (ref 0–7)
BASOPHILS ABSOLUTE: 0 K/UL (ref 0–0.2)
BASOPHILS RELATIVE PERCENT: 0 %
EOSINOPHILS ABSOLUTE: 0.1 K/UL (ref 0–0.6)
EOSINOPHILS RELATIVE PERCENT: 1 %
HCT VFR BLD CALC: 45 % (ref 40.5–52.5)
HEMOGLOBIN: 14.3 G/DL (ref 13.5–17.5)
LYMPHOCYTES ABSOLUTE: 1.8 K/UL (ref 1–5.1)
LYMPHOCYTES RELATIVE PERCENT: 28 %
MCH RBC QN AUTO: 26.9 PG (ref 26–34)
MCHC RBC AUTO-ENTMCNC: 31.7 G/DL (ref 31–36)
MCV RBC AUTO: 84.6 FL (ref 80–100)
MONOCYTES ABSOLUTE: 0.4 K/UL (ref 0–1.3)
MONOCYTES RELATIVE PERCENT: 6 %
NEUTROPHILS ABSOLUTE: 4.3 K/UL (ref 1.7–7.7)
NEUTROPHILS RELATIVE PERCENT: 63 %
PDW BLD-RTO: 21.4 % (ref 12.4–15.4)
PLATELET # BLD: 188 K/UL (ref 135–450)
PMV BLD AUTO: 9.2 FL (ref 5–10.5)
RBC # BLD: 5.31 M/UL (ref 4.2–5.9)
SLIDE REVIEW: ABNORMAL
WBC # BLD: 6.6 K/UL (ref 4–11)

## 2022-11-25 LAB — ALDOLASE: 81.5 U/L (ref 1.2–7.6)

## 2022-12-02 ENCOUNTER — OFFICE VISIT (OUTPATIENT)
Dept: RHEUMATOLOGY | Age: 77
End: 2022-12-02
Payer: COMMERCIAL

## 2022-12-02 VITALS
SYSTOLIC BLOOD PRESSURE: 132 MMHG | DIASTOLIC BLOOD PRESSURE: 82 MMHG | BODY MASS INDEX: 31.32 KG/M2 | HEIGHT: 74 IN | WEIGHT: 244 LBS

## 2022-12-02 DIAGNOSIS — M25.612 DECREASED ROM OF LEFT SHOULDER: ICD-10-CM

## 2022-12-02 DIAGNOSIS — M81.0 SENILE OSTEOPOROSIS: ICD-10-CM

## 2022-12-02 DIAGNOSIS — M60.80 NECROTIZING MYOSITIS: Primary | ICD-10-CM

## 2022-12-02 DIAGNOSIS — Z79.899 HIGH RISK MEDICATION USE: ICD-10-CM

## 2022-12-02 PROCEDURE — 3074F SYST BP LT 130 MM HG: CPT | Performed by: INTERNAL MEDICINE

## 2022-12-02 PROCEDURE — 3078F DIAST BP <80 MM HG: CPT | Performed by: INTERNAL MEDICINE

## 2022-12-02 PROCEDURE — 99215 OFFICE O/P EST HI 40 MIN: CPT | Performed by: INTERNAL MEDICINE

## 2022-12-02 PROCEDURE — 1123F ACP DISCUSS/DSCN MKR DOCD: CPT | Performed by: INTERNAL MEDICINE

## 2022-12-02 RX ORDER — DIPHENHYDRAMINE HCL 25 MG
25 TABLET ORAL ONCE
OUTPATIENT
Start: 2022-12-05 | End: 2022-12-05

## 2022-12-02 RX ORDER — ACETAMINOPHEN 325 MG/1
650 TABLET ORAL
OUTPATIENT
Start: 2022-12-05

## 2022-12-02 RX ORDER — MEPERIDINE HYDROCHLORIDE 50 MG/ML
12.5 INJECTION INTRAMUSCULAR; INTRAVENOUS; SUBCUTANEOUS PRN
OUTPATIENT
Start: 2022-12-05

## 2022-12-02 RX ORDER — SODIUM CHLORIDE 9 MG/ML
5-250 INJECTION, SOLUTION INTRAVENOUS PRN
OUTPATIENT
Start: 2022-12-05

## 2022-12-02 RX ORDER — SODIUM CHLORIDE 9 MG/ML
INJECTION, SOLUTION INTRAVENOUS CONTINUOUS
OUTPATIENT
Start: 2022-12-05

## 2022-12-02 RX ORDER — SODIUM CHLORIDE 0.9 % (FLUSH) 0.9 %
5-40 SYRINGE (ML) INJECTION PRN
OUTPATIENT
Start: 2022-12-05

## 2022-12-02 RX ORDER — ALBUTEROL SULFATE 90 UG/1
4 AEROSOL, METERED RESPIRATORY (INHALATION) PRN
OUTPATIENT
Start: 2022-12-05

## 2022-12-02 RX ORDER — FAMOTIDINE 10 MG/ML
20 INJECTION, SOLUTION INTRAVENOUS
OUTPATIENT
Start: 2022-12-05

## 2022-12-02 RX ORDER — DIPHENHYDRAMINE HYDROCHLORIDE 50 MG/ML
50 INJECTION INTRAMUSCULAR; INTRAVENOUS
OUTPATIENT
Start: 2022-12-05

## 2022-12-02 RX ORDER — ONDANSETRON 2 MG/ML
8 INJECTION INTRAMUSCULAR; INTRAVENOUS
OUTPATIENT
Start: 2022-12-05

## 2022-12-02 RX ORDER — ACETAMINOPHEN 325 MG/1
650 TABLET ORAL ONCE
OUTPATIENT
Start: 2022-12-05 | End: 2022-12-05

## 2022-12-02 RX ORDER — EPINEPHRINE 1 MG/ML
0.3 INJECTION, SOLUTION, CONCENTRATE INTRAVENOUS PRN
OUTPATIENT
Start: 2022-12-05

## 2022-12-02 RX ORDER — HEPARIN SODIUM (PORCINE) LOCK FLUSH IV SOLN 100 UNIT/ML 100 UNIT/ML
500 SOLUTION INTRAVENOUS PRN
OUTPATIENT
Start: 2022-12-05

## 2022-12-02 NOTE — PROGRESS NOTES
[address], [place]                                                           [address] [place] [address]                                                             [phone] (P) [phone] (F)      Note is transcribed using voice recognition software. Inadvertent computerized transcription errors may be present. Patient identification: Gretchen Shirley, male : ,8 y.o.        ASSESSMENT AND PLAN:  Marla Mack was seen today for follow-up. Diagnoses and all orders for this visit:    Necrotizing myositis    High risk medication use    Senile osteoporosis    Decreased ROM of left shoulder  -     XR SHOULDER LEFT (MIN 2 VIEWS); Future    Other orders  -     0.9 % sodium chloride infusion  -     acetaminophen (TYLENOL) tablet 650 mg  -     diphenhydrAMINE (BENADRYL) tablet 25 mg  -     immune globulin (GAMUNEX-C) 10% solution 220 g  -     sodium chloride flush 0.9 % injection 5-40 mL  -     0.9 % sodium chloride infusion  -     heparin flush 100 UNIT/ML injection 500 Units  -     0.9 % sodium chloride infusion  -     diphenhydrAMINE (BENADRYL) injection 50 mg  -     famotidine (PEPCID) injection 20 mg  -     hydrocortisone sodium succinate PF (SOLU-CORTEF) injection 100 mg  -     acetaminophen (TYLENOL) tablet 650 mg  -     meperidine (DEMEROL) injection 12.5 mg  -     ondansetron (ZOFRAN) injection 8 mg  -     EPINEPHrine PF 1 MG/ML injection (Anaphylaxis) 0.3 mg  -     albuterol sulfate HFA (PROVENTIL;VENTOLIN;PROAIR) 108 (90 Base) MCG/ACT inhaler 4 puff      Necrotizing myopathy-affecting both lower extremities particularly bilateral thighs-onset 10/2022-CK 11,000.     Quadriceps muscle biopsy-\"rare necrotic fibers-no inflammatory markers or any findings that would suggest inflammatory myopathy\". Serology positive SSA, weak positive SRP. History of statin use. Negative HMG Co. a reductase antibody. 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 a reductase antibody is negative.     -Response to prednisone has not been great, has been on 1 mg/kg prednisone for last 6 weeks, CK still over 6000. Weakness is worsening in his both lower extremities. Started methotrexate 4 weeks ago, tolerating it well.  -We discussed about utilizing IVIG given poor response to other treatments. Side effects were directions, monitoring explained. Therapy plan completed for [facility]  Meantime, continue 60 mg of prednisone a day and methotrexate 20 mg p.o. weekly. Recheck labs in 2 weeks, if CK is trending down, will plan to reduce oral prednisone. We also discussed about the possibility of utilizing rituximab, however will wait for next 2 to 3 months until methotrexate gets to a therapeutic level. Continue PPI protection. Bone health-DEXA scan awaited, will plan for Reclast thereafter. Meantime, continue calcium vitamin D. Physical deconditioning-advised patient to do passive exercises. We will plan for physical therapy once CK comes to be reasonable limit. Fall risk and preventative measures discussed. 2.  Left shoulder limited range of motion-suspect adhesive capsulitis-both active and passive range of motion are significantly limited. Obtain x-ray left shoulder to evaluate for osteoarthritis. Discussed importance of daily exercises. Shoulder range of motion exercises were discussed. He would benefit by shoulder physical therapy however is challenging at this time given weakness in his lower extremities. Advised patient to call us with any questions or concerns. RTC 6 weeks.     Patient indicates understanding and agrees with the management plan.    #################################################################################################    Ehvbrmpnzk-lqcvxm-hi for necrotizing myopathy predominantly affecting his lower extremities-mainly thigh muscles. He was hospitalized to [facility] in mid October with generalized weakness and shortness of breath of rather acute onset, was found to have CK of 11,000. Work-up revealed necrotizing myopathy. He has been on high-dose prednisone-60 to 80 mg daily ever since then. Currently he is taking 60 mg of prednisone daily, and starting methotrexate 20 mg weekly a month ago. CKs trending down slowly, now up to 6000. He does not notice much improvement in his muscle weakness, in fact feels that it is getting somewhat worse, is having difficulty in carrying out ADLs and recreational activities. His daughter is worried about falling, which is a legitimate concern. Other than weakness in his thigh muscles, rest of the muscle strength is preserved. Denies diplopia, dysphagia  Left shoulder has significantly limited range of motion, painless, began at the onset of generalized muscle weakness. He is tolerating current immunosuppressive medications well. Safety labs have been stable. PMH, PSH,Social history , Meds reviewed. FH-no family history of autoimmune rheumatic disorders. PHYSICAL EXAM:    Vitals:    /82   Ht 6' 2\" (1.88 m)   Wt 244 lb (110.7 kg)   BMI 31.33 kg/m²   AA)x3 well nourished, and well groomed, normal judgement. Patient examined today, physical exam is unchanged as below-  MKS: No appreciable tender, swollen or inflamed joints in upper or lower extremities, F ROM in all peripheral joints. He is unable to get up from sitting position without assistance. Unable to squat. He has difficulty lifting thighs off the table. No weakness noted in his upper extremities or distal to knees. Trace  edema-in his lower extremities below knees.     Skin changes in his lower extremities, likely edema. Skin: No rashes, no induration or skin thickening or nodules. Neck: Supple no adenopathy. Chest: Normal effort  Heart: S1-S2 regular. Abdomen:   Lymph nodes:   Neurologic:     DATA:   Lab Results   Component Value Date    CKTOTAL 6,124 (H) 11/23/2022    TROPONINI 1.65 (HH) 10/26/2022     Lab Results   Component Value Date    IDALIA POSITIVE (A) 10/25/2022     Lab Results   Component Value Date    WBC 6.6 11/23/2022    HGB 14.3 11/23/2022    HCT 45.0 11/23/2022    MCV 84.6 11/23/2022     11/23/2022     Lab Results   Component Value Date     11/23/2022    K 3.6 11/23/2022     11/23/2022    CO2 29 11/23/2022    BUN 18 11/23/2022    CREATININE 0.8 11/23/2022    GLUCOSE 85 11/23/2022    CALCIUM 9.1 11/23/2022    PROT 5.5 (L) 11/23/2022    LABALBU 3.3 (L) 11/23/2022    BILITOT 0.6 11/23/2022    ALKPHOS 77 11/23/2022     (H) 11/23/2022     (H) 11/23/2022    LABGLOM >60 11/23/2022    GFRAA >60 10/17/2022    AGRATIO 1.5 11/23/2022    GLOB 3.2 10/04/2021       Lab Results   Component Value Date    SEDRATE 62 (H) 10/11/2022     Lab Results   Component Value Date    CRP 4.7 10/11/2022         Total time  >40 minutes that includes the following-  Preparing to see the patient such as reviewing patients records, pre-charting, preparing the visit on the same day, performing a medically appropriate history and physical examination, counseling and educating patient about diagnosis, management plan, ordering appropriate testings, prescriptions, communicating findings to other care providers, and documenting clinical information in electronic medical record. I thank you for giving me the opportunity to be involved in [facility] care and I look forward following Clay Kaur along with you. If you have any questions or concerns please feel free to contact me at any time.   Maddy Grossman MD 12/03/22

## 2022-12-07 ENCOUNTER — HOSPITAL ENCOUNTER (OUTPATIENT)
Dept: ONCOLOGY | Age: 77
Setting detail: INFUSION SERIES
Discharge: HOME | End: 2022-12-07
Payer: COMMERCIAL

## 2022-12-07 VITALS
DIASTOLIC BLOOD PRESSURE: 70 MMHG | HEART RATE: 74 BPM | TEMPERATURE: 98.1 F | OXYGEN SATURATION: 97 % | SYSTOLIC BLOOD PRESSURE: 114 MMHG | RESPIRATION RATE: 18 BRPM

## 2022-12-07 DIAGNOSIS — M60.80 NECROTIZING MYOSITIS: Primary | ICD-10-CM

## 2022-12-07 LAB
A/G RATIO: 1.3 (ref 1.1–2.2)
ALBUMIN SERPL-MCNC: 3.3 G/DL (ref 3.4–5)
ALP BLD-CCNC: 89 U/L (ref 40–129)
ALT SERPL-CCNC: 231 U/L (ref 10–40)
ANION GAP SERPL CALCULATED.3IONS-SCNC: 13 MMOL/L (ref 3–16)
AST SERPL-CCNC: 190 U/L (ref 15–37)
BASOPHILS ABSOLUTE: 0 K/UL (ref 0–0.2)
BASOPHILS RELATIVE PERCENT: 0.4 %
BILIRUB SERPL-MCNC: 0.4 MG/DL (ref 0–1)
BUN BLDV-MCNC: 20 MG/DL (ref 7–20)
CALCIUM SERPL-MCNC: 9.1 MG/DL (ref 8.3–10.6)
CHLORIDE BLD-SCNC: 105 MMOL/L (ref 99–110)
CO2: 27 MMOL/L (ref 21–32)
CREAT SERPL-MCNC: 1.1 MG/DL (ref 0.8–1.3)
EOSINOPHILS ABSOLUTE: 0 K/UL (ref 0–0.6)
EOSINOPHILS RELATIVE PERCENT: 0.1 %
GFR SERPL CREATININE-BSD FRML MDRD: >60 ML/MIN/{1.73_M2}
GLUCOSE BLD-MCNC: 104 MG/DL (ref 70–99)
HCT VFR BLD CALC: 46.3 % (ref 40.5–52.5)
HEMOGLOBIN: 15.2 G/DL (ref 13.5–17.5)
LYMPHOCYTES ABSOLUTE: 1.8 K/UL (ref 1–5.1)
LYMPHOCYTES RELATIVE PERCENT: 18.4 %
MCH RBC QN AUTO: 27.4 PG (ref 26–34)
MCHC RBC AUTO-ENTMCNC: 32.8 G/DL (ref 31–36)
MCV RBC AUTO: 83.5 FL (ref 80–100)
MONOCYTES ABSOLUTE: 0.6 K/UL (ref 0–1.3)
MONOCYTES RELATIVE PERCENT: 5.9 %
NEUTROPHILS ABSOLUTE: 7.3 K/UL (ref 1.7–7.7)
NEUTROPHILS RELATIVE PERCENT: 75.2 %
PDW BLD-RTO: 21.3 % (ref 12.4–15.4)
PLATELET # BLD: 188 K/UL (ref 135–450)
PMV BLD AUTO: 8.5 FL (ref 5–10.5)
POTASSIUM SERPL-SCNC: 3 MMOL/L (ref 3.5–5.1)
RBC # BLD: 5.55 M/UL (ref 4.2–5.9)
SODIUM BLD-SCNC: 145 MMOL/L (ref 136–145)
TOTAL CK: 4971 U/L (ref 39–308)
TOTAL PROTEIN: 5.8 G/DL (ref 6.4–8.2)
WBC # BLD: 9.7 K/UL (ref 4–11)

## 2022-12-07 PROCEDURE — 6360000002 HC RX W HCPCS: Performed by: INTERNAL MEDICINE

## 2022-12-07 PROCEDURE — 6370000000 HC RX 637 (ALT 250 FOR IP): Performed by: INTERNAL MEDICINE

## 2022-12-07 PROCEDURE — 82550 ASSAY OF CK (CPK): CPT

## 2022-12-07 PROCEDURE — 82085 ASSAY OF ALDOLASE: CPT

## 2022-12-07 PROCEDURE — 96365 THER/PROPH/DIAG IV INF INIT: CPT

## 2022-12-07 PROCEDURE — 85025 COMPLETE CBC W/AUTO DIFF WBC: CPT

## 2022-12-07 PROCEDURE — 80053 COMPREHEN METABOLIC PANEL: CPT

## 2022-12-07 PROCEDURE — 96367 TX/PROPH/DG ADDL SEQ IV INF: CPT

## 2022-12-07 RX ORDER — DIPHENHYDRAMINE HCL 25 MG
25 TABLET ORAL ONCE
Status: COMPLETED | OUTPATIENT
Start: 2022-12-07 | End: 2022-12-07

## 2022-12-07 RX ORDER — SODIUM CHLORIDE 9 MG/ML
5-250 INJECTION, SOLUTION INTRAVENOUS PRN
Status: CANCELLED | OUTPATIENT
Start: 2022-12-08

## 2022-12-07 RX ORDER — SODIUM CHLORIDE 9 MG/ML
5-250 INJECTION, SOLUTION INTRAVENOUS PRN
Status: DISCONTINUED | OUTPATIENT
Start: 2022-12-07 | End: 2022-12-08 | Stop reason: HOSPADM

## 2022-12-07 RX ORDER — ACETAMINOPHEN 325 MG/1
650 TABLET ORAL ONCE
Status: COMPLETED | OUTPATIENT
Start: 2022-12-07 | End: 2022-12-07

## 2022-12-07 RX ORDER — HEPARIN SODIUM (PORCINE) LOCK FLUSH IV SOLN 100 UNIT/ML 100 UNIT/ML
500 SOLUTION INTRAVENOUS PRN
Status: CANCELLED | OUTPATIENT
Start: 2022-12-08

## 2022-12-07 RX ORDER — ONDANSETRON 2 MG/ML
8 INJECTION INTRAMUSCULAR; INTRAVENOUS
Status: CANCELLED | OUTPATIENT
Start: 2022-12-08

## 2022-12-07 RX ORDER — DIPHENHYDRAMINE HCL 25 MG
25 TABLET ORAL ONCE
Status: CANCELLED | OUTPATIENT
Start: 2022-12-08 | End: 2022-12-08

## 2022-12-07 RX ORDER — DIPHENHYDRAMINE HYDROCHLORIDE 50 MG/ML
50 INJECTION INTRAMUSCULAR; INTRAVENOUS
Status: CANCELLED | OUTPATIENT
Start: 2022-12-08

## 2022-12-07 RX ORDER — ALBUTEROL SULFATE 90 UG/1
4 AEROSOL, METERED RESPIRATORY (INHALATION) PRN
Status: CANCELLED | OUTPATIENT
Start: 2022-12-08

## 2022-12-07 RX ORDER — SODIUM CHLORIDE 0.9 % (FLUSH) 0.9 %
5-40 SYRINGE (ML) INJECTION PRN
Status: CANCELLED | OUTPATIENT
Start: 2022-12-08

## 2022-12-07 RX ORDER — SODIUM CHLORIDE 9 MG/ML
INJECTION, SOLUTION INTRAVENOUS CONTINUOUS
Status: CANCELLED | OUTPATIENT
Start: 2022-12-08

## 2022-12-07 RX ORDER — MEPERIDINE HYDROCHLORIDE 25 MG/ML
12.5 INJECTION INTRAMUSCULAR; INTRAVENOUS; SUBCUTANEOUS PRN
Status: CANCELLED | OUTPATIENT
Start: 2022-12-08

## 2022-12-07 RX ORDER — ACETAMINOPHEN 325 MG/1
650 TABLET ORAL ONCE
Status: CANCELLED | OUTPATIENT
Start: 2022-12-08 | End: 2022-12-08

## 2022-12-07 RX ORDER — ACETAMINOPHEN 325 MG/1
650 TABLET ORAL
Status: CANCELLED | OUTPATIENT
Start: 2022-12-08

## 2022-12-07 RX ADMIN — ACETAMINOPHEN 650 MG: 325 TABLET ORAL at 09:39

## 2022-12-07 RX ADMIN — DIPHENHYDRAMINE HYDROCHLORIDE 25 MG: 25 TABLET, FILM COATED ORAL at 09:39

## 2022-12-07 RX ADMIN — IMMUNE GLOBULIN (HUMAN) 60 G: 10 INJECTION INTRAVENOUS; SUBCUTANEOUS at 10:21

## 2022-12-07 NOTE — PROGRESS NOTES
Pt seen and assessed at [facility] for IVIG infusion per orders from Dr. Joann Pennington. Premeds administered as ordered. Infused per [facility] policy. Monitoring completed for infusion reactions - see flowsheet. Pt tolerated infusion well and without incident. Pt verbalizes understanding of discharge instructions. Discharged ambulatory to home with wife.     Micheal Green RN

## 2022-12-08 ENCOUNTER — HOSPITAL ENCOUNTER (OUTPATIENT)
Dept: ONCOLOGY | Age: 77
Setting detail: INFUSION SERIES
Discharge: HOME | End: 2022-12-08
Payer: COMMERCIAL

## 2022-12-08 VITALS
SYSTOLIC BLOOD PRESSURE: 111 MMHG | RESPIRATION RATE: 18 BRPM | OXYGEN SATURATION: 97 % | DIASTOLIC BLOOD PRESSURE: 66 MMHG | HEART RATE: 63 BPM | TEMPERATURE: 98.2 F

## 2022-12-08 DIAGNOSIS — M60.80 NECROTIZING MYOSITIS: Primary | ICD-10-CM

## 2022-12-08 LAB — ALDOLASE: 76.4 U/L (ref 1.2–7.6)

## 2022-12-08 PROCEDURE — 96366 THER/PROPH/DIAG IV INF ADDON: CPT

## 2022-12-08 PROCEDURE — 6360000002 HC RX W HCPCS: Performed by: INTERNAL MEDICINE

## 2022-12-08 PROCEDURE — 6370000000 HC RX 637 (ALT 250 FOR IP): Performed by: INTERNAL MEDICINE

## 2022-12-08 PROCEDURE — 96365 THER/PROPH/DIAG IV INF INIT: CPT

## 2022-12-08 RX ORDER — DIPHENHYDRAMINE HCL 25 MG
25 TABLET ORAL ONCE
Status: CANCELLED | OUTPATIENT
Start: 2022-12-09 | End: 2022-12-09

## 2022-12-08 RX ORDER — ACETAMINOPHEN 325 MG/1
650 TABLET ORAL
OUTPATIENT
Start: 2022-12-09

## 2022-12-08 RX ORDER — ALBUTEROL SULFATE 90 UG/1
4 AEROSOL, METERED RESPIRATORY (INHALATION) PRN
OUTPATIENT
Start: 2022-12-09

## 2022-12-08 RX ORDER — HEPARIN SODIUM (PORCINE) LOCK FLUSH IV SOLN 100 UNIT/ML 100 UNIT/ML
500 SOLUTION INTRAVENOUS PRN
OUTPATIENT
Start: 2022-12-09

## 2022-12-08 RX ORDER — ONDANSETRON 2 MG/ML
8 INJECTION INTRAMUSCULAR; INTRAVENOUS
OUTPATIENT
Start: 2022-12-09

## 2022-12-08 RX ORDER — SODIUM CHLORIDE 9 MG/ML
5-250 INJECTION, SOLUTION INTRAVENOUS PRN
OUTPATIENT
Start: 2022-12-09

## 2022-12-08 RX ORDER — SODIUM CHLORIDE 9 MG/ML
INJECTION, SOLUTION INTRAVENOUS CONTINUOUS
OUTPATIENT
Start: 2022-12-09

## 2022-12-08 RX ORDER — DIPHENHYDRAMINE HCL 25 MG
25 TABLET ORAL ONCE
Status: COMPLETED | OUTPATIENT
Start: 2022-12-08 | End: 2022-12-08

## 2022-12-08 RX ORDER — MEPERIDINE HYDROCHLORIDE 25 MG/ML
12.5 INJECTION INTRAMUSCULAR; INTRAVENOUS; SUBCUTANEOUS PRN
OUTPATIENT
Start: 2022-12-09

## 2022-12-08 RX ORDER — DIPHENHYDRAMINE HYDROCHLORIDE 50 MG/ML
50 INJECTION INTRAMUSCULAR; INTRAVENOUS
OUTPATIENT
Start: 2022-12-09

## 2022-12-08 RX ORDER — SODIUM CHLORIDE 0.9 % (FLUSH) 0.9 %
5-40 SYRINGE (ML) INJECTION PRN
OUTPATIENT
Start: 2022-12-09

## 2022-12-08 RX ORDER — ACETAMINOPHEN 325 MG/1
650 TABLET ORAL ONCE
Status: COMPLETED | OUTPATIENT
Start: 2022-12-08 | End: 2022-12-08

## 2022-12-08 RX ORDER — ACETAMINOPHEN 325 MG/1
650 TABLET ORAL ONCE
Status: CANCELLED | OUTPATIENT
Start: 2022-12-09 | End: 2022-12-09

## 2022-12-08 RX ADMIN — IMMUNE GLOBULIN (HUMAN) 60 G: 10 INJECTION INTRAVENOUS; SUBCUTANEOUS at 10:13

## 2022-12-08 RX ADMIN — DIPHENHYDRAMINE HYDROCHLORIDE 25 MG: 25 TABLET, FILM COATED ORAL at 09:24

## 2022-12-08 RX ADMIN — ACETAMINOPHEN 650 MG: 325 TABLET ORAL at 09:25

## 2022-12-09 ENCOUNTER — HOSPITAL ENCOUNTER (OUTPATIENT)
Dept: ONCOLOGY | Age: 77
Setting detail: INFUSION SERIES
Discharge: HOME | End: 2022-12-09
Payer: COMMERCIAL

## 2022-12-09 ENCOUNTER — HOSPITAL ENCOUNTER (OUTPATIENT)
Dept: ONCOLOGY | Age: 77
Setting detail: INFUSION SERIES
End: 2022-12-09
Payer: COMMERCIAL

## 2022-12-09 ENCOUNTER — HOSPITAL ENCOUNTER (OUTPATIENT)
Dept: ONCOLOGY | Age: 77
Discharge: HOME | End: 2022-12-09

## 2022-12-09 VITALS
OXYGEN SATURATION: 97 % | HEART RATE: 62 BPM | DIASTOLIC BLOOD PRESSURE: 69 MMHG | SYSTOLIC BLOOD PRESSURE: 114 MMHG | RESPIRATION RATE: 16 BRPM | TEMPERATURE: 98.1 F

## 2022-12-09 DIAGNOSIS — M60.80 NECROTIZING MYOSITIS: Primary | ICD-10-CM

## 2022-12-09 PROCEDURE — 6360000002 HC RX W HCPCS: Performed by: INTERNAL MEDICINE

## 2022-12-09 PROCEDURE — 6370000000 HC RX 637 (ALT 250 FOR IP): Performed by: INTERNAL MEDICINE

## 2022-12-09 PROCEDURE — 96365 THER/PROPH/DIAG IV INF INIT: CPT

## 2022-12-09 PROCEDURE — 96366 THER/PROPH/DIAG IV INF ADDON: CPT

## 2022-12-09 RX ORDER — SODIUM CHLORIDE 9 MG/ML
INJECTION, SOLUTION INTRAVENOUS CONTINUOUS
OUTPATIENT
Start: 2023-01-04

## 2022-12-09 RX ORDER — ACETAMINOPHEN 325 MG/1
650 TABLET ORAL
OUTPATIENT
Start: 2023-01-04

## 2022-12-09 RX ORDER — DIPHENHYDRAMINE HCL 25 MG
25 TABLET ORAL ONCE
Status: COMPLETED | OUTPATIENT
Start: 2022-12-09 | End: 2022-12-09

## 2022-12-09 RX ORDER — ONDANSETRON 2 MG/ML
8 INJECTION INTRAMUSCULAR; INTRAVENOUS
OUTPATIENT
Start: 2023-01-04

## 2022-12-09 RX ORDER — SODIUM CHLORIDE 0.9 % (FLUSH) 0.9 %
5-40 SYRINGE (ML) INJECTION PRN
OUTPATIENT
Start: 2023-01-04

## 2022-12-09 RX ORDER — SODIUM CHLORIDE 9 MG/ML
5-250 INJECTION, SOLUTION INTRAVENOUS PRN
OUTPATIENT
Start: 2023-01-04

## 2022-12-09 RX ORDER — DIPHENHYDRAMINE HCL 25 MG
25 TABLET ORAL ONCE
OUTPATIENT
Start: 2023-01-04 | End: 2023-01-04

## 2022-12-09 RX ORDER — ACETAMINOPHEN 325 MG/1
650 TABLET ORAL ONCE
OUTPATIENT
Start: 2023-01-04 | End: 2023-01-04

## 2022-12-09 RX ORDER — ALBUTEROL SULFATE 90 UG/1
4 AEROSOL, METERED RESPIRATORY (INHALATION) PRN
OUTPATIENT
Start: 2023-01-04

## 2022-12-09 RX ORDER — MEPERIDINE HYDROCHLORIDE 25 MG/ML
12.5 INJECTION INTRAMUSCULAR; INTRAVENOUS; SUBCUTANEOUS PRN
OUTPATIENT
Start: 2023-01-04

## 2022-12-09 RX ORDER — ACETAMINOPHEN 325 MG/1
650 TABLET ORAL ONCE
Status: COMPLETED | OUTPATIENT
Start: 2022-12-09 | End: 2022-12-09

## 2022-12-09 RX ORDER — DIPHENHYDRAMINE HYDROCHLORIDE 50 MG/ML
50 INJECTION INTRAMUSCULAR; INTRAVENOUS
OUTPATIENT
Start: 2023-01-04

## 2022-12-09 RX ORDER — HEPARIN SODIUM (PORCINE) LOCK FLUSH IV SOLN 100 UNIT/ML 100 UNIT/ML
500 SOLUTION INTRAVENOUS PRN
OUTPATIENT
Start: 2023-01-04

## 2022-12-09 RX ADMIN — IMMUNE GLOBULIN (HUMAN) 65 G: 10 INJECTION INTRAVENOUS; SUBCUTANEOUS at 10:13

## 2022-12-09 RX ADMIN — ACETAMINOPHEN 650 MG: 325 TABLET ORAL at 09:40

## 2022-12-09 RX ADMIN — DIPHENHYDRAMINE HCL 25 MG: 25 TABLET ORAL at 09:40

## 2022-12-09 NOTE — PROGRESS NOTES
Pt seen and assessed at [facility] for IVIG infusion per orders from Dr. Maty Aldrich. Premeds administered as ordered. Infused per [place] [id number] policy. Monitoring completed for infusion reactions - see flowsheet. Pt tolerated infusion well and without incident. Pt verbalizes understanding of discharge instructions. Discharged ambulatory to home with daughter.     Maggy Arora RN

## 2022-12-12 ENCOUNTER — HOSPITAL ENCOUNTER (OUTPATIENT)
Dept: GENERAL RADIOLOGY | Age: 77
Discharge: HOME | End: 2022-12-12
Payer: COMMERCIAL

## 2022-12-12 DIAGNOSIS — M25.612 DECREASED ROM OF LEFT SHOULDER: ICD-10-CM

## 2022-12-12 DIAGNOSIS — M81.0 SENILE OSTEOPOROSIS: ICD-10-CM

## 2022-12-12 PROCEDURE — 73030 X-RAY EXAM OF SHOULDER: CPT

## 2022-12-12 PROCEDURE — 77080 DXA BONE DENSITY AXIAL: CPT

## 2022-12-17 DIAGNOSIS — I10 ESSENTIAL HYPERTENSION: ICD-10-CM

## 2022-12-20 RX ORDER — LOSARTAN POTASSIUM 50 MG/1
TABLET ORAL
Qty: 90 TABLET | Refills: 0 | Status: SHIPPED | OUTPATIENT
Start: 2022-12-20

## 2022-12-21 DIAGNOSIS — M60.80 NECROTIZING MYOSITIS: ICD-10-CM

## 2022-12-21 LAB
A/G RATIO: 1.2 (ref 1.1–2.2)
ALBUMIN SERPL-MCNC: 3.4 G/DL (ref 3.4–5)
ALP BLD-CCNC: 78 U/L (ref 40–129)
ALT SERPL-CCNC: 137 U/L (ref 10–40)
ANION GAP SERPL CALCULATED.3IONS-SCNC: 14 MMOL/L (ref 3–16)
AST SERPL-CCNC: 123 U/L (ref 15–37)
BASOPHILS ABSOLUTE: 0.1 K/UL (ref 0–0.2)
BASOPHILS RELATIVE PERCENT: 0.7 %
BILIRUB SERPL-MCNC: 0.3 MG/DL (ref 0–1)
BUN BLDV-MCNC: 16 MG/DL (ref 7–20)
CALCIUM SERPL-MCNC: 9.3 MG/DL (ref 8.3–10.6)
CHLORIDE BLD-SCNC: 104 MMOL/L (ref 99–110)
CO2: 26 MMOL/L (ref 21–32)
CREAT SERPL-MCNC: 1 MG/DL (ref 0.8–1.3)
EOSINOPHILS ABSOLUTE: 0 K/UL (ref 0–0.6)
EOSINOPHILS RELATIVE PERCENT: 0.1 %
GFR SERPL CREATININE-BSD FRML MDRD: >60 ML/MIN/{1.73_M2}
GLUCOSE BLD-MCNC: 82 MG/DL (ref 70–99)
HCT VFR BLD CALC: 40.9 % (ref 40.5–52.5)
HEMOGLOBIN: 12.9 G/DL (ref 13.5–17.5)
LYMPHOCYTES ABSOLUTE: 2.5 K/UL (ref 1–5.1)
LYMPHOCYTES RELATIVE PERCENT: 18.9 %
MCH RBC QN AUTO: 27.9 PG (ref 26–34)
MCHC RBC AUTO-ENTMCNC: 31.7 G/DL (ref 31–36)
MCV RBC AUTO: 88.2 FL (ref 80–100)
MONOCYTES ABSOLUTE: 0.8 K/UL (ref 0–1.3)
MONOCYTES RELATIVE PERCENT: 6.1 %
NEUTROPHILS ABSOLUTE: 9.8 K/UL (ref 1.7–7.7)
NEUTROPHILS RELATIVE PERCENT: 74.2 %
PDW BLD-RTO: 18.8 % (ref 12.4–15.4)
PLATELET # BLD: 185 K/UL (ref 135–450)
PMV BLD AUTO: 9.3 FL (ref 5–10.5)
POTASSIUM SERPL-SCNC: 3.8 MMOL/L (ref 3.5–5.1)
RBC # BLD: 4.64 M/UL (ref 4.2–5.9)
SODIUM BLD-SCNC: 144 MMOL/L (ref 136–145)
TOTAL CK: 1797 U/L (ref 39–308)
TOTAL PROTEIN: 6.2 G/DL (ref 6.4–8.2)
WBC # BLD: 13.2 K/UL (ref 4–11)

## 2022-12-22 LAB — ALDOLASE: 31.7 U/L (ref 1.2–7.6)

## 2022-12-27 DIAGNOSIS — M60.80 NECROTIZING MYOSITIS: ICD-10-CM

## 2022-12-27 RX ORDER — METHOTREXATE 2.5 MG/1
TABLET ORAL
Qty: 32 TABLET | Refills: 0 | Status: SHIPPED | OUTPATIENT
Start: 2022-12-27

## 2022-12-27 RX ORDER — PREDNISONE 20 MG/1
TABLET ORAL
Qty: 90 TABLET | Refills: 0 | Status: SHIPPED | OUTPATIENT
Start: 2022-12-27

## 2022-12-27 NOTE — TELEPHONE ENCOUNTER
Pt called requesting refill on medication. Confirmed pt is taking 1 - 20 mg of prednisone daily and 8 tablets methotrexate weekly.  Both have been pended for approval

## 2023-01-04 DIAGNOSIS — M60.80 NECROTIZING MYOSITIS: ICD-10-CM

## 2023-01-04 LAB
A/G RATIO: 1.3 (ref 1.1–2.2)
ALBUMIN SERPL-MCNC: 3.4 G/DL (ref 3.4–5)
ALP BLD-CCNC: 75 U/L (ref 40–129)
ALT SERPL-CCNC: 57 U/L (ref 10–40)
ANION GAP SERPL CALCULATED.3IONS-SCNC: 12 MMOL/L (ref 3–16)
AST SERPL-CCNC: 50 U/L (ref 15–37)
BASOPHILS ABSOLUTE: 0 K/UL (ref 0–0.2)
BASOPHILS RELATIVE PERCENT: 0.3 %
BILIRUB SERPL-MCNC: 0.3 MG/DL (ref 0–1)
BUN BLDV-MCNC: 12 MG/DL (ref 7–20)
CALCIUM SERPL-MCNC: 9.1 MG/DL (ref 8.3–10.6)
CHLORIDE BLD-SCNC: 101 MMOL/L (ref 99–110)
CO2: 29 MMOL/L (ref 21–32)
CREAT SERPL-MCNC: 0.9 MG/DL (ref 0.8–1.3)
EOSINOPHILS ABSOLUTE: 0.1 K/UL (ref 0–0.6)
EOSINOPHILS RELATIVE PERCENT: 1.1 %
GFR SERPL CREATININE-BSD FRML MDRD: >60 ML/MIN/{1.73_M2}
GLUCOSE BLD-MCNC: 85 MG/DL (ref 70–99)
HCT VFR BLD CALC: 39.3 % (ref 40.5–52.5)
HEMOGLOBIN: 12.3 G/DL (ref 13.5–17.5)
LYMPHOCYTES ABSOLUTE: 2.4 K/UL (ref 1–5.1)
LYMPHOCYTES RELATIVE PERCENT: 31.6 %
MCH RBC QN AUTO: 27.8 PG (ref 26–34)
MCHC RBC AUTO-ENTMCNC: 31.2 G/DL (ref 31–36)
MCV RBC AUTO: 89.1 FL (ref 80–100)
MONOCYTES ABSOLUTE: 0.5 K/UL (ref 0–1.3)
MONOCYTES RELATIVE PERCENT: 6.5 %
NEUTROPHILS ABSOLUTE: 4.5 K/UL (ref 1.7–7.7)
NEUTROPHILS RELATIVE PERCENT: 60.5 %
PDW BLD-RTO: 19.5 % (ref 12.4–15.4)
PLATELET # BLD: 214 K/UL (ref 135–450)
PMV BLD AUTO: 8.3 FL (ref 5–10.5)
POTASSIUM SERPL-SCNC: 3.7 MMOL/L (ref 3.5–5.1)
RBC # BLD: 4.41 M/UL (ref 4.2–5.9)
SODIUM BLD-SCNC: 142 MMOL/L (ref 136–145)
TOTAL CK: 1160 U/L (ref 39–308)
TOTAL PROTEIN: 6 G/DL (ref 6.4–8.2)
WBC # BLD: 7.5 K/UL (ref 4–11)

## 2023-01-05 LAB — ALDOLASE: 15.2 U/L (ref 1.2–7.6)

## 2023-01-12 ENCOUNTER — OFFICE VISIT (OUTPATIENT)
Dept: RHEUMATOLOGY | Age: 78
End: 2023-01-12
Payer: COMMERCIAL

## 2023-01-12 VITALS
BODY MASS INDEX: 28.49 KG/M2 | HEIGHT: 74 IN | DIASTOLIC BLOOD PRESSURE: 84 MMHG | WEIGHT: 222 LBS | SYSTOLIC BLOOD PRESSURE: 128 MMHG

## 2023-01-12 DIAGNOSIS — R29.898 WEAKNESS OF BOTH LOWER EXTREMITIES: ICD-10-CM

## 2023-01-12 DIAGNOSIS — M60.80 NECROTIZING MYOSITIS: Primary | ICD-10-CM

## 2023-01-12 DIAGNOSIS — Z79.899 HIGH RISK MEDICATION USE: ICD-10-CM

## 2023-01-12 PROCEDURE — 99214 OFFICE O/P EST MOD 30 MIN: CPT | Performed by: INTERNAL MEDICINE

## 2023-01-12 PROCEDURE — 3079F DIAST BP 80-89 MM HG: CPT | Performed by: INTERNAL MEDICINE

## 2023-01-12 PROCEDURE — 1123F ACP DISCUSS/DSCN MKR DOCD: CPT | Performed by: INTERNAL MEDICINE

## 2023-01-12 PROCEDURE — 3074F SYST BP LT 130 MM HG: CPT | Performed by: INTERNAL MEDICINE

## 2023-01-12 RX ORDER — PREDNISONE 1 MG/1
TABLET ORAL
Qty: 30 TABLET | Refills: 1 | Status: SHIPPED | OUTPATIENT
Start: 2023-01-12

## 2023-01-12 RX ORDER — METHOTREXATE 2.5 MG/1
TABLET ORAL
Qty: 104 TABLET | Refills: 0 | Status: SHIPPED | OUTPATIENT
Start: 2023-01-12

## 2023-01-12 RX ORDER — FOLIC ACID 1 MG/1
TABLET ORAL
Qty: 90 TABLET | Refills: 3 | Status: SHIPPED | OUTPATIENT
Start: 2023-01-12

## 2023-01-12 NOTE — PROGRESS NOTES
[address], [place]                                                           [address] [place] [address] Parker Conley [address], [address] [place] [address]                                                             [phone] (P) [phone] (F)      Note is transcribed using voice recognition software. Inadvertent computerized transcription errors may be present. Patient identification: Keyshawn Barnhart, male : ,46 y.o.        ASSESSMENT AND PLAN:  Parveen Garcia was seen today for follow-up. Diagnoses and all orders for this visit:    Necrotizing myositis  -     methotrexate (RHEUMATREX) 2.5 MG chemo tablet; Take 8 tab po once a week ( split the dose 6-8 hours apart in the same day )  -     folic acid (FOLVITE) 1 MG tablet; Take 1 tab po daily. -     Ambulatory referral to Physical Therapy    High risk medication use    Weakness of both lower extremities  -     Ambulatory referral to Physical Therapy    Other orders  -     predniSONE (DELTASONE) 5 MG tablet; Take 1 tab po daily. 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 Quadriceps muscle biopsy-\"rare necrotic fibers-no inflammatory markers or any findings that would suggest inflammatory myopathy\". Serology positive SSA, weak positive SRP. History of statin use. Negative HMG Co. a reductase antibody. 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 a reductase antibody is negative.     -Doing much better after starting IVIG treatment-CK in 1000's. -Reduce prednisone 15 mg a day X 4 weeks, then 10 mg thereafter.  -Continue 20 mg of methotrexate weekly.   -Recommend getting second cycle of IVIG, advised patient to call infusion center.  -Continue calcium vitamin D supplementation.  -Physical therapy lower extremities.  -Labs in 4 weeks, office visit in 2 months. Advised patient to call us with any questions or concerns. Patient indicates understanding and agrees with the management plan. #################################################################################################    [id number]-rfslus-[id number] for necrotizing myopathy predominantly affecting his lower extremities-mainly thigh muscles. Interval changes-since last seen, he received IVIG, continues to take methotrexate 20 mg weekly and prednisone 20 mg a day. Subjective and objective improvement in his muscle strength and CK. Still has weakness in proximal muscles. Denies diplopia, dysphagia or breathing difficulties. Tolerating medications well. Safety labs are normal.    PMH, PSH,Social history , Meds reviewed. FH-no family history of autoimmune rheumatic disorders. PHYSICAL EXAM:    Vitals:    /84   Ht 6' 2\" (1.88 m)   Wt 222 lb (100.7 kg)   BMI 28.50 kg/m²   AA)x3 well nourished, and well groomed, normal judgement. Patient examined today, physical exam is unchanged as below-  MKS: No appreciable inflammatory findings in the joints. He is unable to get up from sitting position without assistance. Unable to squat. Strength improved compared to last visit still not full in his thighs. No weakness noted in his upper extremities or distal to knees. No edema. Skin: No rashes, no induration or skin thickening or nodules. Neck: Supple no adenopathy. Chest: Normal effort  Heart: S1-S2 regular.   Abdomen:   Lymph nodes:   Neurologic:     DATA:   Lab Results   Component Value Date    CKTOTAL 1,160 (H) 01/04/2023    TROPONINI 1.65 (HH) 10/26/2022     Lab Results   Component Value Date    IDALIA POSITIVE (A) 10/25/2022     Lab Results   Component Value Date    WBC 7.5 01/04/2023    HGB 12.3 (L) 01/04/2023    HCT 39.3 (L) 01/04/2023    MCV 89.1 01/04/2023     01/04/2023     Lab Results   Component Value Date     01/04/2023    K 3.7 01/04/2023     01/04/2023    CO2 29 01/04/2023    BUN 12 01/04/2023    CREATININE 0.9 01/04/2023    GLUCOSE 85 01/04/2023    CALCIUM 9.1 01/04/2023    PROT 6.0 (L) 01/04/2023    LABALBU 3.4 01/04/2023    BILITOT 0.3 01/04/2023    ALKPHOS 75 01/04/2023    AST 50 (H) 01/04/2023    ALT 57 (H) 01/04/2023    LABGLOM >60 01/04/2023    GFRAA >60 10/17/2022    AGRATIO 1.3 01/04/2023    GLOB 3.2 10/04/2021       Lab Results   Component Value Date    SEDRATE 62 (H) 10/11/2022     Lab Results   Component Value Date    CRP 4.7 10/11/2022         Total time  34 minutes that includes the following-  Preparing to see the patient such as reviewing patients records, pre-charting, preparing the visit on the same day, performing a medically appropriate history and physical examination, counseling and educating patient about diagnosis, management plan, ordering appropriate testings, prescriptions, communicating findings to other care providers, and documenting clinical information in electronic medical record. I thank you for giving me the opportunity to be involved in [facility] care and I look forward following Natalie Parekh along with you. If you have any questions or concerns please feel free to contact me at any time.   Andres Duran MD 01/12/23

## 2023-01-16 ENCOUNTER — OFFICE VISIT (OUTPATIENT)
Dept: INTERNAL MEDICINE CLINIC | Age: 78
End: 2023-01-16
Payer: COMMERCIAL

## 2023-01-16 VITALS
DIASTOLIC BLOOD PRESSURE: 80 MMHG | HEART RATE: 89 BPM | HEIGHT: 74 IN | OXYGEN SATURATION: 99 % | WEIGHT: 228 LBS | SYSTOLIC BLOOD PRESSURE: 134 MMHG | BODY MASS INDEX: 29.26 KG/M2

## 2023-01-16 DIAGNOSIS — N18.31 STAGE 3A CHRONIC KIDNEY DISEASE (HCC): Primary | ICD-10-CM

## 2023-01-16 DIAGNOSIS — I10 ESSENTIAL HYPERTENSION: ICD-10-CM

## 2023-01-16 DIAGNOSIS — E11.9 DIABETES MELLITUS TYPE 2 IN NONOBESE (HCC): ICD-10-CM

## 2023-01-16 DIAGNOSIS — M60.80 NECROTIZING MYOSITIS: ICD-10-CM

## 2023-01-16 DIAGNOSIS — I77.9 CAROTID ARTERY DISEASE, UNSPECIFIED LATERALITY (HCC): ICD-10-CM

## 2023-01-16 PROCEDURE — 99214 OFFICE O/P EST MOD 30 MIN: CPT | Performed by: INTERNAL MEDICINE

## 2023-01-16 PROCEDURE — 3075F SYST BP GE 130 - 139MM HG: CPT | Performed by: INTERNAL MEDICINE

## 2023-01-16 PROCEDURE — 1123F ACP DISCUSS/DSCN MKR DOCD: CPT | Performed by: INTERNAL MEDICINE

## 2023-01-16 PROCEDURE — 3079F DIAST BP 80-89 MM HG: CPT | Performed by: INTERNAL MEDICINE

## 2023-01-16 RX ORDER — LOSARTAN POTASSIUM 50 MG/1
TABLET ORAL
Qty: 90 TABLET | Refills: 1 | Status: SHIPPED | OUTPATIENT
Start: 2023-01-16

## 2023-01-16 RX ORDER — SILDENAFIL 100 MG/1
100 TABLET, FILM COATED ORAL DAILY PRN
Qty: 30 TABLET | Refills: 3 | Status: SHIPPED | OUTPATIENT
Start: 2023-01-16

## 2023-01-16 RX ORDER — ASPIRIN 81 MG/1
81 TABLET, CHEWABLE ORAL DAILY
Qty: 30 TABLET | Refills: 2 | Status: SHIPPED | OUTPATIENT
Start: 2023-01-16

## 2023-01-16 RX ORDER — FUROSEMIDE 40 MG/1
40 TABLET ORAL 2 TIMES DAILY
Qty: 180 TABLET | Refills: 1 | Status: SHIPPED | OUTPATIENT
Start: 2023-01-16

## 2023-01-16 RX ORDER — PANTOPRAZOLE SODIUM 40 MG/1
40 TABLET, DELAYED RELEASE ORAL
Qty: 30 TABLET | Refills: 0 | Status: SHIPPED | OUTPATIENT
Start: 2023-01-16

## 2023-01-16 RX ORDER — PANTOPRAZOLE SODIUM 40 MG/1
40 TABLET, DELAYED RELEASE ORAL
Qty: 90 TABLET | Refills: 0 | Status: SHIPPED | OUTPATIENT
Start: 2023-01-16

## 2023-01-16 ASSESSMENT — ANXIETY QUESTIONNAIRES
3. WORRYING TOO MUCH ABOUT DIFFERENT THINGS: 0
4. TROUBLE RELAXING: 0
GAD7 TOTAL SCORE: 0
6. BECOMING EASILY ANNOYED OR IRRITABLE: 0
2. NOT BEING ABLE TO STOP OR CONTROL WORRYING: 0
IF YOU CHECKED OFF ANY PROBLEMS ON THIS QUESTIONNAIRE, HOW DIFFICULT HAVE THESE PROBLEMS MADE IT FOR YOU TO DO YOUR WORK, TAKE CARE OF THINGS AT HOME, OR GET ALONG WITH OTHER PEOPLE: NOT DIFFICULT AT ALL
5. BEING SO RESTLESS THAT IT IS HARD TO SIT STILL: 0
1. FEELING NERVOUS, ANXIOUS, OR ON EDGE: 0
7. FEELING AFRAID AS IF SOMETHING AWFUL MIGHT HAPPEN: 0

## 2023-01-16 ASSESSMENT — PATIENT HEALTH QUESTIONNAIRE - PHQ9
4. FEELING TIRED OR HAVING LITTLE ENERGY: 0
SUM OF ALL RESPONSES TO PHQ QUESTIONS 1-9: 0
SUM OF ALL RESPONSES TO PHQ QUESTIONS 1-9: 0
6. FEELING BAD ABOUT YOURSELF - OR THAT YOU ARE A FAILURE OR HAVE LET YOURSELF OR YOUR FAMILY DOWN: 0
SUM OF ALL RESPONSES TO PHQ QUESTIONS 1-9: 0
SUM OF ALL RESPONSES TO PHQ9 QUESTIONS 1 & 2: 0
DEPRESSION UNABLE TO ASSESS: FUNCTIONAL CAPACITY MOTIVATION LIMITS ACCURACY
7. TROUBLE CONCENTRATING ON THINGS, SUCH AS READING THE NEWSPAPER OR WATCHING TELEVISION: 0
5. POOR APPETITE OR OVEREATING: 0
3. TROUBLE FALLING OR STAYING ASLEEP: 0
8. MOVING OR SPEAKING SO SLOWLY THAT OTHER PEOPLE COULD HAVE NOTICED. OR THE OPPOSITE, BEING SO FIGETY OR RESTLESS THAT YOU HAVE BEEN MOVING AROUND A LOT MORE THAN USUAL: 0
9. THOUGHTS THAT YOU WOULD BE BETTER OFF DEAD, OR OF HURTING YOURSELF: 0
2. FEELING DOWN, DEPRESSED OR HOPELESS: 0
SUM OF ALL RESPONSES TO PHQ QUESTIONS 1-9: 0
10. IF YOU CHECKED OFF ANY PROBLEMS, HOW DIFFICULT HAVE THESE PROBLEMS MADE IT FOR YOU TO DO YOUR WORK, TAKE CARE OF THINGS AT HOME, OR GET ALONG WITH OTHER PEOPLE: 0
1. LITTLE INTEREST OR PLEASURE IN DOING THINGS: 0

## 2023-01-16 ASSESSMENT — ENCOUNTER SYMPTOMS
SHORTNESS OF BREATH: 0
BLURRED VISION: 0
ORTHOPNEA: 0

## 2023-01-16 NOTE — PROGRESS NOTES
Miguel Denney (:  1945) is a 78 y.o. male,Established patient, here for evaluation of the following chief complaint(s):  Hypertension (Follow up )         ASSESSMENT/PLAN:    2. Carotid artery disease, unspecified laterality ([facility] [address]  - not on stating due to myositis will consider repatha or praluent when more stable  3. Stage 3 chronic kidney disease, unspecified whether stage 3a or 3b CKD ([facility] [address]  - follow closely  4. Diabetes mellitus type 2 in nonobese [facility])  - well controlled will follow up in APril  5. Necrotizing myositis  -     [facility] - Manish Watkins MD, Rheumatology, [address] [place] [address]   6. Essential hypertension  The following orders have not been finalized:  -     losartan (COZAAR) 50 MG tablet  -     metoprolol tartrate (LOPRESSOR) 25 MG tablet        Necrotizing myositis: followed by Dr. Spike Tran MD, Rheumatology, [address] [place] [address]     Essential hypertension  -     metoprolol tartrate (LOPRESSOR) 25 MG tablet; Take 1 tablet by mouth 2 times daily TAKE 1 TABLET TWICE A DAY  -     losartan (COZAAR) 50 MG tablet; TAKE 1 TABLET DAILY  -     furosemide (LASIX) 40 MG tablet; Take 1 tablet by mouth 2 times daily    Other orders  -     pantoprazole (PROTONIX) 40 MG tablet; Take 1 tablet by mouth every morning (before breakfast)  -     pantoprazole (PROTONIX) 40 MG tablet; Take 1 tablet by mouth every morning (before breakfast)  -     aspirin 81 MG chewable tablet; Take 1 tablet by mouth daily  -     sildenafil (VIAGRA) 100 MG tablet; Take 1 tablet by mouth daily as needed for Erectile Dysfunction    Return in about 4 months (around 2023) for Hypertension. Subjective   SUBJECTIVE/OBJECTIVE:  Hypertension  This is a chronic problem. The current episode started more than 1 year ago. The problem is unchanged. The problem is controlled. Associated symptoms include malaise/fatigue.  Pertinent negatives include no anxiety, blurred vision, chest pain, headaches, neck pain, orthopnea, palpitations, peripheral edema, PND, shortness of breath or sweats. There are no associated agents to hypertension. Risk factors for coronary artery disease include obesity, male gender and dyslipidemia. Past treatments include beta blockers. The current treatment provides significant improvement. There are no compliance problems. There is no history of angina, kidney disease, CAD/MI, CVA, heart failure or left ventricular hypertrophy. Patient with necrotizing myositis. He is followed by dr. Chari Willis. He is improving with steroids an IVIG.      Past Medical History:   Diagnosis Date    Arthritis     Colon polyp     Diabetes mellitus ([facility] [address]     denies being diabetic    Hyperlipemia     Hypertension     Kidney disease     Rhabdomyolysis     Rheumatoid arthritis [facility])      Past Surgical History:   Procedure Laterality Date    COLONOSCOPY      COLONOSCOPY N/A 2020    COLONOSCOPY DIAGNOSTIC performed by German Jin MD at [facility] Left 10/18/2022    LEFT LEG MUSCLE BIOPSY performed by John Luke MD at [facility] Left      Family History   Problem Relation Age of Onset    High Blood Pressure Sister      Social History     Socioeconomic History    Marital status:      Spouse name: Parrish    Number of children: 4    Years of education: 14    Highest education level: Not on file   Occupational History    Occupation: part-time to stay busy     Comment: [employer]   Tobacco Use    Smoking status: Former     Packs/day: 0.50     Years: 10.00     Pack years: 5.00     Types: Cigarettes     Start date:      Quit date: 1984     Years since quittin.9    Smokeless tobacco: Never    Tobacco comments:     quit smoking 30 years    Vaping Use    Vaping Use: Never used   Substance and Sexual Activity    Alcohol use: Yes     Comment: rare    Drug use: No    Sexual activity: Yes     Partners: Female   Other Topics Concern    Not on file   Social History Narrative    Lives home with wife and has 4 children. Social Determinants of Health     Financial Resource Strain: Low Risk     Difficulty of Paying Living Expenses: Not hard at all   Food Insecurity: No Food Insecurity    Worried About Running Out of Food in the Last Year: Never true    Ran Out of Food in the Last Year: Never true   Transportation Needs: Not on file   Physical Activity: Insufficiently Active    Days of Exercise per Week: 3 days    Minutes of Exercise per Session: 30 min   Stress: Not on file   Social Connections: Not on file   Intimate Partner Violence: Not on file   Housing Stability: Not on file       Review of Systems   Constitutional:  Positive for malaise/fatigue. Eyes:  Negative for blurred vision. Respiratory:  Negative for shortness of breath. Cardiovascular:  Negative for chest pain, palpitations, orthopnea and PND. Musculoskeletal:  Negative for neck pain. Neurological:  Negative for headaches. No Known Allergies    Current Outpatient Medications   Medication Sig Dispense Refill    methotrexate (RHEUMATREX) 2.5 MG chemo tablet Take 8 tab po once a week ( split the dose 6-8 hours apart in the same day ) 215 tablet 0    folic acid (FOLVITE) 1 MG tablet Take 1 tab po daily. 90 tablet 3    predniSONE (DELTASONE) 5 MG tablet Take 1 tab po daily. (Patient taking differently: 10 mg Take 1 tab po daily.) 30 tablet 1    predniSONE (DELTASONE) 20 MG tablet Take 1 Daily.  90 tablet 0    losartan (COZAAR) 50 MG tablet TAKE 1 TABLET DAILY 90 tablet 0    furosemide (LASIX) 40 MG tablet Take 1 tablet by mouth 2 times daily 180 tablet 1    pantoprazole (PROTONIX) 40 MG tablet Take 1 tablet by mouth every morning (before breakfast) 90 tablet 0    cyanocobalamin ([employer] VITAMIN B12) 1000 MCG tablet Take 1 tablet by mouth daily 30 tablet 3    pantoprazole (PROTONIX) 40 MG tablet Take 1 tablet by mouth every morning (before breakfast) 30 tablet 0    aspirin 81 MG chewable tablet Take 1 tablet by mouth daily 30 tablet 2    metoprolol tartrate (LOPRESSOR) 25 MG tablet TAKE 1 TABLET TWICE A DAY (Patient taking differently: Take 25 mg by mouth 2 times daily TAKE 1 TABLET TWICE A DAY) 180 tablet 3    sildenafil (VIAGRA) 100 MG tablet Take 1 tablet by mouth daily as needed for Erectile Dysfunction 30 tablet 3    Multiple Vitamins-Minerals (THERAPEUTIC MULTIVITAMIN-MINERALS) tablet Take 1 tablet by mouth daily      Vitamin D (CHOLECALCIFEROL) 1000 UNITS CAPS capsule Take 1,000 Units by mouth daily. No current facility-administered medications for this visit. Vitals:    01/16/23 1421   BP: 134/80   Pulse: 89   SpO2: 99%   Weight: 228 lb (103.4 kg)   Height: 6' 2\" (1.88 m)     Body mass index is 29.27 kg/m². Wt Readings from Last 3 Encounters:   01/16/23 228 lb (103.4 kg)   01/12/23 222 lb (100.7 kg)   12/12/22 210 lb (95.3 kg)     BP Readings from Last 3 Encounters:   01/16/23 134/80   01/12/23 128/84   12/12/22 (!) 149/66         Objective   Physical Exam  Vitals and nursing note reviewed. Constitutional:       Appearance: Normal appearance. HENT:      Head: Normocephalic and atraumatic. Musculoskeletal:         General: Deformity present. Comments: Clear muscle atrophy as compared to  previous test   Skin:     General: Skin is warm. Capillary Refill: Capillary refill takes less than 2 seconds. Neurological:      General: No focal deficit present. Mental Status: He is alert and oriented to person, place, and time. Mental status is at baseline. Psychiatric:         Mood and Affect: Mood normal.         Behavior: Behavior normal.         Thought Content: Thought content normal.              An electronic signature was used to authenticate this note.     --Freddy Simms MD

## 2023-01-18 ENCOUNTER — HOSPITAL ENCOUNTER (OUTPATIENT)
Dept: ONCOLOGY | Age: 78
Setting detail: INFUSION SERIES
Discharge: HOME | End: 2023-01-18
Payer: COMMERCIAL

## 2023-01-18 VITALS
DIASTOLIC BLOOD PRESSURE: 64 MMHG | TEMPERATURE: 98.1 F | HEIGHT: 74 IN | HEART RATE: 77 BPM | SYSTOLIC BLOOD PRESSURE: 128 MMHG | OXYGEN SATURATION: 97 % | WEIGHT: 228.4 LBS | RESPIRATION RATE: 16 BRPM | BODY MASS INDEX: 29.31 KG/M2

## 2023-01-18 DIAGNOSIS — M60.80 NECROTIZING MYOSITIS: Primary | ICD-10-CM

## 2023-01-18 PROCEDURE — 6360000002 HC RX W HCPCS: Performed by: INTERNAL MEDICINE

## 2023-01-18 PROCEDURE — 96365 THER/PROPH/DIAG IV INF INIT: CPT

## 2023-01-18 PROCEDURE — 96366 THER/PROPH/DIAG IV INF ADDON: CPT

## 2023-01-18 PROCEDURE — 6370000000 HC RX 637 (ALT 250 FOR IP): Performed by: INTERNAL MEDICINE

## 2023-01-18 RX ORDER — ACETAMINOPHEN 325 MG/1
650 TABLET ORAL
Status: CANCELLED | OUTPATIENT
Start: 2023-02-01

## 2023-01-18 RX ORDER — ALBUTEROL SULFATE 90 UG/1
4 AEROSOL, METERED RESPIRATORY (INHALATION) PRN
Status: CANCELLED | OUTPATIENT
Start: 2023-02-01

## 2023-01-18 RX ORDER — DIPHENHYDRAMINE HCL 25 MG
25 TABLET ORAL ONCE
Status: COMPLETED | OUTPATIENT
Start: 2023-01-18 | End: 2023-01-18

## 2023-01-18 RX ORDER — SODIUM CHLORIDE 9 MG/ML
INJECTION, SOLUTION INTRAVENOUS CONTINUOUS
Status: CANCELLED | OUTPATIENT
Start: 2023-02-01

## 2023-01-18 RX ORDER — ACETAMINOPHEN 325 MG/1
650 TABLET ORAL ONCE
Status: CANCELLED | OUTPATIENT
Start: 2023-02-01 | End: 2023-02-01

## 2023-01-18 RX ORDER — SODIUM CHLORIDE 9 MG/ML
5-250 INJECTION, SOLUTION INTRAVENOUS PRN
Status: CANCELLED | OUTPATIENT
Start: 2023-02-01

## 2023-01-18 RX ORDER — DIPHENHYDRAMINE HYDROCHLORIDE 50 MG/ML
50 INJECTION INTRAMUSCULAR; INTRAVENOUS
Status: CANCELLED | OUTPATIENT
Start: 2023-02-01

## 2023-01-18 RX ORDER — SODIUM CHLORIDE 0.9 % (FLUSH) 0.9 %
5-40 SYRINGE (ML) INJECTION PRN
Status: CANCELLED | OUTPATIENT
Start: 2023-02-01

## 2023-01-18 RX ORDER — MEPERIDINE HYDROCHLORIDE 25 MG/ML
12.5 INJECTION INTRAMUSCULAR; INTRAVENOUS; SUBCUTANEOUS PRN
Status: CANCELLED | OUTPATIENT
Start: 2023-02-01

## 2023-01-18 RX ORDER — SODIUM CHLORIDE 9 MG/ML
5-250 INJECTION, SOLUTION INTRAVENOUS PRN
Status: DISCONTINUED | OUTPATIENT
Start: 2023-01-18 | End: 2023-01-19 | Stop reason: HOSPADM

## 2023-01-18 RX ORDER — ONDANSETRON 2 MG/ML
8 INJECTION INTRAMUSCULAR; INTRAVENOUS
Status: CANCELLED | OUTPATIENT
Start: 2023-02-01

## 2023-01-18 RX ORDER — DIPHENHYDRAMINE HCL 25 MG
25 TABLET ORAL ONCE
Status: CANCELLED | OUTPATIENT
Start: 2023-02-01 | End: 2023-02-01

## 2023-01-18 RX ORDER — ACETAMINOPHEN 325 MG/1
650 TABLET ORAL ONCE
Status: COMPLETED | OUTPATIENT
Start: 2023-01-18 | End: 2023-01-18

## 2023-01-18 RX ORDER — HEPARIN SODIUM (PORCINE) LOCK FLUSH IV SOLN 100 UNIT/ML 100 UNIT/ML
500 SOLUTION INTRAVENOUS PRN
Status: CANCELLED | OUTPATIENT
Start: 2023-02-01

## 2023-01-18 RX ADMIN — DIPHENHYDRAMINE HCL 25 MG: 25 TABLET ORAL at 09:55

## 2023-01-18 RX ADMIN — ACETAMINOPHEN 650 MG: 325 TABLET ORAL at 09:55

## 2023-01-18 RX ADMIN — IMMUNE GLOBULIN (HUMAN) 60 G: 10 INJECTION INTRAVENOUS; SUBCUTANEOUS at 10:23

## 2023-01-18 NOTE — PROGRESS NOTES
Pt seen and assessed at [facility] for IVIG infusion per orders from Dr. Tarah Winston. Infused per [facility] policy. Monitoring completed for infusion reactions - see flowsheet. Pt tolerated infusion well and without incident. Pt verbalizes understanding of discharge instructions. Discharged ambulatory to home with family. IV removed, no complications.  Electronically signed by Oanh Antonio RN on 1/18/2023 at 3:45 PM

## 2023-01-19 ENCOUNTER — HOSPITAL ENCOUNTER (OUTPATIENT)
Dept: ONCOLOGY | Age: 78
Setting detail: INFUSION SERIES
Discharge: HOME | End: 2023-01-19
Payer: COMMERCIAL

## 2023-01-19 VITALS
TEMPERATURE: 97.9 F | HEIGHT: 74 IN | RESPIRATION RATE: 16 BRPM | WEIGHT: 229.28 LBS | BODY MASS INDEX: 29.43 KG/M2 | SYSTOLIC BLOOD PRESSURE: 148 MMHG | OXYGEN SATURATION: 97 % | HEART RATE: 63 BPM | DIASTOLIC BLOOD PRESSURE: 79 MMHG

## 2023-01-19 DIAGNOSIS — M60.80 NECROTIZING MYOSITIS: Primary | ICD-10-CM

## 2023-01-19 PROCEDURE — 6370000000 HC RX 637 (ALT 250 FOR IP): Performed by: INTERNAL MEDICINE

## 2023-01-19 PROCEDURE — 96365 THER/PROPH/DIAG IV INF INIT: CPT

## 2023-01-19 PROCEDURE — 96366 THER/PROPH/DIAG IV INF ADDON: CPT

## 2023-01-19 PROCEDURE — 2580000003 HC RX 258: Performed by: INTERNAL MEDICINE

## 2023-01-19 PROCEDURE — 6360000002 HC RX W HCPCS: Performed by: INTERNAL MEDICINE

## 2023-01-19 RX ORDER — ACETAMINOPHEN 325 MG/1
650 TABLET ORAL ONCE
Status: COMPLETED | OUTPATIENT
Start: 2023-01-19 | End: 2023-01-19

## 2023-01-19 RX ORDER — ALBUTEROL SULFATE 90 UG/1
4 AEROSOL, METERED RESPIRATORY (INHALATION) PRN
OUTPATIENT
Start: 2023-02-02

## 2023-01-19 RX ORDER — ONDANSETRON 2 MG/ML
8 INJECTION INTRAMUSCULAR; INTRAVENOUS
OUTPATIENT
Start: 2023-02-02

## 2023-01-19 RX ORDER — ACETAMINOPHEN 325 MG/1
650 TABLET ORAL
OUTPATIENT
Start: 2023-02-02

## 2023-01-19 RX ORDER — DIPHENHYDRAMINE HYDROCHLORIDE 50 MG/ML
50 INJECTION INTRAMUSCULAR; INTRAVENOUS
OUTPATIENT
Start: 2023-02-02

## 2023-01-19 RX ORDER — SODIUM CHLORIDE 9 MG/ML
5-250 INJECTION, SOLUTION INTRAVENOUS PRN
OUTPATIENT
Start: 2023-02-02

## 2023-01-19 RX ORDER — SODIUM CHLORIDE 9 MG/ML
INJECTION, SOLUTION INTRAVENOUS CONTINUOUS
OUTPATIENT
Start: 2023-02-02

## 2023-01-19 RX ORDER — DIPHENHYDRAMINE HCL 25 MG
25 TABLET ORAL ONCE
Status: COMPLETED | OUTPATIENT
Start: 2023-01-19 | End: 2023-01-19

## 2023-01-19 RX ORDER — SODIUM CHLORIDE 0.9 % (FLUSH) 0.9 %
5-40 SYRINGE (ML) INJECTION PRN
Status: DISCONTINUED | OUTPATIENT
Start: 2023-01-19 | End: 2023-01-20 | Stop reason: HOSPADM

## 2023-01-19 RX ORDER — SODIUM CHLORIDE 0.9 % (FLUSH) 0.9 %
5-40 SYRINGE (ML) INJECTION PRN
OUTPATIENT
Start: 2023-02-02

## 2023-01-19 RX ORDER — ACETAMINOPHEN 325 MG/1
650 TABLET ORAL ONCE
Status: CANCELLED | OUTPATIENT
Start: 2023-02-02 | End: 2023-02-02

## 2023-01-19 RX ORDER — MEPERIDINE HYDROCHLORIDE 25 MG/ML
12.5 INJECTION INTRAMUSCULAR; INTRAVENOUS; SUBCUTANEOUS PRN
OUTPATIENT
Start: 2023-02-02

## 2023-01-19 RX ORDER — HEPARIN SODIUM (PORCINE) LOCK FLUSH IV SOLN 100 UNIT/ML 100 UNIT/ML
500 SOLUTION INTRAVENOUS PRN
OUTPATIENT
Start: 2023-02-02

## 2023-01-19 RX ORDER — DIPHENHYDRAMINE HCL 25 MG
25 TABLET ORAL ONCE
Status: CANCELLED | OUTPATIENT
Start: 2023-02-02 | End: 2023-02-02

## 2023-01-19 RX ADMIN — ACETAMINOPHEN 650 MG: 325 TABLET ORAL at 09:33

## 2023-01-19 RX ADMIN — IMMUNE GLOBULIN (HUMAN) 60 G: 10 INJECTION INTRAVENOUS; SUBCUTANEOUS at 10:14

## 2023-01-19 RX ADMIN — SODIUM CHLORIDE, PRESERVATIVE FREE 10 ML: 5 INJECTION INTRAVENOUS at 09:34

## 2023-01-19 RX ADMIN — DIPHENHYDRAMINE HCL 25 MG: 25 TABLET ORAL at 09:33

## 2023-01-19 RX ADMIN — SODIUM CHLORIDE, PRESERVATIVE FREE 10 ML: 5 INJECTION INTRAVENOUS at 13:13

## 2023-01-19 NOTE — PROGRESS NOTES
Pt arrived at [facility] for 60 Gram IVIG infusion per orders from Dr. Coley.  Infusion rate increased by 49 mls/hr every 30 minutes as ordered to a max rate of 396 mls/hr.  Pt tolerated the infusion well and without incident.  Pt verbalizes understanding of discharge instructions. Pt discharged ambulatory to his home with his Daughter.    Electronically signed by Julianna Miranda RN on 1/19/2023 at 1:13 PM

## 2023-01-20 ENCOUNTER — HOSPITAL ENCOUNTER (OUTPATIENT)
Dept: ONCOLOGY | Age: 78
Setting detail: INFUSION SERIES
Discharge: HOME | End: 2023-01-20
Payer: COMMERCIAL

## 2023-01-20 VITALS
HEART RATE: 66 BPM | TEMPERATURE: 97.9 F | RESPIRATION RATE: 16 BRPM | OXYGEN SATURATION: 97 % | DIASTOLIC BLOOD PRESSURE: 82 MMHG | SYSTOLIC BLOOD PRESSURE: 146 MMHG

## 2023-01-20 DIAGNOSIS — M60.80 NECROTIZING MYOSITIS: Primary | ICD-10-CM

## 2023-01-20 PROCEDURE — 96367 TX/PROPH/DG ADDL SEQ IV INF: CPT

## 2023-01-20 PROCEDURE — 96365 THER/PROPH/DIAG IV INF INIT: CPT

## 2023-01-20 PROCEDURE — 6370000000 HC RX 637 (ALT 250 FOR IP): Performed by: INTERNAL MEDICINE

## 2023-01-20 PROCEDURE — 6360000002 HC RX W HCPCS: Performed by: INTERNAL MEDICINE

## 2023-01-20 RX ORDER — MEPERIDINE HYDROCHLORIDE 25 MG/ML
12.5 INJECTION INTRAMUSCULAR; INTRAVENOUS; SUBCUTANEOUS PRN
OUTPATIENT
Start: 2023-02-03

## 2023-01-20 RX ORDER — ACETAMINOPHEN 325 MG/1
650 TABLET ORAL ONCE
Status: COMPLETED | OUTPATIENT
Start: 2023-01-20 | End: 2023-01-20

## 2023-01-20 RX ORDER — ALBUTEROL SULFATE 90 UG/1
4 AEROSOL, METERED RESPIRATORY (INHALATION) PRN
OUTPATIENT
Start: 2023-02-03

## 2023-01-20 RX ORDER — ACETAMINOPHEN 325 MG/1
650 TABLET ORAL ONCE
OUTPATIENT
Start: 2023-02-03 | End: 2023-02-03

## 2023-01-20 RX ORDER — SODIUM CHLORIDE 9 MG/ML
5-250 INJECTION, SOLUTION INTRAVENOUS PRN
OUTPATIENT
Start: 2023-02-03

## 2023-01-20 RX ORDER — SODIUM CHLORIDE 0.9 % (FLUSH) 0.9 %
5-40 SYRINGE (ML) INJECTION PRN
OUTPATIENT
Start: 2023-02-03

## 2023-01-20 RX ORDER — ACETAMINOPHEN 325 MG/1
650 TABLET ORAL
OUTPATIENT
Start: 2023-02-03

## 2023-01-20 RX ORDER — DIPHENHYDRAMINE HCL 25 MG
25 TABLET ORAL ONCE
Status: COMPLETED | OUTPATIENT
Start: 2023-01-20 | End: 2023-01-20

## 2023-01-20 RX ORDER — SODIUM CHLORIDE 9 MG/ML
INJECTION, SOLUTION INTRAVENOUS CONTINUOUS
OUTPATIENT
Start: 2023-02-03

## 2023-01-20 RX ORDER — ONDANSETRON 2 MG/ML
8 INJECTION INTRAMUSCULAR; INTRAVENOUS
OUTPATIENT
Start: 2023-02-03

## 2023-01-20 RX ORDER — DIPHENHYDRAMINE HYDROCHLORIDE 50 MG/ML
50 INJECTION INTRAMUSCULAR; INTRAVENOUS
OUTPATIENT
Start: 2023-02-03

## 2023-01-20 RX ORDER — HEPARIN SODIUM (PORCINE) LOCK FLUSH IV SOLN 100 UNIT/ML 100 UNIT/ML
500 SOLUTION INTRAVENOUS PRN
OUTPATIENT
Start: 2023-02-03

## 2023-01-20 RX ORDER — DIPHENHYDRAMINE HCL 25 MG
25 TABLET ORAL ONCE
OUTPATIENT
Start: 2023-02-03 | End: 2023-02-03

## 2023-01-20 RX ADMIN — ACETAMINOPHEN 650 MG: 325 TABLET ORAL at 10:09

## 2023-01-20 RX ADMIN — IMMUNE GLOBULIN (HUMAN) 65 G: 10 INJECTION INTRAVENOUS; SUBCUTANEOUS at 11:03

## 2023-01-20 RX ADMIN — DIPHENHYDRAMINE HCL 25 MG: 25 TABLET ORAL at 10:09

## 2023-01-20 NOTE — PROGRESS NOTES
Pt seen and assessed at [facility] for 65G IVIG infusion per orders from Dr. Danny Pimentel. Infused per [facility] policy. Monitoring completed for infusion reactions - see flowsheet. Pt tolerated infusion well and without incident. Pt verbalizes understanding of discharge instructions. Discharged ambulatory to home with family. IV removed, no complications. IV removed, no complications.  Electronically signed by Edelmira Velasquez RN on 1/20/2023 at 2:48 PM

## 2023-01-23 ENCOUNTER — HOSPITAL ENCOUNTER (OUTPATIENT)
Dept: PHYSICAL THERAPY | Age: 78
Setting detail: THERAPIES SERIES
Discharge: HOME | End: 2023-01-23

## 2023-01-30 DIAGNOSIS — M60.80 NECROTIZING MYOSITIS: ICD-10-CM

## 2023-01-30 RX ORDER — PREDNISONE 20 MG/1
TABLET ORAL
Qty: 90 TABLET | Refills: 1 | OUTPATIENT
Start: 2023-01-30

## 2023-02-01 ENCOUNTER — HOSPITAL ENCOUNTER (OUTPATIENT)
Dept: PHYSICAL THERAPY | Age: 78
Setting detail: THERAPIES SERIES
Discharge: HOME | End: 2023-02-01
Payer: COMMERCIAL

## 2023-02-01 DIAGNOSIS — M60.80 NECROTIZING MYOSITIS: ICD-10-CM

## 2023-02-01 DIAGNOSIS — I10 ESSENTIAL HYPERTENSION: ICD-10-CM

## 2023-02-01 DIAGNOSIS — M62.82 RHABDOMYOLYSIS DUE TO STATIN THERAPY: ICD-10-CM

## 2023-02-01 DIAGNOSIS — T46.6X5A RHABDOMYOLYSIS DUE TO STATIN THERAPY: ICD-10-CM

## 2023-02-01 LAB
A/G RATIO: 1.1 (ref 1.1–2.2)
ALBUMIN SERPL-MCNC: 3.4 G/DL (ref 3.4–5)
ALP BLD-CCNC: 68 U/L (ref 40–129)
ALT SERPL-CCNC: 29 U/L (ref 10–40)
ANION GAP SERPL CALCULATED.3IONS-SCNC: 9 MMOL/L (ref 3–16)
AST SERPL-CCNC: 36 U/L (ref 15–37)
BASOPHILS ABSOLUTE: 0 K/UL (ref 0–0.2)
BASOPHILS RELATIVE PERCENT: 0.2 %
BILIRUB SERPL-MCNC: 0.3 MG/DL (ref 0–1)
BUN BLDV-MCNC: 12 MG/DL (ref 7–20)
CALCIUM SERPL-MCNC: 9.2 MG/DL (ref 8.3–10.6)
CHLORIDE BLD-SCNC: 106 MMOL/L (ref 99–110)
CO2: 28 MMOL/L (ref 21–32)
CREAT SERPL-MCNC: 0.9 MG/DL (ref 0.8–1.3)
EOSINOPHILS ABSOLUTE: 0.1 K/UL (ref 0–0.6)
EOSINOPHILS RELATIVE PERCENT: 1.1 %
GFR SERPL CREATININE-BSD FRML MDRD: >60 ML/MIN/{1.73_M2}
GLUCOSE BLD-MCNC: 89 MG/DL (ref 70–99)
HCT VFR BLD CALC: 33.2 % (ref 40.5–52.5)
HEMOGLOBIN: 10.6 G/DL (ref 13.5–17.5)
LYMPHOCYTES ABSOLUTE: 1.8 K/UL (ref 1–5.1)
LYMPHOCYTES RELATIVE PERCENT: 27.9 %
MCH RBC QN AUTO: 28.7 PG (ref 26–34)
MCHC RBC AUTO-ENTMCNC: 32 G/DL (ref 31–36)
MCV RBC AUTO: 89.7 FL (ref 80–100)
MONOCYTES ABSOLUTE: 0.6 K/UL (ref 0–1.3)
MONOCYTES RELATIVE PERCENT: 8.5 %
NEUTROPHILS ABSOLUTE: 4 K/UL (ref 1.7–7.7)
NEUTROPHILS RELATIVE PERCENT: 62.3 %
PDW BLD-RTO: 17.7 % (ref 12.4–15.4)
PLATELET # BLD: 213 K/UL (ref 135–450)
PMV BLD AUTO: 8.7 FL (ref 5–10.5)
POTASSIUM SERPL-SCNC: 3.4 MMOL/L (ref 3.5–5.1)
RBC # BLD: 3.7 M/UL (ref 4.2–5.9)
SODIUM BLD-SCNC: 143 MMOL/L (ref 136–145)
TOTAL CK: 1043 U/L (ref 39–308)
TOTAL PROTEIN: 6.5 G/DL (ref 6.4–8.2)
WBC # BLD: 6.5 K/UL (ref 4–11)

## 2023-02-01 PROCEDURE — 97162 PT EVAL MOD COMPLEX 30 MIN: CPT

## 2023-02-01 PROCEDURE — 97530 THERAPEUTIC ACTIVITIES: CPT

## 2023-02-01 NOTE — FLOWSHEET NOTE
[address]  Phone: [phone]   Fax: [phone]    Physical Therapy Daily Treatment Note    Date:  2023     Patient Name:  Alejandra Cochran    :  1945  MRN: [id number]  Medical Diagnosis:    M60.80 (ICD-10-CM) - Necrotizing myositis   R29.898 (ICD-10-CM) - Weakness of both lower extremities     Treatment Diagnosis: decreased hip strength, decreased ability to produce and control STS, poor endurance, decreased balance. Insurance/Certification information:  PT Insurance Information: [employer]  Physician Information:  Luma Cunningham MD   Plan of care signed (Y/N): []  Yes [x]  No     Date of Patient follow up with Physician:      Progress Report: []  Yes  [x]  No     Date Range for reporting period:  Beginnin2023  PN:  Ending:     Progress report due (10 Rx/or 30 days whichever is less): visit #10 or     Recertification due (POC duration/ or 90 days whichever is less): visit #12 or 23    Visit # Insurance Allowable Auth required?  Date Range    [place], [place]40 co-pay (pt reports he is able to afford co-pay with 2x/week) []  Yes  [x]  No NA       Latex Allergy:  [x]NO      []YES  Preferred Language for Healthcare:   [x]English       []other:    Functional Scale:       Date assessed:  LEFS: raw score = 51    23    Pain level:  0/10     SUBJECTIVE:  See eval    OBJECTIVE: See eval      RESTRICTIONS/PRECAUTIONS: Necrotizing myositis (be aware of overexertion) controlled hypertension    Exercises/Interventions:     Muscle groups most affected: hip flexors, hip abductors, hip extension  Muscle groups least affected: hip IR/ER, hamstring, quad  Muscle groups moderately affected: ankle PF/DF    Therapeutic Exercise (77549)  Resistance / level Sets/sec Reps Notes / Cues   Nustep/Bike/TM       Quad/HS machine       Leg press       T-band rows  -mid  -high  -LPD       Biodex     for foot intrinsic and ankle strengthening   Give mat hip 3 way for HEP (start with 1-2 sets of 10, monitor for fatigue)              Gait (89659)                     Therapeutic Activities (57286)       UE MMT testing       4\" step ups       STS High mat table             Neuromuscular Re-ed (21447)       Rhomberg on foam       tandem                     Manual Intervention (37683)       Knee mobs/PROM       Tib/Fem Mobs       Patella Mobs       Ankle mobs                         Modalities:     Pt. Education:  02/01/2023  -pt educated on diagnosis, prognosis and expectations for rehab  -all pt questions were answered    Home Exercise Program:  NV    Therapeutic Exercise and NMR EXR  [] (66223) Provided verbal/tactile cueing for activities related to strengthening, flexibility, endurance, ROM for improvements in LE, proximal hip, and core control with self care, mobility, lifting, ambulation.  [] (98673) Provided verbal/tactile cueing for activities related to improving balance, coordination, kinesthetic sense, posture, motor skill, proprioception  to assist with LE, proximal hip, and core control in self care, mobility, lifting, ambulation and eccentric single leg control.   [] (84492) Therapist is in constant attendance of 2 or more patients providing skilled therapy interventions, but not providing any significant amount of measurable one-on-one time to either patient, for improvements in LE, proximal hip, and core control in self care, mobility, lifting, ambulation and eccentric single leg control.      NMR and Therapeutic Activities:    [] (96431 or 20132) Provided verbal/tactile cueing for activities related to improving balance, coordination, kinesthetic sense, posture, motor skill, proprioception and motor activation to allow for proper function of core, proximal hip and LE with self care and ADLs  [] (48002) Gait Re-education- Provided training and instruction to the patient for proper LE, core and proximal hip recruitment and positioning and eccentric body weight control with ambulation re-education including up and down stairs     Home Exercise Program:    [x] (09255) Reviewed/Progressed HEP activities related to strengthening, flexibility, endurance, ROM of core, proximal hip and LE for functional self-care, mobility, lifting and ambulation/stair navigation   [] (28941)Reviewed/Progressed HEP activities related to improving balance, coordination, kinesthetic sense, posture, motor skill, proprioception of core, proximal hip and LE for self care, mobility, lifting, and ambulation/stair navigation      Manual Treatments:  PROM / STM / Oscillations-Mobs:  G-I, II, III, IV (PA's, Inf., Post.)  [] (13215) Provided manual therapy to mobilize LE, proximal hip and/or LS spine soft tissue/joints for the purpose of modulating pain, promoting relaxation,  increasing ROM, reducing/eliminating soft tissue swelling/inflammation/restriction, improving soft tissue extensibility and allowing for proper ROM for normal function with self care, mobility, lifting and ambulation. Modalities:  [] (71894) Vasopneumatic compression: Utilized vasopneumatic compression to decrease edema / swelling for the purpose of improving mobility and quad tone / recruitment which will allow for increased overall function including but not limited to self-care, transfers, ambulation, and ascending / descending stairs.        Charges:  Timed Code Treatment Minutes: 25   Total Treatment Minutes: 45     [] EVAL - LOW (51334)   [x] EVAL - MOD (67540)  [] EVAL - HIGH (21406)  [] RE-EVAL (89478)  [] CS(84373) x       [] Ionto  [] NMR (63112) x       [] Vaso  [] Manual (44745) x       [] Ultrasound  [x] TA x   2     [] Mech Traction (14651)  [] Aquatic Therapy x     [] ES (un) (12250):   [] Home Management Training x  [] ES(attended) (75312)   [] Dry Needling 1-2 muscles (76153):  [] Dry Needling 3+ muscles (569231  [] Group:      [] Other:     GOALS:   Patient stated goal: to be able to get up from chairs, complete stairs, and get back to working out at the gym. [] Progressing: [] Met: [] Not Met: [] Adjusted     Therapist goals for Patient:   Short Term Goals: To be achieved in: 2 weeks  1. Independent in HEP and progression per patient tolerance, in order to prevent re-injury. [] Progressing: [] Met: [] Not Met: [] Adjusted  2. Patient will have a decrease in pain to facilitate improvement in movement, function, and ADLs as indicated by Functional Deficits. [] Progressing: [] Met: [] Not Met: [] Adjusted     Long Term Goals: To be achieved in: 6 weeks  1. Pt will improve LEFS by 9 points to reduce disability and progress towards PLOF. [] Progressing: [] Met: [] Not Met: [] Adjusted  2. Patient will demonstrate an increase in Strength of 1 grade in all LE planes as well as good proximal hip strength and control to allow for proper functional mobility as indicated by patients Functional Deficits. [] Progressing: [] Met: [] Not Met: [] Adjusted  3. Patient will return to functional activities including walking for 10 min at 2.8 mph or greater, 1% incline or great, independently. [] Progressing: [] Met: [] Not Met: [] Adjusted  4. Pt will complete 6 STS transfers in 30 sec from bariatric chair without UE assist.   [] Progressing: [] Met: [] Not Met: [] Adjusted      Overall Progression Towards Functional goals/ Treatment Progress Update:  [] Patient is progressing as expected towards functional goals listed. [] Progression is slowed due to complexities/Impairments listed. [] Progression has been slowed due to co-morbidities.   [x] Plan just implemented, too soon to assess goals progression <30days   [] Goals require adjustment due to lack of progress  [] Patient is not progressing as expected and requires additional follow up with physician  [] Other    Persisting Functional Limitations/Impairments:  []Sitting [x]Standing   [x]Walking [x]Stairs   [x]Transfers [x]ADLs   [x]Squatting/bending [x]Kneeling  [x]Housework [x]Job related tasks  []Driving [x]Sports/Recreation   []Sleeping []Other:    ASSESSMENT:  See treveral  Treatment/Activity Tolerance:  [] Pt able to complete treatment [] Patient limited by fatique  [] Patient limited by pain  [] Patient limited by other medical complications  [] Other:     Prognosis:  [x] Good [x] Fair  [] Poor    Patient Requires Follow-up: [x] Yes  [] No    Return to Play:    [x]  N/A   []  Stage 1: Intro to Strength   []  Stage 2: Return to Run and Strength   []  Stage 3: Return to Jump and Strength   []  Stage 4: Dynamic Strength and Agility   []  Stage 5: Sport Specific Training     []  Ready to Return to Play, Meets All Above Stages   []  Not Ready for Return to Sports   Comments:            PLAN: See eval. PT 2x / week for 6 weeks. [] Continue per plan of care [] Alter current plan (see comments)  [x] Plan of care initiated [] Hold pending MD visit [] Discharge    Electronically signed by: Oleg Leon, PT, DPT      Note: If patient does not return for scheduled/ recommended follow up visits, this note will serve as a discharge from care along with most recent update on progress.

## 2023-02-01 NOTE — PLAN OF CARE
[address] [address], [facility] [address]  Phone: [phone]   Fax: [phone]                                                       Physical Therapy Certification    Dear Oleg Rosas MD  ,    We had the pleasure of evaluating the following patient for physical therapy services at [address] [place] [address]. A summary of our findings can be found in the initial assessment below. This includes our plan of care. If you have any questions or concerns regarding these findings, please do not hesitate to contact me at the office phone number checked above. Thank you for the referral.       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 Insurance information: PT Insurance Information: [employer]     Precautions/ Contra-indications:   Latex Allergy:  [x]NO      []YES  Preferred Language for Healthcare:   [x]English       []Other:    C-SSRS Triggered by Intake questionnaire (Past 2 wk assessment ):   [x] No, Questionnaire did not trigger screening.   [] Yes, Patient intake triggered C-SSRS Screening     [] Completed, no further action required. 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   Initial symptoms were shortness of breath and muscle weakness LE > UE.  Pt notices fatigue and difficulty propelling himself up stairs and out of chairs. Pt is unable to walk for prolonged periods. Pt has 13 steps to get to his second floor to get to his bedroom and bathroom, initially this was very challenging for pt however has improved and is now able to complete. No pain is associated with his condition. Pt can visually see that his UE muscles have atrophied but functionally has not noticed significant impaired. Pt feels like his balance is adequate. Main goal is to work on overall fitness and LE strength. Pt is on medication to treat the Necrotizing myositis and his blood work is performed monthly to monitor Creatine Kinase levels. Relevant Medical History: hypertension (controlled)      Functional Scale:       Date assessed:  LEFS: raw score = 51     2/1/23    Pain Scale: 0/10  Easing factors: NA  Provocative factors: NA     Type: []Constant   []Intermittent  []Radiating []Localized [x]other: NA     Numbness/Tingling: NA    Occupation/School: retired, works part time but has been off since October. Pt works at [employer]. Picks up samples and delivers them. Living Status/Prior Level of Function:Prior to this injury / incident, pt was independent with ADLs and IADLs. Pt was very active and worked out frequently. OBJECTIVE:   Palpation: NA    Functional Mobility/Transfers: uses UE assist for lower surfaces. 30 sec STS: bariatric chair, 4 reps without UE assist (only used 20 sec but fatigued out)    Posture:  WNL    Bandages/Dressings/Incisions: NA    Gait: (include devices/WB status) WNL    Strength (0-5) / Myotomes Left Right   Hip Flexion - supine 3+ 3+   Hip Flexion - seated (L1-2) 4 4   Hip Abduction 3+ 3+   Hip extension 4- 4-   Hip ER 4 4   Hip IR 4 4   Quads (L2-4) 4+ 4+   Hamstrings 5 5   Ankle Dorsiflexion (L4-5) 4+ 4+   Ankle Plantarflexion (S1-2) 4+ (25+ DL HR) 4+ (25+ DL HR)   Ankle Inversion     Ankle Eversion (S1-2)     Great Toe Extension (L5)            Balance: SLB 2 sec B with trunk strategies. [x] Patient history, allergies, meds reviewed. Medical chart reviewed. See intake form. Review Of Systems (ROS):  [x]Performed Review of systems (Integumentary, CardioPulmonary, Neurological) by intake and observation. Intake form has been scanned into medical record. Patient has been instructed to contact their primary care physician regarding ROS issues if not already being addressed at this time.       Co-morbidities/Complexities (which will affect course of rehabilitation):   []None        []Hx of COVID   Arthritic conditions   []Rheumatoid arthritis (M05.9)  []Osteoarthritis (M19.91)  []Gout   Cardiovascular conditions   [x]Hypertension (I10)  []Hyperlipidemia (E78.5)  []Angina pectoris (I20)  []Atherosclerosis (I70)  []Pacemaker  []Hx of CABG/stent/  cardiac surgeries   Musculoskeletal conditions   []Disc pathology   []Congenital spine pathologies   []Osteoporosis (M81.8)  []Osteopenia (M85.8)  []Scoliosis       Endocrine conditions   []Hypothyroid (E03.9)  []Hyperthyroid Gastrointestinal conditions   []Constipation (O62.15)   Metabolic conditions   []Morbid obesity (E66.01)  []Diabetes type 1(E10.65) or 2 (E11.65)   []Neuropathy (G60.9)     Cardio/Pulmonary conditions   []Asthma (J45)  []Coughing   []COPD (J44.9)  []CHF  []A-fib   Psychological Disorders  []Anxiety (F41.9)  []Depression (F32.9)   []Other:   Developmental Disorders  []Autism (F84.0)  []CP (G80)  []Down Syndrome (Q90.9)  []Developmental delay     Neurological conditions  []Prior Stroke (I69.30)  []Parkinson's (G20)  []Encephalopathy (G93.40)  []MS (G35)  []Post-polio (G14)  []SCI  []TBI  []ALS Other conditions  []Fibromyalgia (M79.7)  []Vertigo  []Syncope  []Kidney Failure  []Cancer      []currently undergoing                treatment  []Pregnancy  []Incontinence   Prior surgeries  []involved limb  []previous spinal surgery  [] section birth  []hysterectomy  []bowel / bladder surgery  []other relevant surgeries   []Other:              Barriers to/and or personal factors that will affect rehab potential:              [x]Age  []Sex    []Smoker              []Motivation/Lack of Motivation                        []Co-Morbidities              []Cognitive Function, education/learning barriers              []Environmental, home barriers              []profession/work barriers  []past PT/medical experience  []other:  Justification:     Falls Risk Assessment (30 days):   [x] Falls Risk assessed and no intervention required. [] Falls Risk assessed and Patient requires intervention due to being higher risk   TUG score (>12s at risk):     [] Falls education provided, including        ASSESSMENT:  Pt is a 78 yr old male presenting with hip weakness from Necrotizing myositis onset October 2022. Pt's weakness is most apparent in the hips. Knee and ankle strength is functional.  Pt demo poor balance in SLB however functional balance is safe at this point. Pt demo poor endurance as expected with diagnosis. Research supports exercise for Necrotizing myositis, states that it is often safest and most successful if focusing on maximizing muscle groups least effected for adaptation vs. Recovery of strength from significantly affected muscles. Pt would benefit from skilled physical therapy to address functional impairments, improve balance to decrease fall potential and improve pt's quality of life.      Functional Impairments:     []Noted lumbar/proximal hip/LE joint hypomobility   []Decreased LE functional ROM   [x]Decreased core/proximal hip strength and neuromuscular control   [x]Decreased LE functional strength   []Reduced balance/proprioceptive control   []other:      Functional Activity Limitations (from functional questionnaire and intake)   []Reduced ability to tolerate prolonged functional positions   [x]Reduced ability or difficulty with changes of positions or transfers between positions   []Reduced ability to maintain good posture and demonstrate good body mechanics with sitting, bending, and lifting   []Reduced ability to sleep   [] Reduced ability or tolerance with driving and/or computer work   [x]Reduced ability to perform lifting, carrying tasks   [x]Reduced ability to squat   []Reduced ability to forward bend   [x]Reduced ability to ambulate prolonged functional periods/distances/surfaces   [x]Reduced ability to ascend/descend stairs   [x]Reduced ability to run, hop, cut or jump   []other:    Participation Restrictions   [x]Reduced participation in self care activities   [x]Reduced participation in home management activities   [x]Reduced participation in work activities   [x]Reduced participation in social activities. [x]Reduced participation in sport/recreation activities. Classification :    []Signs/symptoms consistent with post-surgical status including decreased ROM, strength and function.    []Signs/symptoms consistent with joint sprain/strain   []Signs/symptoms consistent with patella-femoral syndrome   []Signs/symptoms consistent with knee OA/hip OA   []Signs/symptoms consistent with internal derangement of knee/Hip   []Signs/symptoms consistent with functional hip weakness/NMR control      []Signs/symptoms consistent with tendinitis/tendinosis    []signs/symptoms consistent with pathology which may benefit from Dry needling      [x]other:  signs/symptoms consistent with Necrotizing myositis    Prognosis/Rehab Potential:      []Excellent   [x]Good    []Fair   []Poor    Tolerance of evaluation/treatment:    []Excellent   [x]Good    []Fair   []Poor    Physical Therapy Evaluation Complexity Justification  [x] A history of present problem with:  [] no personal factors and/or comorbidities that impact the plan of care;  [x]1-2 personal factors and/or comorbidities that impact the plan of care  []3 personal factors and/or comorbidities that impact the plan of care  [x] An examination of body systems using standardized tests and measures addressing any of the following: body structures and functions (impairments), activity limitations, and/or participation restrictions;:  [] a total of 1-2 or more elements   [x] a total of 3 or more elements   [] a total of 4 or more elements   [x] A clinical presentation with:  [] stable and/or uncomplicated characteristics   [x] evolving clinical presentation with changing characteristics  [] unstable and unpredictable characteristics;   [x] Clinical decision making of [] Low, [x] moderate, [] high complexity using standardized patient assessment instrument and/or measurable assessment of functional outcome. [] EVAL (LOW) 94517 (typically 15 minutes face-to-face)  [x] EVAL (MOD) 46644 (typically 30 minutes face-to-face)  [] EVAL (HIGH) 89292 (typically 45 minutes face-to-face)  [] RE-EVAL     PLAN:   Frequency/Duration:  2 days per week for 6 Weeks:  Interventions:  [x]  Therapeutic exercise including: strength training, ROM, for Lower extremity and core   [x]  NMR activation and proprioception for LE, Glutes and Core   [x]  Manual therapy as indicated for LE, Hip and spine to include: Dry Needling/IASTM, STM, PROM, Gr I-IV mobilizations, manipulation. [x] Modalities as needed that may include: thermal agents, E-stim, Biofeedback, US, iontophoresis as indicated  [x] Patient education on joint protection, postural re-education, activity modification, progression of HEP. HEP instruction: Written HEP instructions provided and reviewed. GOALS:  Patient stated goal: to be able to get up from chairs, complete stairs, and get back to working out at the gym. [] Progressing: [] Met: [] Not Met: [] Adjusted    Therapist goals for Patient:   Short Term Goals: To be achieved in: 2 weeks  1. Independent in HEP and progression per patient tolerance, in order to prevent re-injury.    [] Progressing: [] Met: [] Not Met: [] Adjusted  2. Patient will have a decrease in pain to facilitate improvement in movement, function, and ADLs as indicated by Functional Deficits. [] Progressing: [] Met: [] Not Met: [] Adjusted    Long Term Goals: To be achieved in: 6 weeks  1. Pt will improve LEFS by 9 points to reduce disability and progress towards PLOF. [] Progressing: [] Met: [] Not Met: [] Adjusted  2. Patient will demonstrate an increase in Strength of 1 grade in all LE planes as well as good proximal hip strength and control to allow for proper functional mobility as indicated by patients Functional Deficits. [] Progressing: [] Met: [] Not Met: [] Adjusted  3. Patient will return to functional activities including walking for 10 min at 2.8 mph or greater, 1% incline or great, independently. [] Progressing: [] Met: [] Not Met: [] Adjusted  4.  Pt will complete 6 STS transfers in 30 sec from bariatric chair without UE assist.   [] Progressing: [] Met: [] Not Met: [] Adjusted     Electronically signed by:  Oleg Leon, PT, DPT

## 2023-02-02 LAB — ALDOLASE: 11.6 U/L (ref 1.2–7.6)

## 2023-02-07 ENCOUNTER — HOSPITAL ENCOUNTER (OUTPATIENT)
Dept: PHYSICAL THERAPY | Age: 78
Setting detail: THERAPIES SERIES
Discharge: HOME | End: 2023-02-07
Payer: COMMERCIAL

## 2023-02-07 PROCEDURE — 97530 THERAPEUTIC ACTIVITIES: CPT

## 2023-02-07 PROCEDURE — 97110 THERAPEUTIC EXERCISES: CPT

## 2023-02-07 NOTE — FLOWSHEET NOTE
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 Insurance/Certification information:  PT Insurance Information: [employer]  Physician Information:  Jose A Conde MD   Plan of care signed (Y/N): [x]  Yes []  No     Date of Patient follow up with Physician:      Progress Report: []  Yes  [x]  No     Date Range for reporting period:  Beginnin2023  PN:  Ending:     Progress report due (10 Rx/or 30 days whichever is less): visit #10 or     Recertification due (POC duration/ or 90 days whichever is less): visit #12 or 23    Visit # Insurance Allowable Auth required? Date Range    [place], [place] (pt reports he is able to afford co-pay with 2x/week) []  Yes  [x]  No NA       Latex Allergy:  [x]NO      []YES  Preferred Language for Healthcare:   [x]English       []other:    Functional Scale:       Date assessed:  LEFS: raw score = 51    23    Pain level:  0/10     SUBJECTIVE:  Pt reports he is feeling pretty good today, has no pain.      OBJECTIVE:   :  Strength (0-5) / Myotomes Left Right   GH flexion 4- 4   GH abduction 4- 4   GH IR 5 5   GH ER 5 5   Biceps 4+ 4+   Triceps 4 4       RESTRICTIONS/PRECAUTIONS: Necrotizing myositis (be aware of overexertion) controlled hypertension, L RTC surgery ~5 years ago (pt had fully recovered however since the myositis began the scap stabilizers do not control UE motion well)    Exercises/Interventions:     Muscle groups most affected: hip flexors, hip abductors, hip extension  Muscle groups least affected: hip IR/ER, hamstring, quad  Muscle groups moderately affected: ankle PF/DF    Therapeutic Exercise (32747)  Resistance / level Sets/sec Reps Notes / Cues   Nustep/Bike/TM L 1 5 min                    T-band rows  -mid  -high  -LPD   Purple  Purple   Purple   2  2  2   12 L  12 L  12 L    Biodex     for foot intrinsic and ankle strengthening   Give mat hip 3 way for HEP   2 10 B    PREs:  GH flexion  GH abduction  GH horizontal abd   0#  0#  0#   2  2  2   12   12   12     Biceps curl              HP:  Leg press  Bench press  Lat pull down  Seated rows  Knee ext  Seated HS curl  NV  3 sets of 8-12 reps , 60 sec rest    5 min global stretching    Walking program 5 min warm up, 30-50 min mod intensity, increased gradually over 4 weeks. Gait (86262)                     Therapeutic Activities (76793)       UE MMT testing       4\" step ups       STS High mat table 3 5 1 min rest between sets          Education on exercise rec. For condition  x10'     Neuromuscular Re-ed (06246)       Rhomberg on foam       tandem                                   Manual Intervention ([address] [facility])       Knee mobs/PROM       Tib/Fem Mobs       Patella Mobs       Ankle mobs                         Modalities:     Pt. Education:  02/01/2023  -pt educated on diagnosis, prognosis and expectations for rehab  -all pt questions were answered  - issued exercise recommendations from myositis foundation, issued research article regarding specific gym program that is safe and effective. Discussed pt's preferences on home vs. Gym exercise. Home Exercise Program:  2/7: Access Code: [email or web address]  URL: [email or web address]  Date: 02/07/2023  Prepared by: Jay Patiño    Exercises  Supine Active Straight Leg Raise - 1 x daily - 7 x weekly - 2 sets - 10 reps  Prone Hip Extension - 1 x daily - 7 x weekly - 2 sets - 10 reps  Sidelying Hip Abduction - 1 x daily - 7 x weekly - 2 sets - 10 reps  Sit to Stand Without Arm Support - 1 x daily - 7 x weekly - 3 sets - 5 reps  Scapular Retraction with Resistance Advanced - 1 x daily - 7 x weekly - 3 sets - 12 reps  Squatting High Shoulder Row with Resistance - 1 x daily - 7 x weekly - 3 sets - 12 reps  Shoulder extension with resistance - Neutral - 1 x daily - 7 x weekly - 3 sets - 12 reps      Therapeutic Exercise and NMR EXR  [] (15950) Provided verbal/tactile cueing for activities related to strengthening, flexibility, endurance, ROM for improvements in LE, proximal hip, and core control with self care, mobility, lifting, ambulation.  [] (75552) Provided verbal/tactile cueing for activities related to improving balance, coordination, kinesthetic sense, posture, motor skill, proprioception  to assist with LE, proximal hip, and core control in self care, mobility, lifting, ambulation and eccentric single leg control.   [] (42976) Therapist is in constant attendance of 2 or more patients providing skilled therapy interventions, but not providing any significant amount of measurable one-on-one time to either patient, for improvements in LE, proximal hip, and core control in self care, mobility, lifting, ambulation and eccentric single leg control.      NMR and Therapeutic Activities:    [] (38281 or 00864) Provided verbal/tactile cueing for activities related to improving balance, coordination, kinesthetic sense, posture, motor skill, proprioception and motor activation to allow for proper function of core, proximal hip and LE with self care and ADLs  [] (47605) Gait Re-education- Provided training and instruction to the patient for proper LE, core and proximal hip recruitment and positioning and eccentric body weight control with ambulation re-education including up and down stairs     Home Exercise Program:    [x] (05755) Reviewed/Progressed HEP activities related to strengthening, flexibility, endurance, ROM of core, proximal hip and LE for functional self-care, mobility, lifting and ambulation/stair navigation   [] (49356)Reviewed/Progressed HEP activities related to improving balance, coordination, kinesthetic sense, posture, motor skill, proprioception of core, proximal hip and LE for self care, mobility, lifting, and ambulation/stair navigation      Manual Treatments:  PROM / STM / Oscillations-Mobs:  G-I, II, III, IV (PA's, Inf., Post.)  [] (08302) Provided manual therapy to mobilize LE, proximal hip and/or LS spine soft tissue/joints for the purpose of modulating pain, promoting relaxation,  increasing ROM, reducing/eliminating soft tissue swelling/inflammation/restriction, improving soft tissue extensibility and allowing for proper ROM for normal function with self care, mobility, lifting and ambulation. Modalities:  [] (07651) Vasopneumatic compression: Utilized vasopneumatic compression to decrease edema / swelling for the purpose of improving mobility and quad tone / recruitment which will allow for increased overall function including but not limited to self-care, transfers, ambulation, and ascending / descending stairs. Charges:  Timed Code Treatment Minutes: 45   Total Treatment Minutes: 45     [] EVAL - LOW (12186)   [] EVAL - MOD (22572)  [] EVAL - HIGH (80972)  [] RE-EVAL (03515)  [x] SA(84785) x  2     [] Ionto  [] NMR (15631) x       [] Vaso  [] Manual (42211) x       [] Ultrasound  [x] TA x  1      [] Mech Traction (15515)  [] Aquatic Therapy x     [] ES (un) (07153):   [] Home Management Training x  [] ES(attended) (86034)   [] Dry Needling 1-2 muscles (08129):  [] Dry Needling 3+ muscles (878545  [] Group:      [] Other:     GOALS:   Patient stated goal: to be able to get up from chairs, complete stairs, and get back to working out at the gym. [] Progressing: [] Met: [] Not Met: [] Adjusted     Therapist goals for Patient:   Short Term Goals: To be achieved in: 2 weeks  1. Independent in HEP and progression per patient tolerance, in order to prevent re-injury.    [] Progressing: [] Met: [] Not Met: [] Adjusted  2. Patient will have a decrease in pain to facilitate improvement in movement, function, and ADLs as indicated by Functional Deficits. [] Progressing: [] Met: [] Not Met: [] Adjusted     Long Term Goals: To be achieved in: 6 weeks  1. Pt will improve LEFS by 9 points to reduce disability and progress towards PLOF. [] Progressing: [] Met: [] Not Met: [] Adjusted  2. Patient will demonstrate an increase in Strength of 1 grade in all LE planes as well as good proximal hip strength and control to allow for proper functional mobility as indicated by patients Functional Deficits. [] Progressing: [] Met: [] Not Met: [] Adjusted  3. Patient will return to functional activities including walking for 10 min at 2.8 mph or greater, 1% incline or great, independently. [] Progressing: [] Met: [] Not Met: [] Adjusted  4. Pt will complete 6 STS transfers in 30 sec from bariatric chair without UE assist.   [] Progressing: [] Met: [] Not Met: [] Adjusted      Overall Progression Towards Functional goals/ Treatment Progress Update:  [] Patient is progressing as expected towards functional goals listed. [] Progression is slowed due to complexities/Impairments listed. [] Progression has been slowed due to co-morbidities. [x] Plan just implemented, too soon to assess goals progression <30days   [] Goals require adjustment due to lack of progress  [] Patient is not progressing as expected and requires additional follow up with physician  [] Other    Persisting Functional Limitations/Impairments:  []Sitting [x]Standing   [x]Walking [x]Stairs   [x]Transfers [x]ADLs   [x]Squatting/bending [x]Kneeling  [x]Housework [x]Job related tasks  []Driving [x]Sports/Recreation   []Sleeping []Other:    ASSESSMENT:  Exercise initiated, HEP issued. Pt tolerated session well. Assessed UE strength, added UE strengthening.   Focus on scap stabilizers prior to RTC as pt with poor form and compensations when attempting UE lifting as he has a past history of RTC repair on that side. NV will add gym strengthening routine from research study shows to be safe. Treatment/Activity Tolerance:  [] Pt able to complete treatment [] Patient limited by fatique  [] Patient limited by pain  [] Patient limited by other medical complications  [] Other:     Prognosis:  [x] Good [x] Fair  [] Poor    Patient Requires Follow-up: [x] Yes  [] No    Return to Play:    [x]  N/A   []  Stage 1: Intro to Strength   []  Stage 2: Return to Run and Strength   []  Stage 3: Return to Jump and Strength   []  Stage 4: Dynamic Strength and Agility   []  Stage 5: Sport Specific Training     []  Ready to Return to Play, Meets All Above Stages   []  Not Ready for Return to Sports   Comments:            PLAN: See eval. PT 2x / week for 6 weeks. [x] Continue per plan of care [] Alter current plan (see comments)  [] Plan of care initiated [] Hold pending MD visit [] Discharge    Electronically signed by: Blade Bell, PT, DPT  Jay Patiño PT, DPT      Note: If patient does not return for scheduled/ recommended follow up visits, this note will serve as a discharge from care along with most recent update on progress.

## 2023-02-10 ENCOUNTER — HOSPITAL ENCOUNTER (OUTPATIENT)
Dept: PHYSICAL THERAPY | Age: 78
Setting detail: THERAPIES SERIES
Discharge: HOME | End: 2023-02-10
Payer: COMMERCIAL

## 2023-02-10 PROCEDURE — 97110 THERAPEUTIC EXERCISES: CPT

## 2023-02-10 NOTE — FLOWSHEET NOTE
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 Insurance/Certification information:  PT Insurance Information: [employer] 42  Physician Information:  Amish Parks MD   Plan of care signed (Y/N): [x]  Yes []  No     Date of Patient follow up with Physician:      Progress Report: []  Yes  [x]  No     Date Range for reporting period:  Beginnin2023  PN:  Ending:     Progress report due (10 Rx/or 30 days whichever is less): visit #10 or 5/3/48    Recertification due (POC duration/ or 90 days whichever is less): visit #12 or 23    Visit # Insurance Allowable Auth required? Date Range   3/12 [place] (pt reports he is able to afford co-pay with 2x/week) []  Yes  [x]  No NA       Latex Allergy:  [x]NO      []YES  Preferred Language for Healthcare:   [x]English       []other:    Functional Scale:       Date assessed:  LEFS: raw score = 51    23    Pain level:  0/10     SUBJECTIVE:  no pain reported today. [employer] and willing to try HP equipment as was the plan for today.     OBJECTIVE:   :  Strength (0-5) / Myotomes Left Right   GH flexion 4- 4   GH abduction 4- 4   GH IR 5 5   GH ER 5 5   Biceps 4+ 4+   Triceps 4 4       RESTRICTIONS/PRECAUTIONS: Necrotizing myositis (be aware of overexertion) controlled hypertension, L RTC surgery ~5 years ago (pt had fully recovered however since the myositis began the scap stabilizers do not control UE motion well)    Exercises/Interventions:     Muscle groups most affected: hip flexors, hip abductors, hip extension  Muscle groups least affected: hip IR/ER, hamstring, quad  Muscle groups moderately affected: ankle PF/DF    Therapeutic Exercise (57396)  Resistance / level Sets/sec Reps Notes / Cues   Nustep/Bike/TM L 1 5 min                    T-band rows  -mid  -high  -LPD    Biodex  for foot intrinsic and ankle strengthening   Give mat hip 3 way for HEP     PREs:  GH flexion  GH abduction  GH horizontal abd    Biceps curl              HP:  Leg press  Bench press  Lat pull down  Seated rows  Knee ext  Seated HS curl   100#  40#  40#  40#  20#  50#   3  3  3  3  3  3   8  8  8  8  8  8  60 sec rest between sets    5       Gait (53319)                     Therapeutic Activities (56345)       UE MMT testing       4\" step ups       STS  Education on exercise rec. For condition Neuromuscular Re-ed (33057)       Rhomberg on foam       tandem                                   Manual Intervention ([phone])       Knee mobs/PROM       Tib/Fem Mobs       Patella Mobs       Ankle mobs                         Modalities:     Pt. Education:  02/01/2023  -pt educated on diagnosis, prognosis and expectations for rehab  -all pt questions were answered  - issued exercise recommendations from myositis foundation, issued research article regarding specific gym program that is safe and effective. Discussed pt's preferences on home vs. Gym exercise. Home Exercise Program:  2/7: Access Code: [email or web address]  URL: [email or web address]  Date: 02/07/2023  Prepared by: Andrew Duran    Exercises  Supine Active Straight Leg Raise - 1 x daily - 7 x weekly - 2 sets - 10 reps  Prone Hip Extension - 1 x daily - 7 x weekly - 2 sets - 10 reps  Sidelying Hip Abduction - 1 x daily - 7 x weekly - 2 sets - 10 reps  Sit to Stand Without Arm Support - 1 x daily - 7 x weekly - 3 sets - 5 reps  Scapular Retraction with Resistance Advanced - 1 x daily - 7 x weekly - 3 sets - 12 reps  Squatting High Shoulder Row with Resistance - 1 x daily - 7 x weekly - 3 sets - 12 reps  Shoulder extension with resistance - Neutral - 1 x daily - 7 x weekly - 3 sets - 12 reps      Therapeutic Exercise and NMR EXR  [x] (76858) Provided verbal/tactile cueing for activities related to strengthening, flexibility, endurance, ROM for improvements in LE, proximal hip, and core control with self care, mobility, lifting, ambulation.  [] (39824) Provided verbal/tactile cueing for activities related to improving balance, coordination, kinesthetic sense, posture, motor skill, proprioception  to assist with LE, proximal hip, and core control in self care, mobility, lifting, ambulation and eccentric single leg control.   [] (31016) Therapist is in constant attendance of 2 or more patients providing skilled therapy interventions, but not providing any significant amount of measurable one-on-one time to either patient, for improvements in LE, proximal hip, and core control in self care, mobility, lifting, ambulation and eccentric single leg control.      NMR and Therapeutic Activities:    [] (03084 or 51988) Provided verbal/tactile cueing for activities related to improving balance, coordination, kinesthetic sense, posture, motor skill, proprioception and motor activation to allow for proper function of core, proximal hip and LE with self care and ADLs  [] (94567) Gait Re-education- Provided training and instruction to the patient for proper LE, core and proximal hip recruitment and positioning and eccentric body weight control with ambulation re-education including up and down stairs     Home Exercise Program:    [] (88690) Reviewed/Progressed HEP activities related to strengthening, flexibility, endurance, ROM of core, proximal hip and LE for functional self-care, mobility, lifting and ambulation/stair navigation   [] (10392)Reviewed/Progressed HEP activities related to improving balance, coordination, kinesthetic sense, posture, motor skill, proprioception of core, proximal hip and LE for self care, mobility, lifting, and ambulation/stair navigation      Manual Treatments:  PROM / STM / Oscillations-Mobs:  G-I, II, III, IV (PA's, Inf., Post.)  [] (51025) Provided manual therapy to mobilize LE, proximal hip and/or LS spine soft tissue/joints for the purpose of modulating pain, promoting relaxation,  increasing ROM, reducing/eliminating soft tissue swelling/inflammation/restriction, improving soft tissue extensibility and allowing for proper ROM for normal function with self care, mobility, lifting and ambulation. Modalities:  [] (20255) Vasopneumatic compression: Utilized vasopneumatic compression to decrease edema / swelling for the purpose of improving mobility and quad tone / recruitment which will allow for increased overall function including but not limited to self-care, transfers, ambulation, and ascending / descending stairs. Charges:  Timed Code Treatment Minutes: 44   Total Treatment Minutes: 44     [] EVAL - LOW (66479)   [] EVAL - MOD (87522)  [] EVAL - HIGH (89211)  [] RE-EVAL (15736)  [x] GK(07610) x  3     [] Ionto  [] NMR (96865) x       [] Vaso  [] Manual (02675) x       [] Ultrasound  [] TA x        [] Mech Traction (68325)  [] Aquatic Therapy x     [] ES (un) (47593):   [] Home Management Training x  [] ES(attended) (49993)   [] Dry Needling 1-2 muscles (85999):  [] Dry Needling 3+ muscles (303422  [] Group:      [] Other:     GOALS:   Patient stated goal: to be able to get up from chairs, complete stairs, and get back to working out at the gym. [] Progressing: [] Met: [] Not Met: [] Adjusted     Therapist goals for Patient:   Short Term Goals: To be achieved in: 2 weeks  1. Independent in HEP and progression per patient tolerance, in order to prevent re-injury. [] Progressing: [] Met: [] Not Met: [] Adjusted  2. Patient will have a decrease in pain to facilitate improvement in movement, function, and ADLs as indicated by Functional Deficits.   [] Progressing: [] Met: [] Not Met: [] Adjusted     Long Term Goals: To be achieved in: 6 weeks  1. Pt will improve LEFS by 9 points to reduce disability and progress towards PLOF. [] Progressing: [] Met: [] Not Met: [] Adjusted  2. Patient will demonstrate an increase in Strength of 1 grade in all LE planes as well as good proximal hip strength and control to allow for proper functional mobility as indicated by patients Functional Deficits. [] Progressing: [] Met: [] Not Met: [] Adjusted  3. Patient will return to functional activities including walking for 10 min at 2.8 mph or greater, 1% incline or great, independently. [] Progressing: [] Met: [] Not Met: [] Adjusted  4. Pt will complete 6 STS transfers in 30 sec from bariatric chair without UE assist.   [] Progressing: [] Met: [] Not Met: [] Adjusted      Overall Progression Towards Functional goals/ Treatment Progress Update:  [] Patient is progressing as expected towards functional goals listed. [] Progression is slowed due to complexities/Impairments listed. [] Progression has been slowed due to co-morbidities. [x] Plan just implemented, too soon to assess goals progression <30days   [] Goals require adjustment due to lack of progress  [] Patient is not progressing as expected and requires additional follow up with physician  [] Other    Persisting Functional Limitations/Impairments:  []Sitting [x]Standing   [x]Walking [x]Stairs   [x]Transfers [x]ADLs   [x]Squatting/bending [x]Kneeling  [x]Housework [x]Job related tasks  []Driving [x]Sports/Recreation   []Sleeping []Other:    ASSESSMENT:  no reports of pain or fatigue with [employer] equipment today.  Appropriate rest encouraged  Treatment/Activity Tolerance:  [x] Pt able to complete treatment [] Patient limited by fatique  [] Patient limited by pain  [] Patient limited by other medical complications  [] Other:     Prognosis:  [x] Good [x] Fair  [] Poor    Patient Requires Follow-up: [x] Yes  [] No    Return to Play:    [x] N/A   []  Stage 1: Intro to Strength   []  Stage 2: Return to Run and Strength   []  Stage 3: Return to Jump and Strength   []  Stage 4: Dynamic Strength and Agility   []  Stage 5: Sport Specific Training     []  Ready to Return to Play, Meets All Above Stages   []  Not Ready for Return to Sports   Comments:            PLAN: See eval. PT 2x / week for 6 weeks. [x] Continue per plan of care [] Alter current plan (see comments)  [] Plan of care initiated [] Hold pending MD visit [] Discharge    Electronically signed by: Bolivar Carrington PT, DPT        Note: If patient does not return for scheduled/ recommended follow up visits, this note will serve as a discharge from care along with most recent update on progress.

## 2023-02-14 ENCOUNTER — HOSPITAL ENCOUNTER (OUTPATIENT)
Dept: PHYSICAL THERAPY | Age: 78
Setting detail: THERAPIES SERIES
Discharge: HOME | End: 2023-02-14
Payer: COMMERCIAL

## 2023-02-14 PROCEDURE — 97110 THERAPEUTIC EXERCISES: CPT

## 2023-02-14 PROCEDURE — 97530 THERAPEUTIC ACTIVITIES: CPT

## 2023-02-14 PROCEDURE — 97112 NEUROMUSCULAR REEDUCATION: CPT

## 2023-02-14 NOTE — FLOWSHEET NOTE
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 Insurance/Certification information:  PT Insurance Information: [employer] 42  Physician Information:  Arrie Sever, MD   Plan of care signed (Y/N): [x]  Yes []  No     Date of Patient follow up with Physician:      Progress Report: []  Yes  [x]  No     Date Range for reporting period:  Beginnin2023  PN:  Ending:     Progress report due (10 Rx/or 30 days whichever is less): visit #10 or 18    Recertification due (POC duration/ or 90 days whichever is less): visit #12 or 23    Visit # Insurance Allowable Auth required? Date Range    [place] (pt reports he is able to afford co-pay with 2x/week) []  Yes  [x]  No NA       Latex Allergy:  [x]NO      []YES  Preferred Language for Healthcare:   [x]English       []other:    Functional Scale:       Date assessed:  LEFS: raw score = 51    23    Pain level:  0/10     SUBJECTIVE:  Pt reports that he is doing well. Read all of the resource materials. Will be having 3 days of infusions later this week so will likely have to take a break from exercise those days however planning to start gym and home exercises soon after.      OBJECTIVE:   :  Strength (0-5) / Myotomes Left Right   GH flexion 4- 4   GH abduction 4- 4   GH IR 5 5   GH ER 5 5   Biceps 4+ 4+   Triceps 4 4       RESTRICTIONS/PRECAUTIONS: Necrotizing myositis (be aware of overexertion) controlled hypertension, L RTC surgery ~5 years ago (pt had fully recovered however since the myositis began the scap stabilizers do not control UE motion well)    Exercises/Interventions:     Muscle groups most affected: hip flexors, hip abductors, hip extension  Muscle groups least affected: hip IR/ER, hamstring, quad  Muscle groups moderately affected: ankle PF/DF    Therapeutic Exercise (42365)  Resistance / level Sets/sec Reps Notes / Cues   Nustep/Bike/TM L 1 5 min                    T-band rows  -mid  -high  -LPD    Biodex  for foot intrinsic and ankle strengthening   Give mat hip 3 way for HEP     PREs:  GH flexion  GH abduction  GH horizontal abd    Biceps curl              HP:  Leg press  Bench press  Lat pull down  Seated rows  Knee ext  Seated HS curl    53 way HR  2 20                         Gait (86318)                     Therapeutic Activities (23991)              6\" step ups Fwd  Lat  2  2 10  10    STS High mat table 2   10   1 min rest between sets    Education on exercise rec. For condition               Neuromuscular Re-ed (53060)       Rhomberg on foam       Tandem on foam   2 x 30\" B     Hip flexion over cone 6\" + med cone. 10 B    Cone taps   10 B                  Manual Intervention (65288)       Knee mobs/PROM       Tib/Fem Mobs       Patella Mobs       Ankle mobs                         Modalities:     Pt. Education:  02/01/2023  -pt educated on diagnosis, prognosis and expectations for rehab  -all pt questions were answered  - issued exercise recommendations from myositis foundation, issued research article regarding specific gym program that is safe and effective. Discussed pt's preferences on home vs. Gym exercise. Home Exercise Program:  2/7: Access Code: [email or web address]  URL: [email or web address]  Date: 02/07/2023  Prepared by: Judy See    Exercises  Supine Active Straight Leg Raise - 1 x daily - 7 x weekly - 2 sets - 10 reps  Prone Hip Extension - 1 x daily - 7 x weekly - 2 sets - 10 reps  Sidelying Hip Abduction - 1 x daily - 7 x weekly - 2 sets - 10 reps  Sit to Stand Without Arm Support - 1 x daily - 7 x weekly - 3 sets - 5 reps  Scapular Retraction with Resistance Advanced - 1 x daily - 7 x weekly - 3 sets - 12 reps  Squatting High Shoulder Row with Resistance - 1 x daily - 7 x weekly - 3 sets - 12 reps  Shoulder extension with resistance - Neutral - 1 x daily - 7 x weekly - 3 sets - 12 reps      Therapeutic Exercise and NMR EXR  [x] (33127) Provided verbal/tactile cueing for activities related to strengthening, flexibility, endurance, ROM for improvements in LE, proximal hip, and core control with self care, mobility, lifting, ambulation.  [] (45796) Provided verbal/tactile cueing for activities related to improving balance, coordination, kinesthetic sense, posture, motor skill, proprioception  to assist with LE, proximal hip, and core control in self care, mobility, lifting, ambulation and eccentric single leg control.   [] (40097) Therapist is in constant attendance of 2 or more patients providing skilled therapy interventions, but not providing any significant amount of measurable one-on-one time to either patient, for improvements in LE, proximal hip, and core control in self care, mobility, lifting, ambulation and eccentric single leg control.      NMR and Therapeutic Activities:    [] (86057 or 84477) Provided verbal/tactile cueing for activities related to improving balance, coordination, kinesthetic sense, posture, motor skill, proprioception and motor activation to allow for proper function of core, proximal hip and LE with self care and ADLs  [] (79092) Gait Re-education- Provided training and instruction to the patient for proper LE, core and proximal hip recruitment and positioning and eccentric body weight control with ambulation re-education including up and down stairs     Home Exercise Program:    [] (62228) Reviewed/Progressed HEP activities related to strengthening, flexibility, endurance, ROM of core, proximal hip and LE for functional self-care, mobility, lifting and ambulation/stair navigation   [] (53426)Reviewed/Progressed HEP activities related to improving balance, coordination, kinesthetic sense, posture, motor skill, proprioception of core, proximal hip and LE for self care, mobility, lifting, and ambulation/stair navigation      Manual Treatments:  PROM / STM / Oscillations-Mobs:  G-I, II, III, IV (PA's, Inf., Post.)  [] (21370) Provided manual therapy to mobilize LE, proximal hip and/or LS spine soft tissue/joints for the purpose of modulating pain, promoting relaxation,  increasing ROM, reducing/eliminating soft tissue swelling/inflammation/restriction, improving soft tissue extensibility and allowing for proper ROM for normal function with self care, mobility, lifting and ambulation. Modalities:  [] (48919) Vasopneumatic compression: Utilized vasopneumatic compression to decrease edema / swelling for the purpose of improving mobility and quad tone / recruitment which will allow for increased overall function including but not limited to self-care, transfers, ambulation, and ascending / descending stairs. Charges:  Timed Code Treatment Minutes: 44   Total Treatment Minutes: 44     [] EVAL - LOW (41485)   [] EVAL - MOD (62083)  [] EVAL - HIGH (22083)  [] RE-EVAL (30304)  [x] IJ(53491) x  1     [] Ionto  [x] NMR (57321) x  1     [] Vaso  [] Manual (88294) x       [] Ultrasound  [x] TA x 1       [] Mech Traction (80074)  [] Aquatic Therapy x     [] ES (un) (49372):   [] Home Management Training x  [] ES(attended) (08857)   [] Dry Needling 1-2 muscles (63317):  [] Dry Needling 3+ muscles (227281  [] Group:      [] Other:     GOALS:   Patient stated goal: to be able to get up from chairs, complete stairs, and get back to working out at the gym. [] Progressing: [] Met: [] Not Met: [] Adjusted     Therapist goals for Patient:   Short Term Goals: To be achieved in: 2 weeks  1.  Independent in HEP and progression per patient tolerance, in order to prevent re-injury. [] Progressing: [] Met: [] Not Met: [] Adjusted  2. Patient will have a decrease in pain to facilitate improvement in movement, function, and ADLs as indicated by Functional Deficits. [] Progressing: [] Met: [] Not Met: [] Adjusted     Long Term Goals: To be achieved in: 6 weeks  1. Pt will improve LEFS by 9 points to reduce disability and progress towards PLOF. [] Progressing: [] Met: [] Not Met: [] Adjusted  2. Patient will demonstrate an increase in Strength of 1 grade in all LE planes as well as good proximal hip strength and control to allow for proper functional mobility as indicated by patients Functional Deficits. [] Progressing: [] Met: [] Not Met: [] Adjusted  3. Patient will return to functional activities including walking for 10 min at 2.8 mph or greater, 1% incline or great, independently. [] Progressing: [] Met: [] Not Met: [] Adjusted  4. Pt will complete 6 STS transfers in 30 sec from bariatric chair without UE assist.   [] Progressing: [] Met: [] Not Met: [] Adjusted      Overall Progression Towards Functional goals/ Treatment Progress Update:  [] Patient is progressing as expected towards functional goals listed. [] Progression is slowed due to complexities/Impairments listed. [] Progression has been slowed due to co-morbidities. [x] Plan just implemented, too soon to assess goals progression <30days   [] Goals require adjustment due to lack of progress  [] Patient is not progressing as expected and requires additional follow up with physician  [] Other    Persisting Functional Limitations/Impairments:  []Sitting [x]Standing   [x]Walking [x]Stairs   [x]Transfers [x]ADLs   [x]Squatting/bending [x]Kneeling  [x]Housework [x]Job related tasks  []Driving [x]Sports/Recreation   []Sleeping []Other:    ASSESSMENT: Pt with good tolerance to session. Pt fatigued post session however frequent rest breaks provided during session.   Will continued to progress functional exercises, balance exercises, and speed training in conjunction with strengthening HEP and walking program being completed by pt independently. Treatment/Activity Tolerance:  [x] Pt able to complete treatment [] Patient limited by fatique  [] Patient limited by pain  [] Patient limited by other medical complications  [] Other:     Prognosis:  [x] Good [x] Fair  [] Poor    Patient Requires Follow-up: [x] Yes  [] No    Return to Play:    [x]  N/A   []  Stage 1: Intro to Strength   []  Stage 2: Return to Run and Strength   []  Stage 3: Return to Jump and Strength   []  Stage 4: Dynamic Strength and Agility   []  Stage 5: Sport Specific Training     []  Ready to Return to Play, Meets All Above Stages   []  Not Ready for Return to Sports   Comments:            PLAN: See eval. PT 2x / week for 6 weeks. [x] Continue per plan of care [] Alter current plan (see comments)  [] Plan of care initiated [] Hold pending MD visit [] Discharge    Electronically signed by: Sheri Richardson, PT, DPT        Note: If patient does not return for scheduled/ recommended follow up visits, this note will serve as a discharge from care along with most recent update on progress.

## 2023-02-15 ENCOUNTER — HOSPITAL ENCOUNTER (OUTPATIENT)
Dept: ONCOLOGY | Age: 78
Setting detail: INFUSION SERIES
Discharge: HOME | End: 2023-02-15
Payer: COMMERCIAL

## 2023-02-15 VITALS
HEART RATE: 63 BPM | BODY MASS INDEX: 29.52 KG/M2 | DIASTOLIC BLOOD PRESSURE: 80 MMHG | TEMPERATURE: 98.1 F | RESPIRATION RATE: 16 BRPM | OXYGEN SATURATION: 97 % | SYSTOLIC BLOOD PRESSURE: 128 MMHG | WEIGHT: 229.5 LBS

## 2023-02-15 DIAGNOSIS — M60.80 NECROTIZING MYOSITIS: Primary | ICD-10-CM

## 2023-02-15 PROCEDURE — 6370000000 HC RX 637 (ALT 250 FOR IP): Performed by: INTERNAL MEDICINE

## 2023-02-15 PROCEDURE — 96366 THER/PROPH/DIAG IV INF ADDON: CPT

## 2023-02-15 PROCEDURE — 6360000002 HC RX W HCPCS: Performed by: INTERNAL MEDICINE

## 2023-02-15 PROCEDURE — 96365 THER/PROPH/DIAG IV INF INIT: CPT

## 2023-02-15 RX ORDER — SODIUM CHLORIDE 9 MG/ML
5-250 INJECTION, SOLUTION INTRAVENOUS PRN
Status: CANCELLED | OUTPATIENT
Start: 2023-02-16

## 2023-02-15 RX ORDER — ACETAMINOPHEN 325 MG/1
650 TABLET ORAL ONCE
Status: CANCELLED | OUTPATIENT
Start: 2023-02-16 | End: 2023-02-16

## 2023-02-15 RX ORDER — SODIUM CHLORIDE 9 MG/ML
5-250 INJECTION, SOLUTION INTRAVENOUS PRN
Status: DISCONTINUED | OUTPATIENT
Start: 2023-02-15 | End: 2023-02-16 | Stop reason: HOSPADM

## 2023-02-15 RX ORDER — DIPHENHYDRAMINE HCL 25 MG
25 TABLET ORAL ONCE
Status: COMPLETED | OUTPATIENT
Start: 2023-02-15 | End: 2023-02-15

## 2023-02-15 RX ORDER — SODIUM CHLORIDE 9 MG/ML
INJECTION, SOLUTION INTRAVENOUS CONTINUOUS
Status: CANCELLED | OUTPATIENT
Start: 2023-02-16

## 2023-02-15 RX ORDER — ACETAMINOPHEN 325 MG/1
650 TABLET ORAL
Status: CANCELLED | OUTPATIENT
Start: 2023-02-16

## 2023-02-15 RX ORDER — ONDANSETRON 2 MG/ML
8 INJECTION INTRAMUSCULAR; INTRAVENOUS
Status: CANCELLED | OUTPATIENT
Start: 2023-02-16

## 2023-02-15 RX ORDER — ALBUTEROL SULFATE 90 UG/1
4 AEROSOL, METERED RESPIRATORY (INHALATION) PRN
Status: CANCELLED | OUTPATIENT
Start: 2023-02-16

## 2023-02-15 RX ORDER — SODIUM CHLORIDE 0.9 % (FLUSH) 0.9 %
5-40 SYRINGE (ML) INJECTION PRN
Status: CANCELLED | OUTPATIENT
Start: 2023-02-16

## 2023-02-15 RX ORDER — MEPERIDINE HYDROCHLORIDE 25 MG/ML
12.5 INJECTION INTRAMUSCULAR; INTRAVENOUS; SUBCUTANEOUS PRN
Status: CANCELLED | OUTPATIENT
Start: 2023-02-16

## 2023-02-15 RX ORDER — DIPHENHYDRAMINE HYDROCHLORIDE 50 MG/ML
50 INJECTION INTRAMUSCULAR; INTRAVENOUS
Status: CANCELLED | OUTPATIENT
Start: 2023-02-16

## 2023-02-15 RX ORDER — HEPARIN SODIUM (PORCINE) LOCK FLUSH IV SOLN 100 UNIT/ML 100 UNIT/ML
500 SOLUTION INTRAVENOUS PRN
Status: CANCELLED | OUTPATIENT
Start: 2023-02-16

## 2023-02-15 RX ORDER — DIPHENHYDRAMINE HCL 25 MG
25 TABLET ORAL ONCE
Status: CANCELLED | OUTPATIENT
Start: 2023-02-16 | End: 2023-02-16

## 2023-02-15 RX ORDER — ACETAMINOPHEN 325 MG/1
650 TABLET ORAL ONCE
Status: COMPLETED | OUTPATIENT
Start: 2023-02-15 | End: 2023-02-15

## 2023-02-15 RX ADMIN — DIPHENHYDRAMINE HCL 25 MG: 25 TABLET ORAL at 09:52

## 2023-02-15 RX ADMIN — IMMUNE GLOBULIN (HUMAN) 60 G: 10 INJECTION INTRAVENOUS; SUBCUTANEOUS at 10:22

## 2023-02-15 RX ADMIN — ACETAMINOPHEN 650 MG: 325 TABLET ORAL at 09:52

## 2023-02-15 NOTE — PROGRESS NOTES
Pt seen and assessed at 0 [facility] for 60G IVIG infusion per orders from Dr. Karen Olguin. Infused per [facility] policy. Monitoring completed for infusion reactions - see flowsheet. Pt tolerated infusion well and without incident. Pt verbalizes understanding of discharge instructions. Discharged ambulatory to home with family. IV removed, no complications.  Electronically signed by Penelope Cooney RN on 2/15/2023 at 2:18 PM

## 2023-02-16 ENCOUNTER — HOSPITAL ENCOUNTER (OUTPATIENT)
Dept: ONCOLOGY | Age: 78
Setting detail: INFUSION SERIES
Discharge: HOME | End: 2023-02-16
Payer: COMMERCIAL

## 2023-02-16 VITALS
DIASTOLIC BLOOD PRESSURE: 74 MMHG | HEIGHT: 74 IN | TEMPERATURE: 98.1 F | OXYGEN SATURATION: 97 % | SYSTOLIC BLOOD PRESSURE: 123 MMHG | BODY MASS INDEX: 29.37 KG/M2 | RESPIRATION RATE: 16 BRPM | HEART RATE: 66 BPM | WEIGHT: 228.84 LBS

## 2023-02-16 DIAGNOSIS — M60.80 NECROTIZING MYOSITIS: Primary | ICD-10-CM

## 2023-02-16 PROCEDURE — 2580000003 HC RX 258: Performed by: INTERNAL MEDICINE

## 2023-02-16 PROCEDURE — 6360000002 HC RX W HCPCS: Performed by: INTERNAL MEDICINE

## 2023-02-16 PROCEDURE — 96366 THER/PROPH/DIAG IV INF ADDON: CPT

## 2023-02-16 PROCEDURE — 96365 THER/PROPH/DIAG IV INF INIT: CPT

## 2023-02-16 PROCEDURE — 6370000000 HC RX 637 (ALT 250 FOR IP): Performed by: INTERNAL MEDICINE

## 2023-02-16 RX ORDER — DIPHENHYDRAMINE HCL 25 MG
25 TABLET ORAL ONCE
Status: CANCELLED | OUTPATIENT
Start: 2023-02-17 | End: 2023-02-17

## 2023-02-16 RX ORDER — ACETAMINOPHEN 325 MG/1
650 TABLET ORAL ONCE
Status: CANCELLED | OUTPATIENT
Start: 2023-02-17 | End: 2023-02-17

## 2023-02-16 RX ORDER — SODIUM CHLORIDE 9 MG/ML
INJECTION, SOLUTION INTRAVENOUS CONTINUOUS
OUTPATIENT
Start: 2023-02-17

## 2023-02-16 RX ORDER — DIPHENHYDRAMINE HYDROCHLORIDE 50 MG/ML
50 INJECTION INTRAMUSCULAR; INTRAVENOUS
OUTPATIENT
Start: 2023-02-17

## 2023-02-16 RX ORDER — ACETAMINOPHEN 325 MG/1
650 TABLET ORAL
OUTPATIENT
Start: 2023-02-17

## 2023-02-16 RX ORDER — SODIUM CHLORIDE 0.9 % (FLUSH) 0.9 %
5-40 SYRINGE (ML) INJECTION PRN
OUTPATIENT
Start: 2023-02-17

## 2023-02-16 RX ORDER — HEPARIN SODIUM (PORCINE) LOCK FLUSH IV SOLN 100 UNIT/ML 100 UNIT/ML
500 SOLUTION INTRAVENOUS PRN
OUTPATIENT
Start: 2023-02-17

## 2023-02-16 RX ORDER — ONDANSETRON 2 MG/ML
8 INJECTION INTRAMUSCULAR; INTRAVENOUS
OUTPATIENT
Start: 2023-02-17

## 2023-02-16 RX ORDER — SODIUM CHLORIDE 9 MG/ML
5-250 INJECTION, SOLUTION INTRAVENOUS PRN
OUTPATIENT
Start: 2023-02-17

## 2023-02-16 RX ORDER — ALBUTEROL SULFATE 90 UG/1
4 AEROSOL, METERED RESPIRATORY (INHALATION) PRN
OUTPATIENT
Start: 2023-02-17

## 2023-02-16 RX ORDER — MEPERIDINE HYDROCHLORIDE 25 MG/ML
12.5 INJECTION INTRAMUSCULAR; INTRAVENOUS; SUBCUTANEOUS PRN
OUTPATIENT
Start: 2023-02-17

## 2023-02-16 RX ORDER — ACETAMINOPHEN 325 MG/1
650 TABLET ORAL ONCE
Status: COMPLETED | OUTPATIENT
Start: 2023-02-16 | End: 2023-02-16

## 2023-02-16 RX ORDER — DIPHENHYDRAMINE HCL 25 MG
25 TABLET ORAL ONCE
Status: COMPLETED | OUTPATIENT
Start: 2023-02-16 | End: 2023-02-16

## 2023-02-16 RX ORDER — SODIUM CHLORIDE 9 MG/ML
5-250 INJECTION, SOLUTION INTRAVENOUS PRN
Status: DISCONTINUED | OUTPATIENT
Start: 2023-02-16 | End: 2023-02-17 | Stop reason: HOSPADM

## 2023-02-16 RX ADMIN — SODIUM CHLORIDE 25 ML/HR: 9 INJECTION, SOLUTION INTRAVENOUS at 09:53

## 2023-02-16 RX ADMIN — IMMUNE GLOBULIN (HUMAN) 60 G: 10 INJECTION INTRAVENOUS; SUBCUTANEOUS at 10:42

## 2023-02-16 RX ADMIN — DIPHENHYDRAMINE HCL 25 MG: 25 TABLET ORAL at 09:43

## 2023-02-16 RX ADMIN — ACETAMINOPHEN 650 MG: 325 TABLET ORAL at 09:43

## 2023-02-16 NOTE — PROGRESS NOTES
Pt arrived at [facility] for 60 Gram IVIG infusion per orders from Dr. José Antonio Cheek. Infusion rate increased by 49 mls/hr every 30 minutes as ordered to a max rate of 396 mls/hr. Pt tolerated the infusion well and without incident. Pt verbalizes understanding of discharge instructions. Pt discharged ambulatory to his home.     Electronically signed by Lenora Castanon RN on 2/16/2023 at 2:34 PM

## 2023-02-17 ENCOUNTER — HOSPITAL ENCOUNTER (EMERGENCY)
Age: 78
Discharge: HOME | End: 2023-02-17
Attending: EMERGENCY MEDICINE
Payer: COMMERCIAL

## 2023-02-17 ENCOUNTER — HOSPITAL ENCOUNTER (OUTPATIENT)
Dept: ONCOLOGY | Age: 78
Setting detail: INFUSION SERIES
Discharge: HOME | End: 2023-02-17
Payer: COMMERCIAL

## 2023-02-17 ENCOUNTER — TELEPHONE (OUTPATIENT)
Dept: INTERNAL MEDICINE CLINIC | Age: 78
End: 2023-02-17

## 2023-02-17 VITALS
BODY MASS INDEX: 29.68 KG/M2 | OXYGEN SATURATION: 97 % | DIASTOLIC BLOOD PRESSURE: 91 MMHG | SYSTOLIC BLOOD PRESSURE: 179 MMHG | TEMPERATURE: 97.9 F | HEART RATE: 71 BPM | WEIGHT: 228.84 LBS | RESPIRATION RATE: 17 BRPM

## 2023-02-17 VITALS
OXYGEN SATURATION: 99 % | BODY MASS INDEX: 29.14 KG/M2 | DIASTOLIC BLOOD PRESSURE: 94 MMHG | RESPIRATION RATE: 18 BRPM | SYSTOLIC BLOOD PRESSURE: 165 MMHG | WEIGHT: 227.07 LBS | HEART RATE: 68 BPM | HEIGHT: 74 IN | TEMPERATURE: 98 F

## 2023-02-17 DIAGNOSIS — M60.80 NECROTIZING MYOSITIS: Primary | ICD-10-CM

## 2023-02-17 DIAGNOSIS — I10 HYPERTENSION, UNSPECIFIED TYPE: Primary | ICD-10-CM

## 2023-02-17 PROCEDURE — 96366 THER/PROPH/DIAG IV INF ADDON: CPT

## 2023-02-17 PROCEDURE — 96365 THER/PROPH/DIAG IV INF INIT: CPT

## 2023-02-17 PROCEDURE — 6360000002 HC RX W HCPCS: Performed by: INTERNAL MEDICINE

## 2023-02-17 PROCEDURE — 99283 EMERGENCY DEPT VISIT LOW MDM: CPT

## 2023-02-17 PROCEDURE — 6370000000 HC RX 637 (ALT 250 FOR IP): Performed by: EMERGENCY MEDICINE

## 2023-02-17 PROCEDURE — 6370000000 HC RX 637 (ALT 250 FOR IP): Performed by: INTERNAL MEDICINE

## 2023-02-17 RX ORDER — HEPARIN SODIUM (PORCINE) LOCK FLUSH IV SOLN 100 UNIT/ML 100 UNIT/ML
500 SOLUTION INTRAVENOUS PRN
OUTPATIENT
Start: 2023-03-15

## 2023-02-17 RX ORDER — ACETAMINOPHEN 325 MG/1
650 TABLET ORAL
OUTPATIENT
Start: 2023-03-15

## 2023-02-17 RX ORDER — SODIUM CHLORIDE 9 MG/ML
5-250 INJECTION, SOLUTION INTRAVENOUS PRN
OUTPATIENT
Start: 2023-03-15

## 2023-02-17 RX ORDER — DIPHENHYDRAMINE HYDROCHLORIDE 50 MG/ML
50 INJECTION INTRAMUSCULAR; INTRAVENOUS
OUTPATIENT
Start: 2023-03-15

## 2023-02-17 RX ORDER — LOSARTAN POTASSIUM 50 MG/1
25 TABLET ORAL DAILY
Status: DISCONTINUED | OUTPATIENT
Start: 2023-02-17 | End: 2023-02-17 | Stop reason: HOSPADM

## 2023-02-17 RX ORDER — ACETAMINOPHEN 325 MG/1
650 TABLET ORAL ONCE
OUTPATIENT
Start: 2023-03-15 | End: 2023-03-15

## 2023-02-17 RX ORDER — MEPERIDINE HYDROCHLORIDE 25 MG/ML
12.5 INJECTION INTRAMUSCULAR; INTRAVENOUS; SUBCUTANEOUS PRN
OUTPATIENT
Start: 2023-03-15

## 2023-02-17 RX ORDER — ALBUTEROL SULFATE 90 UG/1
4 AEROSOL, METERED RESPIRATORY (INHALATION) PRN
OUTPATIENT
Start: 2023-03-15

## 2023-02-17 RX ORDER — ONDANSETRON 2 MG/ML
8 INJECTION INTRAMUSCULAR; INTRAVENOUS
OUTPATIENT
Start: 2023-03-15

## 2023-02-17 RX ORDER — ACETAMINOPHEN 325 MG/1
650 TABLET ORAL ONCE
Status: COMPLETED | OUTPATIENT
Start: 2023-02-17 | End: 2023-02-17

## 2023-02-17 RX ORDER — SODIUM CHLORIDE 0.9 % (FLUSH) 0.9 %
5-40 SYRINGE (ML) INJECTION PRN
OUTPATIENT
Start: 2023-03-15

## 2023-02-17 RX ORDER — DIPHENHYDRAMINE HCL 25 MG
25 TABLET ORAL ONCE
OUTPATIENT
Start: 2023-03-15 | End: 2023-03-15

## 2023-02-17 RX ORDER — DIPHENHYDRAMINE HCL 25 MG
25 TABLET ORAL ONCE
Status: COMPLETED | OUTPATIENT
Start: 2023-02-17 | End: 2023-02-17

## 2023-02-17 RX ORDER — SODIUM CHLORIDE 9 MG/ML
INJECTION, SOLUTION INTRAVENOUS CONTINUOUS
OUTPATIENT
Start: 2023-03-15

## 2023-02-17 RX ADMIN — IMMUNE GLOBULIN (HUMAN) 65 G: 10 INJECTION INTRAVENOUS; SUBCUTANEOUS at 11:22

## 2023-02-17 RX ADMIN — METOPROLOL TARTRATE 25 MG: 25 TABLET, FILM COATED ORAL at 14:07

## 2023-02-17 RX ADMIN — LOSARTAN POTASSIUM 25 MG: 50 TABLET, FILM COATED ORAL at 14:05

## 2023-02-17 RX ADMIN — ACETAMINOPHEN 650 MG: 325 TABLET ORAL at 10:25

## 2023-02-17 RX ADMIN — DIPHENHYDRAMINE HCL 25 MG: 25 TABLET ORAL at 10:25

## 2023-02-17 ASSESSMENT — PAIN - FUNCTIONAL ASSESSMENT: PAIN_FUNCTIONAL_ASSESSMENT: 0-10

## 2023-02-17 ASSESSMENT — PAIN SCALES - GENERAL: PAINLEVEL_OUTOF10: 0

## 2023-02-17 NOTE — PROGRESS NOTES
Pt seen and assessed at 0 [facility] for IVIG infusion per orders from Dr. Berna Hurley. Pt arrived with increased blood pressure d/t forgetting to take his morning dose of metoprolol. Dr. Berna Hurley was notified of pt's BP ans stated he could not get it with Bp that high. Pts wife brought his bp medication. Once bp was low enough this nurse started the infusion. This nurse preformed 30 minute bp checks, bp started to increase again. Infusion was stopped, pt was informed to go to ED. IV flushed and removed, no complications. Pt discharged ambulatory to ED.

## 2023-02-17 NOTE — TELEPHONE ENCOUNTER
----- Message from Mavis Dao MA sent at 2/17/2023  3:05 PM EST -----  Subject: Medication Problem    Medication: losartan (COZAAR) 50 MG tablet  Dosage: 50 1 x day  Ordering Provider: jerry    Question/Problem: Just leaving ER and needing the dose adjusted, needs an   increased dose per ER      Pharmacy: [facility] [address] [facility], [address] [phone]    ---------------------------------------------------------------------------  --------------  Lindsey DUPREE  [phone]; OK to leave message on voicemail  ---------------------------------------------------------------------------  --------------    SCRIPT ANSWERS  Relationship to Patient: Self

## 2023-02-17 NOTE — ED PROVIDER NOTES
[address]          ATTENDING PHYSICIAN NOTE       Date of evaluation: 2/17/2023    Chief Complaint     Hypertension (Pt was sent down from Outpatient infusion for BP of 200/100. Pt reports taking his BP medication today)      History of Present Illness     Cameron Ling is a 78 y.o. male who presents with a chief complaint of hypertension. 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 This was his day 3 of 3 infusion. Yesterday he had slightly elevated blood pressure but it resolved by the end of his infusion. Today his blood pressure was noted to be 200/100 and it did not resolve. He received one third of his infusion but then was sent down here for work-up of blood pressure. Patient states he takes metoprolol, losartan, and Lasix. He took his medications this morning. Has not missed any doses. Denies any symptoms of high blood pressure. Denies headaches, dizziness, vision changes, chest pain, shortness of breath, abdominal pain. He does have a history of occasionally elevated blood pressures, but I can see for the last few visits his blood pressures were around 793R and 364O systolic. Review of Systems     Review of Systems a complete review of systems was obtained and is negative unless stated otherwise in HPI above. Past Medical, Surgical, Family, and Social History     He has a past medical history of Arthritis, Colon polyp, Diabetes mellitus ([address] Hyperlipemia, Hypertension, Kidney disease, Rhabdomyolysis, and Rheumatoid arthritis ([facility] [address] He has a past surgical history that includes Rotator cuff repair (Left); Colonoscopy; Colonoscopy (N/A, 08/14/2020); Muscle biopsy (Left, 10/18/2022); and Muscle biopsy. His family history includes High Blood Pressure in his sister. He reports that he quit smoking about 39 years ago. His smoking use included cigarettes.  He started smoking about 49 years ago. He has a 5.00 pack-year smoking history. He has never used smokeless tobacco. He reports current alcohol use. He reports that he does not use drugs. Medications     Previous Medications    ASPIRIN 81 MG CHEWABLE TABLET    Take 1 tablet by mouth daily    CYANOCOBALAMIN ([employer] VITAMIN B12) 1000 MCG TABLET    Take 1 tablet by mouth daily    FOLIC ACID (FOLVITE) 1 MG TABLET    Take 1 tab po daily. FUROSEMIDE (LASIX) 40 MG TABLET    Take 1 tablet by mouth 2 times daily    LOSARTAN (COZAAR) 50 MG TABLET    TAKE 1 TABLET DAILY    METHOTREXATE (RHEUMATREX) 2.5 MG CHEMO TABLET    Take 8 tab po once a week ( split the dose 6-8 hours apart in the same day )    METOPROLOL TARTRATE (LOPRESSOR) 25 MG TABLET    Take 1 tablet by mouth 2 times daily TAKE 1 TABLET TWICE A DAY    MULTIPLE VITAMINS-MINERALS (THERAPEUTIC MULTIVITAMIN-MINERALS) TABLET    Take 1 tablet by mouth daily    PANTOPRAZOLE (PROTONIX) 40 MG TABLET    Take 1 tablet by mouth every morning (before breakfast)    PANTOPRAZOLE (PROTONIX) 40 MG TABLET    Take 1 tablet by mouth every morning (before breakfast)    PREDNISONE (DELTASONE) 20 MG TABLET    Take 1 Daily. PREDNISONE (DELTASONE) 5 MG TABLET    Take 1 tab po daily. SILDENAFIL (VIAGRA) 100 MG TABLET    Take 1 tablet by mouth daily as needed for Erectile Dysfunction    VITAMIN D (CHOLECALCIFEROL) 1000 UNITS CAPS CAPSULE    Take 1,000 Units by mouth daily. Allergies     He has No Known Allergies. Physical Exam     INITIAL VITALS: BP: (!) 192/122, Temp: 98 °F (36.7 °C), Heart Rate: 82, Resp: 18, SpO2: 99 %   Physical Exam  Constitutional:       General: He is not in acute distress. Appearance: He is well-developed. He is not diaphoretic. HENT:      Head: Normocephalic and atraumatic. Mouth/Throat:      Pharynx: No oropharyngeal exudate. Eyes:      General:         Right eye: No discharge. Left eye: No discharge.       Conjunctiva/sclera: Conjunctivae normal. Pupils: Pupils are equal, round, and reactive to light. Neck:      Thyroid: No thyromegaly. Vascular: No JVD. Trachea: No tracheal deviation. Cardiovascular:      Rate and Rhythm: Normal rate and regular rhythm. Heart sounds: Normal heart sounds. No murmur heard. No friction rub. No gallop. Pulmonary:      Effort: Pulmonary effort is normal. No respiratory distress. Breath sounds: Normal breath sounds. No stridor. No wheezing or rales. Chest:      Chest wall: No tenderness. Abdominal:      General: Bowel sounds are normal. There is no distension. Palpations: Abdomen is soft. Tenderness: There is no abdominal tenderness. There is no guarding or rebound. Musculoskeletal:         General: No tenderness or deformity. Normal range of motion. Cervical back: Normal range of motion and neck supple. Skin:     General: Skin is warm and dry. Findings: No erythema or rash. Neurological:      Mental Status: He is alert and oriented to person, place, and time. Cranial Nerves: No cranial nerve deficit. Motor: No abnormal muscle tone. Coordination: Coordination normal.   Psychiatric:         Behavior: Behavior normal.       Diagnostic Results     EKG   none    RADIOLOGY:  No orders to display       LABS:   No results found for this visit on 02/17/23. ED BEDSIDE ULTRASOUND:  No results found. RECENT VITALS:  BP: (!) 192/122,Temp: 98 °F (36.7 °C), Heart Rate: 82, Resp: 18, SpO2: 99 %     Procedures     none    ED Course     Nursing Notes, Past Medical Hx, Past Surgical Hx, Social Hx,Allergies, and Family Hx were reviewed.          patient was given the following medications:  Orders Placed This Encounter   Medications    losartan (COZAAR) tablet 25 mg    metoprolol tartrate (LOPRESSOR) tablet 25 mg       CONSULTS:  None    MEDICAL DECISIONMAKING / ASSESSMENT / Bethanie Rater is a 78 y.o. male who presents with a chief complaint of hypertension. Initial exam reveals a well-appearing male in no acute distress with high blood pressure but otherwise normal vitals, afebrile. Physical exam remarkable for completely normal exam.    Patient symptoms are consistent with asymptomatic hypertension. I did review his medications and given his significantly elevated blood pressure today, will give him extra doses of his losartan and his metoprolol. I do not want to permanently change his medication dosing since he usually has normal blood pressure. .  I do not think he needs a work-up today given he is asymptomatic. Patient was given instructions to check his blood pressure every day this weekend and follow-up with his primary care doctor if it continues to be elevated as he may need more permanent changes to his blood pressure medications. Had improvement in his blood pressure to 165/94 prior to receiving medications here. Patient is well-appearing and discharged home in good condition. Medical Decision Making  Problems Addressed:  Hypertension, unspecified type: chronic illness or injury    Amount and/or Complexity of Data Reviewed  External Data Reviewed: labs and notes. Risk  OTC drugs. Prescription drug management. Clinical Impression     1.  Hypertension, unspecified type        Disposition     PATIENT REFERRED TO:  [facility]  [address] [place] [address]  [address]  [phone]    As needed, If symptoms worsen    [facility]  [phone]    In 1 week  for ED follow up visit      DISCHARGE MEDICATIONS:  New Prescriptions    No medications on file       DISPOSITION Decision To Discharge 02/17/2023 02:00:38 PM         Lizz Mckay MD  02/17/23 [id number]

## 2023-02-17 NOTE — ED NOTES
Patient prepared for and ready to be discharged. Patient discharged at this time to home in care of self in no acute distress after verbalizing understanding of discharge instructions. Patient left after receiving After Visit Summary instructions. IV removed prior to discharge.      Bernardo Contreras, RN  02/17/23 [id number]

## 2023-02-17 NOTE — TELEPHONE ENCOUNTER
Recent Visits  Date Type Provider Dept   01/16/23 Office Visit Willard Liriano MD [facility]   10/31/22 Office Visit Willard Liriano MD [facility]   07/14/22 Office Visit Willard Liriano MD [facility]   12/20/21 Office Visit Willard Liriano MD [facility]   Showing recent visits within past 540 days with a meds authorizing provider and meeting all other requirements  Future Appointments  Date Type Provider Dept   04/18/23 Appointment Willard Liriano MD [facility]   Showing future appointments within next 150 days with a meds authorizing provider and meeting all other requirements     1/16/2023

## 2023-02-17 NOTE — DISCHARGE INSTRUCTIONS
We gave you extra doses of your medications today. Tomorrow and Sunday keep an eye on your blood pressure. If still high you may need more permanent adjustments to your medications. Call your regular doctor today to set up a follow up appointment for next week. If you have chest pain, dizziness, shortness of breath, or other concerns over the weekend we are happy to see you again and re-evaluate.

## 2023-02-20 ENCOUNTER — OFFICE VISIT (OUTPATIENT)
Dept: INTERNAL MEDICINE CLINIC | Age: 78
End: 2023-02-20

## 2023-02-20 VITALS
DIASTOLIC BLOOD PRESSURE: 94 MMHG | BODY MASS INDEX: 29.76 KG/M2 | TEMPERATURE: 97.1 F | HEART RATE: 83 BPM | SYSTOLIC BLOOD PRESSURE: 145 MMHG | OXYGEN SATURATION: 98 % | WEIGHT: 231.8 LBS

## 2023-02-20 DIAGNOSIS — I10 ESSENTIAL HYPERTENSION: Primary | ICD-10-CM

## 2023-02-20 RX ORDER — LOSARTAN POTASSIUM 100 MG/1
100 TABLET ORAL DAILY
Qty: 900 TABLET | Refills: 1 | Status: SHIPPED | OUTPATIENT
Start: 2023-02-20 | End: 2023-05-21

## 2023-02-20 RX ORDER — METOPROLOL TARTRATE 50 MG/1
50 TABLET, FILM COATED ORAL 2 TIMES DAILY
Qty: 180 TABLET | Refills: 1 | Status: SHIPPED | OUTPATIENT
Start: 2023-02-20 | End: 2023-05-21

## 2023-02-20 SDOH — ECONOMIC STABILITY: HOUSING INSECURITY
IN THE LAST 12 MONTHS, WAS THERE A TIME WHEN YOU DID NOT HAVE A STEADY PLACE TO SLEEP OR SLEPT IN A SHELTER (INCLUDING NOW)?: NO

## 2023-02-20 SDOH — ECONOMIC STABILITY: FOOD INSECURITY: WITHIN THE PAST 12 MONTHS, THE FOOD YOU BOUGHT JUST DIDN'T LAST AND YOU DIDN'T HAVE MONEY TO GET MORE.: NEVER TRUE

## 2023-02-20 SDOH — ECONOMIC STABILITY: INCOME INSECURITY: HOW HARD IS IT FOR YOU TO PAY FOR THE VERY BASICS LIKE FOOD, HOUSING, MEDICAL CARE, AND HEATING?: NOT HARD AT ALL

## 2023-02-20 SDOH — ECONOMIC STABILITY: FOOD INSECURITY: WITHIN THE PAST 12 MONTHS, YOU WORRIED THAT YOUR FOOD WOULD RUN OUT BEFORE YOU GOT MONEY TO BUY MORE.: NEVER TRUE

## 2023-02-20 NOTE — PROGRESS NOTES
Darlene Andersen (:  1945) is a 78 y.o. male,Established patient, here for evaluation of the following chief complaint(s):  Hypertension         ASSESSMENT/PLAN:  1. Essential hypertension  - elevated due to IVIG will discuss with rheumatology  - medications have been increased  - advised low salt    Return in about 3 weeks (around 3/13/2023) for Hypertension, nurse visit on thursday of friday, . Subjective   SUBJECTIVE/OBJECTIVE:  Hypertension  This is a chronic problem. The current episode started more than 1 year ago. The problem is unchanged. The problem is controlled. Pertinent negatives include no anxiety, blurred vision, chest pain, headaches, malaise/fatigue, neck pain, orthopnea, palpitations, peripheral edema, PND, shortness of breath or sweats. There are no associated agents to hypertension. Past treatments include angiotensin blockers, diuretics and beta blockers. The current treatment provides moderate improvement. There are no compliance problems. There is no history of angina, kidney disease, CAD/MI, CVA, heart failure, left ventricular hypertrophy, PVD or retinopathy. Review of Systems   Constitutional: Negative. Negative for malaise/fatigue. HENT: Negative. Eyes: Negative. Negative for blurred vision. Respiratory: Negative. Negative for shortness of breath. Cardiovascular: Negative. Negative for chest pain, palpitations, orthopnea and PND. Endocrine: Negative. Musculoskeletal: Negative. Negative for neck pain. Skin: Negative. Allergic/Immunologic: Negative. Neurological: Negative. Negative for headaches. Hematological: Negative. Psychiatric/Behavioral: Negative. All other systems reviewed and are negative.        No Known Allergies    Current Outpatient Medications   Medication Sig Dispense Refill    metoprolol tartrate (LOPRESSOR) 25 MG tablet Take 1 tablet by mouth 2 times daily TAKE 1 TABLET TWICE A  tablet 1    losartan (COZAAR) 50 MG tablet TAKE 1 TABLET DAILY 90 tablet 1    pantoprazole (PROTONIX) 40 MG tablet Take 1 tablet by mouth every morning (before breakfast) 90 tablet 0    aspirin 81 MG chewable tablet Take 1 tablet by mouth daily 30 tablet 2    sildenafil (VIAGRA) 100 MG tablet Take 1 tablet by mouth daily as needed for Erectile Dysfunction 30 tablet 3    methotrexate (RHEUMATREX) 2.5 MG chemo tablet Take 8 tab po once a week ( split the dose 6-8 hours apart in the same day ) 910 tablet 0    folic acid (FOLVITE) 1 MG tablet Take 1 tab po daily. 90 tablet 3    predniSONE (DELTASONE) 5 MG tablet Take 1 tab po daily. (Patient taking differently: 10 mg Take 1 tab po daily.) 30 tablet 1    cyanocobalamin ([employer] VITAMIN B12) 1000 MCG tablet Take 1 tablet by mouth daily 30 tablet 3    Multiple Vitamins-Minerals (THERAPEUTIC MULTIVITAMIN-MINERALS) tablet Take 1 tablet by mouth daily      Vitamin D (CHOLECALCIFEROL) 1000 UNITS CAPS capsule Take 1,000 Units by mouth daily. furosemide (LASIX) 40 MG tablet Take 1 tablet by mouth 2 times daily (Patient taking differently: Take 40 mg by mouth daily) 180 tablet 1    predniSONE (DELTASONE) 20 MG tablet Take 1 Daily. 90 tablet 0     No current facility-administered medications for this visit. Vitals:    02/20/23 1428 02/20/23 1430   BP: (!) 154/88 (!) 162/88   Site: Right Lower Arm Left Lower Arm   Position: Sitting Sitting   Pulse: 83    Temp: 97.1 °F (36.2 °C)    SpO2: 98%    Weight: 231 lb 12.8 oz (105.1 kg)      Body mass index is 29.76 kg/m². Wt Readings from Last 3 Encounters:   02/20/23 231 lb 12.8 oz (105.1 kg)   02/17/23 227 lb 1.2 oz (103 kg)   02/17/23 228 lb 13.4 oz (103.8 kg)     BP Readings from Last 3 Encounters:   02/20/23 (!) 162/88   02/17/23 (!) 165/94   02/17/23 (!) 179/91       Objective   Physical Exam  Vitals and nursing note reviewed. Constitutional:       General: He is not in acute distress. Appearance: Normal appearance. He is well-developed.  He is not ill-appearing. HENT:      Head: Normocephalic and atraumatic. Nose: Nose normal.      Mouth/Throat:      Mouth: Mucous membranes are moist.      Pharynx: Oropharynx is clear. Eyes:      General: No scleral icterus. Right eye: No discharge. Left eye: No discharge. Extraocular Movements: Extraocular movements intact. Conjunctiva/sclera: Conjunctivae normal.      Pupils: Pupils are equal, round, and reactive to light. Cardiovascular:      Rate and Rhythm: Normal rate and regular rhythm. Heart sounds: Normal heart sounds. No murmur heard. Pulmonary:      Effort: Pulmonary effort is normal. No respiratory distress. Breath sounds: Normal breath sounds. No wheezing or rales. Musculoskeletal:         General: Normal range of motion. Cervical back: Normal range of motion and neck supple. Right lower leg: No edema. Left lower leg: No edema. Skin:     General: Skin is warm. Capillary Refill: Capillary refill takes less than 2 seconds. Neurological:      General: No focal deficit present. Mental Status: He is alert and oriented to person, place, and time. Mental status is at baseline. Psychiatric:         Mood and Affect: Mood normal.         Behavior: Behavior normal.         Thought Content: Thought content normal.         Judgment: Judgment normal.        An electronic signature was used to authenticate this note.     --Miguel Prabhakar MD

## 2023-02-21 ENCOUNTER — APPOINTMENT (OUTPATIENT)
Dept: PHYSICAL THERAPY | Age: 78
End: 2023-02-21
Payer: COMMERCIAL

## 2023-02-22 RX ORDER — PANTOPRAZOLE SODIUM 40 MG/1
TABLET, DELAYED RELEASE ORAL
Qty: 90 TABLET | Refills: 0 | Status: SHIPPED | OUTPATIENT
Start: 2023-02-22

## 2023-02-23 ENCOUNTER — NURSE ONLY (OUTPATIENT)
Dept: INTERNAL MEDICINE CLINIC | Age: 78
End: 2023-02-23

## 2023-02-23 VITALS — SYSTOLIC BLOOD PRESSURE: 170 MMHG | HEART RATE: 92 BPM | DIASTOLIC BLOOD PRESSURE: 92 MMHG

## 2023-02-24 ENCOUNTER — APPOINTMENT (OUTPATIENT)
Dept: PHYSICAL THERAPY | Age: 78
End: 2023-02-24
Payer: COMMERCIAL

## 2023-02-28 ENCOUNTER — APPOINTMENT (OUTPATIENT)
Dept: PHYSICAL THERAPY | Age: 78
End: 2023-02-28
Payer: COMMERCIAL

## 2023-03-01 DIAGNOSIS — M60.80 NECROTIZING MYOSITIS: ICD-10-CM

## 2023-03-01 DIAGNOSIS — I10 ESSENTIAL HYPERTENSION: ICD-10-CM

## 2023-03-01 LAB
A/G RATIO: 1.1 (ref 1.1–2.2)
ALBUMIN SERPL-MCNC: 3.9 G/DL (ref 3.4–5)
ALP BLD-CCNC: 71 U/L (ref 40–129)
ALT SERPL-CCNC: 21 U/L (ref 10–40)
ANION GAP SERPL CALCULATED.3IONS-SCNC: 13 MMOL/L (ref 3–16)
AST SERPL-CCNC: 32 U/L (ref 15–37)
BASOPHILS ABSOLUTE: 0 K/UL (ref 0–0.2)
BASOPHILS RELATIVE PERCENT: 0.2 %
BILIRUB SERPL-MCNC: 0.3 MG/DL (ref 0–1)
BUN BLDV-MCNC: 14 MG/DL (ref 7–20)
CALCIUM SERPL-MCNC: 9.4 MG/DL (ref 8.3–10.6)
CHLORIDE BLD-SCNC: 105 MMOL/L (ref 99–110)
CO2: 26 MMOL/L (ref 21–32)
CREAT SERPL-MCNC: 1.1 MG/DL (ref 0.8–1.3)
EOSINOPHILS ABSOLUTE: 0.1 K/UL (ref 0–0.6)
EOSINOPHILS RELATIVE PERCENT: 1.3 %
GFR SERPL CREATININE-BSD FRML MDRD: >60 ML/MIN/{1.73_M2}
GLUCOSE BLD-MCNC: 104 MG/DL (ref 70–99)
HCT VFR BLD CALC: 37.2 % (ref 40.5–52.5)
HEMOGLOBIN: 12.2 G/DL (ref 13.5–17.5)
LYMPHOCYTES ABSOLUTE: 1.7 K/UL (ref 1–5.1)
LYMPHOCYTES RELATIVE PERCENT: 35.1 %
MCH RBC QN AUTO: 28.8 PG (ref 26–34)
MCHC RBC AUTO-ENTMCNC: 32.7 G/DL (ref 31–36)
MCV RBC AUTO: 88.1 FL (ref 80–100)
MONOCYTES ABSOLUTE: 0.3 K/UL (ref 0–1.3)
MONOCYTES RELATIVE PERCENT: 5.8 %
NEUTROPHILS ABSOLUTE: 2.8 K/UL (ref 1.7–7.7)
NEUTROPHILS RELATIVE PERCENT: 57.6 %
PDW BLD-RTO: 17.5 % (ref 12.4–15.4)
PHOSPHORUS: 3.2 MG/DL (ref 2.5–4.9)
PLATELET # BLD: 210 K/UL (ref 135–450)
PMV BLD AUTO: 8.9 FL (ref 5–10.5)
POTASSIUM SERPL-SCNC: 4 MMOL/L (ref 3.5–5.1)
RBC # BLD: 4.22 M/UL (ref 4.2–5.9)
SODIUM BLD-SCNC: 144 MMOL/L (ref 136–145)
TOTAL CK: 959 U/L (ref 39–308)
TOTAL PROTEIN: 7.4 G/DL (ref 6.4–8.2)
WBC # BLD: 4.8 K/UL (ref 4–11)

## 2023-03-03 LAB — ALDOLASE: 8 U/L (ref 1.2–7.6)

## 2023-03-05 ASSESSMENT — ENCOUNTER SYMPTOMS
EYES NEGATIVE: 1
BLURRED VISION: 0
SHORTNESS OF BREATH: 0
ORTHOPNEA: 0
ALLERGIC/IMMUNOLOGIC NEGATIVE: 1
RESPIRATORY NEGATIVE: 1

## 2023-03-08 RX ORDER — PREDNISONE 1 MG/1
TABLET ORAL
Qty: 30 TABLET | Refills: 1 | Status: SHIPPED | OUTPATIENT
Start: 2023-03-08

## 2023-03-14 ENCOUNTER — OFFICE VISIT (OUTPATIENT)
Dept: RHEUMATOLOGY | Age: 78
End: 2023-03-14
Payer: COMMERCIAL

## 2023-03-14 VITALS
BODY MASS INDEX: 29.65 KG/M2 | WEIGHT: 231 LBS | SYSTOLIC BLOOD PRESSURE: 132 MMHG | DIASTOLIC BLOOD PRESSURE: 80 MMHG | HEIGHT: 74 IN

## 2023-03-14 DIAGNOSIS — M60.80 NECROTIZING MYOSITIS: ICD-10-CM

## 2023-03-14 DIAGNOSIS — Z79.899 HIGH RISK MEDICATION USE: Primary | ICD-10-CM

## 2023-03-14 PROCEDURE — 99214 OFFICE O/P EST MOD 30 MIN: CPT | Performed by: INTERNAL MEDICINE

## 2023-03-14 PROCEDURE — 1123F ACP DISCUSS/DSCN MKR DOCD: CPT | Performed by: INTERNAL MEDICINE

## 2023-03-14 PROCEDURE — 3075F SYST BP GE 130 - 139MM HG: CPT | Performed by: INTERNAL MEDICINE

## 2023-03-14 PROCEDURE — 3079F DIAST BP 80-89 MM HG: CPT | Performed by: INTERNAL MEDICINE

## 2023-03-14 RX ORDER — METHOTREXATE 2.5 MG/1
TABLET ORAL
Qty: 104 TABLET | Refills: 0 | Status: SHIPPED | OUTPATIENT
Start: 2023-03-14

## 2023-03-14 RX ORDER — FOLIC ACID 1 MG/1
TABLET ORAL
Qty: 90 TABLET | Refills: 1 | Status: SHIPPED | OUTPATIENT
Start: 2023-03-14

## 2023-03-14 NOTE — PROGRESS NOTES
[address], MD                                                           [address] [place] [address] [phone] (P) [phone] (F)      Note is transcribed using voice recognition software. Inadvertent computerized transcription errors may be present. Patient identification: Bryant Heath, male : ,59 y.o.        ASSESSMENT AND PLAN:  Jhonathan Hutchinson was seen today for follow-up. Diagnoses and all orders for this visit:    High risk medication use    Necrotizing myositis  -     methotrexate (RHEUMATREX) 2.5 MG chemo tablet; Take 8 tab po once a week ( split the dose 6-8 hours apart in the same day )  -     folic acid (FOLVITE) 1 MG tablet; Take 1 tab po daily. 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 Quadriceps muscle biopsy-\"rare necrotic fibers-no inflammatory markers or any findings that would suggest inflammatory myopathy\". Serology positive SSA, weak positive SRP. History of statin use. Negative HMG Co. a reductase antibody. 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 a reductase antibody is negative. -Muscle strength improved nearly to baseline, CK down to 900. Received 3 months of IVIG. Because of persistently elevated blood pressure, and CK is at an acceptable level, hold off on IVIG at this time. Continue methotrexate 20 mg weekly, and prednisone 5 mg a day. Safety labs are normal.  Recommend checking labs every 4 weekly for now.  -Continue calcium vitamin D supplementation.  -Physical therapy lower extremities. RTC 2 months.     Patient indicates understanding and agrees with the management plan.    #################################################################################################    Gitaluctea-erelxi-cj for necrotizing myopathy predominantly affecting his lower extremities-mainly thigh muscles. Interval changes-states that he is doing much better, back to baseline. He desires to go back to work. Compliant with medications. CK trended down to 900. He is doing physical therapy and self exercises. Overall, rates improvement about 90%. Denies diplopia, dysphagia or breathing difficulties. Tolerating medications well. Safety labs are normal.    PMH, PSH,Social history , Meds reviewed. FH-no family history of autoimmune rheumatic disorders. PHYSICAL EXAM:    Vitals:    /80   Ht 6' 2\" (1.88 m)   Wt 231 lb (104.8 kg)   BMI 29.66 kg/m²   AA)x3 well nourished, and well groomed, normal judgement. MKS: No appreciable inflammatory findings in the joints. He is able to get up from sitting position without assistance and is able to squat and get up from squatting position although needs some assistance while getting up from squatting position. Otherwise no focal weakness noted in upper or lower extremity muscles individually checked. No edema. Skin: No rashes, no induration or skin thickening or nodules. Neck: Supple no adenopathy.       DATA:   Lab Results   Component Value Date    CKTOTAL 959 (H) 03/01/2023    TROPONINI 1.65 (HH) 10/26/2022     Lab Results   Component Value Date    IDALIA POSITIVE (A) 10/25/2022     Lab Results   Component Value Date    WBC 4.8 03/01/2023    HGB 12.2 (L) 03/01/2023    HCT 37.2 (L) 03/01/2023    MCV 88.1 03/01/2023     03/01/2023     Lab Results   Component Value Date     03/01/2023    K 4.0 03/01/2023     03/01/2023    CO2 26 03/01/2023    BUN 14 03/01/2023    CREATININE 1.1 03/01/2023    GLUCOSE 104 (H) 03/01/2023    CALCIUM 9.4 03/01/2023    PROT 7.4 03/01/2023    LABALBU 3.9 03/01/2023    BILITOT 0.3 03/01/2023 ALKPHOS 71 03/01/2023    AST 32 03/01/2023    ALT 21 03/01/2023    LABGLOM >60 03/01/2023    GFRAA >60 10/17/2022    AGRATIO 1.1 03/01/2023    GLOB 3.2 10/04/2021       Lab Results   Component Value Date    SEDRATE 62 (H) 10/11/2022     Lab Results   Component Value Date    CRP 4.7 10/11/2022         Total time  34 minutes that includes the following-  Preparing to see the patient such as reviewing patients records, pre-charting, preparing the visit on the same day, performing a medically appropriate history and physical examination, counseling and educating patient about diagnosis, management plan, ordering appropriate testings, prescriptions, communicating findings to other care providers, and documenting clinical information in electronic medical record. I thank you for giving me the opportunity to be involved in [facility] and I look forward following Ruben Beckham along with you. If you have any questions or concerns please feel free to contact me at any time.   Yaron Segundo MD 03/14/23

## 2023-03-15 ENCOUNTER — HOSPITAL ENCOUNTER (OUTPATIENT)
Dept: ONCOLOGY | Age: 78
Setting detail: INFUSION SERIES
Discharge: HOME | End: 2023-03-15

## 2023-03-22 ENCOUNTER — OFFICE VISIT (OUTPATIENT)
Dept: INTERNAL MEDICINE CLINIC | Age: 78
End: 2023-03-22

## 2023-03-22 VITALS
HEART RATE: 89 BPM | DIASTOLIC BLOOD PRESSURE: 80 MMHG | SYSTOLIC BLOOD PRESSURE: 132 MMHG | TEMPERATURE: 97.2 F | BODY MASS INDEX: 30.22 KG/M2 | WEIGHT: 235.4 LBS | OXYGEN SATURATION: 99 %

## 2023-03-22 DIAGNOSIS — I10 ESSENTIAL HYPERTENSION: Primary | ICD-10-CM

## 2023-03-22 ASSESSMENT — ANXIETY QUESTIONNAIRES
IF YOU CHECKED OFF ANY PROBLEMS ON THIS QUESTIONNAIRE, HOW DIFFICULT HAVE THESE PROBLEMS MADE IT FOR YOU TO DO YOUR WORK, TAKE CARE OF THINGS AT HOME, OR GET ALONG WITH OTHER PEOPLE: NOT DIFFICULT AT ALL
1. FEELING NERVOUS, ANXIOUS, OR ON EDGE: 0
3. WORRYING TOO MUCH ABOUT DIFFERENT THINGS: 0
7. FEELING AFRAID AS IF SOMETHING AWFUL MIGHT HAPPEN: 0
GAD7 TOTAL SCORE: 0
2. NOT BEING ABLE TO STOP OR CONTROL WORRYING: 0
5. BEING SO RESTLESS THAT IT IS HARD TO SIT STILL: 0
6. BECOMING EASILY ANNOYED OR IRRITABLE: 0
4. TROUBLE RELAXING: 0

## 2023-03-22 ASSESSMENT — PATIENT HEALTH QUESTIONNAIRE - PHQ9
SUM OF ALL RESPONSES TO PHQ9 QUESTIONS 1 & 2: 0
1. LITTLE INTEREST OR PLEASURE IN DOING THINGS: 0
SUM OF ALL RESPONSES TO PHQ QUESTIONS 1-9: 0
2. FEELING DOWN, DEPRESSED OR HOPELESS: 0

## 2023-03-22 ASSESSMENT — ENCOUNTER SYMPTOMS
ORTHOPNEA: 0
RESPIRATORY NEGATIVE: 1
SHORTNESS OF BREATH: 0
ALLERGIC/IMMUNOLOGIC NEGATIVE: 1
BLURRED VISION: 0
EYES NEGATIVE: 1

## 2023-03-22 NOTE — PROGRESS NOTES
Diamond Lainer (:  1945) is a 78 y.o. male,Established patient, here for evaluation of the following chief complaint(s):  Hypertension         ASSESSMENT/PLAN:  1. Essential hypertension  - blood pressure is stable, he is no longer using IVIG  - medications have been increased  - advised low salt             Subjective   SUBJECTIVE/OBJECTIVE:  Hypertension  This is a chronic problem. The current episode started more than 1 year ago. The problem is unchanged. The problem is controlled. Pertinent negatives include no anxiety, blurred vision, chest pain, headaches, malaise/fatigue, neck pain, orthopnea, palpitations, peripheral edema, PND, shortness of breath or sweats. There are no associated agents to hypertension. Past treatments include angiotensin blockers, diuretics and beta blockers. The current treatment provides moderate improvement. There are no compliance problems. There is no history of angina, kidney disease, CAD/MI, CVA, heart failure, left ventricular hypertrophy, PVD or retinopathy. Review of Systems   Constitutional: Negative. Negative for malaise/fatigue. HENT: Negative. Eyes: Negative. Negative for blurred vision. Respiratory: Negative. Negative for shortness of breath. Cardiovascular: Negative. Negative for chest pain, palpitations, orthopnea and PND. Endocrine: Negative. Musculoskeletal: Negative. Negative for neck pain. Skin: Negative. Allergic/Immunologic: Negative. Neurological: Negative. Negative for headaches. Hematological: Negative. Psychiatric/Behavioral: Negative. All other systems reviewed and are negative.        No Known Allergies    Current Outpatient Medications   Medication Sig Dispense Refill    furosemide (LASIX) 20 MG tablet Take 1 tablet by mouth daily 90 tablet 1    methotrexate (RHEUMATREX) 2.5 MG chemo tablet Take 8 tab po once a week ( split the dose 6-8 hours apart in the same day ) 918 tablet 0    folic acid (FOLVITE) 1

## 2023-03-29 DIAGNOSIS — M60.80 NECROTIZING MYOSITIS: ICD-10-CM

## 2023-03-29 RX ORDER — METHOTREXATE 2.5 MG/1
TABLET ORAL
Qty: 104 TABLET | Refills: 0 | Status: SHIPPED | OUTPATIENT
Start: 2023-03-29

## 2023-03-30 DIAGNOSIS — M60.80 NECROTIZING MYOSITIS: ICD-10-CM

## 2023-03-30 LAB
ALBUMIN SERPL-MCNC: 3.9 G/DL (ref 3.4–5)
ALBUMIN/GLOB SERPL: 1.4 {RATIO} (ref 1.1–2.2)
ALP SERPL-CCNC: 81 U/L (ref 40–129)
ALT SERPL-CCNC: 20 U/L (ref 10–40)
ANION GAP SERPL CALCULATED.3IONS-SCNC: 11 MMOL/L (ref 3–16)
AST SERPL-CCNC: 36 U/L (ref 15–37)
BASOPHILS # BLD: 0 K/UL (ref 0–0.2)
BASOPHILS NFR BLD: 0.5 %
BILIRUB SERPL-MCNC: 0.3 MG/DL (ref 0–1)
BUN SERPL-MCNC: 13 MG/DL (ref 7–20)
CALCIUM SERPL-MCNC: 9.3 MG/DL (ref 8.3–10.6)
CHLORIDE SERPL-SCNC: 105 MMOL/L (ref 99–110)
CK SERPL-CCNC: 1690 U/L (ref 39–308)
CO2 SERPL-SCNC: 27 MMOL/L (ref 21–32)
CREAT SERPL-MCNC: 1 MG/DL (ref 0.8–1.3)
DEPRECATED RDW RBC AUTO: 17.6 % (ref 12.4–15.4)
EOSINOPHIL # BLD: 0 K/UL (ref 0–0.6)
EOSINOPHIL NFR BLD: 0.3 %
GFR SERPLBLD CREATININE-BSD FMLA CKD-EPI: >60 ML/MIN/{1.73_M2}
GLUCOSE SERPL-MCNC: 80 MG/DL (ref 70–99)
HCT VFR BLD AUTO: 39.2 % (ref 40.5–52.5)
HGB BLD-MCNC: 12.4 G/DL (ref 13.5–17.5)
LYMPHOCYTES # BLD: 1.2 K/UL (ref 1–5.1)
LYMPHOCYTES NFR BLD: 30.7 %
MCH RBC QN AUTO: 28 PG (ref 26–34)
MCHC RBC AUTO-ENTMCNC: 31.7 G/DL (ref 31–36)
MCV RBC AUTO: 88.4 FL (ref 80–100)
MONOCYTES # BLD: 0.2 K/UL (ref 0–1.3)
MONOCYTES NFR BLD: 5.4 %
NEUTROPHILS # BLD: 2.4 K/UL (ref 1.7–7.7)
NEUTROPHILS NFR BLD: 63.1 %
PLATELET # BLD AUTO: 176 K/UL (ref 135–450)
PMV BLD AUTO: 9 FL (ref 5–10.5)
POTASSIUM SERPL-SCNC: 4.5 MMOL/L (ref 3.5–5.1)
PROT SERPL-MCNC: 6.6 G/DL (ref 6.4–8.2)
RBC # BLD AUTO: 4.43 M/UL (ref 4.2–5.9)
SODIUM SERPL-SCNC: 143 MMOL/L (ref 136–145)
WBC # BLD AUTO: 3.8 K/UL (ref 4–11)

## 2023-03-31 NOTE — TELEPHONE ENCOUNTER
Recent Visits  Date Type Provider Dept   03/22/23 Office Visit Darryl Valdovinos MD [facility]   02/20/23 Office Visit Darryl Valdovinos MD [facility]   01/16/23 Office Visit Darryl Valdovinos MD [facility]   10/31/22 Office Visit Darryl Valdovinos MD [facility]   07/14/22 Office Visit Darryl Valdovinos MD [facility]   12/20/21 Office Visit Darryl Valdovinos MD [facility]   Showing recent visits within past 540 days with a meds authorizing provider and meeting all other requirements  Future Appointments  Date Type Provider Dept   07/25/23 Appointment Darryl Valdovinos MD [facility]   Showing future appointments within next 150 days with a meds authorizing provider and meeting all other requirements     3/22/2023

## 2023-04-01 LAB — ALDOLASE SERPL-CCNC: 19.8 U/L (ref 1.2–7.6)

## 2023-04-03 RX ORDER — PANTOPRAZOLE SODIUM 40 MG/1
TABLET, DELAYED RELEASE ORAL
Qty: 90 TABLET | Refills: 0 | Status: SHIPPED | OUTPATIENT
Start: 2023-04-03

## 2023-04-10 ENCOUNTER — TELEPHONE (OUTPATIENT)
Dept: RHEUMATOLOGY | Age: 78
End: 2023-04-10

## 2023-04-24 DIAGNOSIS — M60.80 NECROTIZING MYOSITIS: ICD-10-CM

## 2023-04-24 LAB
BASOPHILS # BLD: 0 K/UL (ref 0–0.2)
BASOPHILS NFR BLD: 0.6 %
DEPRECATED RDW RBC AUTO: 17.5 % (ref 12.4–15.4)
EOSINOPHIL # BLD: 0 K/UL (ref 0–0.6)
EOSINOPHIL NFR BLD: 0.4 %
HCT VFR BLD AUTO: 40.5 % (ref 40.5–52.5)
HGB BLD-MCNC: 13.1 G/DL (ref 13.5–17.5)
LYMPHOCYTES # BLD: 1.2 K/UL (ref 1–5.1)
LYMPHOCYTES NFR BLD: 27.5 %
MCH RBC QN AUTO: 28.6 PG (ref 26–34)
MCHC RBC AUTO-ENTMCNC: 32.3 G/DL (ref 31–36)
MCV RBC AUTO: 88.5 FL (ref 80–100)
MONOCYTES # BLD: 0.3 K/UL (ref 0–1.3)
MONOCYTES NFR BLD: 6.6 %
NEUTROPHILS # BLD: 2.7 K/UL (ref 1.7–7.7)
NEUTROPHILS NFR BLD: 64.9 %
PLATELET # BLD AUTO: 172 K/UL (ref 135–450)
PMV BLD AUTO: 9.9 FL (ref 5–10.5)
RBC # BLD AUTO: 4.58 M/UL (ref 4.2–5.9)
WBC # BLD AUTO: 4.2 K/UL (ref 4–11)

## 2023-04-25 LAB
ALBUMIN SERPL-MCNC: 4.1 G/DL (ref 3.4–5)
ALBUMIN/GLOB SERPL: 1.6 {RATIO} (ref 1.1–2.2)
ALDOLASE SERPL-CCNC: 24.7 U/L (ref 1.2–7.6)
ALP SERPL-CCNC: 87 U/L (ref 40–129)
ALT SERPL-CCNC: 27 U/L (ref 10–40)
ANION GAP SERPL CALCULATED.3IONS-SCNC: 11 MMOL/L (ref 3–16)
AST SERPL-CCNC: 53 U/L (ref 15–37)
BILIRUB SERPL-MCNC: <0.2 MG/DL (ref 0–1)
BUN SERPL-MCNC: 12 MG/DL (ref 7–20)
CALCIUM SERPL-MCNC: 9.3 MG/DL (ref 8.3–10.6)
CHLORIDE SERPL-SCNC: 104 MMOL/L (ref 99–110)
CK SERPL-CCNC: 3022 U/L (ref 39–308)
CO2 SERPL-SCNC: 29 MMOL/L (ref 21–32)
CREAT SERPL-MCNC: 1 MG/DL (ref 0.8–1.3)
GFR SERPLBLD CREATININE-BSD FMLA CKD-EPI: >60 ML/MIN/{1.73_M2}
GLUCOSE SERPL-MCNC: 102 MG/DL (ref 70–99)
POTASSIUM SERPL-SCNC: 4.3 MMOL/L (ref 3.5–5.1)
PROT SERPL-MCNC: 6.6 G/DL (ref 6.4–8.2)
SODIUM SERPL-SCNC: 144 MMOL/L (ref 136–145)

## 2023-05-03 RX ORDER — PREDNISONE 5 MG/1
TABLET ORAL
Qty: 30 TABLET | Refills: 1 | OUTPATIENT
Start: 2023-05-03

## 2023-05-04 DIAGNOSIS — M60.80 NECROTIZING MYOSITIS: Primary | ICD-10-CM

## 2023-05-04 DIAGNOSIS — Z11.59 NEED FOR HEPATITIS B SCREENING TEST: ICD-10-CM

## 2023-07-03 RX ORDER — PANTOPRAZOLE SODIUM 40 MG/1
TABLET, DELAYED RELEASE ORAL
Qty: 90 TABLET | Refills: 0 | Status: SHIPPED | OUTPATIENT
Start: 2023-07-03

## 2023-07-03 NOTE — TELEPHONE ENCOUNTER
Medication:   Requested Prescriptions     Pending Prescriptions Disp Refills    pantoprazole (PROTONIX) 40 MG tablet [Pharmacy Med Name: PANTOPRAZOLE TAB 40MG DR] 90 tablet 0     Sig: TAKE 1 TABLET EVERY MORNINGBEFORE BREAKFAST     Last Filled:  4/3/23    Last appt: 3/22/2023   Next appt: 7/25/2023    Last OARRS: No flowsheet data found.

## 2023-07-17 DIAGNOSIS — M60.80 NECROTIZING MYOSITIS: ICD-10-CM

## 2023-07-18 RX ORDER — PREDNISONE 20 MG/1
TABLET ORAL
Qty: 90 TABLET | Refills: 1 | OUTPATIENT
Start: 2023-07-18

## 2023-07-18 RX ORDER — PANTOPRAZOLE SODIUM 40 MG/1
TABLET, DELAYED RELEASE ORAL
Qty: 90 TABLET | Refills: 0 | OUTPATIENT
Start: 2023-07-18

## 2023-07-18 NOTE — TELEPHONE ENCOUNTER
Chelsy Harper is available at her new location to address any patient needs. Directed pharmacy to reach out to the new location for regarding this refill.

## 2023-07-24 SDOH — HEALTH STABILITY: PHYSICAL HEALTH: ON AVERAGE, HOW MANY MINUTES DO YOU ENGAGE IN EXERCISE AT THIS LEVEL?: 30 MIN

## 2023-07-24 SDOH — HEALTH STABILITY: PHYSICAL HEALTH: ON AVERAGE, HOW MANY DAYS PER WEEK DO YOU ENGAGE IN MODERATE TO STRENUOUS EXERCISE (LIKE A BRISK WALK)?: 3 DAYS

## 2023-07-24 ASSESSMENT — PATIENT HEALTH QUESTIONNAIRE - PHQ9
SUM OF ALL RESPONSES TO PHQ QUESTIONS 1-9: 0
SUM OF ALL RESPONSES TO PHQ QUESTIONS 1-9: 0
SUM OF ALL RESPONSES TO PHQ9 QUESTIONS 1 & 2: 0
SUM OF ALL RESPONSES TO PHQ QUESTIONS 1-9: 0
SUM OF ALL RESPONSES TO PHQ QUESTIONS 1-9: 0
2. FEELING DOWN, DEPRESSED OR HOPELESS: 0
1. LITTLE INTEREST OR PLEASURE IN DOING THINGS: 0

## 2023-07-24 ASSESSMENT — LIFESTYLE VARIABLES
HOW OFTEN DO YOU HAVE SIX OR MORE DRINKS ON ONE OCCASION: 1
HOW OFTEN DO YOU HAVE A DRINK CONTAINING ALCOHOL: 1
HOW MANY STANDARD DRINKS CONTAINING ALCOHOL DO YOU HAVE ON A TYPICAL DAY: PATIENT DOES NOT DRINK
HOW MANY STANDARD DRINKS CONTAINING ALCOHOL DO YOU HAVE ON A TYPICAL DAY: 0
HOW OFTEN DO YOU HAVE A DRINK CONTAINING ALCOHOL: NEVER

## 2023-07-25 ENCOUNTER — OFFICE VISIT (OUTPATIENT)
Dept: INTERNAL MEDICINE CLINIC | Age: 78
End: 2023-07-25
Payer: COMMERCIAL

## 2023-07-25 VITALS
WEIGHT: 234 LBS | HEIGHT: 74 IN | HEART RATE: 88 BPM | BODY MASS INDEX: 30.03 KG/M2 | OXYGEN SATURATION: 98 % | DIASTOLIC BLOOD PRESSURE: 95 MMHG | SYSTOLIC BLOOD PRESSURE: 160 MMHG

## 2023-07-25 DIAGNOSIS — Z12.5 SCREENING PSA (PROSTATE SPECIFIC ANTIGEN): ICD-10-CM

## 2023-07-25 DIAGNOSIS — T50.905A HYPERTENSION SECONDARY TO DRUG: ICD-10-CM

## 2023-07-25 DIAGNOSIS — I15.8 HYPERTENSION SECONDARY TO DRUG: ICD-10-CM

## 2023-07-25 DIAGNOSIS — I10 SEVERE UNCONTROLLED HYPERTENSION: ICD-10-CM

## 2023-07-25 DIAGNOSIS — E61.1 IRON DEFICIENCY: ICD-10-CM

## 2023-07-25 DIAGNOSIS — M62.82 NON-TRAUMATIC RHABDOMYOLYSIS: ICD-10-CM

## 2023-07-25 DIAGNOSIS — E53.8 B12 DEFICIENCY: ICD-10-CM

## 2023-07-25 DIAGNOSIS — Z00.00 MEDICARE ANNUAL WELLNESS VISIT, SUBSEQUENT: Primary | ICD-10-CM

## 2023-07-25 DIAGNOSIS — K86.2 CYST OF PANCREAS: ICD-10-CM

## 2023-07-25 PROCEDURE — 1123F ACP DISCUSS/DSCN MKR DOCD: CPT | Performed by: INTERNAL MEDICINE

## 2023-07-25 PROCEDURE — 99214 OFFICE O/P EST MOD 30 MIN: CPT | Performed by: INTERNAL MEDICINE

## 2023-07-25 PROCEDURE — 3074F SYST BP LT 130 MM HG: CPT | Performed by: INTERNAL MEDICINE

## 2023-07-25 PROCEDURE — 3078F DIAST BP <80 MM HG: CPT | Performed by: INTERNAL MEDICINE

## 2023-07-25 PROCEDURE — G0439 PPPS, SUBSEQ VISIT: HCPCS | Performed by: INTERNAL MEDICINE

## 2023-07-25 RX ORDER — LOSARTAN POTASSIUM AND HYDROCHLOROTHIAZIDE 25; 100 MG/1; MG/1
1 TABLET ORAL DAILY
Qty: 30 TABLET | Refills: 5 | COMMUNITY
Start: 2023-07-11 | End: 2024-01-07

## 2023-07-25 RX ORDER — CLONIDINE HYDROCHLORIDE 0.1 MG/1
0.1 TABLET ORAL 2 TIMES DAILY
Qty: 60 TABLET | Refills: 3 | Status: SHIPPED | OUTPATIENT
Start: 2023-07-25 | End: 2023-08-24

## 2023-07-25 RX ORDER — IRON POLYSACCHARIDE COMPLEX 150 MG
150 CAPSULE ORAL 2 TIMES DAILY
Qty: 60 CAPSULE | Refills: 3 | Status: SHIPPED | OUTPATIENT
Start: 2023-07-25

## 2023-07-25 NOTE — PROGRESS NOTES
Medicare Annual Wellness Visit    Padmaja Quesada is here for Medicare AWV    Assessment & Plan   Medicare annual wellness visit, subsequent  Non-traumatic rhabdomyolysis  -    currently receiving IVIG       Iron deficiency:unstble  -RX    iron polysaccharides (POLY-IRON 150) 150 MG capsule; Take 1 capsule by mouth 2 times daily, Disp-60 capsule, R-3Normal  - labs reviewed    Screening PSA (prostate specific antigen)  -     PSA Screening; Future    B12 deficiency  -     Vitamin B12 & Folate; Future    Upon review of old records patient with Pancreatic cyst plan MRCP for follow up. Discussed results and plan with patient      Severe uncontrolled hypertension  The following orders have not been finalized:  -     cloNIDine (CATAPRES) 0.1 MG tablet  - I have discussed blood pressure control with Dr. Maryan Hein the nephrologist  - BP is elevated while receiving IVIG  - I have  reviewed action/ side effects and how to take any new medications. Patient understands or pt understands purpose and side effects. A complete  list of medications was provided in their after-visit summary. 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 Discussed results and plan with patient  I have reviewed the patient's medical history in detail and updated the computerized patient record. BMI ; unstable discussed   Recommendations for Preventive Services Due: see orders and patient instructions/AVS.  Recommended screening schedule for the next 5-10 years is provided to the patient in written form: see Patient Instructions/AVS.     Return in 1 year (on 7/25/2024) for AWV in 366 days/6 mo OVE (chronic problems)- nurse visit bp check  every 3wk.      Subjective   The following acute and/or chronic problems were also addressed today:  Hypertension:  Home

## 2023-07-25 NOTE — PROGRESS NOTES
Medicare Annual Wellness Visit    Blaise Kaur is here for Medicare AWV    Assessment & Plan   Medicare annual wellness visit, subsequent  Non-traumatic rhabdomyolysis  -    currently receiving IVIG       Iron deficiency  -     iron polysaccharides (POLY-IRON 150) 150 MG capsule; Take 1 capsule by mouth 2 times daily, Disp-60 capsule, R-3Normal    Screening PSA (prostate specific antigen)  -     PSA Screening; Future    B12 deficiency  -     Vitamin B12 & Folate; Future    Upon review of old records patient with Pancreatic cyst plan MRCP for follow up. Discussed results and plan with patient      Severe uncontrolled hypertension  The following orders have not been finalized:  -     cloNIDine (CATAPRES) 0.1 MG tablet  - I have discussed blood pressure control with Dr. 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 Discussed results and plan with patient      BMI ; unstable discussed   Recommendations for Preventive Services Due: see orders and patient instructions/AVS.  Recommended screening schedule for the next 5-10 years is provided to the patient in written form: see Patient Instructions/AVS.     Return in 1 year (on 7/25/2024) for AWV in 366 days and 6 mo OVE (chronic problems). Subjective   The following acute and/or chronic problems were also addressed today:  Hypertension:  Home blood pressure monitoring: Yes - elevated. He is adherent to a low sodium diet. Patient denies lightheadedness, blurred vision, peripheral edema, palpitations, dry cough, and fatigue. Antihypertensive medication side effects: no medication side effects noted. Use of agents associated with hypertension: none and IvIG and steroids .                                         Sodium (mmol/L)

## 2023-07-26 LAB
FOLATE SERPL-MCNC: >20 NG/ML (ref 4.78–24.2)
PSA SERPL DL<=0.01 NG/ML-MCNC: 2.23 NG/ML (ref 0–4)
VIT B12 SERPL-MCNC: 569 PG/ML (ref 211–911)

## 2023-08-06 DIAGNOSIS — I10 ESSENTIAL HYPERTENSION: ICD-10-CM

## 2023-08-06 NOTE — TELEPHONE ENCOUNTER
Recent Visits  Date Type Provider Dept   07/25/23 Office Visit Yosvany Martinez MD [facility]   03/22/23 Office Visit Yosvany Martinez MD [facility]   02/20/23 Office Visit Yosvany Martinez MD [facility]   01/16/23 Office Visit Yosvany Martinez MD [facility]   10/31/22 Office Visit Yosvany Martinez MD [facility]   07/14/22 Office Visit Yosvany Martinez MD [facility]   Showing recent visits within past 540 days with a meds authorizing provider and meeting all other requirements  Future Appointments  No visits were found meeting these conditions.   Showing future appointments within next 150 days with a meds authorizing provider and meeting all other requirements     7/25/2023

## 2023-08-08 RX ORDER — METOPROLOL TARTRATE 50 MG/1
TABLET, FILM COATED ORAL
Qty: 180 TABLET | Refills: 1 | Status: SHIPPED | OUTPATIENT
Start: 2023-08-08

## 2023-08-18 DIAGNOSIS — I10 SEVERE UNCONTROLLED HYPERTENSION: ICD-10-CM

## 2023-08-18 DIAGNOSIS — T50.905A HYPERTENSION SECONDARY TO DRUG: ICD-10-CM

## 2023-08-18 DIAGNOSIS — I15.8 HYPERTENSION SECONDARY TO DRUG: ICD-10-CM

## 2023-08-18 RX ORDER — CLONIDINE HYDROCHLORIDE 0.1 MG/1
0.1 TABLET ORAL 2 TIMES DAILY
Qty: 60 TABLET | Refills: 3 | OUTPATIENT
Start: 2023-08-18 | End: 2023-09-17

## 2023-08-22 ENCOUNTER — HOSPITAL ENCOUNTER (OUTPATIENT)
Dept: MRI IMAGING | Age: 78
Discharge: HOME | End: 2023-08-22
Attending: INTERNAL MEDICINE
Payer: COMMERCIAL

## 2023-08-22 DIAGNOSIS — K86.2 CYST OF PANCREAS: ICD-10-CM

## 2023-08-22 PROCEDURE — 6360000004 HC RX CONTRAST MEDICATION: Performed by: INTERNAL MEDICINE

## 2023-08-22 PROCEDURE — 74183 MRI ABD W/O CNTR FLWD CNTR: CPT

## 2023-08-22 PROCEDURE — A9577 INJ MULTIHANCE: HCPCS | Performed by: INTERNAL MEDICINE

## 2023-08-22 RX ORDER — SODIUM CHLORIDE 9 MG/ML
INJECTION, SOLUTION INTRAVENOUS ONCE
Status: DISCONTINUED | OUTPATIENT
Start: 2023-08-22 | End: 2023-08-23 | Stop reason: HOSPADM

## 2023-08-22 RX ADMIN — GADOBENATE DIMEGLUMINE 20 ML: 529 INJECTION, SOLUTION INTRAVENOUS at 17:49

## 2023-08-30 ENCOUNTER — OFFICE VISIT (OUTPATIENT)
Dept: INTERNAL MEDICINE CLINIC | Age: 78
End: 2023-08-30

## 2023-08-30 VITALS — HEART RATE: 75 BPM | DIASTOLIC BLOOD PRESSURE: 70 MMHG | OXYGEN SATURATION: 98 % | SYSTOLIC BLOOD PRESSURE: 124 MMHG

## 2023-08-30 DIAGNOSIS — K86.2 PANCREATIC CYST: Primary | ICD-10-CM

## 2023-08-30 DIAGNOSIS — T50.905A HYPERTENSION SECONDARY TO DRUG: ICD-10-CM

## 2023-08-30 DIAGNOSIS — I10 SEVERE UNCONTROLLED HYPERTENSION: ICD-10-CM

## 2023-08-30 DIAGNOSIS — I15.8 HYPERTENSION SECONDARY TO DRUG: ICD-10-CM

## 2023-08-30 RX ORDER — CLONIDINE HYDROCHLORIDE 0.1 MG/1
0.1 TABLET ORAL 3 TIMES DAILY
Qty: 270 TABLET | Refills: 0 | Status: SHIPPED | OUTPATIENT
Start: 2023-08-30 | End: 2023-11-28

## 2023-08-30 NOTE — PROGRESS NOTES
lumbar  spine. IMPRESSION  1. Stable 1.5 cm pancreatic uncinate process cystic lesion representing a  side branch IPMN. See follow-up guidelines. 2. Stable 2 cm hepatic hemangioma. 3. Cholelithiasis. a  4. Stable Bosniak 1/Bosniak 2 renal cysts. No additional follow-up needed. 5. Pancreatic divisum is identified without dilatation of the pancreatic duct              Specimen Collected: 08/23/23 10:19 EDT Last Resulted: 08/23/23 14:16 EDT    [employer] as an Unsuccessful Attempt       Order Details     View Encounter     Lab and Collection Details     Routing     Result History - Result Edited     View Encounter Conversation           Result Care Coordination      Result Notes and Patient Communication     Edit Comments   Add Notifications  Back to Top    Reached out to patient. Will see him tomorrow at 4:15/ elevated bp and abnormal MRI   Written by Marcial Reeves MD on 8/29/2023  7:10 PM EDT          Risk Scores    No risk assessment data  Orders Requiring a Screening Form    Procedure Order Status Form Status    MRI ABDOMEN W WO CONTRAST MRCP Completed Completed     Radiation Dose Estimates    No radiation information found for this patient  Addendum    Signed by Maye Phoenix, MD on 08/23/23 1418    ADDENDUM:   Follow-up guidelines 41.5 cm pancreatic cyst: Reimage Q 1 year x5 and then    Q   2 years x2. Stop if stable over 9 years. An electronic signature was used to authenticate this note.     --Marcial Reeves MD

## 2023-11-05 NOTE — TELEPHONE ENCOUNTER
Recent Visits  Date Type Provider Dept   08/30/23 Office Visit Robert Huber MD [facility]   07/25/23 Office Visit Robert Huber MD [facility]   03/22/23 Office Visit Robret Huber MD [facility]   02/20/23 Office Visit Robert Huber MD [facility]   01/16/23 Office Visit Robert Huber MD [facility]   10/31/22 Office Visit Robert Huber MD [facility]   07/14/22 Office Visit Robert Huber MD [facility]   Showing recent visits within past 540 days with a meds authorizing provider and meeting all other requirements  Future Appointments  Date Type Provider Dept   12/13/23 Appointment Robert Huber MD [facility]   Showing future appointments within next 150 days with a meds authorizing provider and meeting all other requirements     8/30/2023

## 2023-11-08 RX ORDER — PANTOPRAZOLE SODIUM 40 MG/1
TABLET, DELAYED RELEASE ORAL
Qty: 90 TABLET | Refills: 0 | Status: SHIPPED | OUTPATIENT
Start: 2023-11-08

## 2023-11-17 DIAGNOSIS — T50.905A HYPERTENSION SECONDARY TO DRUG: ICD-10-CM

## 2023-11-17 DIAGNOSIS — I10 SEVERE UNCONTROLLED HYPERTENSION: ICD-10-CM

## 2023-11-17 DIAGNOSIS — I15.8 HYPERTENSION SECONDARY TO DRUG: ICD-10-CM

## 2023-11-17 RX ORDER — CLONIDINE HYDROCHLORIDE 0.1 MG/1
TABLET ORAL
Qty: 180 TABLET | Refills: 1 | OUTPATIENT
Start: 2023-11-17

## 2024-01-26 DIAGNOSIS — I10 ESSENTIAL HYPERTENSION: ICD-10-CM

## 2024-01-26 NOTE — TELEPHONE ENCOUNTER
Recent Visits  Date Type Provider Dept   08/30/23 Office Visit Eva Hedrick MD [facility]   07/25/23 Office Visit Eva Hedrick MD [facility]   03/22/23 Office Visit Eva Hedrick MD [facility]   02/20/23 Office Visit Eva Hedrick MD [facility]   01/16/23 Office Visit Eva Hedrick MD [facility]   10/31/22 Office Visit Eva Hedrick MD [facility]   Showing recent visits within past 540 days with a meds authorizing provider and meeting all other requirements  Future Appointments  Date Type Provider Dept   01/30/24 Appointment Eva Hedrick MD [facility]   Showing future appointments within next 150 days with a meds authorizing provider and meeting all other requirements     8/30/2023

## 2024-01-30 ENCOUNTER — OFFICE VISIT (OUTPATIENT)
Dept: INTERNAL MEDICINE CLINIC | Age: 79
End: 2024-01-30
Payer: COMMERCIAL

## 2024-01-30 VITALS
SYSTOLIC BLOOD PRESSURE: 112 MMHG | WEIGHT: 239 LBS | HEART RATE: 70 BPM | DIASTOLIC BLOOD PRESSURE: 68 MMHG | BODY MASS INDEX: 30.69 KG/M2 | OXYGEN SATURATION: 95 %

## 2024-01-30 DIAGNOSIS — K21.9 GASTROESOPHAGEAL REFLUX DISEASE WITHOUT ESOPHAGITIS: ICD-10-CM

## 2024-01-30 DIAGNOSIS — N18.31 STAGE 3A CHRONIC KIDNEY DISEASE (HCC): Primary | ICD-10-CM

## 2024-01-30 DIAGNOSIS — I10 ESSENTIAL HYPERTENSION: ICD-10-CM

## 2024-01-30 PROCEDURE — 3078F DIAST BP <80 MM HG: CPT | Performed by: INTERNAL MEDICINE

## 2024-01-30 PROCEDURE — 99214 OFFICE O/P EST MOD 30 MIN: CPT | Performed by: INTERNAL MEDICINE

## 2024-01-30 PROCEDURE — 3074F SYST BP LT 130 MM HG: CPT | Performed by: INTERNAL MEDICINE

## 2024-01-30 PROCEDURE — 1123F ACP DISCUSS/DSCN MKR DOCD: CPT | Performed by: INTERNAL MEDICINE

## 2024-01-30 RX ORDER — LOSARTAN POTASSIUM AND HYDROCHLOROTHIAZIDE 25; 100 MG/1; MG/1
1 TABLET ORAL DAILY
Qty: 30 TABLET | Refills: 5 | Status: CANCELLED | OUTPATIENT
Start: 2024-01-30 | End: 2024-07-28

## 2024-01-30 RX ORDER — PANTOPRAZOLE SODIUM 40 MG/1
TABLET, DELAYED RELEASE ORAL
Qty: 90 TABLET | Refills: 0 | Status: CANCELLED | OUTPATIENT
Start: 2024-01-30

## 2024-01-30 RX ORDER — NIFEDIPINE 60 MG/1
60 TABLET, FILM COATED, EXTENDED RELEASE ORAL DAILY
COMMUNITY
Start: 2023-10-17 | End: 2024-10-11

## 2024-01-30 RX ORDER — NIFEDIPINE 60 MG/1
60 TABLET, FILM COATED, EXTENDED RELEASE ORAL DAILY
Qty: 30 TABLET | Refills: 11 | Status: CANCELLED | OUTPATIENT
Start: 2024-01-30 | End: 2025-01-24

## 2024-01-30 RX ORDER — PANTOPRAZOLE SODIUM 40 MG/1
TABLET, DELAYED RELEASE ORAL
Qty: 90 TABLET | Refills: 0 | OUTPATIENT
Start: 2024-01-30

## 2024-01-30 RX ORDER — NIFEDIPINE 60 MG/1
60 TABLET, FILM COATED, EXTENDED RELEASE ORAL DAILY
COMMUNITY
End: 2024-01-30 | Stop reason: CLARIF

## 2024-01-30 RX ORDER — METOPROLOL TARTRATE 50 MG/1
TABLET, FILM COATED ORAL
Qty: 180 TABLET | Refills: 1 | OUTPATIENT
Start: 2024-01-30

## 2024-01-30 RX ORDER — METOPROLOL TARTRATE 50 MG/1
TABLET, FILM COATED ORAL
Qty: 180 TABLET | Refills: 1 | Status: CANCELLED | OUTPATIENT
Start: 2024-01-30

## 2024-01-30 NOTE — PROGRESS NOTES
Mouth/Throat:      Mouth: Mucous membranes are moist.      Pharynx: Oropharynx is clear.   Eyes:      Extraocular Movements: Extraocular movements intact.      Pupils: Pupils are equal, round, and reactive to light.   Cardiovascular:      Rate and Rhythm: Normal rate and regular rhythm.   Pulmonary:      Effort: Pulmonary effort is normal.   Musculoskeletal:         General: Normal range of motion.      Cervical back: Normal range of motion and neck supple.   Skin:     General: Skin is warm.      Capillary Refill: Capillary refill takes less than 2 seconds.      Coloration: Skin is not jaundiced.   Neurological:      General: No focal deficit present.      Mental Status: He is alert and oriented to person, place, and time. Mental status is at baseline.   Psychiatric:         Attention and Perception: Attention and perception normal.         Mood and Affect: Mood normal.         Speech: Speech normal.         Behavior: Behavior normal.         Thought Content: Thought content normal.         Cognition and Memory: Cognition and memory normal.         Judgment: Judgment normal.              An electronic signature was used to authenticate this note.    --VIANEY ALEJANDRA MD

## 2024-03-21 ENCOUNTER — PATIENT MESSAGE (OUTPATIENT)
Dept: INTERNAL MEDICINE CLINIC | Age: 79
End: 2024-03-21

## 2024-03-21 DIAGNOSIS — I10 ESSENTIAL HYPERTENSION: ICD-10-CM

## 2024-03-21 NOTE — TELEPHONE ENCOUNTER
Recent Visits  Date Type Provider Dept   01/30/24 Office Visit Eva Hedrick MD [facility]   08/30/23 Office Visit Eva Hedrick MD [facility]   07/25/23 Office Visit Eva Hedrick MD [facility]   03/22/23 Office Visit Eva Hedrick MD [facility]   02/20/23 Office Visit Eva Hedrick MD [facility]   01/16/23 Office Visit Eva Hedrick MD [facility]   10/31/22 Office Visit Eva Hedrick MD [facility]   Showing recent visits within past 540 days with a meds authorizing provider and meeting all other requirements  Future Appointments  Date Type Provider Dept   08/02/24 Appointment Eva Hedrick MD [facility]   Showing future appointments within next 150 days with a meds authorizing provider and meeting all other requirements     1/30/2024

## 2024-03-21 NOTE — TELEPHONE ENCOUNTER
From: Nirav Hernandes  To: Dr. Eva Hedrick  Sent: 3/21/2024 9:38 AM EDT  Subject: Medicine     I need a prescription for metoprolol please if I am supposed to continue to take either 50mg twice a day or 100mg once a day please advise

## 2024-03-22 RX ORDER — METOPROLOL TARTRATE 50 MG/1
50 TABLET, FILM COATED ORAL 2 TIMES DAILY
Qty: 180 TABLET | Refills: 1 | Status: SHIPPED | OUTPATIENT
Start: 2024-03-22

## 2024-07-30 SDOH — ECONOMIC STABILITY: FOOD INSECURITY: WITHIN THE PAST 12 MONTHS, YOU WORRIED THAT YOUR FOOD WOULD RUN OUT BEFORE YOU GOT MONEY TO BUY MORE.: NEVER TRUE

## 2024-07-30 SDOH — HEALTH STABILITY: PHYSICAL HEALTH: ON AVERAGE, HOW MANY DAYS PER WEEK DO YOU ENGAGE IN MODERATE TO STRENUOUS EXERCISE (LIKE A BRISK WALK)?: 3 DAYS

## 2024-07-30 SDOH — ECONOMIC STABILITY: FOOD INSECURITY: WITHIN THE PAST 12 MONTHS, THE FOOD YOU BOUGHT JUST DIDN'T LAST AND YOU DIDN'T HAVE MONEY TO GET MORE.: NEVER TRUE

## 2024-07-30 SDOH — HEALTH STABILITY: PHYSICAL HEALTH: ON AVERAGE, HOW MANY MINUTES DO YOU ENGAGE IN EXERCISE AT THIS LEVEL?: 30 MIN

## 2024-07-30 SDOH — ECONOMIC STABILITY: INCOME INSECURITY: HOW HARD IS IT FOR YOU TO PAY FOR THE VERY BASICS LIKE FOOD, HOUSING, MEDICAL CARE, AND HEATING?: NOT HARD AT ALL

## 2024-07-30 SDOH — ECONOMIC STABILITY: TRANSPORTATION INSECURITY
IN THE PAST 12 MONTHS, HAS LACK OF TRANSPORTATION KEPT YOU FROM MEETINGS, WORK, OR FROM GETTING THINGS NEEDED FOR DAILY LIVING?: NO

## 2024-07-30 ASSESSMENT — LIFESTYLE VARIABLES
HOW MANY STANDARD DRINKS CONTAINING ALCOHOL DO YOU HAVE ON A TYPICAL DAY: 0
HOW OFTEN DO YOU HAVE A DRINK CONTAINING ALCOHOL: 1
HOW MANY STANDARD DRINKS CONTAINING ALCOHOL DO YOU HAVE ON A TYPICAL DAY: PATIENT DOES NOT DRINK
HOW OFTEN DO YOU HAVE SIX OR MORE DRINKS ON ONE OCCASION: 1
HOW OFTEN DO YOU HAVE A DRINK CONTAINING ALCOHOL: NEVER

## 2024-07-30 ASSESSMENT — PATIENT HEALTH QUESTIONNAIRE - PHQ9
SUM OF ALL RESPONSES TO PHQ9 QUESTIONS 1 & 2: 0
SUM OF ALL RESPONSES TO PHQ QUESTIONS 1-9: 0
SUM OF ALL RESPONSES TO PHQ QUESTIONS 1-9: 0
1. LITTLE INTEREST OR PLEASURE IN DOING THINGS: NOT AT ALL
2. FEELING DOWN, DEPRESSED OR HOPELESS: NOT AT ALL
SUM OF ALL RESPONSES TO PHQ QUESTIONS 1-9: 0
SUM OF ALL RESPONSES TO PHQ QUESTIONS 1-9: 0

## 2024-08-02 ENCOUNTER — OFFICE VISIT (OUTPATIENT)
Dept: INTERNAL MEDICINE CLINIC | Age: 79
End: 2024-08-02

## 2024-08-02 VITALS
HEART RATE: 76 BPM | OXYGEN SATURATION: 98 % | BODY MASS INDEX: 31.18 KG/M2 | WEIGHT: 243 LBS | DIASTOLIC BLOOD PRESSURE: 62 MMHG | HEIGHT: 74 IN | SYSTOLIC BLOOD PRESSURE: 120 MMHG

## 2024-08-02 DIAGNOSIS — I10 ESSENTIAL HYPERTENSION: ICD-10-CM

## 2024-08-02 DIAGNOSIS — M62.82 NON-TRAUMATIC RHABDOMYOLYSIS: ICD-10-CM

## 2024-08-02 DIAGNOSIS — R79.9 ABNORMAL FINDING OF BLOOD CHEMISTRY, UNSPECIFIED: ICD-10-CM

## 2024-08-02 DIAGNOSIS — Z12.5 SCREENING FOR PROSTATE CANCER: ICD-10-CM

## 2024-08-02 DIAGNOSIS — I77.9 CAROTID ARTERY DISEASE, UNSPECIFIED LATERALITY (HCC): ICD-10-CM

## 2024-08-02 DIAGNOSIS — Z00.00 MEDICARE ANNUAL WELLNESS VISIT, SUBSEQUENT: Primary | ICD-10-CM

## 2024-08-02 DIAGNOSIS — Z13.1 SCREENING FOR DIABETES MELLITUS (DM): ICD-10-CM

## 2024-08-02 DIAGNOSIS — N18.31 STAGE 3A CHRONIC KIDNEY DISEASE (HCC): ICD-10-CM

## 2024-08-02 DIAGNOSIS — Z12.5 SCREENING PSA (PROSTATE SPECIFIC ANTIGEN): ICD-10-CM

## 2024-08-02 DIAGNOSIS — Z72.89 OTHER PROBLEMS RELATED TO LIFESTYLE: ICD-10-CM

## 2024-08-02 DIAGNOSIS — Z00.00 ROUTINE GENERAL MEDICAL EXAMINATION AT A HEALTH CARE FACILITY: ICD-10-CM

## 2024-08-02 RX ORDER — METOPROLOL TARTRATE 50 MG/1
50 TABLET, FILM COATED ORAL 2 TIMES DAILY
Qty: 180 TABLET | Refills: 1 | Status: SHIPPED | OUTPATIENT
Start: 2024-08-02

## 2024-08-02 RX ORDER — ASPIRIN 81 MG/1
81 TABLET, CHEWABLE ORAL DAILY
Qty: 30 TABLET | Refills: 2 | Status: SHIPPED | OUTPATIENT
Start: 2024-08-02

## 2024-08-02 RX ORDER — METOPROLOL TARTRATE 50 MG/1
50 TABLET, FILM COATED ORAL 2 TIMES DAILY
Qty: 180 TABLET | Refills: 1 | Status: SHIPPED | OUTPATIENT
Start: 2024-08-02 | End: 2024-08-02 | Stop reason: SDUPTHER

## 2024-08-02 RX ORDER — LOSARTAN POTASSIUM AND HYDROCHLOROTHIAZIDE 25; 100 MG/1; MG/1
1 TABLET ORAL DAILY
Qty: 90 TABLET | Refills: 1 | Status: SHIPPED | OUTPATIENT
Start: 2024-08-02 | End: 2025-08-25

## 2024-08-02 SDOH — SOCIAL STABILITY: SOCIAL NETWORK
DO YOU BELONG TO ANY CLUBS OR ORGANIZATIONS SUCH AS CHURCH GROUPS UNIONS, FRATERNAL OR ATHLETIC GROUPS, OR SCHOOL GROUPS?: NO

## 2024-08-02 SDOH — SOCIAL STABILITY: SOCIAL NETWORK: HOW OFTEN DO YOU ATTEND CHURCH OR RELIGIOUS SERVICES?: 1 TO 4 TIMES PER YEAR

## 2024-08-02 SDOH — SOCIAL STABILITY: SOCIAL INSECURITY: WITHIN THE LAST YEAR, HAVE YOU BEEN AFRAID OF YOUR PARTNER OR EX-PARTNER?: NO

## 2024-08-02 SDOH — SOCIAL STABILITY: SOCIAL INSECURITY
WITHIN THE LAST YEAR, HAVE YOU BEEN KICKED, HIT, SLAPPED, OR OTHERWISE PHYSICALLY HURT BY YOUR PARTNER OR EX-PARTNER?: NO

## 2024-08-02 SDOH — SOCIAL STABILITY: SOCIAL NETWORK: HOW OFTEN DO YOU ATTENT MEETINGS OF THE CLUB OR ORGANIZATION YOU BELONG TO?: MORE THAN 4 TIMES PER YEAR

## 2024-08-02 SDOH — SOCIAL STABILITY: SOCIAL INSECURITY
WITHIN THE LAST YEAR, HAVE TO BEEN RAPED OR FORCED TO HAVE ANY KIND OF SEXUAL ACTIVITY BY YOUR PARTNER OR EX-PARTNER?: NO

## 2024-08-02 SDOH — ECONOMIC STABILITY: FOOD INSECURITY: WITHIN THE PAST 12 MONTHS, THE FOOD YOU BOUGHT JUST DIDN'T LAST AND YOU DIDN'T HAVE MONEY TO GET MORE.: NEVER TRUE

## 2024-08-02 SDOH — HEALTH STABILITY: MENTAL HEALTH: HOW OFTEN DO YOU HAVE A DRINK CONTAINING ALCOHOL?: 4 OR MORE TIMES A WEEK

## 2024-08-02 SDOH — HEALTH STABILITY: MENTAL HEALTH: HOW MANY STANDARD DRINKS CONTAINING ALCOHOL DO YOU HAVE ON A TYPICAL DAY?: PATIENT DOES NOT DRINK

## 2024-08-02 SDOH — ECONOMIC STABILITY: INCOME INSECURITY: IN THE LAST 12 MONTHS, WAS THERE A TIME WHEN YOU WERE NOT ABLE TO PAY THE MORTGAGE OR RENT ON TIME?: NO

## 2024-08-02 SDOH — ECONOMIC STABILITY: FOOD INSECURITY: WITHIN THE PAST 12 MONTHS, YOU WORRIED THAT YOUR FOOD WOULD RUN OUT BEFORE YOU GOT MONEY TO BUY MORE.: NEVER TRUE

## 2024-08-02 SDOH — SOCIAL STABILITY: SOCIAL NETWORK: ARE YOU MARRIED, WIDOWED, DIVORCED, SEPARATED, NEVER MARRIED, OR LIVING WITH A PARTNER?: MARRIED

## 2024-08-02 SDOH — HEALTH STABILITY: MENTAL HEALTH
STRESS IS WHEN SOMEONE FEELS TENSE, NERVOUS, ANXIOUS, OR CAN'T SLEEP AT NIGHT BECAUSE THEIR MIND IS TROUBLED. HOW STRESSED ARE YOU?: NOT AT ALL

## 2024-08-02 SDOH — ECONOMIC STABILITY: TRANSPORTATION INSECURITY
IN THE PAST 12 MONTHS, HAS THE LACK OF TRANSPORTATION KEPT YOU FROM MEDICAL APPOINTMENTS OR FROM GETTING MEDICATIONS?: NO

## 2024-08-02 SDOH — SOCIAL STABILITY: SOCIAL NETWORK: HOW OFTEN DO YOU GET TOGETHER WITH FRIENDS OR RELATIVES?: MORE THAN THREE TIMES A WEEK

## 2024-08-02 SDOH — SOCIAL STABILITY: SOCIAL NETWORK
IN A TYPICAL WEEK, HOW MANY TIMES DO YOU TALK ON THE PHONE WITH FAMILY, FRIENDS, OR NEIGHBORS?: MORE THAN THREE TIMES A WEEK

## 2024-08-02 SDOH — SOCIAL STABILITY: SOCIAL INSECURITY: WITHIN THE LAST YEAR, HAVE YOU BEEN HUMILIATED OR EMOTIONALLY ABUSED IN OTHER WAYS BY YOUR PARTNER OR EX-PARTNER?: NO

## 2024-08-02 SDOH — ECONOMIC STABILITY: INCOME INSECURITY: HOW HARD IS IT FOR YOU TO PAY FOR THE VERY BASICS LIKE FOOD, HOUSING, MEDICAL CARE, AND HEATING?: NOT HARD AT ALL

## 2024-08-02 SDOH — HEALTH STABILITY: PHYSICAL HEALTH: ON AVERAGE, HOW MANY DAYS PER WEEK DO YOU ENGAGE IN MODERATE TO STRENUOUS EXERCISE (LIKE A BRISK WALK)?: 0 DAYS

## 2024-08-02 NOTE — PROGRESS NOTES
written form: see Patient Instructions/AVS.     Return for cshedule.     Subjective   The following acute and/or chronic problems were also addressed today:    Treatment Adherence:   Medication compliance:  compliant most of the time  Diet compliance:  compliant most of the time  Weight trend: stable  Current exercise: no regular exercise  Barriers: none    Hypertension:  Home blood pressure monitoring: No. Patient denies shortness of breath, headache, lightheadedness, and blurred vision.  Antihypertensive medication side effects: no medication side effects noted.  Use of agents associated with hypertension: none.                                        Sodium (mmol/L)   Date Value   05/31/2024 141    BUN (mg/dL)   Date Value   05/31/2024 20    Glucose (mg/dL)   Date Value   05/31/2024 91      Potassium (mmol/L)   Date Value   05/31/2024 3.9     Potassium reflex Magnesium (mmol/L)   Date Value   10/28/2022 4.1    Creatinine (mg/dL)   Date Value   05/31/2024 1.2         Hyperlipidemia:  No new myalgias or GI upset on on no medications due to adverse reaction.     Lab Results   Component Value Date    CHOL 137 10/12/2022    TRIG 190 (H) 10/12/2022    HDL 34 (L) 10/12/2022     Lab Results   Component Value Date    ALT 27 04/24/2023    AST 53 (H) 04/24/2023         Patient's complete Health Risk Assessment and screening values have been reviewed and are found in Flowsheets. The following problems were reviewed today and where indicated follow up appointments were made and/or referrals ordered.    Positive Risk Factor Screenings with Interventions:                Inactivity:  On average, how many days per week do you engage in moderate to strenuous exercise (like a brisk walk)?: 0 days (!) Abnormal  On average, how many minutes do you engage in exercise at this level?: 30 min  Interventions:  Patient advised to follow up in the office for further evaluation and treatment     Abnormal BMI (obese):  Body mass index is 31.2

## 2024-08-02 NOTE — PATIENT INSTRUCTIONS
secondhand smoke too.     Stay at a weight that's healthy for you. Talk to your doctor if you need help losing weight.     Try to get 7 to 9 hours of sleep each night.     Limit alcohol to 2 drinks a day for men and 1 drink a day for women. Too much alcohol can cause health problems.     Manage other health problems such as diabetes, high blood pressure, and high cholesterol. If you think you may have a problem with alcohol or drug use, talk to your doctor.   Medicines    Take your medicines exactly as prescribed. Call your doctor if you think you are having a problem with your medicine.     If your doctor recommends aspirin, take the amount directed each day. Make sure you take aspirin and not another kind of pain reliever, such as acetaminophen (Tylenol).   When should you call for help?   Call 911 if you have symptoms of a heart attack. These may include:    Chest pain or pressure, or a strange feeling in the chest.     Sweating.     Shortness of breath.     Pain, pressure, or a strange feeling in the back, neck, jaw, or upper belly or in one or both shoulders or arms.     Lightheadedness or sudden weakness.     A fast or irregular heartbeat.   After you call 911, the  may tell you to chew 1 adult-strength or 2 to 4 low-dose aspirin. Wait for an ambulance. Do not try to drive yourself.  Watch closely for changes in your health, and be sure to contact your doctor if you have any problems.  Where can you learn more?  Go to https://www.[employer].net/patientEd and enter F075 to learn more about \"A Healthy Heart: Care Instructions.\"  Current as of: June 24, 2023  Content Version: 14.1  © 2006-2024 [employer].   Care instructions adapted under license by [employer]. If you have questions about a medical condition or this instruction, always ask your healthcare professional. [employer] disclaims any warranty or liability for your use of this information.      Personalized

## 2024-08-08 DIAGNOSIS — Z12.5 SCREENING PSA (PROSTATE SPECIFIC ANTIGEN): ICD-10-CM

## 2024-08-08 DIAGNOSIS — Z13.1 SCREENING FOR DIABETES MELLITUS (DM): ICD-10-CM

## 2024-08-08 DIAGNOSIS — Z72.89 OTHER PROBLEMS RELATED TO LIFESTYLE: ICD-10-CM

## 2024-08-08 DIAGNOSIS — Z12.5 SCREENING FOR PROSTATE CANCER: ICD-10-CM

## 2024-08-08 LAB
ALBUMIN SERPL-MCNC: 4.1 G/DL (ref 3.4–5)
ALBUMIN/GLOB SERPL: 1.3 {RATIO} (ref 1.1–2.2)
ALP SERPL-CCNC: 112 U/L (ref 40–129)
ALT SERPL-CCNC: 31 U/L (ref 10–40)
ANION GAP SERPL CALCULATED.3IONS-SCNC: 21 MMOL/L (ref 3–16)
AST SERPL-CCNC: 40 U/L (ref 15–37)
BILIRUB SERPL-MCNC: 0.3 MG/DL (ref 0–1)
BUN SERPL-MCNC: 20 MG/DL (ref 7–20)
CHLORIDE SERPL-SCNC: 98 MMOL/L (ref 99–110)
CO2 SERPL-SCNC: 20 MMOL/L (ref 21–32)
CREAT SERPL-MCNC: 1.2 MG/DL (ref 0.8–1.3)
GFR SERPLBLD CREATININE-BSD FMLA CKD-EPI: 62 ML/MIN/{1.73_M2}
GLUCOSE P FAST SERPL-MCNC: 89 MG/DL (ref 70–99)
POTASSIUM SERPL-SCNC: 4.6 MMOL/L (ref 3.5–5.1)
PROT SERPL-MCNC: 7.2 G/DL (ref 6.4–8.2)
PSA SERPL DL<=0.01 NG/ML-MCNC: 2.52 NG/ML (ref 0–4)
SODIUM SERPL-SCNC: 139 MMOL/L (ref 136–145)

## 2024-08-09 LAB
EST. AVERAGE GLUCOSE BLD GHB EST-MCNC: 108.3 MG/DL
HBA1C MFR BLD: 5.4 %

## 2024-12-24 DIAGNOSIS — I10 ESSENTIAL HYPERTENSION: ICD-10-CM

## 2024-12-26 NOTE — TELEPHONE ENCOUNTER
Recent Visits  Date Type Provider Dept   08/02/24 Office Visit Eva Hedrick MD [facility]   01/30/24 Office Visit Eva Hedrick MD [facility]   08/30/23 Office Visit Eva Hedrick MD [facility]   07/25/23 Office Visit Eva Hedrick MD [facility]   Showing recent visits within past 540 days with a meds authorizing provider and meeting all other requirements  Future Appointments  Date Type Provider Dept   02/03/25 Appointment Eva Hedrick MD [facility]   Showing future appointments within next 150 days with a meds authorizing provider and meeting all other requirements     8/2/2024     Requested Prescriptions     Pending Prescriptions Disp Refills    metoprolol tartrate (LOPRESSOR) 50 MG tablet [Pharmacy Med Name: METOPROL TAR TAB 50MG] 180 tablet 1     Sig: TAKE 1 TABLET TWICE A DAY

## 2024-12-27 RX ORDER — METOPROLOL TARTRATE 50 MG
50 TABLET ORAL 2 TIMES DAILY
Qty: 180 TABLET | Refills: 1 | Status: SHIPPED | OUTPATIENT
Start: 2024-12-27

## 2025-01-29 ENCOUNTER — TELEPHONE (OUTPATIENT)
Dept: INTERNAL MEDICINE CLINIC | Age: 80
End: 2025-01-29

## 2025-01-29 NOTE — TELEPHONE ENCOUNTER
Left message to call office at [phone] to reschedule appointment. Provider will be out of the office.

## 2025-02-15 DIAGNOSIS — N18.31 STAGE 3A CHRONIC KIDNEY DISEASE (HCC): ICD-10-CM

## 2025-02-16 NOTE — TELEPHONE ENCOUNTER
Recent Visits  Date Type Provider Dept   08/02/24 Office Visit Eva Hedrick MD [facility]   01/30/24 Office Visit Eva Hedrick MD [facility]   08/30/23 Office Visit Eva Hedrick MD [facility]   Showing recent visits within past 540 days with a meds authorizing provider and meeting all other requirements  Future Appointments  Date Type Provider Dept   04/01/25 Appointment Eva Hedrick MD [facility]   Showing future appointments within next 150 days with a meds authorizing provider and meeting all other requirements     8/2/2024

## 2025-02-17 RX ORDER — LOSARTAN POTASSIUM AND HYDROCHLOROTHIAZIDE 25; 100 MG/1; MG/1
1 TABLET ORAL DAILY
Qty: 90 TABLET | Refills: 0 | Status: SHIPPED | OUTPATIENT
Start: 2025-02-17

## 2025-03-30 SDOH — ECONOMIC STABILITY: FOOD INSECURITY: WITHIN THE PAST 12 MONTHS, YOU WORRIED THAT YOUR FOOD WOULD RUN OUT BEFORE YOU GOT MONEY TO BUY MORE.: NEVER TRUE

## 2025-03-30 SDOH — ECONOMIC STABILITY: INCOME INSECURITY: IN THE LAST 12 MONTHS, WAS THERE A TIME WHEN YOU WERE NOT ABLE TO PAY THE MORTGAGE OR RENT ON TIME?: NO

## 2025-03-30 SDOH — ECONOMIC STABILITY: FOOD INSECURITY: WITHIN THE PAST 12 MONTHS, THE FOOD YOU BOUGHT JUST DIDN'T LAST AND YOU DIDN'T HAVE MONEY TO GET MORE.: NEVER TRUE

## 2025-03-30 ASSESSMENT — PATIENT HEALTH QUESTIONNAIRE - PHQ9
SUM OF ALL RESPONSES TO PHQ QUESTIONS 1-9: 0
SUM OF ALL RESPONSES TO PHQ QUESTIONS 1-9: 0
2. FEELING DOWN, DEPRESSED OR HOPELESS: NOT AT ALL
SUM OF ALL RESPONSES TO PHQ9 QUESTIONS 1 & 2: 0
2. FEELING DOWN, DEPRESSED OR HOPELESS: NOT AT ALL
SUM OF ALL RESPONSES TO PHQ QUESTIONS 1-9: 0
1. LITTLE INTEREST OR PLEASURE IN DOING THINGS: NOT AT ALL
1. LITTLE INTEREST OR PLEASURE IN DOING THINGS: NOT AT ALL
SUM OF ALL RESPONSES TO PHQ QUESTIONS 1-9: 0

## 2025-04-01 ENCOUNTER — OFFICE VISIT (OUTPATIENT)
Dept: INTERNAL MEDICINE CLINIC | Age: 80
End: 2025-04-01
Payer: COMMERCIAL

## 2025-04-01 VITALS
TEMPERATURE: 97.3 F | WEIGHT: 228 LBS | DIASTOLIC BLOOD PRESSURE: 62 MMHG | SYSTOLIC BLOOD PRESSURE: 128 MMHG | HEART RATE: 87 BPM | OXYGEN SATURATION: 96 % | BODY MASS INDEX: 29.27 KG/M2

## 2025-04-01 DIAGNOSIS — Z12.5 SCREENING PSA (PROSTATE SPECIFIC ANTIGEN): ICD-10-CM

## 2025-04-01 DIAGNOSIS — I10 ESSENTIAL HYPERTENSION: ICD-10-CM

## 2025-04-01 DIAGNOSIS — N18.31 STAGE 3A CHRONIC KIDNEY DISEASE (HCC): Primary | ICD-10-CM

## 2025-04-01 DIAGNOSIS — Z13.9 ENCOUNTER FOR HEALTH-RELATED SCREENING: ICD-10-CM

## 2025-04-01 PROCEDURE — 1159F MED LIST DOCD IN RCRD: CPT | Performed by: INTERNAL MEDICINE

## 2025-04-01 PROCEDURE — 3074F SYST BP LT 130 MM HG: CPT | Performed by: INTERNAL MEDICINE

## 2025-04-01 PROCEDURE — 1160F RVW MEDS BY RX/DR IN RCRD: CPT | Performed by: INTERNAL MEDICINE

## 2025-04-01 PROCEDURE — 99214 OFFICE O/P EST MOD 30 MIN: CPT | Performed by: INTERNAL MEDICINE

## 2025-04-01 PROCEDURE — 3078F DIAST BP <80 MM HG: CPT | Performed by: INTERNAL MEDICINE

## 2025-04-01 PROCEDURE — 1123F ACP DISCUSS/DSCN MKR DOCD: CPT | Performed by: INTERNAL MEDICINE

## 2025-04-01 RX ORDER — METOPROLOL TARTRATE 50 MG
50 TABLET ORAL 2 TIMES DAILY
Qty: 180 TABLET | Refills: 1 | Status: SHIPPED | OUTPATIENT
Start: 2025-04-01

## 2025-04-01 RX ORDER — LOSARTAN POTASSIUM AND HYDROCHLOROTHIAZIDE 25; 100 MG/1; MG/1
1 TABLET ORAL DAILY
Qty: 90 TABLET | Refills: 1 | Status: SHIPPED | OUTPATIENT
Start: 2025-04-01

## 2025-04-01 SDOH — HEALTH STABILITY: PHYSICAL HEALTH: ON AVERAGE, HOW MANY DAYS PER WEEK DO YOU ENGAGE IN MODERATE TO STRENUOUS EXERCISE (LIKE A BRISK WALK)?: 3 DAYS

## 2025-04-01 SDOH — HEALTH STABILITY: PHYSICAL HEALTH: ON AVERAGE, HOW MANY MINUTES DO YOU ENGAGE IN EXERCISE AT THIS LEVEL?: 40 MIN

## 2025-04-01 ASSESSMENT — LIFESTYLE VARIABLES
HOW MANY STANDARD DRINKS CONTAINING ALCOHOL DO YOU HAVE ON A TYPICAL DAY: PATIENT DOES NOT DRINK
HOW OFTEN DO YOU HAVE A DRINK CONTAINING ALCOHOL: NEVER

## 2025-04-01 ASSESSMENT — SOCIAL DETERMINANTS OF HEALTH (SDOH)
WITHIN THE LAST YEAR, HAVE YOU BEEN HUMILIATED OR EMOTIONALLY ABUSED IN OTHER WAYS BY YOUR PARTNER OR EX-PARTNER?: NO
WITHIN THE LAST YEAR, HAVE YOU BEEN AFRAID OF YOUR PARTNER OR EX-PARTNER?: NO
WITHIN THE LAST YEAR, HAVE YOU BEEN KICKED, HIT, SLAPPED, OR OTHERWISE PHYSICALLY HURT BY YOUR PARTNER OR EX-PARTNER?: NO
WITHIN THE LAST YEAR, HAVE TO BEEN RAPED OR FORCED TO HAVE ANY KIND OF SEXUAL ACTIVITY BY YOUR PARTNER OR EX-PARTNER?: NO
HOW HARD IS IT FOR YOU TO PAY FOR THE VERY BASICS LIKE FOOD, HOUSING, MEDICAL CARE, AND HEATING?: NOT HARD AT ALL

## 2025-04-01 NOTE — PROGRESS NOTES
Nirav Hernandes (:  1945) is a 79 y.o. male,Established patient, here for evaluation of the following chief complaint(s):  Hypertension      Assessment & Plan   Assessment/Plan:  Nirav was seen today for hypertension.    Diagnoses and all orders for this visit:    Stage 3a chronic kidney disease (HCC)  -     losartan-hydroCHLOROthiazide (HYZAAR) 100-25 MG per tablet; Take 1 tablet by mouth daily  -     CBC; Future    Essential hypertension  -     metoprolol tartrate (LOPRESSOR) 50 MG tablet; Take 1 tablet by mouth 2 times daily  -     Albumin/Creatinine Ratio, Urine  -     NIFEdipine (ADALAT CC) 60 MG extended release tablet; Take 1 tablet by mouth daily    Encounter for health-related screening  -     losartan-hydroCHLOROthiazide (HYZAAR) 100-25 MG per tablet; Take 1 tablet by mouth daily  -     CBC; Future  -     Lipid, Fasting; Future  -     Hemoglobin A1C; Future  -     Comprehensive Metabolic Panel; Future  -     Albumin/Creatinine Ratio, Urine    Screening PSA (prostate specific antigen)  -     PSA Screening; Future      No follow-ups on file.          Subjective   SUBJECTIVE/OBJECTIVE:  Hypertension      ,Treatment Adherence:   Medication compliance:  compliant most of the time  Diet compliance:  compliant most of the time  Weight trend: stable  Current exercise: has started back exercising  Barriers: overwhelmed by complexity of regimen    Hypertension:  Home blood pressure monitoring: No. Patient denies shortness of breath, headache, blurred vision, peripheral edema, and palpitations.  Antihypertensive medication side effects: no medication side effects noted and light headedness when he stands.  Use of agents associated with hypertension: none.                                        Sodium (mmol/L)   Date Value   2024 139    BUN (mg/dL)   Date Value   2024 20    Glucose (mg/dL)   Date Value   2024 91      Potassium (mmol/L)   Date Value   2024 4.6     Potassium reflex

## 2025-04-02 DIAGNOSIS — N18.31 STAGE 3A CHRONIC KIDNEY DISEASE (HCC): ICD-10-CM

## 2025-04-02 DIAGNOSIS — Z13.9 ENCOUNTER FOR HEALTH-RELATED SCREENING: ICD-10-CM

## 2025-04-02 LAB
ALBUMIN SERPL-MCNC: 3.9 G/DL (ref 3.4–5)
ALBUMIN/GLOB SERPL: 1.5 {RATIO} (ref 1.1–2.2)
ALP SERPL-CCNC: 89 U/L (ref 40–129)
ALT SERPL-CCNC: 27 U/L (ref 10–40)
ANION GAP SERPL CALCULATED.3IONS-SCNC: 12 MMOL/L (ref 3–16)
AST SERPL-CCNC: 38 U/L (ref 15–37)
BILIRUB SERPL-MCNC: 0.3 MG/DL (ref 0–1)
BUN SERPL-MCNC: 17 MG/DL (ref 7–20)
CALCIUM SERPL-MCNC: 9.1 MG/DL (ref 8.3–10.6)
CHLORIDE SERPL-SCNC: 102 MMOL/L (ref 99–110)
CHOLEST SERPL-MCNC: 307 MG/DL (ref 0–199)
CO2 SERPL-SCNC: 25 MMOL/L (ref 21–32)
CREAT SERPL-MCNC: 1.1 MG/DL (ref 0.8–1.3)
CREAT UR-MCNC: 149 MG/DL (ref 39–259)
DEPRECATED RDW RBC AUTO: 17.3 % (ref 12.4–15.4)
EST. AVERAGE GLUCOSE BLD GHB EST-MCNC: 96.8 MG/DL
GFR SERPLBLD CREATININE-BSD FMLA CKD-EPI: 69 ML/MIN/{1.73_M2}
GLUCOSE SERPL-MCNC: 85 MG/DL (ref 70–99)
HBA1C MFR BLD: 5 %
HCT VFR BLD AUTO: 40.1 % (ref 40.5–52.5)
HDLC SERPL-MCNC: 46 MG/DL (ref 40–60)
HGB BLD-MCNC: 13 G/DL (ref 13.5–17.5)
LDL CHOLESTEROL: 225 MG/DL
MCH RBC QN AUTO: 28.4 PG (ref 26–34)
MCHC RBC AUTO-ENTMCNC: 32.3 G/DL (ref 31–36)
MCV RBC AUTO: 87.9 FL (ref 80–100)
MICROALBUMIN UR DL<=1MG/L-MCNC: <1.2 MG/DL
MICROALBUMIN/CREAT UR: NORMAL MG/G (ref 0–30)
PLATELET # BLD AUTO: 170 K/UL (ref 135–450)
PMV BLD AUTO: 9.5 FL (ref 5–10.5)
POTASSIUM SERPL-SCNC: 4.1 MMOL/L (ref 3.5–5.1)
PROT SERPL-MCNC: 6.5 G/DL (ref 6.4–8.2)
RBC # BLD AUTO: 4.56 M/UL (ref 4.2–5.9)
SODIUM SERPL-SCNC: 139 MMOL/L (ref 136–145)
TRIGL SERPL-MCNC: 179 MG/DL (ref 0–150)
VLDLC SERPL CALC-MCNC: 36 MG/DL
WBC # BLD AUTO: 3.8 K/UL (ref 4–11)

## 2025-08-14 SDOH — HEALTH STABILITY: PHYSICAL HEALTH: ON AVERAGE, HOW MANY DAYS PER WEEK DO YOU ENGAGE IN MODERATE TO STRENUOUS EXERCISE (LIKE A BRISK WALK)?: 3 DAYS

## 2025-08-14 SDOH — HEALTH STABILITY: PHYSICAL HEALTH: ON AVERAGE, HOW MANY MINUTES DO YOU ENGAGE IN EXERCISE AT THIS LEVEL?: 30 MIN

## 2025-08-14 ASSESSMENT — LIFESTYLE VARIABLES
HOW OFTEN DO YOU HAVE SIX OR MORE DRINKS ON ONE OCCASION: 1
HOW OFTEN DO YOU HAVE A DRINK CONTAINING ALCOHOL: NEVER
HOW OFTEN DO YOU HAVE A DRINK CONTAINING ALCOHOL: 1
HOW MANY STANDARD DRINKS CONTAINING ALCOHOL DO YOU HAVE ON A TYPICAL DAY: PATIENT DOES NOT DRINK
HOW MANY STANDARD DRINKS CONTAINING ALCOHOL DO YOU HAVE ON A TYPICAL DAY: 0

## 2025-08-14 ASSESSMENT — PATIENT HEALTH QUESTIONNAIRE - PHQ9
SUM OF ALL RESPONSES TO PHQ QUESTIONS 1-9: 0
SUM OF ALL RESPONSES TO PHQ QUESTIONS 1-9: 0
2. FEELING DOWN, DEPRESSED OR HOPELESS: NOT AT ALL
1. LITTLE INTEREST OR PLEASURE IN DOING THINGS: NOT AT ALL
SUM OF ALL RESPONSES TO PHQ QUESTIONS 1-9: 0
SUM OF ALL RESPONSES TO PHQ QUESTIONS 1-9: 0

## 2025-08-15 ENCOUNTER — OFFICE VISIT (OUTPATIENT)
Dept: INTERNAL MEDICINE CLINIC | Age: 80
End: 2025-08-15

## 2025-08-15 VITALS
WEIGHT: 229 LBS | BODY MASS INDEX: 31.02 KG/M2 | TEMPERATURE: 97.9 F | SYSTOLIC BLOOD PRESSURE: 116 MMHG | HEART RATE: 72 BPM | HEIGHT: 72 IN | OXYGEN SATURATION: 95 % | DIASTOLIC BLOOD PRESSURE: 62 MMHG

## 2025-08-15 DIAGNOSIS — I25.84 CORONARY ARTERY DISEASE DUE TO CALCIFIED CORONARY LESION: ICD-10-CM

## 2025-08-15 DIAGNOSIS — Z00.00 MEDICARE ANNUAL WELLNESS VISIT, SUBSEQUENT: Primary | ICD-10-CM

## 2025-08-15 DIAGNOSIS — N18.31 STAGE 3A CHRONIC KIDNEY DISEASE (HCC): ICD-10-CM

## 2025-08-15 DIAGNOSIS — I21.4 NSTEMI (NON-ST ELEVATED MYOCARDIAL INFARCTION) (HCC): ICD-10-CM

## 2025-08-15 DIAGNOSIS — I25.10 CORONARY ARTERY DISEASE DUE TO CALCIFIED CORONARY LESION: ICD-10-CM

## 2025-08-15 DIAGNOSIS — Z13.9 ENCOUNTER FOR HEALTH-RELATED SCREENING: ICD-10-CM

## 2025-08-15 DIAGNOSIS — I10 ESSENTIAL HYPERTENSION: ICD-10-CM

## 2025-08-15 DIAGNOSIS — M60.9 STATIN-INDUCED MYOSITIS: ICD-10-CM

## 2025-08-15 DIAGNOSIS — T46.6X5A STATIN-INDUCED MYOSITIS: ICD-10-CM

## 2025-08-15 RX ORDER — NEOMYCIN SULFATE, POLYMYXIN B SULFATE, AND DEXAMETHASONE 3.5; 10000; 1 MG/G; [USP'U]/G; MG/G
OINTMENT OPHTHALMIC
COMMUNITY
Start: 2025-07-30

## 2025-08-15 RX ORDER — LOSARTAN POTASSIUM AND HYDROCHLOROTHIAZIDE 25; 100 MG/1; MG/1
1 TABLET ORAL DAILY
Qty: 90 TABLET | Refills: 1 | Status: SHIPPED | OUTPATIENT
Start: 2025-08-15

## 2025-08-15 RX ORDER — METOPROLOL TARTRATE 50 MG
50 TABLET ORAL 2 TIMES DAILY
Qty: 180 TABLET | Refills: 1 | Status: SHIPPED | OUTPATIENT
Start: 2025-08-15

## 2025-08-15 RX ORDER — ICOSAPENT ETHYL 1 G/1
2 CAPSULE ORAL 2 TIMES DAILY
Qty: 60 CAPSULE | Refills: 3 | Status: SHIPPED | OUTPATIENT
Start: 2025-08-15

## 2025-08-18 ENCOUNTER — TELEPHONE (OUTPATIENT)
Dept: INTERNAL MEDICINE CLINIC | Age: 80
End: 2025-08-18

## (undated) DEVICE — ELECTRODE PT RET AD L9FT HI MOIST COND ADH HYDRGEL CORDED

## (undated) DEVICE — MAJOR SET UP PK

## (undated) DEVICE — CONTAINER,SPECIMEN,OR STERILE,4OZ: Brand: MEDLINE

## (undated) DEVICE — ADHESIVE SKIN CLOSURE TOP 36 CC HI VISC DERMBND MINI

## (undated) DEVICE — PROCEDURE KIT ENDOSCP CUST

## (undated) DEVICE — SOLUTION IV IRRIG WATER 500ML POUR BRL ST 2F7113

## (undated) DEVICE — STERILE POLYISOPRENE POWDER-FREE SURGICAL GLOVES: Brand: PROTEXIS

## (undated) DEVICE — 60 ML SYRINGE,REGULAR TIP: Brand: MONOJECT

## (undated) DEVICE — INTENDED FOR TISSUE SEPARATION, AND OTHER PROCEDURES THAT REQUIRE A SHARP SURGICAL BLADE TO PUNCTURE OR CUT.: Brand: BARD-PARKER ® STAINLESS STEEL BLADES

## (undated) DEVICE — APPLICATOR MEDICATED 26 CC SOLUTION HI LT ORNG CHLORAPREP

## (undated) DEVICE — SYRINGE, LUER LOCK, 10ML: Brand: MEDLINE

## (undated) DEVICE — NEEDLE HYPO 22GA L1.5IN BLK POLYPR HUB S STL REG BVL STR

## (undated) DEVICE — DRAPE ADOLESCENT  LAPAROTOMY

## (undated) DEVICE — CLAMP SURG JAW W16MM POLYPR DISP FOR MUSC BX RAYPRT

## (undated) DEVICE — SUTURE VCRL + SZ 2-0 L27IN ABSRB WHT SH 1/2 CIR TAPERCUT VCP417H

## (undated) DEVICE — SET VLV 3 PC AWS DISPOSABLE GRDIAN SCOPEVALET

## (undated) DEVICE — BW-412T DISP COMBO CLEANING BRUSH: Brand: SINGLE USE COMBINATION CLEANING BRUSH

## (undated) DEVICE — SUTURE VCRL + SZ 3-0 L18IN ABSRB UD SH 1/2 CIR TAPERCUT NDL VCP864D

## (undated) DEVICE — SOLUTION IRRIG 1000ML 0.9% SOD CHL USP POUR PLAS BTL

## (undated) DEVICE — SUTURE PERMA-HAND SZ 2-0 L30IN NONABSORBABLE BLK L26MM SH K833H

## (undated) DEVICE — SUTURE VCRL + SZ 4-0 L18IN ABSRB UD L19MM PS-2 3/8 CIR PRIM VCP496H

## (undated) DEVICE — MERCY FAIRFIELD TURNOVER KIT: Brand: MEDLINE INDUSTRIES, INC.

## (undated) DEVICE — SHEET,DRAPE,40X58,STERILE: Brand: MEDLINE